# Patient Record
Sex: MALE | Race: WHITE | NOT HISPANIC OR LATINO | Employment: OTHER | URBAN - METROPOLITAN AREA
[De-identification: names, ages, dates, MRNs, and addresses within clinical notes are randomized per-mention and may not be internally consistent; named-entity substitution may affect disease eponyms.]

---

## 2017-01-03 ENCOUNTER — ALLSCRIPTS OFFICE VISIT (OUTPATIENT)
Dept: OTHER | Facility: OTHER | Age: 72
End: 2017-01-03

## 2017-01-03 ENCOUNTER — APPOINTMENT (OUTPATIENT)
Dept: LAB | Facility: CLINIC | Age: 72
End: 2017-01-03
Payer: MEDICARE

## 2017-01-03 DIAGNOSIS — G70.00 MYASTHENIA GRAVIS WITHOUT (ACUTE) EXACERBATION (HCC): ICD-10-CM

## 2017-01-03 LAB
ALBUMIN SERPL BCP-MCNC: 3.5 G/DL (ref 3.5–5)
ALP SERPL-CCNC: 47 U/L (ref 46–116)
ALT SERPL W P-5'-P-CCNC: 46 U/L (ref 12–78)
ANION GAP SERPL CALCULATED.3IONS-SCNC: 11 MMOL/L (ref 4–13)
AST SERPL W P-5'-P-CCNC: 34 U/L (ref 5–45)
BILIRUB SERPL-MCNC: 1.02 MG/DL (ref 0.2–1)
BILIRUB UR QL STRIP: ABNORMAL
BUN SERPL-MCNC: 15 MG/DL (ref 5–25)
CALCIUM SERPL-MCNC: 10 MG/DL (ref 8.3–10.1)
CHLORIDE SERPL-SCNC: 97 MMOL/L (ref 100–108)
CLARITY UR: ABNORMAL
CO2 SERPL-SCNC: 30 MMOL/L (ref 21–32)
COLOR UR: ABNORMAL
CREAT SERPL-MCNC: 1.41 MG/DL (ref 0.6–1.3)
ERYTHROCYTE [DISTWIDTH] IN BLOOD BY AUTOMATED COUNT: 12.8 % (ref 11.6–15.1)
GFR SERPL CREATININE-BSD FRML MDRD: 49.5 ML/MIN/1.73SQ M
GLUCOSE (HISTORICAL): NEGATIVE
GLUCOSE SERPL-MCNC: 194 MG/DL (ref 65–140)
HCT VFR BLD AUTO: 45.9 % (ref 36.5–49.3)
HGB BLD-MCNC: 16.2 G/DL (ref 12–17)
HGB UR QL STRIP.AUTO: ABNORMAL
KETONES UR STRIP-MCNC: 0.5 MG/DL
LEUKOCYTE ESTERASE UR QL STRIP: ABNORMAL
MCH RBC QN AUTO: 31.6 PG (ref 26.8–34.3)
MCHC RBC AUTO-ENTMCNC: 35.3 G/DL (ref 31.4–37.4)
MCV RBC AUTO: 90 FL (ref 82–98)
NITRITE UR QL STRIP: ABNORMAL
PH UR STRIP.AUTO: 5 [PH]
PLATELET # BLD AUTO: 301 THOUSANDS/UL (ref 149–390)
PMV BLD AUTO: 10.6 FL (ref 8.9–12.7)
POTASSIUM SERPL-SCNC: 3.3 MMOL/L (ref 3.5–5.3)
PROT SERPL-MCNC: 7.5 G/DL (ref 6.4–8.2)
PROT UR STRIP-MCNC: ABNORMAL MG/DL
RBC # BLD AUTO: 5.13 MILLION/UL (ref 3.88–5.62)
SODIUM SERPL-SCNC: 138 MMOL/L (ref 136–145)
SP GR UR STRIP.AUTO: 1.02
UROBILINOGEN UR QL STRIP.AUTO: ABNORMAL
WBC # BLD AUTO: 15.3 THOUSAND/UL (ref 4.31–10.16)

## 2017-01-03 PROCEDURE — 80053 COMPREHEN METABOLIC PANEL: CPT

## 2017-01-03 PROCEDURE — 36415 COLL VENOUS BLD VENIPUNCTURE: CPT

## 2017-01-03 PROCEDURE — 85027 COMPLETE CBC AUTOMATED: CPT

## 2017-01-04 ENCOUNTER — GENERIC CONVERSION - ENCOUNTER (OUTPATIENT)
Dept: OTHER | Facility: OTHER | Age: 72
End: 2017-01-04

## 2017-01-04 ENCOUNTER — LAB REQUISITION (OUTPATIENT)
Dept: LAB | Facility: HOSPITAL | Age: 72
End: 2017-01-04
Payer: MEDICARE

## 2017-01-04 DIAGNOSIS — R31.9 HEMATURIA: ICD-10-CM

## 2017-01-04 DIAGNOSIS — R33.9 RETENTION OF URINE: ICD-10-CM

## 2017-01-04 DIAGNOSIS — N39.0 URINARY TRACT INFECTION: ICD-10-CM

## 2017-01-04 DIAGNOSIS — R50.9 FEVER: ICD-10-CM

## 2017-01-04 DIAGNOSIS — R31.29 OTHER MICROSCOPIC HEMATURIA: ICD-10-CM

## 2017-01-04 DIAGNOSIS — N20.0 CALCULUS OF KIDNEY: ICD-10-CM

## 2017-01-04 DIAGNOSIS — R00.0 TACHYCARDIA: ICD-10-CM

## 2017-01-04 PROCEDURE — 87186 SC STD MICRODIL/AGAR DIL: CPT | Performed by: FAMILY MEDICINE

## 2017-01-04 PROCEDURE — 87086 URINE CULTURE/COLONY COUNT: CPT | Performed by: FAMILY MEDICINE

## 2017-01-04 PROCEDURE — 87077 CULTURE AEROBIC IDENTIFY: CPT | Performed by: FAMILY MEDICINE

## 2017-01-06 ENCOUNTER — TRANSCRIBE ORDERS (OUTPATIENT)
Dept: ADMINISTRATIVE | Facility: HOSPITAL | Age: 72
End: 2017-01-06

## 2017-01-06 ENCOUNTER — ALLSCRIPTS OFFICE VISIT (OUTPATIENT)
Dept: OTHER | Facility: OTHER | Age: 72
End: 2017-01-06

## 2017-01-06 ENCOUNTER — LAB REQUISITION (OUTPATIENT)
Dept: LAB | Facility: HOSPITAL | Age: 72
DRG: 683 | End: 2017-01-06
Payer: MEDICARE

## 2017-01-06 ENCOUNTER — APPOINTMENT (OUTPATIENT)
Dept: LAB | Facility: MEDICAL CENTER | Age: 72
DRG: 683 | End: 2017-01-06
Payer: MEDICARE

## 2017-01-06 DIAGNOSIS — N39.0 URINARY TRACT INFECTION: ICD-10-CM

## 2017-01-06 DIAGNOSIS — N20.0 CALCULUS OF KIDNEY: ICD-10-CM

## 2017-01-06 DIAGNOSIS — R17 JAUNDICE: ICD-10-CM

## 2017-01-06 LAB
ALBUMIN SERPL BCP-MCNC: 3.3 G/DL (ref 3.5–5)
ALP SERPL-CCNC: 44 U/L (ref 46–116)
ALT SERPL W P-5'-P-CCNC: 34 U/L (ref 12–78)
ANION GAP SERPL CALCULATED.3IONS-SCNC: 10 MMOL/L (ref 4–13)
AST SERPL W P-5'-P-CCNC: 18 U/L (ref 5–45)
BACTERIA UR CULT: NORMAL
BACTERIA UR QL AUTO: ABNORMAL /HPF
BILIRUB DIRECT SERPL-MCNC: 0.26 MG/DL (ref 0–0.2)
BILIRUB SERPL-MCNC: 0.89 MG/DL (ref 0.2–1)
BILIRUB UR QL STRIP: NEGATIVE
BUN SERPL-MCNC: 19 MG/DL (ref 5–25)
CALCIUM ALBUM COR SERPL-MCNC: 10.8 MG/DL (ref 8.3–10.1)
CALCIUM SERPL-MCNC: 10.2 MG/DL (ref 8.3–10.1)
CHLORIDE SERPL-SCNC: 93 MMOL/L (ref 100–108)
CLARITY UR: ABNORMAL
CLARITY UR: NORMAL
CO2 SERPL-SCNC: 32 MMOL/L (ref 21–32)
COLOR UR: YELLOW
COLOR UR: YELLOW
CREAT SERPL-MCNC: 1.31 MG/DL (ref 0.6–1.3)
GFR SERPL CREATININE-BSD FRML MDRD: 53.9 ML/MIN/1.73SQ M
GLUCOSE (HISTORICAL): NORMAL
GLUCOSE SERPL-MCNC: 199 MG/DL (ref 65–140)
GLUCOSE UR STRIP-MCNC: ABNORMAL MG/DL
HGB UR QL STRIP.AUTO: NEGATIVE
HGB UR QL STRIP.AUTO: NORMAL
KETONES UR STRIP-MCNC: NEGATIVE MG/DL
KETONES UR STRIP-MCNC: NORMAL MG/DL
LEUKOCYTE ESTERASE UR QL STRIP: ABNORMAL
LEUKOCYTE ESTERASE UR QL STRIP: NORMAL
NITRITE UR QL STRIP: NORMAL
NITRITE UR QL STRIP: POSITIVE
NON-SQ EPI CELLS URNS QL MICRO: ABNORMAL /HPF
PH UR STRIP.AUTO: 5 [PH]
PH UR STRIP.AUTO: 7 [PH] (ref 4.5–8)
POTASSIUM SERPL-SCNC: 3.1 MMOL/L (ref 3.5–5.3)
PROT SERPL-MCNC: 7.1 G/DL (ref 6.4–8.2)
PROT UR STRIP-MCNC: ABNORMAL MG/DL
PROT UR STRIP-MCNC: NORMAL MG/DL
RBC #/AREA URNS AUTO: ABNORMAL /HPF
SODIUM SERPL-SCNC: 135 MMOL/L (ref 136–145)
SP GR UR STRIP.AUTO: 1.02
SP GR UR STRIP.AUTO: 1.02 (ref 1–1.03)
UROBILINOGEN UR QL STRIP.AUTO: 2 E.U./DL
WBC #/AREA URNS AUTO: ABNORMAL /HPF

## 2017-01-06 PROCEDURE — 80053 COMPREHEN METABOLIC PANEL: CPT

## 2017-01-06 PROCEDURE — 81001 URINALYSIS AUTO W/SCOPE: CPT

## 2017-01-06 PROCEDURE — 87186 SC STD MICRODIL/AGAR DIL: CPT

## 2017-01-06 PROCEDURE — 82360 CALCULUS ASSAY QUANT: CPT | Performed by: UROLOGY

## 2017-01-06 PROCEDURE — 36415 COLL VENOUS BLD VENIPUNCTURE: CPT

## 2017-01-06 PROCEDURE — 82248 BILIRUBIN DIRECT: CPT

## 2017-01-06 PROCEDURE — 87086 URINE CULTURE/COLONY COUNT: CPT

## 2017-01-08 LAB — BACTERIA UR CULT: NORMAL

## 2017-01-09 ENCOUNTER — HOSPITAL ENCOUNTER (OUTPATIENT)
Dept: ULTRASOUND IMAGING | Facility: HOSPITAL | Age: 72
Discharge: HOME | DRG: 683 | End: 2017-01-09
Payer: MEDICARE

## 2017-01-09 DIAGNOSIS — N39.0 URINARY TRACT INFECTION: ICD-10-CM

## 2017-01-09 PROCEDURE — 76770 US EXAM ABDO BACK WALL COMP: CPT

## 2017-01-10 ENCOUNTER — HOSPITAL ENCOUNTER (INPATIENT)
Facility: HOSPITAL | Age: 72
LOS: 2 days | Discharge: HOME | DRG: 683 | End: 2017-01-12
Attending: EMERGENCY MEDICINE | Admitting: HOSPITALIST
Payer: MEDICARE

## 2017-01-10 ENCOUNTER — APPOINTMENT (EMERGENCY)
Dept: RADIOLOGY | Facility: HOSPITAL | Age: 72
DRG: 683 | End: 2017-01-10
Payer: MEDICARE

## 2017-01-10 DIAGNOSIS — G70.00 MYASTHENIA GRAVIS (HCC): ICD-10-CM

## 2017-01-10 DIAGNOSIS — N17.9 AKI (ACUTE KIDNEY INJURY) (HCC): Primary | ICD-10-CM

## 2017-01-10 DIAGNOSIS — N20.0 KIDNEY STONE: ICD-10-CM

## 2017-01-10 DIAGNOSIS — G70.00 MYASTHENIA (HCC): ICD-10-CM

## 2017-01-10 DIAGNOSIS — R73.9 HYPERGLYCEMIA: ICD-10-CM

## 2017-01-10 DIAGNOSIS — N39.0 UTI (URINARY TRACT INFECTION): ICD-10-CM

## 2017-01-10 LAB
ALBUMIN SERPL BCP-MCNC: 3.6 G/DL (ref 3.5–5)
ALP SERPL-CCNC: 51 U/L (ref 46–116)
ALT SERPL W P-5'-P-CCNC: 40 U/L (ref 12–78)
ANION GAP SERPL CALCULATED.3IONS-SCNC: 11 MMOL/L (ref 4–13)
APTT PPP: 25 SECONDS (ref 24–36)
AST SERPL W P-5'-P-CCNC: 33 U/L (ref 5–45)
ATRIAL RATE: 101 BPM
BACTERIA UR QL AUTO: ABNORMAL /HPF
BASOPHILS # BLD AUTO: 0.01 THOUSANDS/ΜL (ref 0–0.1)
BASOPHILS NFR BLD AUTO: 0 % (ref 0–1)
BILIRUB SERPL-MCNC: 0.93 MG/DL (ref 0.2–1)
BILIRUB UR QL STRIP: NEGATIVE
BUN SERPL-MCNC: 22 MG/DL (ref 5–25)
CALCIUM SERPL-MCNC: 9.7 MG/DL (ref 8.3–10.1)
CHLORIDE SERPL-SCNC: 94 MMOL/L (ref 100–108)
CLARITY UR: ABNORMAL
CO2 SERPL-SCNC: 30 MMOL/L (ref 21–32)
COLOR UR: ABNORMAL
CREAT SERPL-MCNC: 1.67 MG/DL (ref 0.6–1.3)
DEPRECATED D DIMER PPP: 282 NG/ML (FEU) (ref 0–424)
EOSINOPHIL # BLD AUTO: 0.01 THOUSAND/ΜL (ref 0–0.61)
EOSINOPHIL NFR BLD AUTO: 0 % (ref 0–6)
ERYTHROCYTE [DISTWIDTH] IN BLOOD BY AUTOMATED COUNT: 12.8 % (ref 11.6–15.1)
GFR SERPL CREATININE-BSD FRML MDRD: 40.8 ML/MIN/1.73SQ M
GLUCOSE SERPL-MCNC: 299 MG/DL (ref 65–140)
GLUCOSE UR STRIP-MCNC: ABNORMAL MG/DL
HCT VFR BLD AUTO: 42.6 % (ref 36.5–49.3)
HGB BLD-MCNC: 15.3 G/DL (ref 12–17)
HGB UR QL STRIP.AUTO: NEGATIVE
INR PPP: 1.04 (ref 0.86–1.16)
KETONES UR STRIP-MCNC: ABNORMAL MG/DL
LEUKOCYTE ESTERASE UR QL STRIP: ABNORMAL
LYMPHOCYTES # BLD AUTO: 1.01 THOUSANDS/ΜL (ref 0.6–4.47)
LYMPHOCYTES NFR BLD AUTO: 9 % (ref 14–44)
MCH RBC QN AUTO: 32 PG (ref 26.8–34.3)
MCHC RBC AUTO-ENTMCNC: 35.9 G/DL (ref 31.4–37.4)
MCV RBC AUTO: 89 FL (ref 82–98)
MONOCYTES # BLD AUTO: 0.21 THOUSAND/ΜL (ref 0.17–1.22)
MONOCYTES NFR BLD AUTO: 2 % (ref 4–12)
NEUTROPHILS # BLD AUTO: 10.16 THOUSANDS/ΜL (ref 1.85–7.62)
NEUTS SEG NFR BLD AUTO: 89 % (ref 43–75)
NITRITE UR QL STRIP: NEGATIVE
NON-SQ EPI CELLS URNS QL MICRO: ABNORMAL /HPF
NRBC BLD AUTO-RTO: 0 /100 WBCS
NT-PROBNP SERPL-MCNC: 225 PG/ML
P AXIS: 52 DEGREES
PH UR STRIP.AUTO: 6.5 [PH] (ref 4.5–8)
PLATELET # BLD AUTO: 261 THOUSANDS/UL (ref 149–390)
PMV BLD AUTO: 10.3 FL (ref 8.9–12.7)
POTASSIUM SERPL-SCNC: 3.4 MMOL/L (ref 3.5–5.3)
PR INTERVAL: 142 MS
PROT SERPL-MCNC: 7.5 G/DL (ref 6.4–8.2)
PROT UR STRIP-MCNC: NEGATIVE MG/DL
PROTHROMBIN TIME: 13.6 SECONDS (ref 12–14.3)
QRS AXIS: -19 DEGREES
QRSD INTERVAL: 114 MS
QT INTERVAL: 340 MS
QTC INTERVAL: 440 MS
RBC # BLD AUTO: 4.78 MILLION/UL (ref 3.88–5.62)
RBC #/AREA URNS AUTO: ABNORMAL /HPF
SODIUM SERPL-SCNC: 135 MMOL/L (ref 136–145)
SP GR UR STRIP.AUTO: 1.01 (ref 1–1.03)
SPECIMEN SOURCE: NORMAL
T WAVE AXIS: 26 DEGREES
TROPONIN I BLD-MCNC: 0 NG/ML (ref 0–0.08)
TSH SERPL DL<=0.05 MIU/L-ACNC: 2.16 UIU/ML (ref 0.36–3.74)
UROBILINOGEN UR QL STRIP.AUTO: 2 E.U./DL
VENTRICULAR RATE: 101 BPM
WBC # BLD AUTO: 11.4 THOUSAND/UL (ref 4.31–10.16)
WBC #/AREA URNS AUTO: ABNORMAL /HPF

## 2017-01-10 PROCEDURE — 80053 COMPREHEN METABOLIC PANEL: CPT | Performed by: EMERGENCY MEDICINE

## 2017-01-10 PROCEDURE — 36415 COLL VENOUS BLD VENIPUNCTURE: CPT | Performed by: EMERGENCY MEDICINE

## 2017-01-10 PROCEDURE — 84484 ASSAY OF TROPONIN QUANT: CPT

## 2017-01-10 PROCEDURE — 85025 COMPLETE CBC W/AUTO DIFF WBC: CPT | Performed by: EMERGENCY MEDICINE

## 2017-01-10 PROCEDURE — 83880 ASSAY OF NATRIURETIC PEPTIDE: CPT | Performed by: EMERGENCY MEDICINE

## 2017-01-10 PROCEDURE — 85379 FIBRIN DEGRADATION QUANT: CPT | Performed by: EMERGENCY MEDICINE

## 2017-01-10 PROCEDURE — 99285 EMERGENCY DEPT VISIT HI MDM: CPT

## 2017-01-10 PROCEDURE — 87086 URINE CULTURE/COLONY COUNT: CPT

## 2017-01-10 PROCEDURE — 85610 PROTHROMBIN TIME: CPT | Performed by: EMERGENCY MEDICINE

## 2017-01-10 PROCEDURE — 85730 THROMBOPLASTIN TIME PARTIAL: CPT | Performed by: EMERGENCY MEDICINE

## 2017-01-10 PROCEDURE — 71020 HB CHEST X-RAY 2VW FRONTAL&LATL: CPT

## 2017-01-10 PROCEDURE — 84443 ASSAY THYROID STIM HORMONE: CPT | Performed by: EMERGENCY MEDICINE

## 2017-01-10 PROCEDURE — 93005 ELECTROCARDIOGRAM TRACING: CPT | Performed by: EMERGENCY MEDICINE

## 2017-01-10 PROCEDURE — 81002 URINALYSIS NONAUTO W/O SCOPE: CPT | Performed by: EMERGENCY MEDICINE

## 2017-01-10 PROCEDURE — 81001 URINALYSIS AUTO W/SCOPE: CPT

## 2017-01-10 RX ORDER — NITROFURANTOIN MACROCRYSTALS 100 MG/1
100 CAPSULE ORAL EVERY 12 HOURS
Status: ON HOLD | COMMUNITY
End: 2017-01-12

## 2017-01-10 RX ORDER — POTASSIUM CHLORIDE 750 MG/1
10 TABLET, EXTENDED RELEASE ORAL DAILY
COMMUNITY
End: 2017-03-06

## 2017-01-10 RX ORDER — FAMOTIDINE 20 MG/1
20 TABLET, FILM COATED ORAL DAILY
Status: DISCONTINUED | OUTPATIENT
Start: 2017-01-11 | End: 2017-01-12 | Stop reason: HOSPADM

## 2017-01-10 RX ORDER — MELATONIN
2 DAILY
COMMUNITY
End: 2019-10-29

## 2017-01-10 RX ORDER — PREDNISONE 20 MG/1
20 TABLET ORAL DAILY
Status: DISCONTINUED | OUTPATIENT
Start: 2017-01-11 | End: 2017-01-10

## 2017-01-10 RX ORDER — HYDROCHLOROTHIAZIDE 25 MG/1
25 TABLET ORAL DAILY
COMMUNITY
End: 2018-06-20

## 2017-01-10 RX ORDER — ACETAMINOPHEN 325 MG/1
650 TABLET ORAL EVERY 6 HOURS PRN
Status: DISCONTINUED | OUTPATIENT
Start: 2017-01-10 | End: 2017-01-12 | Stop reason: HOSPADM

## 2017-01-10 RX ORDER — NITROFURANTOIN 25; 75 MG/1; MG/1
100 CAPSULE ORAL 2 TIMES DAILY WITH MEALS
Status: DISCONTINUED | OUTPATIENT
Start: 2017-01-10 | End: 2017-01-12 | Stop reason: HOSPADM

## 2017-01-10 RX ORDER — HEPARIN SODIUM 5000 [USP'U]/ML
5000 INJECTION, SOLUTION INTRAVENOUS; SUBCUTANEOUS EVERY 8 HOURS SCHEDULED
Status: DISCONTINUED | OUTPATIENT
Start: 2017-01-10 | End: 2017-01-12 | Stop reason: HOSPADM

## 2017-01-10 RX ORDER — SODIUM CHLORIDE 9 MG/ML
75 INJECTION, SOLUTION INTRAVENOUS CONTINUOUS
Status: CANCELLED | OUTPATIENT
Start: 2017-01-10

## 2017-01-10 RX ORDER — MECLIZINE HCL 12.5 MG/1
12.5 TABLET ORAL 3 TIMES DAILY PRN
Status: DISCONTINUED | OUTPATIENT
Start: 2017-01-10 | End: 2017-01-12 | Stop reason: HOSPADM

## 2017-01-10 RX ORDER — MELATONIN
2000 DAILY
Status: DISCONTINUED | OUTPATIENT
Start: 2017-01-11 | End: 2017-01-12 | Stop reason: HOSPADM

## 2017-01-10 RX ORDER — MECLIZINE HCL 12.5 MG/1
12.5 TABLET ORAL DAILY
COMMUNITY
End: 2018-11-27 | Stop reason: SDUPTHER

## 2017-01-10 RX ORDER — ONDANSETRON 2 MG/ML
4 INJECTION INTRAMUSCULAR; INTRAVENOUS EVERY 6 HOURS PRN
Status: DISCONTINUED | OUTPATIENT
Start: 2017-01-10 | End: 2017-01-12 | Stop reason: HOSPADM

## 2017-01-10 RX ORDER — SODIUM CHLORIDE AND POTASSIUM CHLORIDE .9; .15 G/100ML; G/100ML
75 SOLUTION INTRAVENOUS CONTINUOUS
Status: DISCONTINUED | OUTPATIENT
Start: 2017-01-10 | End: 2017-01-12 | Stop reason: HOSPADM

## 2017-01-10 RX ORDER — FAMOTIDINE 20 MG/1
20 TABLET, FILM COATED ORAL DAILY
COMMUNITY
End: 2018-04-05 | Stop reason: SDUPTHER

## 2017-01-10 RX ORDER — PREDNISONE 20 MG/1
30 TABLET ORAL DAILY
Status: DISCONTINUED | OUTPATIENT
Start: 2017-01-11 | End: 2017-01-12 | Stop reason: HOSPADM

## 2017-01-10 RX ORDER — HYDRALAZINE HYDROCHLORIDE 20 MG/ML
10 INJECTION INTRAMUSCULAR; INTRAVENOUS EVERY 6 HOURS PRN
Status: DISCONTINUED | OUTPATIENT
Start: 2017-01-10 | End: 2017-01-12 | Stop reason: HOSPADM

## 2017-01-10 RX ORDER — PREDNISONE 10 MG/1
20 TABLET ORAL DAILY
Status: ON HOLD | COMMUNITY
End: 2017-01-12

## 2017-01-10 RX ORDER — POTASSIUM CHLORIDE 750 MG/1
10 TABLET, EXTENDED RELEASE ORAL DAILY
Status: DISCONTINUED | OUTPATIENT
Start: 2017-01-11 | End: 2017-01-12 | Stop reason: HOSPADM

## 2017-01-10 RX ADMIN — SODIUM CHLORIDE 1000 ML: 0.9 INJECTION, SOLUTION INTRAVENOUS at 12:28

## 2017-01-10 RX ADMIN — NITROFURANTOIN MONOHYDRATE/MACROCRYSTALLINE 100 MG: 25; 75 CAPSULE ORAL at 18:44

## 2017-01-10 RX ADMIN — SODIUM CHLORIDE AND POTASSIUM CHLORIDE 75 ML/HR: .9; .15 SOLUTION INTRAVENOUS at 18:44

## 2017-01-10 RX ADMIN — HEPARIN SODIUM 5000 UNITS: 5000 INJECTION, SOLUTION INTRAVENOUS; SUBCUTANEOUS at 22:16

## 2017-01-11 PROBLEM — E87.6 HYPOKALEMIA: Status: ACTIVE | Noted: 2017-01-11

## 2017-01-11 LAB
ALBUMIN SERPL BCP-MCNC: 3 G/DL (ref 3.5–5)
ALP SERPL-CCNC: 45 U/L (ref 46–116)
ALT SERPL W P-5'-P-CCNC: 29 U/L (ref 12–78)
ANION GAP SERPL CALCULATED.3IONS-SCNC: 11 MMOL/L (ref 4–13)
AST SERPL W P-5'-P-CCNC: 23 U/L (ref 5–45)
BACTERIA UR CULT: NORMAL
BILIRUB SERPL-MCNC: 0.66 MG/DL (ref 0.2–1)
BUN SERPL-MCNC: 17 MG/DL (ref 5–25)
CALCIUM SERPL-MCNC: 9.1 MG/DL (ref 8.3–10.1)
CHLORIDE SERPL-SCNC: 102 MMOL/L (ref 100–108)
CO2 SERPL-SCNC: 28 MMOL/L (ref 21–32)
CREAT SERPL-MCNC: 1.24 MG/DL (ref 0.6–1.3)
ERYTHROCYTE [DISTWIDTH] IN BLOOD BY AUTOMATED COUNT: 12.9 % (ref 11.6–15.1)
GFR SERPL CREATININE-BSD FRML MDRD: 57.5 ML/MIN/1.73SQ M
GLUCOSE SERPL-MCNC: 138 MG/DL (ref 65–140)
HCT VFR BLD AUTO: 38.9 % (ref 36.5–49.3)
HGB BLD-MCNC: 13.7 G/DL (ref 12–17)
MCH RBC QN AUTO: 31.6 PG (ref 26.8–34.3)
MCHC RBC AUTO-ENTMCNC: 35.2 G/DL (ref 31.4–37.4)
MCV RBC AUTO: 90 FL (ref 82–98)
PLATELET # BLD AUTO: 224 THOUSANDS/UL (ref 149–390)
PMV BLD AUTO: 10.2 FL (ref 8.9–12.7)
POTASSIUM SERPL-SCNC: 3.3 MMOL/L (ref 3.5–5.3)
PROT SERPL-MCNC: 6.4 G/DL (ref 6.4–8.2)
RBC # BLD AUTO: 4.34 MILLION/UL (ref 3.88–5.62)
SODIUM SERPL-SCNC: 141 MMOL/L (ref 136–145)
WBC # BLD AUTO: 9.12 THOUSAND/UL (ref 4.31–10.16)

## 2017-01-11 PROCEDURE — G8978 MOBILITY CURRENT STATUS: HCPCS

## 2017-01-11 PROCEDURE — G8979 MOBILITY GOAL STATUS: HCPCS

## 2017-01-11 PROCEDURE — 97162 PT EVAL MOD COMPLEX 30 MIN: CPT

## 2017-01-11 PROCEDURE — 80053 COMPREHEN METABOLIC PANEL: CPT | Performed by: PHYSICIAN ASSISTANT

## 2017-01-11 PROCEDURE — 97110 THERAPEUTIC EXERCISES: CPT

## 2017-01-11 PROCEDURE — 85027 COMPLETE CBC AUTOMATED: CPT | Performed by: PHYSICIAN ASSISTANT

## 2017-01-11 RX ADMIN — PREDNISONE 30 MG: 20 TABLET ORAL at 09:57

## 2017-01-11 RX ADMIN — HEPARIN SODIUM 5000 UNITS: 5000 INJECTION, SOLUTION INTRAVENOUS; SUBCUTANEOUS at 21:38

## 2017-01-11 RX ADMIN — NITROFURANTOIN MONOHYDRATE/MACROCRYSTALLINE 100 MG: 25; 75 CAPSULE ORAL at 08:00

## 2017-01-11 RX ADMIN — FAMOTIDINE 20 MG: 20 TABLET ORAL at 09:56

## 2017-01-11 RX ADMIN — SODIUM CHLORIDE AND POTASSIUM CHLORIDE 75 ML/HR: .9; .15 SOLUTION INTRAVENOUS at 08:14

## 2017-01-11 RX ADMIN — VITAMIN D, TAB 1000IU (100/BT) 2000 UNITS: 25 TAB at 09:57

## 2017-01-11 RX ADMIN — NITROFURANTOIN MONOHYDRATE/MACROCRYSTALLINE 100 MG: 25; 75 CAPSULE ORAL at 17:12

## 2017-01-11 RX ADMIN — HEPARIN SODIUM 5000 UNITS: 5000 INJECTION, SOLUTION INTRAVENOUS; SUBCUTANEOUS at 13:54

## 2017-01-11 RX ADMIN — POTASSIUM CHLORIDE 10 MEQ: 750 TABLET, EXTENDED RELEASE ORAL at 09:57

## 2017-01-11 RX ADMIN — HEPARIN SODIUM 5000 UNITS: 5000 INJECTION, SOLUTION INTRAVENOUS; SUBCUTANEOUS at 05:13

## 2017-01-12 VITALS
WEIGHT: 173 LBS | DIASTOLIC BLOOD PRESSURE: 69 MMHG | HEART RATE: 75 BPM | OXYGEN SATURATION: 97 % | TEMPERATURE: 96.5 F | SYSTOLIC BLOOD PRESSURE: 113 MMHG | RESPIRATION RATE: 18 BRPM

## 2017-01-12 RX ORDER — PREDNISONE 10 MG/1
30 TABLET ORAL DAILY
Refills: 0
Start: 2017-01-12 | End: 2017-02-11

## 2017-01-12 RX ORDER — NITROFURANTOIN MACROCRYSTALS 100 MG/1
100 CAPSULE ORAL EVERY 12 HOURS
Qty: 4 CAPSULE | Refills: 0
Start: 2017-01-12 | End: 2017-01-14

## 2017-01-12 RX ADMIN — HEPARIN SODIUM 5000 UNITS: 5000 INJECTION, SOLUTION INTRAVENOUS; SUBCUTANEOUS at 05:11

## 2017-01-12 RX ADMIN — FAMOTIDINE 20 MG: 20 TABLET ORAL at 08:26

## 2017-01-12 RX ADMIN — POTASSIUM CHLORIDE 10 MEQ: 750 TABLET, EXTENDED RELEASE ORAL at 08:26

## 2017-01-12 RX ADMIN — VITAMIN D, TAB 1000IU (100/BT) 2000 UNITS: 25 TAB at 08:26

## 2017-01-12 RX ADMIN — NITROFURANTOIN MONOHYDRATE/MACROCRYSTALLINE 100 MG: 25; 75 CAPSULE ORAL at 08:27

## 2017-01-12 RX ADMIN — SODIUM CHLORIDE AND POTASSIUM CHLORIDE 75 ML/HR: .9; .15 SOLUTION INTRAVENOUS at 00:04

## 2017-01-12 RX ADMIN — PREDNISONE 30 MG: 20 TABLET ORAL at 08:26

## 2017-01-13 LAB
CA PHOS MFR STONE: 3 %
COLOR STONE: NORMAL
COM MFR STONE: 7 %
COMMENT-STONE3: NORMAL
COMPOSITION: NORMAL
LABORATORY COMMENT REPORT: NORMAL
NIDUS STONE QL: NORMAL
PHOTO: NORMAL
SIZE STONE: NORMAL MM
STONE ANALYSIS-IMP: NORMAL
URATE MFR STONE: 90 %
WT STONE: 28 MG

## 2017-01-17 DIAGNOSIS — R73.09 OTHER ABNORMAL GLUCOSE: ICD-10-CM

## 2017-01-17 DIAGNOSIS — E87.6 HYPOKALEMIA: ICD-10-CM

## 2017-01-17 DIAGNOSIS — N39.0 URINARY TRACT INFECTION: ICD-10-CM

## 2017-01-17 DIAGNOSIS — Z92.89 PERSONAL HISTORY OF OTHER MEDICAL TREATMENT: ICD-10-CM

## 2017-01-17 DIAGNOSIS — R29.898 OTHER SYMPTOMS AND SIGNS INVOLVING THE MUSCULOSKELETAL SYSTEM: ICD-10-CM

## 2017-01-17 DIAGNOSIS — E11.9 TYPE 2 DIABETES MELLITUS WITHOUT COMPLICATIONS (HCC): ICD-10-CM

## 2017-01-17 DIAGNOSIS — G70.00 MYASTHENIA GRAVIS WITHOUT (ACUTE) EXACERBATION (HCC): ICD-10-CM

## 2017-01-18 ENCOUNTER — APPOINTMENT (OUTPATIENT)
Dept: LAB | Facility: CLINIC | Age: 72
End: 2017-01-18
Payer: MEDICARE

## 2017-01-18 DIAGNOSIS — R73.09 OTHER ABNORMAL GLUCOSE: ICD-10-CM

## 2017-01-18 DIAGNOSIS — E87.6 HYPOKALEMIA: ICD-10-CM

## 2017-01-18 DIAGNOSIS — R29.898 OTHER SYMPTOMS AND SIGNS INVOLVING THE MUSCULOSKELETAL SYSTEM: ICD-10-CM

## 2017-01-18 DIAGNOSIS — G70.00 MYASTHENIA GRAVIS WITHOUT (ACUTE) EXACERBATION (HCC): ICD-10-CM

## 2017-01-18 DIAGNOSIS — N39.0 URINARY TRACT INFECTION: ICD-10-CM

## 2017-01-18 LAB
ALBUMIN SERPL BCP-MCNC: 3.5 G/DL (ref 3.5–5)
ALP SERPL-CCNC: 39 U/L (ref 46–116)
ALT SERPL W P-5'-P-CCNC: 49 U/L (ref 12–78)
ANION GAP SERPL CALCULATED.3IONS-SCNC: 9 MMOL/L (ref 4–13)
AST SERPL W P-5'-P-CCNC: 32 U/L (ref 5–45)
BASOPHILS # BLD AUTO: 0.03 THOUSANDS/ΜL (ref 0–0.1)
BASOPHILS NFR BLD AUTO: 0 % (ref 0–1)
BILIRUB SERPL-MCNC: 0.75 MG/DL (ref 0.2–1)
BUN SERPL-MCNC: 22 MG/DL (ref 5–25)
CALCIUM SERPL-MCNC: 10 MG/DL (ref 8.3–10.1)
CHLORIDE SERPL-SCNC: 97 MMOL/L (ref 100–108)
CO2 SERPL-SCNC: 30 MMOL/L (ref 21–32)
CREAT SERPL-MCNC: 1.34 MG/DL (ref 0.6–1.3)
EOSINOPHIL # BLD AUTO: 0.06 THOUSAND/ΜL (ref 0–0.61)
EOSINOPHIL NFR BLD AUTO: 1 % (ref 0–6)
ERYTHROCYTE [DISTWIDTH] IN BLOOD BY AUTOMATED COUNT: 13.4 % (ref 11.6–15.1)
EST. AVERAGE GLUCOSE BLD GHB EST-MCNC: 203 MG/DL
GFR SERPL CREATININE-BSD FRML MDRD: 52.5 ML/MIN/1.73SQ M
GLUCOSE SERPL-MCNC: 163 MG/DL (ref 65–140)
HBA1C MFR BLD: 8.7 % (ref 4.2–6.3)
HCT VFR BLD AUTO: 42 % (ref 36.5–49.3)
HGB BLD-MCNC: 14.4 G/DL (ref 12–17)
LYMPHOCYTES # BLD AUTO: 4.5 THOUSANDS/ΜL (ref 0.6–4.47)
LYMPHOCYTES NFR BLD AUTO: 36 % (ref 14–44)
MCH RBC QN AUTO: 31.3 PG (ref 26.8–34.3)
MCHC RBC AUTO-ENTMCNC: 34.3 G/DL (ref 31.4–37.4)
MCV RBC AUTO: 91 FL (ref 82–98)
MONOCYTES # BLD AUTO: 1.05 THOUSAND/ΜL (ref 0.17–1.22)
MONOCYTES NFR BLD AUTO: 8 % (ref 4–12)
NEUTROPHILS # BLD AUTO: 6.71 THOUSANDS/ΜL (ref 1.85–7.62)
NEUTS SEG NFR BLD AUTO: 55 % (ref 43–75)
NRBC BLD AUTO-RTO: 0 /100 WBCS
NT-PROBNP SERPL-MCNC: 272 PG/ML
PLATELET # BLD AUTO: 315 THOUSANDS/UL (ref 149–390)
PMV BLD AUTO: 10.1 FL (ref 8.9–12.7)
POTASSIUM SERPL-SCNC: 4 MMOL/L (ref 3.5–5.3)
PROT SERPL-MCNC: 7 G/DL (ref 6.4–8.2)
RBC # BLD AUTO: 4.6 MILLION/UL (ref 3.88–5.62)
SODIUM SERPL-SCNC: 136 MMOL/L (ref 136–145)
WBC # BLD AUTO: 12.46 THOUSAND/UL (ref 4.31–10.16)

## 2017-01-18 PROCEDURE — 85025 COMPLETE CBC W/AUTO DIFF WBC: CPT

## 2017-01-18 PROCEDURE — 36415 COLL VENOUS BLD VENIPUNCTURE: CPT

## 2017-01-18 PROCEDURE — 80053 COMPREHEN METABOLIC PANEL: CPT

## 2017-01-18 PROCEDURE — 83036 HEMOGLOBIN GLYCOSYLATED A1C: CPT

## 2017-01-18 PROCEDURE — 83880 ASSAY OF NATRIURETIC PEPTIDE: CPT

## 2017-01-20 ENCOUNTER — ALLSCRIPTS OFFICE VISIT (OUTPATIENT)
Dept: OTHER | Facility: OTHER | Age: 72
End: 2017-01-20

## 2017-01-31 ENCOUNTER — ALLSCRIPTS OFFICE VISIT (OUTPATIENT)
Dept: OTHER | Facility: OTHER | Age: 72
End: 2017-01-31

## 2017-02-03 ENCOUNTER — ALLSCRIPTS OFFICE VISIT (OUTPATIENT)
Dept: OTHER | Facility: OTHER | Age: 72
End: 2017-02-03

## 2017-02-03 ENCOUNTER — TRANSCRIBE ORDERS (OUTPATIENT)
Dept: ADMINISTRATIVE | Facility: HOSPITAL | Age: 72
End: 2017-02-03

## 2017-02-03 DIAGNOSIS — E11.9 TYPE 2 DIABETES MELLITUS WITHOUT COMPLICATIONS (HCC): ICD-10-CM

## 2017-02-03 DIAGNOSIS — E10.8 TYPE 1 DIABETES MELLITUS WITH COMPLICATION (HCC): ICD-10-CM

## 2017-02-03 DIAGNOSIS — R29.898 OTHER SYMPTOMS REFERABLE TO UPPER ARM JOINT: ICD-10-CM

## 2017-02-03 DIAGNOSIS — G70.00 MYASTHENIA GRAVIS WITHOUT EXACERBATION (HCC): ICD-10-CM

## 2017-02-03 DIAGNOSIS — M79.601 RIGHT UPPER LIMB PAIN: Primary | ICD-10-CM

## 2017-02-24 ENCOUNTER — GENERIC CONVERSION - ENCOUNTER (OUTPATIENT)
Dept: OTHER | Facility: OTHER | Age: 72
End: 2017-02-24

## 2017-02-24 ENCOUNTER — HOSPITAL ENCOUNTER (OUTPATIENT)
Dept: NEUROLOGY | Facility: CLINIC | Age: 72
Discharge: HOME | End: 2017-02-24
Payer: MEDICARE

## 2017-02-24 ENCOUNTER — APPOINTMENT (OUTPATIENT)
Dept: LAB | Facility: MEDICAL CENTER | Age: 72
End: 2017-02-24
Payer: MEDICARE

## 2017-02-24 DIAGNOSIS — N39.0 URINARY TRACT INFECTION: ICD-10-CM

## 2017-02-24 DIAGNOSIS — R82.90 ABNORMAL FINDING IN URINE: ICD-10-CM

## 2017-02-24 DIAGNOSIS — M79.601 RIGHT UPPER LIMB PAIN: ICD-10-CM

## 2017-02-24 DIAGNOSIS — G70.00 MYASTHENIA GRAVIS WITHOUT (ACUTE) EXACERBATION (HCC): ICD-10-CM

## 2017-02-24 DIAGNOSIS — E11.9 TYPE 2 DIABETES MELLITUS WITHOUT COMPLICATIONS (HCC): ICD-10-CM

## 2017-02-24 DIAGNOSIS — E87.1 HYPO-OSMOLALITY AND HYPONATREMIA: ICD-10-CM

## 2017-02-24 DIAGNOSIS — R31.9 HEMATURIA: ICD-10-CM

## 2017-02-24 DIAGNOSIS — G70.00 MYASTHENIA GRAVIS (HCC): ICD-10-CM

## 2017-02-24 DIAGNOSIS — N26.1 ATROPHY OF KIDNEY (TERMINAL): ICD-10-CM

## 2017-02-24 DIAGNOSIS — Z12.5 ENCOUNTER FOR SCREENING FOR MALIGNANT NEOPLASM OF PROSTATE: ICD-10-CM

## 2017-02-24 DIAGNOSIS — G62.9 NEUROPATHY: ICD-10-CM

## 2017-02-24 DIAGNOSIS — E83.52 HYPERCALCEMIA: ICD-10-CM

## 2017-02-24 LAB
BACTERIA UR QL AUTO: ABNORMAL /HPF
BILIRUB UR QL STRIP: NEGATIVE
CLARITY UR: CLEAR
COLOR UR: YELLOW
GLUCOSE UR STRIP-MCNC: ABNORMAL MG/DL
HGB UR QL STRIP.AUTO: NEGATIVE
KETONES UR STRIP-MCNC: NEGATIVE MG/DL
LEUKOCYTE ESTERASE UR QL STRIP: ABNORMAL
NITRITE UR QL STRIP: NEGATIVE
NON-SQ EPI CELLS URNS QL MICRO: ABNORMAL /HPF
PH UR STRIP.AUTO: 6 [PH] (ref 4.5–8)
PROT UR STRIP-MCNC: NEGATIVE MG/DL
PSA SERPL-MCNC: 0.4 NG/ML (ref 0–4)
RBC #/AREA URNS AUTO: ABNORMAL /HPF
SP GR UR STRIP.AUTO: 1.03 (ref 1–1.03)
UROBILINOGEN UR QL STRIP.AUTO: 1 E.U./DL
WBC #/AREA URNS AUTO: ABNORMAL /HPF

## 2017-02-24 PROCEDURE — 81001 URINALYSIS AUTO W/SCOPE: CPT

## 2017-02-24 PROCEDURE — 36415 COLL VENOUS BLD VENIPUNCTURE: CPT

## 2017-02-24 PROCEDURE — 95886 MUSC TEST DONE W/N TEST COMP: CPT

## 2017-02-24 PROCEDURE — 95910 NRV CNDJ TEST 7-8 STUDIES: CPT

## 2017-02-24 PROCEDURE — G0103 PSA SCREENING: HCPCS

## 2017-03-03 ENCOUNTER — GENERIC CONVERSION - ENCOUNTER (OUTPATIENT)
Dept: OTHER | Facility: OTHER | Age: 72
End: 2017-03-03

## 2017-03-03 ENCOUNTER — APPOINTMENT (OUTPATIENT)
Dept: LAB | Facility: CLINIC | Age: 72
DRG: 683 | End: 2017-03-03
Payer: MEDICARE

## 2017-03-03 ENCOUNTER — ALLSCRIPTS OFFICE VISIT (OUTPATIENT)
Dept: OTHER | Facility: OTHER | Age: 72
End: 2017-03-03

## 2017-03-03 ENCOUNTER — APPOINTMENT (OUTPATIENT)
Dept: LAB | Facility: HOSPITAL | Age: 72
DRG: 683 | End: 2017-03-03
Payer: MEDICARE

## 2017-03-03 DIAGNOSIS — E10.8 TYPE 1 DIABETES MELLITUS WITH COMPLICATION (HCC): ICD-10-CM

## 2017-03-03 DIAGNOSIS — R31.9 HEMATURIA: ICD-10-CM

## 2017-03-03 DIAGNOSIS — G70.00 MYASTHENIA GRAVIS WITHOUT EXACERBATION (HCC): ICD-10-CM

## 2017-03-03 DIAGNOSIS — R29.898 OTHER SYMPTOMS REFERABLE TO UPPER ARM JOINT: ICD-10-CM

## 2017-03-03 DIAGNOSIS — M79.601 RIGHT UPPER LIMB PAIN: ICD-10-CM

## 2017-03-03 LAB
ALBUMIN SERPL BCP-MCNC: 3.8 G/DL (ref 3.5–5)
ALP SERPL-CCNC: 51 U/L (ref 46–116)
ALT SERPL W P-5'-P-CCNC: 31 U/L (ref 12–78)
ANION GAP SERPL CALCULATED.3IONS-SCNC: 12 MMOL/L (ref 4–13)
AST SERPL W P-5'-P-CCNC: 21 U/L (ref 5–45)
BACTERIA UR QL AUTO: ABNORMAL /HPF
BILIRUB SERPL-MCNC: 1.06 MG/DL (ref 0.2–1)
BILIRUB UR QL STRIP: NEGATIVE
BUN SERPL-MCNC: 29 MG/DL (ref 5–25)
CALCIUM ALBUM COR SERPL-MCNC: 10.8 MG/DL (ref 8.3–10.1)
CALCIUM SERPL-MCNC: 10.6 MG/DL (ref 8.3–10.1)
CHLORIDE SERPL-SCNC: 86 MMOL/L (ref 100–108)
CLARITY UR: CLEAR
CO2 SERPL-SCNC: 32 MMOL/L (ref 21–32)
COLOR UR: YELLOW
CREAT SERPL-MCNC: 1.82 MG/DL (ref 0.6–1.3)
EST. AVERAGE GLUCOSE BLD GHB EST-MCNC: 269 MG/DL
GFR SERPL CREATININE-BSD FRML MDRD: 36.8 ML/MIN/1.73SQ M
GLUCOSE SERPL-MCNC: 497 MG/DL (ref 65–140)
GLUCOSE UR STRIP-MCNC: ABNORMAL MG/DL
HBA1C MFR BLD: 11 % (ref 4.2–6.3)
HGB UR QL STRIP.AUTO: NEGATIVE
HYALINE CASTS #/AREA URNS LPF: ABNORMAL /LPF
KETONES UR STRIP-MCNC: NEGATIVE MG/DL
LEUKOCYTE ESTERASE UR QL STRIP: ABNORMAL
NITRITE UR QL STRIP: NEGATIVE
NON-SQ EPI CELLS URNS QL MICRO: ABNORMAL /HPF
PH UR STRIP.AUTO: 6 [PH] (ref 4.5–8)
POTASSIUM SERPL-SCNC: 3.8 MMOL/L (ref 3.5–5.3)
PROT SERPL-MCNC: 8 G/DL (ref 6.4–8.2)
PROT UR STRIP-MCNC: NEGATIVE MG/DL
RBC #/AREA URNS AUTO: ABNORMAL /HPF
SODIUM SERPL-SCNC: 130 MMOL/L (ref 136–145)
SP GR UR STRIP.AUTO: 1.03 (ref 1–1.03)
UROBILINOGEN UR QL STRIP.AUTO: 1 E.U./DL
WBC #/AREA URNS AUTO: ABNORMAL /HPF

## 2017-03-03 PROCEDURE — 81001 URINALYSIS AUTO W/SCOPE: CPT

## 2017-03-03 PROCEDURE — 83036 HEMOGLOBIN GLYCOSYLATED A1C: CPT | Performed by: PSYCHIATRY & NEUROLOGY

## 2017-03-03 PROCEDURE — 80053 COMPREHEN METABOLIC PANEL: CPT | Performed by: PSYCHIATRY & NEUROLOGY

## 2017-03-06 ENCOUNTER — HOSPITAL ENCOUNTER (INPATIENT)
Facility: HOSPITAL | Age: 72
LOS: 3 days | Discharge: HOME | DRG: 683 | End: 2017-03-09
Attending: EMERGENCY MEDICINE | Admitting: INTERNAL MEDICINE
Payer: MEDICARE

## 2017-03-06 ENCOUNTER — TRANSCRIBE ORDERS (OUTPATIENT)
Dept: LAB | Facility: CLINIC | Age: 72
End: 2017-03-06

## 2017-03-06 ENCOUNTER — APPOINTMENT (OUTPATIENT)
Dept: LAB | Facility: CLINIC | Age: 72
DRG: 683 | End: 2017-03-06
Payer: MEDICARE

## 2017-03-06 DIAGNOSIS — N17.9 ACUTE KIDNEY INJURY (HCC): ICD-10-CM

## 2017-03-06 DIAGNOSIS — E87.1 HYPO-OSMOLALITY AND HYPONATREMIA: ICD-10-CM

## 2017-03-06 DIAGNOSIS — R73.9 HYPERGLYCEMIA: Primary | ICD-10-CM

## 2017-03-06 PROBLEM — K21.9 GASTROESOPHAGEAL REFLUX DISEASE WITHOUT ESOPHAGITIS: Status: ACTIVE | Noted: 2017-03-06

## 2017-03-06 LAB
ACETONE SERPL-MCNC: NEGATIVE MG/DL
ALBUMIN SERPL BCP-MCNC: 3.6 G/DL (ref 3.5–5)
ALBUMIN SERPL BCP-MCNC: 3.7 G/DL (ref 3.5–5)
ALP SERPL-CCNC: 50 U/L (ref 46–116)
ALP SERPL-CCNC: 54 U/L (ref 46–116)
ALT SERPL W P-5'-P-CCNC: 29 U/L (ref 12–78)
ALT SERPL W P-5'-P-CCNC: 31 U/L (ref 12–78)
ANION GAP SERPL CALCULATED.3IONS-SCNC: 12 MMOL/L (ref 4–13)
ANION GAP SERPL CALCULATED.3IONS-SCNC: 18 MMOL/L (ref 4–13)
AST SERPL W P-5'-P-CCNC: 17 U/L (ref 5–45)
AST SERPL W P-5'-P-CCNC: 23 U/L (ref 5–45)
BACTERIA UR QL AUTO: ABNORMAL /HPF
BASE EX.OXY STD BLDV CALC-SCNC: 74.5 % (ref 60–80)
BASE EXCESS BLDV CALC-SCNC: -0.3 MMOL/L
BASOPHILS # BLD AUTO: 0.02 THOUSANDS/ΜL (ref 0–0.1)
BASOPHILS # BLD AUTO: 0.03 THOUSANDS/ΜL (ref 0–0.1)
BASOPHILS NFR BLD AUTO: 0 % (ref 0–1)
BASOPHILS NFR BLD AUTO: 0 % (ref 0–1)
BILIRUB SERPL-MCNC: 0.69 MG/DL (ref 0.2–1)
BILIRUB SERPL-MCNC: 0.92 MG/DL (ref 0.2–1)
BILIRUB UR QL STRIP: NEGATIVE
BUN SERPL-MCNC: 29 MG/DL (ref 5–25)
BUN SERPL-MCNC: 29 MG/DL (ref 5–25)
CALCIUM ALBUM COR SERPL-MCNC: 10.7 MG/DL (ref 8.3–10.1)
CALCIUM SERPL-MCNC: 10.2 MG/DL (ref 8.3–10.1)
CALCIUM SERPL-MCNC: 10.4 MG/DL (ref 8.3–10.1)
CHLORIDE SERPL-SCNC: 85 MMOL/L (ref 100–108)
CHLORIDE SERPL-SCNC: 87 MMOL/L (ref 100–108)
CLARITY UR: CLEAR
CO2 SERPL-SCNC: 26 MMOL/L (ref 21–32)
CO2 SERPL-SCNC: 30 MMOL/L (ref 21–32)
COLOR UR: ABNORMAL
CREAT SERPL-MCNC: 1.95 MG/DL (ref 0.6–1.3)
CREAT SERPL-MCNC: 2.04 MG/DL (ref 0.6–1.3)
EOSINOPHIL # BLD AUTO: 0 THOUSAND/ΜL (ref 0–0.61)
EOSINOPHIL # BLD AUTO: 0.01 THOUSAND/ΜL (ref 0–0.61)
EOSINOPHIL NFR BLD AUTO: 0 % (ref 0–6)
EOSINOPHIL NFR BLD AUTO: 0 % (ref 0–6)
ERYTHROCYTE [DISTWIDTH] IN BLOOD BY AUTOMATED COUNT: 13.2 % (ref 11.6–15.1)
ERYTHROCYTE [DISTWIDTH] IN BLOOD BY AUTOMATED COUNT: 13.3 % (ref 11.6–15.1)
GFR SERPL CREATININE-BSD FRML MDRD: 32.3 ML/MIN/1.73SQ M
GFR SERPL CREATININE-BSD FRML MDRD: 34 ML/MIN/1.73SQ M
GLUCOSE SERPL-MCNC: 272 MG/DL (ref 65–140)
GLUCOSE SERPL-MCNC: 496 MG/DL (ref 65–140)
GLUCOSE SERPL-MCNC: 544 MG/DL (ref 65–140)
GLUCOSE UR STRIP-MCNC: ABNORMAL MG/DL
HCO3 BLDV-SCNC: 22.9 MMOL/L (ref 24–30)
HCT VFR BLD AUTO: 43.9 % (ref 36.5–49.3)
HCT VFR BLD AUTO: 44.2 % (ref 36.5–49.3)
HGB BLD-MCNC: 15.8 G/DL (ref 12–17)
HGB BLD-MCNC: 16.1 G/DL (ref 12–17)
HGB UR QL STRIP.AUTO: NEGATIVE
KETONES UR STRIP-MCNC: NEGATIVE MG/DL
LEUKOCYTE ESTERASE UR QL STRIP: ABNORMAL
LIPASE SERPL-CCNC: 320 U/L (ref 73–393)
LYMPHOCYTES # BLD AUTO: 1.46 THOUSANDS/ΜL (ref 0.6–4.47)
LYMPHOCYTES # BLD AUTO: 2 THOUSANDS/ΜL (ref 0.6–4.47)
LYMPHOCYTES NFR BLD AUTO: 10 % (ref 14–44)
LYMPHOCYTES NFR BLD AUTO: 15 % (ref 14–44)
MCH RBC QN AUTO: 31.5 PG (ref 26.8–34.3)
MCH RBC QN AUTO: 32.4 PG (ref 26.8–34.3)
MCHC RBC AUTO-ENTMCNC: 36 G/DL (ref 31.4–37.4)
MCHC RBC AUTO-ENTMCNC: 36.4 G/DL (ref 31.4–37.4)
MCV RBC AUTO: 88 FL (ref 82–98)
MCV RBC AUTO: 89 FL (ref 82–98)
MONOCYTES # BLD AUTO: 0.55 THOUSAND/ΜL (ref 0.17–1.22)
MONOCYTES # BLD AUTO: 0.9 THOUSAND/ΜL (ref 0.17–1.22)
MONOCYTES NFR BLD AUTO: 4 % (ref 4–12)
MONOCYTES NFR BLD AUTO: 7 % (ref 4–12)
NEUTROPHILS # BLD AUTO: 10.75 THOUSANDS/ΜL (ref 1.85–7.62)
NEUTROPHILS # BLD AUTO: 12.23 THOUSANDS/ΜL (ref 1.85–7.62)
NEUTS SEG NFR BLD AUTO: 78 % (ref 43–75)
NEUTS SEG NFR BLD AUTO: 86 % (ref 43–75)
NITRITE UR QL STRIP: NEGATIVE
NON-SQ EPI CELLS URNS QL MICRO: ABNORMAL /HPF
NRBC BLD AUTO-RTO: 0 /100 WBCS
NRBC BLD AUTO-RTO: 0 /100 WBCS
O2 CT BLDV-SCNC: 15.8 ML/DL
PCO2 BLDV: 33.7 MM HG (ref 42–50)
PH BLDV: 7.45 [PH] (ref 7.3–7.4)
PH UR STRIP.AUTO: 5 [PH] (ref 4.5–8)
PLATELET # BLD AUTO: 351 THOUSANDS/UL (ref 149–390)
PLATELET # BLD AUTO: 351 THOUSANDS/UL (ref 149–390)
PMV BLD AUTO: 11.1 FL (ref 8.9–12.7)
PMV BLD AUTO: 11.8 FL (ref 8.9–12.7)
PO2 BLDV: 39.4 MM HG (ref 35–45)
POTASSIUM SERPL-SCNC: 3.1 MMOL/L (ref 3.5–5.3)
POTASSIUM SERPL-SCNC: 3.3 MMOL/L (ref 3.5–5.3)
PROT SERPL-MCNC: 7.4 G/DL (ref 6.4–8.2)
PROT SERPL-MCNC: 7.9 G/DL (ref 6.4–8.2)
PROT UR STRIP-MCNC: NEGATIVE MG/DL
RBC # BLD AUTO: 4.97 MILLION/UL (ref 3.88–5.62)
RBC # BLD AUTO: 5.01 MILLION/UL (ref 3.88–5.62)
RBC #/AREA URNS AUTO: ABNORMAL /HPF
SODIUM SERPL-SCNC: 127 MMOL/L (ref 136–145)
SODIUM SERPL-SCNC: 131 MMOL/L (ref 136–145)
SP GR UR STRIP.AUTO: <=1.005 (ref 1–1.03)
TROPONIN I SERPL-MCNC: <0.02 NG/ML
UROBILINOGEN UR QL STRIP.AUTO: 0.2 E.U./DL
WBC # BLD AUTO: 13.67 THOUSAND/UL (ref 4.31–10.16)
WBC # BLD AUTO: 14.32 THOUSAND/UL (ref 4.31–10.16)
WBC #/AREA URNS AUTO: ABNORMAL /HPF

## 2017-03-06 PROCEDURE — 93005 ELECTROCARDIOGRAM TRACING: CPT | Performed by: EMERGENCY MEDICINE

## 2017-03-06 PROCEDURE — 82948 REAGENT STRIP/BLOOD GLUCOSE: CPT

## 2017-03-06 PROCEDURE — 93005 ELECTROCARDIOGRAM TRACING: CPT

## 2017-03-06 PROCEDURE — 85025 COMPLETE CBC W/AUTO DIFF WBC: CPT | Performed by: EMERGENCY MEDICINE

## 2017-03-06 PROCEDURE — 82009 KETONE BODYS QUAL: CPT | Performed by: EMERGENCY MEDICINE

## 2017-03-06 PROCEDURE — 82805 BLOOD GASES W/O2 SATURATION: CPT | Performed by: EMERGENCY MEDICINE

## 2017-03-06 PROCEDURE — 83690 ASSAY OF LIPASE: CPT | Performed by: EMERGENCY MEDICINE

## 2017-03-06 PROCEDURE — 96361 HYDRATE IV INFUSION ADD-ON: CPT

## 2017-03-06 PROCEDURE — 80053 COMPREHEN METABOLIC PANEL: CPT | Performed by: EMERGENCY MEDICINE

## 2017-03-06 PROCEDURE — 96360 HYDRATION IV INFUSION INIT: CPT

## 2017-03-06 PROCEDURE — 85025 COMPLETE CBC W/AUTO DIFF WBC: CPT

## 2017-03-06 PROCEDURE — 80053 COMPREHEN METABOLIC PANEL: CPT

## 2017-03-06 PROCEDURE — 99284 EMERGENCY DEPT VISIT MOD MDM: CPT

## 2017-03-06 PROCEDURE — 84484 ASSAY OF TROPONIN QUANT: CPT | Performed by: EMERGENCY MEDICINE

## 2017-03-06 PROCEDURE — 36415 COLL VENOUS BLD VENIPUNCTURE: CPT

## 2017-03-06 PROCEDURE — 81002 URINALYSIS NONAUTO W/O SCOPE: CPT | Performed by: EMERGENCY MEDICINE

## 2017-03-06 PROCEDURE — 81001 URINALYSIS AUTO W/SCOPE: CPT

## 2017-03-06 RX ORDER — ACETAMINOPHEN 325 MG/1
650 TABLET ORAL EVERY 6 HOURS PRN
Status: DISCONTINUED | OUTPATIENT
Start: 2017-03-06 | End: 2017-03-09 | Stop reason: HOSPADM

## 2017-03-06 RX ORDER — SODIUM CHLORIDE 9 MG/ML
75 INJECTION, SOLUTION INTRAVENOUS CONTINUOUS
Status: DISCONTINUED | OUTPATIENT
Start: 2017-03-06 | End: 2017-03-09 | Stop reason: HOSPADM

## 2017-03-06 RX ORDER — PREDNISONE 10 MG/1
10 TABLET ORAL DAILY
COMMUNITY
End: 2018-05-11 | Stop reason: SDUPTHER

## 2017-03-06 RX ADMIN — SODIUM CHLORIDE 1000 ML: 0.9 INJECTION, SOLUTION INTRAVENOUS at 18:44

## 2017-03-06 RX ADMIN — INSULIN HUMAN 5 UNITS: 100 INJECTION, SOLUTION PARENTERAL at 22:41

## 2017-03-06 RX ADMIN — SODIUM CHLORIDE 1000 ML: 0.9 INJECTION, SOLUTION INTRAVENOUS at 18:41

## 2017-03-07 ENCOUNTER — APPOINTMENT (INPATIENT)
Dept: PHYSICAL THERAPY | Facility: HOSPITAL | Age: 72
DRG: 683 | End: 2017-03-07
Payer: MEDICARE

## 2017-03-07 LAB
ALBUMIN SERPL BCP-MCNC: 2.6 G/DL (ref 3.5–5)
ALP SERPL-CCNC: 32 U/L (ref 46–116)
ALT SERPL W P-5'-P-CCNC: 20 U/L (ref 12–78)
ANION GAP SERPL CALCULATED.3IONS-SCNC: 10 MMOL/L (ref 4–13)
AST SERPL W P-5'-P-CCNC: 15 U/L (ref 5–45)
ATRIAL RATE: 110 BPM
BILIRUB SERPL-MCNC: 0.62 MG/DL (ref 0.2–1)
BUN SERPL-MCNC: 24 MG/DL (ref 5–25)
CALCIUM SERPL-MCNC: 8 MG/DL (ref 8.3–10.1)
CHLORIDE SERPL-SCNC: 101 MMOL/L (ref 100–108)
CO2 SERPL-SCNC: 29 MMOL/L (ref 21–32)
CREAT SERPL-MCNC: 1.36 MG/DL (ref 0.6–1.3)
EST. AVERAGE GLUCOSE BLD GHB EST-MCNC: 298 MG/DL
GFR SERPL CREATININE-BSD FRML MDRD: 51.5 ML/MIN/1.73SQ M
GLUCOSE SERPL-MCNC: 130 MG/DL (ref 65–140)
GLUCOSE SERPL-MCNC: 154 MG/DL (ref 65–140)
GLUCOSE SERPL-MCNC: 203 MG/DL (ref 65–140)
GLUCOSE SERPL-MCNC: 204 MG/DL (ref 65–140)
GLUCOSE SERPL-MCNC: 208 MG/DL (ref 65–140)
GLUCOSE SERPL-MCNC: 274 MG/DL (ref 65–140)
HBA1C MFR BLD: 12 % (ref 4.2–6.3)
P AXIS: 56 DEGREES
POTASSIUM SERPL-SCNC: 2.3 MMOL/L (ref 3.5–5.3)
POTASSIUM SERPL-SCNC: 3.1 MMOL/L (ref 3.5–5.3)
PR INTERVAL: 138 MS
PROT SERPL-MCNC: 5.5 G/DL (ref 6.4–8.2)
QRS AXIS: 48 DEGREES
QRSD INTERVAL: 126 MS
QT INTERVAL: 392 MS
QTC INTERVAL: 530 MS
SODIUM SERPL-SCNC: 140 MMOL/L (ref 136–145)
T WAVE AXIS: 43 DEGREES
TSH SERPL DL<=0.05 MIU/L-ACNC: 2.54 UIU/ML (ref 0.36–3.74)
VENTRICULAR RATE: 110 BPM

## 2017-03-07 PROCEDURE — 82948 REAGENT STRIP/BLOOD GLUCOSE: CPT

## 2017-03-07 PROCEDURE — 83036 HEMOGLOBIN GLYCOSYLATED A1C: CPT | Performed by: PHYSICIAN ASSISTANT

## 2017-03-07 PROCEDURE — 80053 COMPREHEN METABOLIC PANEL: CPT | Performed by: PHYSICIAN ASSISTANT

## 2017-03-07 PROCEDURE — G8979 MOBILITY GOAL STATUS: HCPCS | Performed by: PHYSICAL THERAPIST

## 2017-03-07 PROCEDURE — 97162 PT EVAL MOD COMPLEX 30 MIN: CPT | Performed by: PHYSICAL THERAPIST

## 2017-03-07 PROCEDURE — 84443 ASSAY THYROID STIM HORMONE: CPT | Performed by: PHYSICIAN ASSISTANT

## 2017-03-07 PROCEDURE — 84132 ASSAY OF SERUM POTASSIUM: CPT | Performed by: INTERNAL MEDICINE

## 2017-03-07 PROCEDURE — G8978 MOBILITY CURRENT STATUS: HCPCS | Performed by: PHYSICAL THERAPIST

## 2017-03-07 PROCEDURE — G8980 MOBILITY D/C STATUS: HCPCS | Performed by: PHYSICAL THERAPIST

## 2017-03-07 RX ORDER — INSULIN GLARGINE 100 [IU]/ML
10 INJECTION, SOLUTION SUBCUTANEOUS
Status: DISCONTINUED | OUTPATIENT
Start: 2017-03-07 | End: 2017-03-08

## 2017-03-07 RX ORDER — PREDNISONE 1 MG/1
10 TABLET ORAL EVERY OTHER DAY
Status: DISCONTINUED | OUTPATIENT
Start: 2017-03-09 | End: 2017-03-09 | Stop reason: HOSPADM

## 2017-03-07 RX ORDER — INSULIN GLARGINE 100 [IU]/ML
12 INJECTION, SOLUTION SUBCUTANEOUS
Status: DISCONTINUED | OUTPATIENT
Start: 2017-03-07 | End: 2017-03-07

## 2017-03-07 RX ORDER — PREDNISONE 10 MG/1
10 TABLET ORAL DAILY
Status: DISCONTINUED | OUTPATIENT
Start: 2017-03-07 | End: 2017-03-07

## 2017-03-07 RX ORDER — MECLIZINE HCL 12.5 MG/1
12.5 TABLET ORAL 3 TIMES DAILY PRN
Status: DISCONTINUED | OUTPATIENT
Start: 2017-03-07 | End: 2017-03-09 | Stop reason: HOSPADM

## 2017-03-07 RX ORDER — FAMOTIDINE 20 MG/1
20 TABLET, FILM COATED ORAL DAILY
Status: DISCONTINUED | OUTPATIENT
Start: 2017-03-07 | End: 2017-03-09 | Stop reason: HOSPADM

## 2017-03-07 RX ORDER — POTASSIUM CHLORIDE 20 MEQ/1
40 TABLET, EXTENDED RELEASE ORAL ONCE
Status: COMPLETED | OUTPATIENT
Start: 2017-03-07 | End: 2017-03-07

## 2017-03-07 RX ORDER — PREDNISONE 20 MG/1
20 TABLET ORAL EVERY OTHER DAY
Status: DISCONTINUED | OUTPATIENT
Start: 2017-03-08 | End: 2017-03-09 | Stop reason: HOSPADM

## 2017-03-07 RX ORDER — MELATONIN
2000 DAILY
Status: DISCONTINUED | OUTPATIENT
Start: 2017-03-07 | End: 2017-03-09 | Stop reason: HOSPADM

## 2017-03-07 RX ORDER — HEPARIN SODIUM 5000 [USP'U]/ML
5000 INJECTION, SOLUTION INTRAVENOUS; SUBCUTANEOUS EVERY 8 HOURS SCHEDULED
Status: DISCONTINUED | OUTPATIENT
Start: 2017-03-07 | End: 2017-03-09 | Stop reason: HOSPADM

## 2017-03-07 RX ADMIN — INSULIN GLARGINE 10 UNITS: 100 INJECTION, SOLUTION SUBCUTANEOUS at 22:49

## 2017-03-07 RX ADMIN — PREDNISONE 10 MG: 10 TABLET ORAL at 09:23

## 2017-03-07 RX ADMIN — SODIUM CHLORIDE 250 ML/HR: 0.9 INJECTION, SOLUTION INTRAVENOUS at 00:21

## 2017-03-07 RX ADMIN — INSULIN LISPRO 1 UNITS: 100 INJECTION, SOLUTION INTRAVENOUS; SUBCUTANEOUS at 09:22

## 2017-03-07 RX ADMIN — HEPARIN SODIUM 5000 UNITS: 5000 INJECTION, SOLUTION INTRAVENOUS; SUBCUTANEOUS at 14:46

## 2017-03-07 RX ADMIN — FAMOTIDINE 20 MG: 20 TABLET ORAL at 09:23

## 2017-03-07 RX ADMIN — SODIUM CHLORIDE 75 ML/HR: 0.9 INJECTION, SOLUTION INTRAVENOUS at 20:12

## 2017-03-07 RX ADMIN — HEPARIN SODIUM 5000 UNITS: 5000 INJECTION, SOLUTION INTRAVENOUS; SUBCUTANEOUS at 06:07

## 2017-03-07 RX ADMIN — HEPARIN SODIUM 5000 UNITS: 5000 INJECTION, SOLUTION INTRAVENOUS; SUBCUTANEOUS at 22:49

## 2017-03-07 RX ADMIN — POTASSIUM CHLORIDE 40 MEQ: 1500 TABLET, EXTENDED RELEASE ORAL at 12:27

## 2017-03-07 RX ADMIN — SODIUM CHLORIDE 250 ML/HR: 0.9 INJECTION, SOLUTION INTRAVENOUS at 07:41

## 2017-03-07 RX ADMIN — VITAMIN D, TAB 1000IU (100/BT) 2000 UNITS: 25 TAB at 09:23

## 2017-03-07 RX ADMIN — INSULIN LISPRO 2 UNITS: 100 INJECTION, SOLUTION INTRAVENOUS; SUBCUTANEOUS at 12:20

## 2017-03-07 RX ADMIN — INSULIN LISPRO 4 UNITS: 100 INJECTION, SOLUTION INTRAVENOUS; SUBCUTANEOUS at 17:35

## 2017-03-08 PROBLEM — E11.65 TYPE 2 DIABETES MELLITUS WITH HYPERGLYCEMIA (HCC): Status: ACTIVE | Noted: 2017-03-06

## 2017-03-08 LAB
ANION GAP SERPL CALCULATED.3IONS-SCNC: 9 MMOL/L (ref 4–13)
BUN SERPL-MCNC: 20 MG/DL (ref 5–25)
CALCIUM SERPL-MCNC: 8.3 MG/DL (ref 8.3–10.1)
CHLORIDE SERPL-SCNC: 105 MMOL/L (ref 100–108)
CO2 SERPL-SCNC: 26 MMOL/L (ref 21–32)
CREAT SERPL-MCNC: 1.16 MG/DL (ref 0.6–1.3)
ERYTHROCYTE [DISTWIDTH] IN BLOOD BY AUTOMATED COUNT: 13.5 % (ref 11.6–15.1)
GFR SERPL CREATININE-BSD FRML MDRD: >60 ML/MIN/1.73SQ M
GLUCOSE SERPL-MCNC: 139 MG/DL (ref 65–140)
GLUCOSE SERPL-MCNC: 152 MG/DL (ref 65–140)
GLUCOSE SERPL-MCNC: 200 MG/DL (ref 65–140)
GLUCOSE SERPL-MCNC: 215 MG/DL (ref 65–140)
GLUCOSE SERPL-MCNC: 238 MG/DL (ref 65–140)
HCT VFR BLD AUTO: 34.4 % (ref 36.5–49.3)
HGB BLD-MCNC: 12.2 G/DL (ref 12–17)
MCH RBC QN AUTO: 31.5 PG (ref 26.8–34.3)
MCHC RBC AUTO-ENTMCNC: 35.5 G/DL (ref 31.4–37.4)
MCV RBC AUTO: 89 FL (ref 82–98)
PLATELET # BLD AUTO: 235 THOUSANDS/UL (ref 149–390)
PMV BLD AUTO: 10.7 FL (ref 8.9–12.7)
POTASSIUM SERPL-SCNC: 2.8 MMOL/L (ref 3.5–5.3)
RBC # BLD AUTO: 3.87 MILLION/UL (ref 3.88–5.62)
SODIUM SERPL-SCNC: 140 MMOL/L (ref 136–145)
WBC # BLD AUTO: 7.43 THOUSAND/UL (ref 4.31–10.16)

## 2017-03-08 PROCEDURE — 85027 COMPLETE CBC AUTOMATED: CPT | Performed by: INTERNAL MEDICINE

## 2017-03-08 PROCEDURE — 80048 BASIC METABOLIC PNL TOTAL CA: CPT | Performed by: INTERNAL MEDICINE

## 2017-03-08 PROCEDURE — 82948 REAGENT STRIP/BLOOD GLUCOSE: CPT

## 2017-03-08 RX ORDER — POTASSIUM CHLORIDE 20 MEQ/1
40 TABLET, EXTENDED RELEASE ORAL ONCE
Status: COMPLETED | OUTPATIENT
Start: 2017-03-08 | End: 2017-03-08

## 2017-03-08 RX ORDER — POTASSIUM CHLORIDE 20 MEQ/1
20 TABLET, EXTENDED RELEASE ORAL ONCE
Status: COMPLETED | OUTPATIENT
Start: 2017-03-08 | End: 2017-03-08

## 2017-03-08 RX ORDER — HYDROCHLOROTHIAZIDE 25 MG/1
25 TABLET ORAL DAILY
Status: DISCONTINUED | OUTPATIENT
Start: 2017-03-08 | End: 2017-03-09 | Stop reason: HOSPADM

## 2017-03-08 RX ORDER — INSULIN GLARGINE 100 [IU]/ML
12 INJECTION, SOLUTION SUBCUTANEOUS
Status: DISCONTINUED | OUTPATIENT
Start: 2017-03-08 | End: 2017-03-09 | Stop reason: HOSPADM

## 2017-03-08 RX ORDER — POTASSIUM CHLORIDE 14.9 MG/ML
20 INJECTION INTRAVENOUS ONCE
Status: COMPLETED | OUTPATIENT
Start: 2017-03-08 | End: 2017-03-08

## 2017-03-08 RX ADMIN — HEPARIN SODIUM 5000 UNITS: 5000 INJECTION, SOLUTION INTRAVENOUS; SUBCUTANEOUS at 21:46

## 2017-03-08 RX ADMIN — INSULIN GLARGINE 12 UNITS: 100 INJECTION, SOLUTION SUBCUTANEOUS at 21:46

## 2017-03-08 RX ADMIN — POTASSIUM CHLORIDE 20 MEQ: 1500 TABLET, EXTENDED RELEASE ORAL at 19:28

## 2017-03-08 RX ADMIN — INSULIN LISPRO 1 UNITS: 100 INJECTION, SOLUTION INTRAVENOUS; SUBCUTANEOUS at 08:34

## 2017-03-08 RX ADMIN — HEPARIN SODIUM 5000 UNITS: 5000 INJECTION, SOLUTION INTRAVENOUS; SUBCUTANEOUS at 13:13

## 2017-03-08 RX ADMIN — FAMOTIDINE 20 MG: 20 TABLET ORAL at 08:35

## 2017-03-08 RX ADMIN — POTASSIUM CHLORIDE 20 MEQ: 200 INJECTION, SOLUTION INTRAVENOUS at 08:36

## 2017-03-08 RX ADMIN — INSULIN LISPRO 3 UNITS: 100 INJECTION, SOLUTION INTRAVENOUS; SUBCUTANEOUS at 17:00

## 2017-03-08 RX ADMIN — SODIUM CHLORIDE 75 ML/HR: 0.9 INJECTION, SOLUTION INTRAVENOUS at 08:35

## 2017-03-08 RX ADMIN — VITAMIN D, TAB 1000IU (100/BT) 2000 UNITS: 25 TAB at 08:34

## 2017-03-08 RX ADMIN — POTASSIUM CHLORIDE 40 MEQ: 1500 TABLET, EXTENDED RELEASE ORAL at 08:39

## 2017-03-08 RX ADMIN — INSULIN LISPRO 2 UNITS: 100 INJECTION, SOLUTION INTRAVENOUS; SUBCUTANEOUS at 11:12

## 2017-03-08 RX ADMIN — HEPARIN SODIUM 5000 UNITS: 5000 INJECTION, SOLUTION INTRAVENOUS; SUBCUTANEOUS at 05:10

## 2017-03-08 RX ADMIN — SODIUM CHLORIDE 75 ML/HR: 0.9 INJECTION, SOLUTION INTRAVENOUS at 22:42

## 2017-03-08 RX ADMIN — PREDNISONE 20 MG: 20 TABLET ORAL at 08:35

## 2017-03-08 RX ADMIN — HYDROCHLOROTHIAZIDE 25 MG: 25 TABLET ORAL at 11:13

## 2017-03-09 VITALS
SYSTOLIC BLOOD PRESSURE: 136 MMHG | HEART RATE: 82 BPM | HEIGHT: 70 IN | TEMPERATURE: 97.6 F | OXYGEN SATURATION: 98 % | RESPIRATION RATE: 18 BRPM | DIASTOLIC BLOOD PRESSURE: 63 MMHG

## 2017-03-09 PROBLEM — E87.6 HYPOKALEMIA: Status: RESOLVED | Noted: 2017-01-11 | Resolved: 2017-03-09

## 2017-03-09 PROBLEM — K21.9 GASTROESOPHAGEAL REFLUX DISEASE WITHOUT ESOPHAGITIS: Status: RESOLVED | Noted: 2017-03-06 | Resolved: 2017-03-09

## 2017-03-09 PROBLEM — N17.9 AKI (ACUTE KIDNEY INJURY) (HCC): Status: RESOLVED | Noted: 2017-01-10 | Resolved: 2017-03-09

## 2017-03-09 PROBLEM — E87.1 HYPONATREMIA: Status: RESOLVED | Noted: 2017-03-06 | Resolved: 2017-03-09

## 2017-03-09 LAB
ANION GAP SERPL CALCULATED.3IONS-SCNC: 9 MMOL/L (ref 4–13)
BUN SERPL-MCNC: 17 MG/DL (ref 5–25)
CALCIUM SERPL-MCNC: 8.3 MG/DL (ref 8.3–10.1)
CHLORIDE SERPL-SCNC: 107 MMOL/L (ref 100–108)
CO2 SERPL-SCNC: 25 MMOL/L (ref 21–32)
CREAT SERPL-MCNC: 1.1 MG/DL (ref 0.6–1.3)
GFR SERPL CREATININE-BSD FRML MDRD: >60 ML/MIN/1.73SQ M
GLUCOSE SERPL-MCNC: 131 MG/DL (ref 65–140)
GLUCOSE SERPL-MCNC: 143 MG/DL (ref 65–140)
GLUCOSE SERPL-MCNC: 165 MG/DL (ref 65–140)
POTASSIUM SERPL-SCNC: 3.2 MMOL/L (ref 3.5–5.3)
SODIUM SERPL-SCNC: 141 MMOL/L (ref 136–145)

## 2017-03-09 PROCEDURE — 80048 BASIC METABOLIC PNL TOTAL CA: CPT | Performed by: INTERNAL MEDICINE

## 2017-03-09 PROCEDURE — 82948 REAGENT STRIP/BLOOD GLUCOSE: CPT

## 2017-03-09 RX ORDER — POTASSIUM CHLORIDE 20 MEQ/1
40 TABLET, EXTENDED RELEASE ORAL ONCE
Status: COMPLETED | OUTPATIENT
Start: 2017-03-09 | End: 2017-03-09

## 2017-03-09 RX ORDER — INSULIN GLARGINE 100 [IU]/ML
12 INJECTION, SOLUTION SUBCUTANEOUS
Qty: 360 UNITS | Refills: 0 | Status: SHIPPED | OUTPATIENT
Start: 2017-03-09 | End: 2018-02-14 | Stop reason: ALTCHOICE

## 2017-03-09 RX ADMIN — FAMOTIDINE 20 MG: 20 TABLET ORAL at 09:09

## 2017-03-09 RX ADMIN — VITAMIN D, TAB 1000IU (100/BT) 2000 UNITS: 25 TAB at 09:09

## 2017-03-09 RX ADMIN — HEPARIN SODIUM 5000 UNITS: 5000 INJECTION, SOLUTION INTRAVENOUS; SUBCUTANEOUS at 05:08

## 2017-03-09 RX ADMIN — PREDNISONE 10 MG: 5 TABLET ORAL at 09:09

## 2017-03-09 RX ADMIN — HYDROCHLOROTHIAZIDE 25 MG: 25 TABLET ORAL at 09:09

## 2017-03-09 RX ADMIN — POTASSIUM CHLORIDE 40 MEQ: 1500 TABLET, EXTENDED RELEASE ORAL at 12:26

## 2017-03-16 ENCOUNTER — ALLSCRIPTS OFFICE VISIT (OUTPATIENT)
Dept: OTHER | Facility: OTHER | Age: 72
End: 2017-03-16

## 2017-03-17 ENCOUNTER — APPOINTMENT (OUTPATIENT)
Dept: LAB | Facility: CLINIC | Age: 72
End: 2017-03-17
Payer: MEDICARE

## 2017-03-17 DIAGNOSIS — G70.00 MYASTHENIA GRAVIS WITHOUT (ACUTE) EXACERBATION (HCC): ICD-10-CM

## 2017-03-17 DIAGNOSIS — E11.9 TYPE 2 DIABETES MELLITUS WITHOUT COMPLICATIONS (HCC): ICD-10-CM

## 2017-03-17 LAB
ALBUMIN SERPL BCP-MCNC: 3.6 G/DL (ref 3.5–5)
ALP SERPL-CCNC: 37 U/L (ref 46–116)
ALT SERPL W P-5'-P-CCNC: 34 U/L (ref 12–78)
ANION GAP SERPL CALCULATED.3IONS-SCNC: 10 MMOL/L (ref 4–13)
AST SERPL W P-5'-P-CCNC: 27 U/L (ref 5–45)
BACTERIA UR QL AUTO: ABNORMAL /HPF
BASOPHILS # BLD AUTO: 0.09 THOUSANDS/ΜL (ref 0–0.1)
BASOPHILS NFR BLD AUTO: 1 % (ref 0–1)
BILIRUB SERPL-MCNC: 0.64 MG/DL (ref 0.2–1)
BILIRUB UR QL STRIP: NEGATIVE
BUN SERPL-MCNC: 23 MG/DL (ref 5–25)
CALCIUM ALBUM COR SERPL-MCNC: 10.6 MG/DL (ref 8.3–10.1)
CALCIUM SERPL-MCNC: 10.3 MG/DL (ref 8.3–10.1)
CAOX CRY URNS QL MICRO: ABNORMAL /HPF
CHLORIDE SERPL-SCNC: 100 MMOL/L (ref 100–108)
CLARITY UR: ABNORMAL
CO2 SERPL-SCNC: 28 MMOL/L (ref 21–32)
COLOR UR: YELLOW
CREAT SERPL-MCNC: 1.31 MG/DL (ref 0.6–1.3)
CREAT UR-MCNC: 213 MG/DL
EOSINOPHIL # BLD AUTO: 0.07 THOUSAND/ΜL (ref 0–0.61)
EOSINOPHIL NFR BLD AUTO: 1 % (ref 0–6)
ERYTHROCYTE [DISTWIDTH] IN BLOOD BY AUTOMATED COUNT: 13.7 % (ref 11.6–15.1)
GFR SERPL CREATININE-BSD FRML MDRD: 53.8 ML/MIN/1.73SQ M
GLUCOSE P FAST SERPL-MCNC: 116 MG/DL (ref 65–99)
GLUCOSE UR STRIP-MCNC: NEGATIVE MG/DL
HCT VFR BLD AUTO: 43.8 % (ref 36.5–49.3)
HGB BLD-MCNC: 14.7 G/DL (ref 12–17)
HGB UR QL STRIP.AUTO: NEGATIVE
KETONES UR STRIP-MCNC: NEGATIVE MG/DL
LEUKOCYTE ESTERASE UR QL STRIP: ABNORMAL
LYMPHOCYTES # BLD AUTO: 4.14 THOUSANDS/ΜL (ref 0.6–4.47)
LYMPHOCYTES NFR BLD AUTO: 40 % (ref 14–44)
MCH RBC QN AUTO: 30.7 PG (ref 26.8–34.3)
MCHC RBC AUTO-ENTMCNC: 33.6 G/DL (ref 31.4–37.4)
MCV RBC AUTO: 91 FL (ref 82–98)
MICROALBUMIN UR-MCNC: 27.8 MG/L (ref 0–20)
MICROALBUMIN/CREAT 24H UR: 13 MG/G CREATININE (ref 0–30)
MONOCYTES # BLD AUTO: 1.03 THOUSAND/ΜL (ref 0.17–1.22)
MONOCYTES NFR BLD AUTO: 10 % (ref 4–12)
NEUTROPHILS # BLD AUTO: 4.92 THOUSANDS/ΜL (ref 1.85–7.62)
NEUTS SEG NFR BLD AUTO: 48 % (ref 43–75)
NITRITE UR QL STRIP: NEGATIVE
NON-SQ EPI CELLS URNS QL MICRO: ABNORMAL /HPF
NRBC BLD AUTO-RTO: 0 /100 WBCS
PH UR STRIP.AUTO: 5.5 [PH] (ref 4.5–8)
PLATELET # BLD AUTO: 388 THOUSANDS/UL (ref 149–390)
PMV BLD AUTO: 10.3 FL (ref 8.9–12.7)
POTASSIUM SERPL-SCNC: 3.7 MMOL/L (ref 3.5–5.3)
PROT SERPL-MCNC: 7.6 G/DL (ref 6.4–8.2)
PROT UR STRIP-MCNC: NEGATIVE MG/DL
RBC # BLD AUTO: 4.79 MILLION/UL (ref 3.88–5.62)
RBC #/AREA URNS AUTO: ABNORMAL /HPF
SODIUM SERPL-SCNC: 138 MMOL/L (ref 136–145)
SP GR UR STRIP.AUTO: 1.02 (ref 1–1.03)
UROBILINOGEN UR QL STRIP.AUTO: 1 E.U./DL
WBC # BLD AUTO: 10.31 THOUSAND/UL (ref 4.31–10.16)
WBC #/AREA URNS AUTO: ABNORMAL /HPF

## 2017-03-17 PROCEDURE — 36415 COLL VENOUS BLD VENIPUNCTURE: CPT

## 2017-03-17 PROCEDURE — 82570 ASSAY OF URINE CREATININE: CPT

## 2017-03-17 PROCEDURE — 86337 INSULIN ANTIBODIES: CPT

## 2017-03-17 PROCEDURE — 81001 URINALYSIS AUTO W/SCOPE: CPT

## 2017-03-17 PROCEDURE — 85025 COMPLETE CBC W/AUTO DIFF WBC: CPT

## 2017-03-17 PROCEDURE — 87086 URINE CULTURE/COLONY COUNT: CPT

## 2017-03-17 PROCEDURE — 82043 UR ALBUMIN QUANTITATIVE: CPT

## 2017-03-17 PROCEDURE — 80053 COMPREHEN METABOLIC PANEL: CPT

## 2017-03-18 LAB — BACTERIA UR CULT: NORMAL

## 2017-03-24 ENCOUNTER — GENERIC CONVERSION - ENCOUNTER (OUTPATIENT)
Dept: OTHER | Facility: OTHER | Age: 72
End: 2017-03-24

## 2017-03-24 ENCOUNTER — ALLSCRIPTS OFFICE VISIT (OUTPATIENT)
Dept: OTHER | Facility: OTHER | Age: 72
End: 2017-03-24

## 2017-03-24 LAB — INSULIN AB SER-ACNC: <5 UU/ML

## 2017-03-28 ENCOUNTER — GENERIC CONVERSION - ENCOUNTER (OUTPATIENT)
Dept: OTHER | Facility: OTHER | Age: 72
End: 2017-03-28

## 2017-03-29 ENCOUNTER — APPOINTMENT (OUTPATIENT)
Dept: LAB | Facility: MEDICAL CENTER | Age: 72
End: 2017-03-29
Payer: MEDICARE

## 2017-03-29 DIAGNOSIS — N18.30 CHRONIC KIDNEY DISEASE, STAGE III (MODERATE) (HCC): ICD-10-CM

## 2017-03-29 DIAGNOSIS — E83.52 HYPERCALCEMIA: ICD-10-CM

## 2017-03-29 LAB
25(OH)D3 SERPL-MCNC: 36.2 NG/ML (ref 30–100)
ALBUMIN SERPL BCP-MCNC: 3.8 G/DL (ref 3.5–5)
ALBUMIN SERPL BCP-MCNC: 3.8 G/DL (ref 3.5–5)
ANION GAP SERPL CALCULATED.3IONS-SCNC: 10 MMOL/L (ref 4–13)
BUN SERPL-MCNC: 24 MG/DL (ref 5–25)
CA-I BLD-SCNC: 1.3 MMOL/L (ref 1.12–1.32)
CALCIUM ALBUM COR SERPL-MCNC: 10.5 MG/DL (ref 8.3–10.1)
CALCIUM SERPL-MCNC: 10.3 MD/DL (ref 8.3–10.1)
CALCIUM SERPL-MCNC: 10.3 MG/DL (ref 8.3–10.1)
CHLORIDE SERPL-SCNC: 101 MMOL/L (ref 100–108)
CO2 SERPL-SCNC: 27 MMOL/L (ref 21–32)
CREAT SERPL-MCNC: 1.55 MG/DL (ref 0.6–1.3)
GFR SERPL CREATININE-BSD FRML MDRD: 44.3 ML/MIN/1.73SQ M
GLUCOSE P FAST SERPL-MCNC: 120 MG/DL (ref 65–99)
PHOSPHATE SERPL-MCNC: 2.9 MG/DL (ref 2.3–4.1)
POTASSIUM SERPL-SCNC: 3.6 MMOL/L (ref 3.5–5.3)
PTH-INTACT SERPL-MCNC: 15.7 PG/ML (ref 14–72)
SODIUM SERPL-SCNC: 138 MMOL/L (ref 136–145)

## 2017-03-29 PROCEDURE — 82040 ASSAY OF SERUM ALBUMIN: CPT

## 2017-03-29 PROCEDURE — 36415 COLL VENOUS BLD VENIPUNCTURE: CPT

## 2017-03-29 PROCEDURE — 82306 VITAMIN D 25 HYDROXY: CPT

## 2017-03-29 PROCEDURE — 83970 ASSAY OF PARATHORMONE: CPT

## 2017-03-29 PROCEDURE — 82310 ASSAY OF CALCIUM: CPT

## 2017-03-29 PROCEDURE — 84100 ASSAY OF PHOSPHORUS: CPT

## 2017-03-29 PROCEDURE — 82652 VIT D 1 25-DIHYDROXY: CPT

## 2017-03-29 PROCEDURE — 80048 BASIC METABOLIC PNL TOTAL CA: CPT

## 2017-03-29 PROCEDURE — 82330 ASSAY OF CALCIUM: CPT

## 2017-03-31 LAB — 1,25(OH)2D3 SERPL-MCNC: 27.6 PG/ML (ref 19.9–79.3)

## 2017-04-03 ENCOUNTER — GENERIC CONVERSION - ENCOUNTER (OUTPATIENT)
Dept: OTHER | Facility: OTHER | Age: 72
End: 2017-04-03

## 2017-04-03 DIAGNOSIS — N26.1 ATROPHY OF KIDNEY (TERMINAL): ICD-10-CM

## 2017-04-03 DIAGNOSIS — N18.30 CHRONIC KIDNEY DISEASE, STAGE III (MODERATE) (HCC): ICD-10-CM

## 2017-04-03 DIAGNOSIS — N17.9 ACUTE KIDNEY FAILURE (HCC): ICD-10-CM

## 2017-04-03 DIAGNOSIS — N20.0 CALCULUS OF KIDNEY: ICD-10-CM

## 2017-04-03 DIAGNOSIS — N39.0 URINARY TRACT INFECTION: ICD-10-CM

## 2017-04-05 ENCOUNTER — TRANSCRIBE ORDERS (OUTPATIENT)
Dept: LAB | Facility: CLINIC | Age: 72
End: 2017-04-05

## 2017-04-05 ENCOUNTER — APPOINTMENT (OUTPATIENT)
Dept: LAB | Facility: CLINIC | Age: 72
End: 2017-04-05
Payer: MEDICARE

## 2017-04-05 DIAGNOSIS — N18.30 CHRONIC KIDNEY DISEASE, STAGE III (MODERATE) (HCC): ICD-10-CM

## 2017-04-05 DIAGNOSIS — N26.1 ATROPHY OF KIDNEY (TERMINAL): ICD-10-CM

## 2017-04-05 DIAGNOSIS — N20.0 CALCULUS OF KIDNEY: ICD-10-CM

## 2017-04-05 DIAGNOSIS — N39.0 URINARY TRACT INFECTION: ICD-10-CM

## 2017-04-05 DIAGNOSIS — N17.9 ACUTE KIDNEY FAILURE (HCC): ICD-10-CM

## 2017-04-05 LAB
BACTERIA UR QL AUTO: ABNORMAL /HPF
BILIRUB UR QL STRIP: NEGATIVE
CLARITY UR: CLEAR
COLOR UR: YELLOW
GLUCOSE UR STRIP-MCNC: NEGATIVE MG/DL
HGB UR QL STRIP.AUTO: NEGATIVE
HYALINE CASTS #/AREA URNS LPF: ABNORMAL /LPF
KETONES UR STRIP-MCNC: NEGATIVE MG/DL
LEUKOCYTE ESTERASE UR QL STRIP: ABNORMAL
NITRITE UR QL STRIP: NEGATIVE
NON-SQ EPI CELLS URNS QL MICRO: ABNORMAL /HPF
PH UR STRIP.AUTO: 5 [PH] (ref 4.5–8)
PROT UR STRIP-MCNC: NEGATIVE MG/DL
RBC #/AREA URNS AUTO: ABNORMAL /HPF
SP GR UR STRIP.AUTO: 1.02 (ref 1–1.03)
UROBILINOGEN UR QL STRIP.AUTO: 0.2 E.U./DL
WBC #/AREA URNS AUTO: ABNORMAL /HPF

## 2017-04-05 PROCEDURE — 81001 URINALYSIS AUTO W/SCOPE: CPT

## 2017-04-17 ENCOUNTER — ALLSCRIPTS OFFICE VISIT (OUTPATIENT)
Dept: OTHER | Facility: OTHER | Age: 72
End: 2017-04-17

## 2017-04-26 ENCOUNTER — APPOINTMENT (OUTPATIENT)
Dept: LAB | Facility: CLINIC | Age: 72
End: 2017-04-26
Payer: MEDICARE

## 2017-04-26 DIAGNOSIS — N18.30 CHRONIC KIDNEY DISEASE, STAGE III (MODERATE) (HCC): ICD-10-CM

## 2017-04-26 LAB
BILIRUB UR QL STRIP: NEGATIVE
CLARITY UR: CLEAR
COLOR UR: YELLOW
GLUCOSE UR STRIP-MCNC: NEGATIVE MG/DL
HGB UR QL STRIP.AUTO: NEGATIVE
KETONES UR STRIP-MCNC: NEGATIVE MG/DL
LEUKOCYTE ESTERASE UR QL STRIP: NEGATIVE
NITRITE UR QL STRIP: NEGATIVE
PH UR STRIP.AUTO: 6 [PH] (ref 4.5–8)
PROT UR STRIP-MCNC: NEGATIVE MG/DL
SP GR UR STRIP.AUTO: 1.02 (ref 1–1.03)
UROBILINOGEN UR QL STRIP.AUTO: 1 E.U./DL

## 2017-04-26 PROCEDURE — 81003 URINALYSIS AUTO W/O SCOPE: CPT

## 2017-05-10 DIAGNOSIS — E11.9 TYPE 2 DIABETES MELLITUS WITHOUT COMPLICATIONS (HCC): ICD-10-CM

## 2017-06-02 ENCOUNTER — HOSPITAL ENCOUNTER (OUTPATIENT)
Dept: NON INVASIVE DIAGNOSTICS | Facility: CLINIC | Age: 72
Discharge: HOME | End: 2017-06-02
Payer: MEDICARE

## 2017-06-02 DIAGNOSIS — N26.1 ATROPHY OF KIDNEY (TERMINAL): ICD-10-CM

## 2017-06-02 PROCEDURE — 93975 VASCULAR STUDY: CPT

## 2017-06-06 ENCOUNTER — TRANSCRIBE ORDERS (OUTPATIENT)
Dept: LAB | Facility: CLINIC | Age: 72
End: 2017-06-06

## 2017-06-06 ENCOUNTER — APPOINTMENT (OUTPATIENT)
Dept: LAB | Facility: CLINIC | Age: 72
End: 2017-06-06
Payer: MEDICARE

## 2017-06-06 DIAGNOSIS — E11.9 TYPE 2 DIABETES MELLITUS WITHOUT COMPLICATIONS (HCC): ICD-10-CM

## 2017-06-06 LAB
ALBUMIN SERPL BCP-MCNC: 3.4 G/DL (ref 3.5–5)
ALP SERPL-CCNC: 35 U/L (ref 46–116)
ALT SERPL W P-5'-P-CCNC: 25 U/L (ref 12–78)
ANION GAP SERPL CALCULATED.3IONS-SCNC: 8 MMOL/L (ref 4–13)
AST SERPL W P-5'-P-CCNC: 20 U/L (ref 5–45)
BACTERIA UR QL AUTO: ABNORMAL /HPF
BASOPHILS # BLD AUTO: 0.13 THOUSANDS/ΜL (ref 0–0.1)
BASOPHILS NFR BLD AUTO: 2 % (ref 0–1)
BILIRUB SERPL-MCNC: 0.32 MG/DL (ref 0.2–1)
BILIRUB UR QL STRIP: NEGATIVE
BUN SERPL-MCNC: 24 MG/DL (ref 5–25)
CALCIUM SERPL-MCNC: 9.7 MG/DL (ref 8.3–10.1)
CHLORIDE SERPL-SCNC: 100 MMOL/L (ref 100–108)
CLARITY UR: ABNORMAL
CO2 SERPL-SCNC: 30 MMOL/L (ref 21–32)
COLOR UR: YELLOW
CREAT SERPL-MCNC: 1.33 MG/DL (ref 0.6–1.3)
EOSINOPHIL # BLD AUTO: 0.09 THOUSAND/ΜL (ref 0–0.61)
EOSINOPHIL NFR BLD AUTO: 1 % (ref 0–6)
ERYTHROCYTE [DISTWIDTH] IN BLOOD BY AUTOMATED COUNT: 12.4 % (ref 11.6–15.1)
EST. AVERAGE GLUCOSE BLD GHB EST-MCNC: 131 MG/DL
GFR SERPL CREATININE-BSD FRML MDRD: 52.9 ML/MIN/1.73SQ M
GLUCOSE SERPL-MCNC: 128 MG/DL (ref 65–140)
GLUCOSE UR STRIP-MCNC: NEGATIVE MG/DL
HBA1C MFR BLD: 6.2 % (ref 4.2–6.3)
HCT VFR BLD AUTO: 47 % (ref 36.5–49.3)
HGB BLD-MCNC: 15.8 G/DL (ref 12–17)
HGB UR QL STRIP.AUTO: NEGATIVE
HYALINE CASTS #/AREA URNS LPF: ABNORMAL /LPF
KETONES UR STRIP-MCNC: NEGATIVE MG/DL
LEUKOCYTE ESTERASE UR QL STRIP: ABNORMAL
LYMPHOCYTES # BLD AUTO: 2.33 THOUSANDS/ΜL (ref 0.6–4.47)
LYMPHOCYTES NFR BLD AUTO: 26 % (ref 14–44)
MCH RBC QN AUTO: 30.6 PG (ref 26.8–34.3)
MCHC RBC AUTO-ENTMCNC: 33.6 G/DL (ref 31.4–37.4)
MCV RBC AUTO: 91 FL (ref 82–98)
MONOCYTES # BLD AUTO: 0.91 THOUSAND/ΜL (ref 0.17–1.22)
MONOCYTES NFR BLD AUTO: 10 % (ref 4–12)
NEUTROPHILS # BLD AUTO: 5.37 THOUSANDS/ΜL (ref 1.85–7.62)
NEUTS SEG NFR BLD AUTO: 61 % (ref 43–75)
NITRITE UR QL STRIP: NEGATIVE
NON-SQ EPI CELLS URNS QL MICRO: ABNORMAL /HPF
NRBC BLD AUTO-RTO: 0 /100 WBCS
PH UR STRIP.AUTO: 6 [PH] (ref 4.5–8)
PLATELET # BLD AUTO: 323 THOUSANDS/UL (ref 149–390)
PMV BLD AUTO: 10.3 FL (ref 8.9–12.7)
POTASSIUM SERPL-SCNC: 3.6 MMOL/L (ref 3.5–5.3)
PROT SERPL-MCNC: 7.1 G/DL (ref 6.4–8.2)
PROT UR STRIP-MCNC: NEGATIVE MG/DL
RBC # BLD AUTO: 5.16 MILLION/UL (ref 3.88–5.62)
RBC #/AREA URNS AUTO: ABNORMAL /HPF
SODIUM SERPL-SCNC: 138 MMOL/L (ref 136–145)
SP GR UR STRIP.AUTO: 1.02 (ref 1–1.03)
UROBILINOGEN UR QL STRIP.AUTO: 0.2 E.U./DL
WBC # BLD AUTO: 8.87 THOUSAND/UL (ref 4.31–10.16)
WBC #/AREA URNS AUTO: ABNORMAL /HPF

## 2017-06-06 PROCEDURE — 81001 URINALYSIS AUTO W/SCOPE: CPT

## 2017-06-06 PROCEDURE — 87086 URINE CULTURE/COLONY COUNT: CPT

## 2017-06-06 PROCEDURE — 87077 CULTURE AEROBIC IDENTIFY: CPT

## 2017-06-06 PROCEDURE — 36415 COLL VENOUS BLD VENIPUNCTURE: CPT

## 2017-06-06 PROCEDURE — 80053 COMPREHEN METABOLIC PANEL: CPT

## 2017-06-06 PROCEDURE — 83036 HEMOGLOBIN GLYCOSYLATED A1C: CPT

## 2017-06-06 PROCEDURE — 87186 SC STD MICRODIL/AGAR DIL: CPT

## 2017-06-06 PROCEDURE — 85025 COMPLETE CBC W/AUTO DIFF WBC: CPT

## 2017-06-07 ENCOUNTER — APPOINTMENT (OUTPATIENT)
Dept: LAB | Facility: CLINIC | Age: 72
End: 2017-06-07
Payer: MEDICARE

## 2017-06-07 DIAGNOSIS — E11.9 TYPE 2 DIABETES MELLITUS WITHOUT COMPLICATIONS (HCC): ICD-10-CM

## 2017-06-07 LAB
CHOLEST SERPL-MCNC: 199 MG/DL (ref 50–200)
HDLC SERPL-MCNC: 48 MG/DL (ref 40–60)
LDLC SERPL CALC-MCNC: 118 MG/DL (ref 0–100)
TRIGL SERPL-MCNC: 163 MG/DL

## 2017-06-07 PROCEDURE — 80061 LIPID PANEL: CPT

## 2017-06-07 PROCEDURE — 36415 COLL VENOUS BLD VENIPUNCTURE: CPT

## 2017-06-08 LAB — BACTERIA UR CULT: NORMAL

## 2017-06-09 ENCOUNTER — ALLSCRIPTS OFFICE VISIT (OUTPATIENT)
Dept: OTHER | Facility: OTHER | Age: 72
End: 2017-06-09

## 2017-06-10 ENCOUNTER — HOSPITAL ENCOUNTER (OUTPATIENT)
Dept: RADIOLOGY | Facility: HOSPITAL | Age: 72
Discharge: HOME | End: 2017-06-10
Attending: UROLOGY
Payer: MEDICARE

## 2017-06-10 ENCOUNTER — TRANSCRIBE ORDERS (OUTPATIENT)
Dept: ADMINISTRATIVE | Facility: HOSPITAL | Age: 72
End: 2017-06-10

## 2017-06-10 DIAGNOSIS — N20.0 URIC ACID NEPHROLITHIASIS: Primary | ICD-10-CM

## 2017-06-10 DIAGNOSIS — N20.0 URIC ACID NEPHROLITHIASIS: ICD-10-CM

## 2017-06-10 PROCEDURE — 74000 HB X-RAY EXAM OF ABDOMEN (SINGLE ANTEROPOSTERIOR VIEW): CPT

## 2017-06-14 ENCOUNTER — GENERIC CONVERSION - ENCOUNTER (OUTPATIENT)
Dept: OTHER | Facility: OTHER | Age: 72
End: 2017-06-14

## 2017-06-16 ENCOUNTER — ALLSCRIPTS OFFICE VISIT (OUTPATIENT)
Dept: OTHER | Facility: OTHER | Age: 72
End: 2017-06-16

## 2017-06-19 DIAGNOSIS — N39.0 URINARY TRACT INFECTION: ICD-10-CM

## 2017-07-06 ENCOUNTER — TRANSCRIBE ORDERS (OUTPATIENT)
Dept: LAB | Facility: CLINIC | Age: 72
End: 2017-07-06

## 2017-07-06 ENCOUNTER — APPOINTMENT (OUTPATIENT)
Dept: LAB | Facility: CLINIC | Age: 72
End: 2017-07-06
Payer: MEDICARE

## 2017-07-06 DIAGNOSIS — N39.0 URINARY TRACT INFECTION: ICD-10-CM

## 2017-07-06 LAB
BACTERIA UR QL AUTO: ABNORMAL /HPF
BILIRUB UR QL STRIP: NEGATIVE
CLARITY UR: CLEAR
COLOR UR: YELLOW
GLUCOSE UR STRIP-MCNC: NEGATIVE MG/DL
HGB UR QL STRIP.AUTO: NEGATIVE
HYALINE CASTS #/AREA URNS LPF: ABNORMAL /LPF
KETONES UR STRIP-MCNC: NEGATIVE MG/DL
LEUKOCYTE ESTERASE UR QL STRIP: ABNORMAL
NITRITE UR QL STRIP: NEGATIVE
NON-SQ EPI CELLS URNS QL MICRO: ABNORMAL /HPF
PH UR STRIP.AUTO: 5.5 [PH] (ref 4.5–8)
PROT UR STRIP-MCNC: NEGATIVE MG/DL
RBC #/AREA URNS AUTO: ABNORMAL /HPF
SP GR UR STRIP.AUTO: 1.02 (ref 1–1.03)
UROBILINOGEN UR QL STRIP.AUTO: 1 E.U./DL
WBC #/AREA URNS AUTO: ABNORMAL /HPF

## 2017-07-06 PROCEDURE — 81001 URINALYSIS AUTO W/SCOPE: CPT

## 2017-07-06 PROCEDURE — 87086 URINE CULTURE/COLONY COUNT: CPT

## 2017-07-07 LAB — BACTERIA UR CULT: NORMAL

## 2017-08-14 DIAGNOSIS — E11.9 TYPE 2 DIABETES MELLITUS WITHOUT COMPLICATIONS (HCC): ICD-10-CM

## 2017-08-15 ENCOUNTER — TRANSCRIBE ORDERS (OUTPATIENT)
Dept: LAB | Facility: CLINIC | Age: 72
End: 2017-08-15

## 2017-08-15 ENCOUNTER — APPOINTMENT (OUTPATIENT)
Dept: LAB | Facility: CLINIC | Age: 72
End: 2017-08-15
Payer: MEDICARE

## 2017-08-15 DIAGNOSIS — E11.9 TYPE 2 DIABETES MELLITUS WITHOUT COMPLICATIONS (HCC): ICD-10-CM

## 2017-08-15 LAB
ANION GAP SERPL CALCULATED.3IONS-SCNC: 8 MMOL/L (ref 4–13)
BUN SERPL-MCNC: 31 MG/DL (ref 5–25)
CALCIUM SERPL-MCNC: 9.2 MG/DL (ref 8.3–10.1)
CHLORIDE SERPL-SCNC: 101 MMOL/L (ref 100–108)
CO2 SERPL-SCNC: 29 MMOL/L (ref 21–32)
CREAT SERPL-MCNC: 1.29 MG/DL (ref 0.6–1.3)
CREAT UR-MCNC: 107 MG/DL
GFR SERPL CREATININE-BSD FRML MDRD: 55 ML/MIN/1.73SQ M
GLUCOSE SERPL-MCNC: 119 MG/DL (ref 65–140)
MICROALBUMIN UR-MCNC: 5.9 MG/L (ref 0–20)
MICROALBUMIN/CREAT 24H UR: 6 MG/G CREATININE (ref 0–30)
POTASSIUM SERPL-SCNC: 3.7 MMOL/L (ref 3.5–5.3)
SODIUM SERPL-SCNC: 138 MMOL/L (ref 136–145)

## 2017-08-15 PROCEDURE — 82570 ASSAY OF URINE CREATININE: CPT

## 2017-08-15 PROCEDURE — 36415 COLL VENOUS BLD VENIPUNCTURE: CPT

## 2017-08-15 PROCEDURE — 80048 BASIC METABOLIC PNL TOTAL CA: CPT

## 2017-08-15 PROCEDURE — 82043 UR ALBUMIN QUANTITATIVE: CPT

## 2017-08-16 ENCOUNTER — ALLSCRIPTS OFFICE VISIT (OUTPATIENT)
Dept: OTHER | Facility: OTHER | Age: 72
End: 2017-08-16

## 2017-09-29 ENCOUNTER — APPOINTMENT (OUTPATIENT)
Dept: LAB | Facility: CLINIC | Age: 72
End: 2017-09-29
Payer: MEDICARE

## 2017-09-29 ENCOUNTER — TRANSCRIBE ORDERS (OUTPATIENT)
Dept: LAB | Facility: CLINIC | Age: 72
End: 2017-09-29

## 2017-09-29 DIAGNOSIS — E11.9 TYPE 2 DIABETES MELLITUS WITHOUT COMPLICATIONS (HCC): ICD-10-CM

## 2017-09-29 DIAGNOSIS — E13.8 DIABETES MELLITUS OF OTHER TYPE WITH COMPLICATION, UNSPECIFIED LONG TERM INSULIN USE STATUS: ICD-10-CM

## 2017-09-29 DIAGNOSIS — E13.8 DIABETES MELLITUS OF OTHER TYPE WITH COMPLICATION, UNSPECIFIED LONG TERM INSULIN USE STATUS: Primary | ICD-10-CM

## 2017-09-29 PROCEDURE — 36415 COLL VENOUS BLD VENIPUNCTURE: CPT

## 2017-09-29 PROCEDURE — 84681 ASSAY OF C-PEPTIDE: CPT

## 2017-09-29 PROCEDURE — 86341 ISLET CELL ANTIBODY: CPT

## 2017-09-29 PROCEDURE — 83519 RIA NONANTIBODY: CPT

## 2017-09-30 LAB — C PEPTIDE SERPL-MCNC: 7.1 NG/ML (ref 1.1–4.4)

## 2017-10-02 LAB — GAD65 AB SER-ACNC: <5 U/ML (ref 0–5)

## 2017-10-03 LAB — ISLET CELL512 AB SER-ACNC: <1 U/ML

## 2017-10-19 ENCOUNTER — ALLSCRIPTS OFFICE VISIT (OUTPATIENT)
Dept: OTHER | Facility: OTHER | Age: 72
End: 2017-10-19

## 2017-10-19 DIAGNOSIS — R14.0 ABDOMINAL DISTENSION (GASEOUS): ICD-10-CM

## 2017-10-19 DIAGNOSIS — E11.9 TYPE 2 DIABETES MELLITUS WITHOUT COMPLICATIONS (HCC): ICD-10-CM

## 2017-10-19 DIAGNOSIS — K57.90 DIVERTICULOSIS OF INTESTINE WITHOUT PERFORATION OR ABSCESS WITHOUT BLEEDING: ICD-10-CM

## 2017-10-19 DIAGNOSIS — R19.4 CHANGE IN BOWEL HABITS: ICD-10-CM

## 2017-10-19 DIAGNOSIS — K57.92 DIVERTICULITIS OF INTESTINE WITHOUT PERFORATION OR ABSCESS WITHOUT BLEEDING: ICD-10-CM

## 2017-10-19 DIAGNOSIS — G70.00 MYASTHENIA GRAVIS WITHOUT (ACUTE) EXACERBATION (HCC): ICD-10-CM

## 2017-10-19 DIAGNOSIS — R19.5 OTHER FECAL ABNORMALITIES: ICD-10-CM

## 2017-10-26 ENCOUNTER — GENERIC CONVERSION - ENCOUNTER (OUTPATIENT)
Dept: OTHER | Facility: OTHER | Age: 72
End: 2017-10-26

## 2017-10-27 ENCOUNTER — HOSPITAL ENCOUNTER (OUTPATIENT)
Dept: RADIOLOGY | Facility: HOSPITAL | Age: 72
Discharge: HOME | End: 2017-10-27
Payer: MEDICARE

## 2017-10-27 ENCOUNTER — APPOINTMENT (OUTPATIENT)
Dept: LAB | Facility: HOSPITAL | Age: 72
End: 2017-10-27
Payer: MEDICARE

## 2017-10-27 DIAGNOSIS — R19.5 OTHER FECAL ABNORMALITIES: ICD-10-CM

## 2017-10-27 DIAGNOSIS — R14.0 ABDOMINAL DISTENSION (GASEOUS): ICD-10-CM

## 2017-10-27 DIAGNOSIS — E11.9 TYPE 2 DIABETES MELLITUS WITHOUT COMPLICATIONS (HCC): ICD-10-CM

## 2017-10-27 DIAGNOSIS — K57.92 DIVERTICULITIS OF INTESTINE WITHOUT PERFORATION OR ABSCESS WITHOUT BLEEDING: ICD-10-CM

## 2017-10-27 DIAGNOSIS — R19.4 CHANGE IN BOWEL HABITS: ICD-10-CM

## 2017-10-27 DIAGNOSIS — G70.00 MYASTHENIA GRAVIS WITHOUT (ACUTE) EXACERBATION (HCC): ICD-10-CM

## 2017-10-27 DIAGNOSIS — K57.90 DIVERTICULOSIS OF INTESTINE WITHOUT PERFORATION OR ABSCESS WITHOUT BLEEDING: ICD-10-CM

## 2017-10-27 LAB
BASOPHILS # BLD AUTO: 0.11 THOUSANDS/ΜL (ref 0–0.1)
BASOPHILS NFR BLD AUTO: 1 % (ref 0–1)
EOSINOPHIL # BLD AUTO: 0.13 THOUSAND/ΜL (ref 0–0.61)
EOSINOPHIL NFR BLD AUTO: 1 % (ref 0–6)
ERYTHROCYTE [DISTWIDTH] IN BLOOD BY AUTOMATED COUNT: 13.6 % (ref 11.6–15.1)
HCT VFR BLD AUTO: 46.7 % (ref 36.5–49.3)
HGB BLD-MCNC: 16.3 G/DL (ref 12–17)
LYMPHOCYTES # BLD AUTO: 2.11 THOUSANDS/ΜL (ref 0.6–4.47)
LYMPHOCYTES NFR BLD AUTO: 18 % (ref 14–44)
MCH RBC QN AUTO: 31.8 PG (ref 26.8–34.3)
MCHC RBC AUTO-ENTMCNC: 34.9 G/DL (ref 31.4–37.4)
MCV RBC AUTO: 91 FL (ref 82–98)
MONOCYTES # BLD AUTO: 0.88 THOUSAND/ΜL (ref 0.17–1.22)
MONOCYTES NFR BLD AUTO: 8 % (ref 4–12)
NEUTROPHILS # BLD AUTO: 8.54 THOUSANDS/ΜL (ref 1.85–7.62)
NEUTS SEG NFR BLD AUTO: 72 % (ref 43–75)
NRBC BLD AUTO-RTO: 0 /100 WBCS
PLATELET # BLD AUTO: 331 THOUSANDS/UL (ref 149–390)
PMV BLD AUTO: 10.1 FL (ref 8.9–12.7)
RBC # BLD AUTO: 5.13 MILLION/UL (ref 3.88–5.62)
WBC # BLD AUTO: 11.77 THOUSAND/UL (ref 4.31–10.16)

## 2017-10-27 PROCEDURE — 85025 COMPLETE CBC W/AUTO DIFF WBC: CPT

## 2017-10-27 PROCEDURE — 74022 RADEX COMPL AQT ABD SERIES: CPT

## 2017-10-27 PROCEDURE — 36415 COLL VENOUS BLD VENIPUNCTURE: CPT

## 2017-11-07 ENCOUNTER — GENERIC CONVERSION - ENCOUNTER (OUTPATIENT)
Dept: OTHER | Facility: OTHER | Age: 72
End: 2017-11-07

## 2017-11-13 ENCOUNTER — GENERIC CONVERSION - ENCOUNTER (OUTPATIENT)
Dept: OTHER | Facility: OTHER | Age: 72
End: 2017-11-13

## 2017-11-28 ENCOUNTER — ALLSCRIPTS OFFICE VISIT (OUTPATIENT)
Dept: OTHER | Facility: OTHER | Age: 72
End: 2017-11-28

## 2017-11-28 ENCOUNTER — HOSPITAL ENCOUNTER (OUTPATIENT)
Facility: HOSPITAL | Age: 72
Setting detail: OUTPATIENT SURGERY
End: 2017-11-28
Attending: SURGERY | Admitting: SURGERY
Payer: MEDICARE

## 2017-11-30 NOTE — CONSULTS
Assessment    1  Change in bowel habits (787 99) (R19 4)   2  Myasthenia gravis (358 00) (G70 00)   3  Screening for colorectal cancer (V76 51) (Z12 11,Z12 12)    Discussion/Summary  Discussion Summary:   Rodolfo Cee is a 67year old male who presents today to discuss having a colonoscopy  is feeling better and is no longer constipated  He could develop diverticulitis again, but his symptoms do not indicate that  A fistula forming again in two years is highly unlikely  It is possible he was just constipated  Recommended that if that happens again he try milk of magnesia  He understands he should be eating a high fiber diet  He does need another colonoscopy anyway based on the results of his previous colonoscopy  He will schedule for a colonoscopy for January  He knows to contact our office if any concerns or problems arise  Goals and Barriers: The patient has the current Goals: Schedule colonoscopy  The patent has the current Barriers: Diabetes  Patient's Capacity to Self-Care: Patient is able to Self-Care  Patient Education: Educational resources provided:      Chief Complaint  Chief Complaint Free Text Note Form: Recent change in bowel movements  Established patient who is 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  History of Present Illness  HPI: Rodolfo Cee is a 67year old male who presents today to discuss having a colonoscopy  He has started having irregular bowel movements in the last few weeks  He is not sure if he has diverticulitis or another fistula  He was told by someone he has Crohnâs disease, which he does not  He was prescribed Flagyl  was diagnosed with diabetes in March  Review of Systems  Complete-Male:  Constitutional: No fever or chills, feels well, no tiredness, no recent weight gain or weight loss  Eyes: No complaints of eye pain, no red eyes, no discharge from eyes, no itchy eyes    ENT: no complaints of earache, no hearing loss, no nosebleeds, no nasal discharge, no sore throat, no hoarseness  Cardiovascular: No complaints of slow heart rate, no fast heart rate, no chest pain, no palpitations, no leg claudication, no lower extremity  Respiratory: No complaints of shortness of breath, no wheezing, no cough, no SOB on exertion, no orthopnea or PND  Gastrointestinal: nausea-- and-- constipation, but-- as noted in HPI  Genitourinary: No complaints of dysuria, no incontinence, no hesitancy, no nocturia, no genital lesion, no testicular pain-- and-- as noted in HPI  Musculoskeletal: No complaints of arthralgia, no myalgias, no joint swelling or stiffness, no limb pain or swelling  Integumentary: No complaints of skin rash or skin lesions, no itching, no skin wound, no dry skin  Neurological: No compliants of headache, no confusion, no convulsions, no numbness or tingling, no dizziness or fainting, no limb weakness, no difficulty walking  Psychiatric: Is not suicidal, no sleep disturbances, no anxiety or depression, no change in personality, no emotional problems  Endocrine: No complaints of proptosis, no hot flashes, no muscle weakness, no erectile dysfunction, no deepening of the voice, no feelings of weakness  Hematologic/Lymphatic: No complaints of swollen glands, no swollen glands in the neck, does not bleed easily, no easy bruising  ROS Reviewed:   ROS reviewed  Active Problems    1  Abdominal distension (787 3) (R14 0)   2  Abnormal urinalysis (791 9) (R82 90)   3  Actinic keratosis (702 0) (L57 0)   4  Acute kidney injury (584 9) (N17 9)   5  Anxiety as acute reaction to gross stress (308 0) (F41 1,F43 0)   6  Benign non-nodular prostatic hyperplasia with lower urinary tract symptoms (600 91) (N40 1)   7  Bilateral leg pain (729 5) (M79 604,M79 605)   8  Cervical radiculopathy (723 4) (M54 12)   9  Cervical spinal stenosis (723 0) (M48 02)   10  Change in bowel habits (787 99) (R19 4)   11   CKD (chronic kidney disease) stage 3, GFR 30-59 ml/min (585 3) (N18 3)   12  Complaints of leg weakness (729 89) (R29 898)   13  Constipation (564 00) (K59 00)   14  Diabetes mellitus type 2, insulin dependent (250 00,V58 67) (E11 9,Z79 4)   15  Diverticulitis (562 11) (K57 92)   16  Diverticulosis (562 10) (K57 90)   17  Elevated bilirubin (277 4) (R17)   18  Encounter for special screening examination for genitourinary disorder (V81 6) (Z13 89)   19  Generalized weakness (780 79) (R53 1)   20  Hematuria (599 70) (R31 9)   21  History of recent hospitalization (V13 9) (Z92 89)   22  Hypercalcemia (275 42) (E83 52)   23  Hyperlipidemia (272 4) (E78 5)   24  Hypertension (401 9) (I10)   25  Hyponatremia (276 1) (E87 1)   26  Kidney atrophy (587) (N26 1)   27  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)   28  Leukocytosis (288 60) (D72 829)   29  Loose stools (787 7) (R19 5)   30  Medicare annual wellness visit, subsequent (V70 0) (Z00 00)   31  Microscopic hematuria (599 72) (R31 29)   32  Myasthenia gravis (358 00) (G70 00)   33  Need for influenza vaccination (V04 81) (Z23)   34  Need for Tdap vaccination (V06 1) (Z23)   35  Need for zoster vaccine (V04 89) (Z23)   36  Nephrolithiasis (592 0) (N20 0)   37  Neuralgia (729 2) (M79 2)   38  Polyneuropathy (356 9) (G62 9)   39  RBBB (right bundle branch block) (426 4) (I45 10)   40  Recent major surgery (V12 29) (Z98 890)   41  Renal lithiasis (592 0) (N20 0)   42  Screening for colorectal cancer (V76 51) (Z12 11,Z12 12)   43  Sinus tachycardia (427 89) (R00 0)   44  Sleep apnea (780 57) (G47 30)   45  Special screening examination for neoplasm of prostate (V76 44) (Z12 5)   46  Urinary retention (788 20) (R33 9)   47  Vertigo (780 4) (R42)    Past Medical History    1  History of Acute upper respiratory infection (465 9) (J06 9)   2  History of Acute URI (465 9) (J06 9)   3  History of Ataxic gait (781 2) (R26 0)   4  History of Bilateral leg weakness (729 89) (R29 898)   5   History of Candida infection of genital region (112 2) (B37 49)   6  History of Colitis (558 9) (K52 9)   7  History of [place]-vesical fistula (596 1) (N32 1)   8  History of Cough (786 2) (R05)   9  History of Cough (786 2) (R05)   10  History of Dyspnea (786 09) (R06 00)   11  History of E  coli UTI (urinary tract infection) (599 0,041 49) (N39 0,B96 20)   12  History of E  coli UTI (urinary tract infection) (599 0,041 49) (N39 0,B96 20)   13  History of Flu-like symptoms (780 99) (R68 89)   14  History of acute sinusitis (V12 69) (Z87 09)   15  History of candidiasis of mouth (V12 09) (Z86 19)   16  History of dehydration (V12 29) (Z86 39)   17  History of fatigue (V13 89) (Z87 898)   18  History of fever (V13 89) (Z87 898)   19  History of hypokalemia (V12 29) (Z86 39)   20  History of polyneuropathy (V12 49) (Z86 69)   21  History of sinusitis (V12 69) (Z87 09)   22  History of transient cerebral ischemia (V12 54) (Z86 73)   23  History of urinary tract infection (V13 02) (Z87 440)   24  History of urinary tract infection (V13 02) (Z87 440)   25  History of vertigo (V12 49) (Z87 898)   26  History of Hospitalization within last 30 days (V15 89) (Z92 89)   27  History of Medicare annual wellness visit, initial (V70 0) (Z00 00)   28  History of Myasthenia gravis with acute exacerbation (358 01) (G70 01)   29  History of Nasal turbinate hypertrophy (478 0) (J34 3)   30  History of Neck pain (723 1) (M54 2)   31  History of Nephrolithiasis (V13 01)   32  History of Otitis media (382 9) (H66 90)   33  History of Postnasal drip (784 91) (R09 82)   34  History of Status post laparoscopic colectomy (V45 89) (Z90 49)   35  History of Unintentional weight loss (783 21) (R63 4)   36  History of Unstable gait (781 2) (R26 81)   37  History of Weakness of right lower extremity (969 89) (R29 898)  Active Problems And Past Medical History Reviewed: The active problems and past medical history were reviewed and updated today  Surgical History    1  History of Biopsy Parotid   2  History of Complete Colonoscopy   3  History of Laparoscopy Partial Colectomy Sigmoid   4  History of Lithotomy  Surgical History Reviewed: The surgical history was reviewed and updated today  Family History  Mother    1  Family history of Arthritis (V17 7)   2  Family history of Diabetes Mellitus (V18 0)   3  Family history of Hypertension (V17 49)   4  Family history of Osteoporosis (V17 81)  Father    5  Family history of depression (V17 0) (Z81 8)  Family History Reviewed: The family history was reviewed and updated today  Social History     · Marital History - Currently    · Never Drank Alcohol   · Never smoked cigarettes (V49 89) (Z78 9)   · No caffeine use   · Uses Safety Equipment - Seatbelts  Social History Reviewed: The social history was reviewed and updated today  The social history was reviewed and is unchanged  Current Meds   1  Accu-Chek Mone Plus In Vitro Strip; TEST 3 TIMES DAILY; Therapy: 69Hnd8387 to (Kerwin Ybarra)  Requested for: 40Ddz1054; Last Rx:19Apr2017 Ordered   2  Accu-Chek FastClix Lancets Miscellaneous; TEST twice a day as directed; Therapy: 92OFM9731 to (Last Rx:09Oct2017)  Requested for: 01XPF7425 Ordered   3  Accu-Chek Guide In Vitro Strip; TEST TWICE DAILY; Therapy: 76BVX0966 to ([facility]89OYY3244)  Requested for: 18NIK3740; Last Rx:09Oct2017 Ordered   4  Accu-Chek Guide w/Device Kit; USE AS DIRECTED; Therapy: 89XOR9139 to (Last Rx:09Oct2017)  Requested for: 85KSI7877 Ordered   5  Accu-Chek Softclix Lancets Miscellaneous; Test 3 times daily; Therapy: 96Qnc0415 to (Last Rx:19Apr2017)  Requested for: 85Heo3161 Ordered   6  BD Pen Needle Mini U/F 31G X 5 MM Miscellaneous; USE 4 TIMES DAILY; Therapy: 32MNF0779 to (Last Rx:88Aaw5103)  Requested for: 61Irl2054 Ordered   7  Famotidine 20 MG Oral Tablet; TAKE 1 TABLET DAILY AS DIRECTED;  Therapy: 35EPX2432 to (Brandon Castro)  Requested for: 88TDE7029; Last [facility]:08BVL8401 Ordered   8  HydroCHLOROthiazide 25 MG Oral Tablet; take one tablet by mouth daily as directed  Requested for: 14MXL3526; Last Rx:09Jun2017 Ordered   9  Lantus SoloStar 100 UNIT/ML Subcutaneous Solution Pen-injector; 12units SQ QHS; Therapy: 81RBP3547 to (Last Rx:09Jun2017)  Requested for: 84JDS3179 Ordered   10  NovoLOG FlexPen 100 UNIT/ML Subcutaneous Solution Pen-injector; INJECT  SUBCUTANEOUSLY 3 UNITS with  each meal;  Therapy: 83BQY7008 to (Last IU:23TFE2889)  Requested for: 35AMO1951 Ordered   11  Potassium Chloride Kendra ER 20 MEQ Oral Tablet Extended Release; Therapy: 84KXO4136 to (Evaluate:15Apr2017) Recorded   12  PredniSONE 10 MG Oral Tablet; TAKE 1 TABLET DAILY as of march 2017  3mth supply; Therapy: 01Apr2016 to (Rakesh Press)  Requested for: 19Oct2017; Last  Rx:19Oct2017 Ordered   13  RA Vitamin B-6  MG Oral Tablet Extended Release; TAKE 1 TABLET DAILY; Therapy: 01KGX6668 to Recorded   14  Vitamin D3 2000 UNIT Oral Capsule; take 1 capsule daily; Therapy: (Recorded:05Xwz1837) to Recorded  Medication List Reviewed: The medication list was reviewed and updated today  Allergies  1  Antihistamine Decongestant TABS   2  Antihistamine Decongestant TB12   3  Antihistamine Nasal Decongest TABS   4  Antihistamine TABS   5  Antihistamines, Chlorpheniramine-type   6  Antihistamines, Diphenhydramine-type   7  Antihistamines, Loratadine-type   8  Erythromycin Base TABS   9  Iodine Solution SOLN   10  Levaquin   11  Sulfa Drugs   12  Tetracyclines    Vitals  Vital Signs    Recorded: 28Nov2017 01:40PM   Temperature 98 2 F, Tympanic   Heart Rate 80, L Radial   Pulse Quality Normal, L Radial   Respiration Quality Normal   Respiration 16   Systolic 463, LUE, Sitting   Diastolic 78, LUE, Sitting   Height 5 ft 9 5 in   Weight 178 lb    BMI Calculated 25 91   BSA Calculated 1 98       Physical Exam   Constitutional  General appearance: No acute distress, well appearing and well nourished     Eyes Conjunctiva and lids: No swelling, erythema, or discharge  Pupils and irises: Equal, round and reactive to light  Sclera non-icteric  Ears, Nose, Mouth, and Throat  External inspection of ears and nose: Normal    Neck  Supple, symmetric, trachea midline, no masses  Pulmonary  Respiratory effort: No increased work of breathing or signs of respiratory distress  Auscultation of lungs: Clear to auscultation, equal breath sounds bilaterally, no wheezes, no rales, no rhonci  Cardiovascular  Auscultation of heart: Normal rate and rhythm, normal S1 and S2, without murmurs  Examination of extremities for edema and/or varicosities: Normal    Carotid pulses: Normal    Abdomen  Abdomen: Non-tender, no masses  Liver and spleen: No hepatomegaly or splenomegaly  Lymphatic  Palpation of lymph nodes in neck: No lymphadenopathy  Musculoskeletal  Digits and nails: Normal without clubbing or cyanosis  Extremities: No clubbing, no cyanosis, no edema  Skin  Skin and subcutaneous tissue: Normal without rashes or lesions  Neurologic  Sensation: Motor and sensory grossly intact  Psychiatric  Orientation to person, place and time: Normal    Mood and affect: Normal        Attending Note  Scribe Attestation:  Scribe Attestation Tom VIZCARRA am acting as a scribe in the presence of the attending physician while the attending physician examines the patient  Physician Attestation:  Ulysses Koch personally performed the services described in this documentation as scribed in my presence, and it is both accurate and complete        Future Appointments    Date/Time Provider Specialty Site   01/05/2018 12:30 PM Carlo Mello MD General Surgery [place] [address] OR   12/07/2018 09:45 AM Jono Chavarria MD Urology [address]       Signatures   Electronically signed by : Maxine Toledo MD; Nov 29 2017 11:03AM EST                       (Author)

## 2017-12-06 ENCOUNTER — GENERIC CONVERSION - ENCOUNTER (OUTPATIENT)
Dept: OTHER | Facility: OTHER | Age: 72
End: 2017-12-06

## 2017-12-06 ENCOUNTER — APPOINTMENT (OUTPATIENT)
Dept: LAB | Facility: CLINIC | Age: 72
End: 2017-12-06
Payer: MEDICARE

## 2017-12-06 ENCOUNTER — TRANSCRIBE ORDERS (OUTPATIENT)
Dept: LAB | Facility: CLINIC | Age: 72
End: 2017-12-06

## 2017-12-06 DIAGNOSIS — J01.90 ACUTE SINUSITIS: ICD-10-CM

## 2017-12-06 DIAGNOSIS — E11.9 TYPE 2 DIABETES MELLITUS WITHOUT COMPLICATIONS (HCC): ICD-10-CM

## 2017-12-06 LAB
ALBUMIN SERPL BCP-MCNC: 3.9 G/DL (ref 3.5–5)
ALP SERPL-CCNC: 40 U/L (ref 46–116)
ALT SERPL W P-5'-P-CCNC: 44 U/L (ref 12–78)
ANION GAP SERPL CALCULATED.3IONS-SCNC: 6 MMOL/L (ref 4–13)
AST SERPL W P-5'-P-CCNC: 30 U/L (ref 5–45)
BASOPHILS # BLD AUTO: 0.06 THOUSANDS/ΜL (ref 0–0.1)
BASOPHILS NFR BLD AUTO: 1 % (ref 0–1)
BILIRUB SERPL-MCNC: 0.85 MG/DL (ref 0.2–1)
BUN SERPL-MCNC: 18 MG/DL (ref 5–25)
CALCIUM SERPL-MCNC: 10 MG/DL (ref 8.3–10.1)
CHLORIDE SERPL-SCNC: 98 MMOL/L (ref 100–108)
CO2 SERPL-SCNC: 31 MMOL/L (ref 21–32)
CREAT SERPL-MCNC: 1.13 MG/DL (ref 0.6–1.3)
EOSINOPHIL # BLD AUTO: 0.05 THOUSAND/ΜL (ref 0–0.61)
EOSINOPHIL NFR BLD AUTO: 0 % (ref 0–6)
ERYTHROCYTE [DISTWIDTH] IN BLOOD BY AUTOMATED COUNT: 13.3 % (ref 11.6–15.1)
EST. AVERAGE GLUCOSE BLD GHB EST-MCNC: 126 MG/DL
GFR SERPL CREATININE-BSD FRML MDRD: 65 ML/MIN/1.73SQ M
GLUCOSE SERPL-MCNC: 125 MG/DL (ref 65–140)
HBA1C MFR BLD: 6 % (ref 4.2–6.3)
HCT VFR BLD AUTO: 48.2 % (ref 36.5–49.3)
HGB BLD-MCNC: 16.9 G/DL (ref 12–17)
LYMPHOCYTES # BLD AUTO: 1.25 THOUSANDS/ΜL (ref 0.6–4.47)
LYMPHOCYTES NFR BLD AUTO: 11 % (ref 14–44)
MCH RBC QN AUTO: 31.8 PG (ref 26.8–34.3)
MCHC RBC AUTO-ENTMCNC: 35.1 G/DL (ref 31.4–37.4)
MCV RBC AUTO: 91 FL (ref 82–98)
MONOCYTES # BLD AUTO: 0.51 THOUSAND/ΜL (ref 0.17–1.22)
MONOCYTES NFR BLD AUTO: 5 % (ref 4–12)
NEUTROPHILS # BLD AUTO: 9.23 THOUSANDS/ΜL (ref 1.85–7.62)
NEUTS SEG NFR BLD AUTO: 83 % (ref 43–75)
NRBC BLD AUTO-RTO: 0 /100 WBCS
PLATELET # BLD AUTO: 297 THOUSANDS/UL (ref 149–390)
PMV BLD AUTO: 10.4 FL (ref 8.9–12.7)
POTASSIUM SERPL-SCNC: 4.4 MMOL/L (ref 3.5–5.3)
PROT SERPL-MCNC: 8.2 G/DL (ref 6.4–8.2)
RBC # BLD AUTO: 5.31 MILLION/UL (ref 3.88–5.62)
SODIUM SERPL-SCNC: 135 MMOL/L (ref 136–145)
TSH SERPL DL<=0.05 MIU/L-ACNC: 2.08 UIU/ML (ref 0.36–3.74)
WBC # BLD AUTO: 11.14 THOUSAND/UL (ref 4.31–10.16)

## 2017-12-06 PROCEDURE — 84443 ASSAY THYROID STIM HORMONE: CPT

## 2017-12-06 PROCEDURE — 80053 COMPREHEN METABOLIC PANEL: CPT

## 2017-12-06 PROCEDURE — 83036 HEMOGLOBIN GLYCOSYLATED A1C: CPT

## 2017-12-06 PROCEDURE — 36415 COLL VENOUS BLD VENIPUNCTURE: CPT

## 2017-12-06 PROCEDURE — 85025 COMPLETE CBC W/AUTO DIFF WBC: CPT

## 2017-12-16 DIAGNOSIS — N39.0 URINARY TRACT INFECTION: ICD-10-CM

## 2017-12-16 DIAGNOSIS — N20.0 CALCULUS OF KIDNEY: ICD-10-CM

## 2017-12-28 ENCOUNTER — GENERIC CONVERSION - ENCOUNTER (OUTPATIENT)
Dept: OTHER | Facility: OTHER | Age: 72
End: 2017-12-28

## 2017-12-28 RX ORDER — MULTIVITAMIN WITH IRON
100 TABLET ORAL DAILY
COMMUNITY
End: 2020-11-04 | Stop reason: ALTCHOICE

## 2017-12-28 RX ORDER — POTASSIUM CHLORIDE 1.5 G/1.77G
20 POWDER, FOR SOLUTION ORAL 2 TIMES DAILY
COMMUNITY
End: 2018-02-14 | Stop reason: ALTCHOICE

## 2018-01-10 NOTE — RESULT NOTES
Message   labs reviwed ,  no change  in results      Verified Results  (1) COMPREHENSIVE METABOLIC PANEL [id number] 04:50VV Arcenio Justice     Test Name Result Flag Reference   GLUCOSE,RANDM 194 mg/dL H    If the patient is fasting, the ADA then defines impaired fasting glucose as > 100 mg/dL and diabetes as > or equal to 123 mg/dL  SODIUM 138 mmol/L  136-145   POTASSIUM 3 3 mmol/L L 3 5-5 3   CHLORIDE 97 mmol/L L 100-108   CARBON DIOXIDE 30 mmol/L  21-32   ANION GAP (CALC) 11 mmol/L  4-13   BLOOD UREA NITROGEN 15 mg/dL  5-25   CREATININE 1 41 mg/dL H 0 60-1 30   Standardized to IDMS reference method   CALCIUM 10 0 mg/dL  8 3-10 1   BILI, TOTAL 1 02 mg/dL H 0 20-1 00   ALK PHOSPHATAS 47 U/L     ALT (SGPT) 46 U/L  12-78   AST(SGOT) 34 U/L  5-45   ALBUMIN 3 5 g/dL  3 5-5 0   TOTAL PROTEIN 7 5 g/dL  6 4-8 2   eGFR Non-African American 49 5 ml/min/1 73sq [facility] Disease Education Program recommendations are as follows:  GFR calculation is accurate only with a steady state creatinine  Chronic Kidney disease less than 60 ml/min/1 73 sq  meters  Kidney failure less than 15 ml/min/1 73 sq  meters       (1) CBC/ PLT (NO DIFF) [id number] 09:59AM Arcenio Justice     Test Name Result Flag Reference   HEMATOCRIT 45 9 %  36 5-49 3   HEMOGLOBIN 16 2 g/dL  12 0-17 0   MCHC 35 3 g/dL  31 4-37 4   MCH 31 6 pg  26 8-34 3   MCV 90 fL  82-98   PLATELET COUNT 049 Thousands/uL  149-390   RBC COUNT 5 13 Million/uL  3 88-5 62   RDW 12 8 %  11 6-15 1   WBC COUNT 15 30 Thousand/uL H 4 31-10 16   MPV 10 6 fL  8 9-12 7

## 2018-01-11 NOTE — MISCELLANEOUS
Assessment   1  Diabetes mellitus type 2, insulin dependent (250 00,V58 67) (E11 9,Z79 4)  2  Myasthenia gravis (358 00) (G70 00)  3  Acute kidney injury (584 9) (N17 9)  4  History of recent hospitalization (V13 9) (Z92 89)  5  Sinus tachycardia (427 89) (R00 0)  6  Hypertension (401 9) (I10)    Plan   Diabetes mellitus type 2, insulin dependent, Myasthenia gravis    · (1) INSULIN ANTIBODIES; Status:Active; Requested for:2017;   Perform:[facility]; Due:2018; Ordered; For:Diabetes mellitus type 2,   insulin dependent, Myasthenia gravis; Ordered By:Mary Kay Nieto;  Diabetes mellitus, type 2    · Renew: Ginger Contour Next Test In [employer]; TEST TWICE DAILY  Rx By: Caleb Rose; Dispense: 50 Days ; #:1 X 100 Strip Box; Refill: 3;For: Diabetes   mellitus, type 2; DANIEL = N; Print Rx   · Glucose Monitoring Strips; Status:Complete;   Done: 74ZTU3817 01:37PM  Perform:Not Applicable; [id number];CNOXDAP; For:Diabetes mellitus, type 2; Ordered   By:Mary Kay Nieto;  Need for influenza vaccination    · Stop: Influenza  For: Need for influenza vaccination; Ordered By:Mary Kay Nieto; Effective   Date:2017; Last Updated By: Loki Vizcarra; 3/16/2017 2:54:22 PM    (1) COMPREHENSIVE METABOLIC PANEL; Status:Resulted - Requires Verification;   Done: 63IQT8947 12:00AM  OXJ:22UIR8365; Ordered; For:Diabetes mellitus type 2, insulin dependent, Myasthenia gravis; Ordered By:Mary Kay Nieto;   (1) CBC/PLT/DIFF; Status:Resulted - Requires Verification;   Done: 42QGI9935 12:00AM  Due:2018; Ordered; For:Diabetes mellitus type 2, insulin dependent, Myasthenia gravis; Ordered By:Mary Kay Nieto;   (1) MICROALBUMIN CREATININE RATIO, RANDOM URINE; Status:Resulted - Requires Verification;   Done: 78NYW1836 12:00AM  Due:2018; Ordered; For:Diabetes mellitus type 2, insulin dependent, Myasthenia gravis;  Ordered By:Mary Kay Nieto;   (1) URINALYSIS w URINE C/S REFLEX (will reflex a microscopy if leukocytes, occult blood, or nitrites are not within normal limits); Status:Resulted - Requires Verification;   Done: 45QAP2104 12:00AM  Due:16Mar2018; Ordered; For:Diabetes mellitus type 2, insulin dependent, Myasthenia gravis; Ordered By:Mary Kay Nieto;      Discussion/Summary  Discussion Summary:   Advised pt that, given his chronic autoimmune condition of MG, will test for possibility that he is actually type I diabetic/autoimmune diabetes, not type II, which could explain why his condition worsened so rapidly despite oral med  he will start testing fasting fingerstick bs's and continue one 2 hour pp test each day, 1  f/u in approx 3 weeks   Counseling Documentation With Imm: The patient was counseled regarding diagnostic results, instructions for management, risk factor reductions, patient and family education, impressions  Medication SE Review and Pt Understands Tx: Possible side effects of new medications were reviewed with the patient/guardian today  The treatment plan was reviewed with the patient/guardian  The patient/guardian understands and agrees with the treatment plan       1 Amended By: Magnolia Elaine; Mar 24 2017 1:12 PM EST    Chief Complaint  Chief Complaint Free Text Note Form: PT is here for a SARAVANAN  Pt would like to talk about diets  History of Present Illness  TCM Communication [facility]: The patient is being contacted for follow-up after hospitalization  He was hospitalized at [facility]  The date of admission: 03/06/2017, date of discharge: 03/09/2017  Diagnosis: diabetes Type II, Low potassium  He was discharged to home  Medications reviewed and updated today  He scheduled a follow up appointment  Follow-up appointments with other specialists: Nephrology appt  3/23     Symptoms: weakness, fatigue, shortness of breath and anorexia, but no fever, no dizziness, no headache, no cough, no chest pain, no back pain on left side, no back pain on right side, no arm pain left side, no arm pain on right side, no leg pain on left side, no leg pain on right side, no upper abdominal pain, no middle abdominal pain, no lower abdominal pain, no rash:, no nausea, no vomiting, no loose stools, no constipation and no pain with urinating  The patient is currently experiencing symptoms  Counseling was provided to the patient  Communication performed and completed by Any Pearce   HPI: feel 100% better except eyes are still a little blurry, hearing is back  is having difficulty pricking fingers, but does twice a day, highest was 189 or 191, range , but is testing about 2 hours after breakfast and 2 hours after supper  has been following diabetic diet, but feels hungry all the time  visiting nurse will be coming once a month  states his weight fluctuates during the day by about 4 pounds  made appt for podiatrist      Review of Systems  Complete-Male:   Constitutional: as noted in HPI, no fever, not feeling poorly, no chills and not feeling tired  Eyes: as noted in HPI, no eye pain, no dryness of the eyes, eyes not red, no purulent discharge from the eyes and no itching of the eyes  ENT: no complaints of earache, no hearing loss, no nosebleeds, no nasal discharge, no sore throat, no hoarseness  Cardiovascular: No complaints of slow heart rate, no fast heart rate, no chest pain, no palpitations, no leg claudication, no lower extremity  Respiratory: No complaints of shortness of breath, no wheezing, no cough, no SOB on exertion, no orthopnea or PND  Gastrointestinal: No complaints of abdominal pain, no constipation, no nausea or vomiting, no diarrhea or bloody stools  Genitourinary: No complaints of dysuria, no incontinence, no hesitancy, no nocturia, no genital lesion, no testicular pain  Musculoskeletal: No complaints of arthralgia, no myalgias, no joint swelling or stiffness, no limb pain or swelling     Integumentary: No complaints of skin rash or skin lesions, no itching, no skin wound, no dry skin  Neurological: admits still a little leg weakness and wobbliness when ambulating, was able to go back to work this morning for a couple of hours, but no headache, no numbness, no tingling, no confusion, no dizziness, no convulsions and no fainting  Psychiatric: Is not suicidal, no sleep disturbances, no anxiety or depression, no change in personality, no emotional problems  Endocrine: as noted in HPI, no proptosis, no muscle weakness, no deepening of the voice, no hot flashes and no feelings of weakness  Hematologic/Lymphatic: No complaints of swollen glands, no swollen glands in the neck, does not bleed easily, no easy bruising  Active Problems   1  Abnormal glucose measurement (790 29) (R73 09)  2  Abnormal urinalysis (791 9) (R82 90)  3  Actinic keratosis (702 0) (L57 0)  4  Acute kidney injury (584 9) (N17 9)  5  Anxiety as acute reaction to gross stress (308 0) (F41 1,F43 0)  6  Benign non-nodular prostatic hyperplasia with lower urinary tract symptoms (600 91)   (N40 1)  7  Bilateral leg pain (729 5) (M79 604,M79 605)  8  Cervical radiculopathy (723 4) (M54 12)  9  Cervical spinal stenosis (723 0) (M48 02)  10  Colitis (558 9) (K52 9)  11  Complaints of leg weakness (729 89) (R29 898)  12  Constipation (564 00) (K59 00)  13  Diabetes mellitus, type 2 (250 00) (E11 9)  14  Diverticulosis (562 10) (K57 90)  15  Elevated bilirubin (277 4) (R17)  16  Encounter for special screening examination for genitourinary disorder (V81 6) (Z13 89)  17  Fever (780 60) (R50 9)  18  Generalized weakness (780 79) (R53 1)  19  Hematuria (599 70) (R31 9)  20  History of recent hospitalization (V13 9) (Z92 89)  21  Hospitalization within last 30 days (V15 89) (Z92 89)  22  Hyperglycemia (790 29) (R73 9)  23  Hyperlipidemia (272 4) (E78 5)  24  Hypertension (401 9) (I10)  25  Hyponatremia (276 1) (E87 1)  26  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)  27   Medicare annual wellness visit, subsequent (V70 0) (Z00 00)  28  Microscopic hematuria (599 72) (R31 29)  29  Myasthenia gravis (358 00) (G70 00)  30  Myasthenia gravis (358 00) (G70 00)  31  Myasthenia gravis with acute exacerbation (358 01) (G70 01)  32  Nasal turbinate hypertrophy (478 0) (J34 3)  33  Need for influenza vaccination (V04 81) (Z23)  34  Need for Tdap vaccination (V06 1) (Z23)  35  Need for zoster vaccine (V04 89) (Z23)  36  Nephrolithiasis (592 0) (N20 0)  37  Neuralgia (729 2) (M79 2)  38  Polyneuropathy (356 9) (G62 9)  39  RBBB (right bundle branch block) (426 4) (I45 10)  40  Recent major surgery (V12 29) (Z98 890)  41  Renal lithiasis (592 0) (N20 0)  42  Sinus tachycardia (427 89) (R00 0)  43  Sleep apnea (780 57) (G47 30)  44  Special screening examination for neoplasm of prostate (V76 44) (Z12 5)  45  Tachycardia (785 0) (R00 0)  46  Unintentional weight loss (783 21) (R63 4)  47  Urinary retention (788 20) (R33 9)  48  UTI (urinary tract infection) (599 0) (N39 0)  49  Vertigo (780 4) (R42)    Past Medical History   1  History of Acute upper respiratory infection (465 9) (J06 9)  2  History of Acute URI (465 9) (J06 9)  3  History of Ataxic gait (781 2) (R26 0)  4  History of Bilateral leg weakness (729 89) (R29 898)  5  History of Candida infection of genital region (112 2) (B37 49)  6  History of [place]-vesical fistula (596 1) (N32 1)  7  History of Cough (786 2) (R05)  8  History of Cough (786 2) (R05)  9  History of Dyspnea (786 09) (R06 00)  10  History of E  coli UTI (urinary tract infection) (599 0,041 49) (N39 0,B96 20)  11  History of Flu-like symptoms (780 99) (R68 89)  12  History of acute sinusitis (V12 69) (Z87 09)  13  History of candidiasis of mouth (V12 09) (Z86 19)  14  History of dehydration (V12 29) (Z86 39)  15  History of fatigue (V13 89) (Z87 898)  16  History of hypokalemia (V12 29) (Z86 39)  17  History of polyneuropathy (V12 49) (Z86 69)  18   History of sinusitis (V12 69) (Z87 09)  19  History of transient cerebral ischemia (V12 54) (Z86 73)  20  History of vertigo (V12 49) (Z87 898)  21  History of Medicare annual wellness visit, initial (V70 0) (Z00 00)  22  History of Neck pain (723 1) (M54 2)  23  History of Nephrolithiasis (V13 01)  24  History of Otitis media (382 9) (H66 90)  25  History of Postnasal drip (784 91) (R09 82)  26  History of Status post laparoscopic colectomy (V45 89) (Z90 49)  27  History of Unstable gait (781 2) (R26 81)  28  History of UTI (lower urinary tract infection) (599 0) (N39 0)  29  History of Weakness of right lower extremity (729 89) (R29 898)    Surgical History   1  History of Biopsy Parotid  2  History of Complete Colonoscopy  3  History of Laparoscopy Partial Colectomy Sigmoid  4  History of Lithotomy  Surgical History Reviewed: The surgical history was reviewed and updated today  Family History  Mother   1  Family history of Arthritis (V17 7)  2  Family history of Diabetes Mellitus (V18 0)  3  Family history of Hypertension (V17 49)  4  Family history of Osteoporosis (V17 81)  Family History Reviewed: The family history was reviewed and updated today  Social History    · Marital History - Currently    · Never A Smoker   · Never Drank Alcohol   · No caffeine use   · Uses Safety Equipment - Seatbelts  Social History Reviewed: The social history was reviewed and updated today  The social history was reviewed and is unchanged  Current Meds  1  Clotrimazole 1 % External Cream; APPLY SPARINGLY TO AFFECTED AREA(S) 2 TO 3   TIMES DAILY; Therapy: 90JAF5236 to (Last Rx:03Mar2017)  Requested for: 76JET7110 Ordered  2  Famotidine 20 MG Oral Tablet; TAKE 1 TABLET DAILY AS DIRECTED; Therapy: 78XEG4745 to (Evaluate:29Apr2017)  Requested for: 46CGR9886; Last   Rx:01Nov2016 Ordered  3  HydroCHLOROthiazide 25 MG Oral Tablet; take one tablet by mouth daily as directed;    Therapy: (Recorded:16Mar2017) to  Requested for: 29ZCL6353 Recorded  4  Lantus SoloStar 100 UNIT/ML Subcutaneous Solution Pen-injector; 12units SQ QHS; Therapy: 98GYC4215 to Recorded  5  Meclizine HCl - 12 5 MG Oral Tablet; TAKE 1 TABLET 3 TIMES DAILY AS NEEDED; Therapy: 75BJL7938 to (Evaluate:30Lni8459)  Requested for: 20Jan2017; Last   Rx:20Jan2017 Ordered  6  MetFORMIN HCl - 1000 MG Oral Tablet; TAKE 1 TABLET TWICE DAILY; Therapy: 99NRQ6792 to (Evaluate:04Jun2017)  Requested for: 55RCL3238; Last   Rx:06Mar2017 Ordered  7  NovoLOG FlexPen 100 UNIT/ML Subcutaneous Solution Pen-injector; INJECT   SUBCUTANEOUSLY 3 UNITS with  each meal;   Therapy: 64DVA3816 to Recorded  8  Potassium Chloride Kendra ER 20 MEQ Oral Tablet Extended Release; Therapy: 27KBD7752 to (Evaluate:15Apr2017) Recorded  9  PredniSONE 10 MG Oral Tablet; TAKE 2 TABLET DAILY  3mth supply; Therapy: 01Apr2016 to (Chema Fraga)  Requested for: 44LDE6627; Last   Rx:18Zch6131 Ordered  10  Vitamin D3 2000 UNIT Oral Capsule; take 1 capsule daily; Therapy: (Recorded:86Lse1631) to Recorded  Medication List Reviewed: The medication list was reviewed and updated today  Allergies   1  Antihistamine Decongestant TABS  2  Antihistamine Decongestant TB12  3  Antihistamine Nasal Decongest TABS  4  Antihistamine TABS  5  Antihistamines, Chlorpheniramine-type  6  Antihistamines, Diphenhydramine-type  7  Antihistamines, Loratadine-type  8  Erythromycin Base TABS  9  Iodine Solution SOLN  10  Sulfa Drugs  11  Tetracyclines    Vitals  Signs   Recorded: 55MZD6733 01:13PM   Temperature: 97 9 F  Heart Rate: 104  Respiration: 17  Systolic: 674  Diastolic: 70  Height: 5 ft 9 5 in  Weight: 166 lb 2 oz  BMI Calculated: 24 18  BSA Calculated: 1 92  O2 Saturation: 97    Physical Exam    Constitutional   General appearance: No acute distress, well appearing and well nourished  Eyes   Conjunctiva and lids: No swelling, erythema, or discharge  Pupils and irises: Equal, round and reactive to light      Ears, Nose, Mouth, and Throat   Oropharynx: Normal with no erythema, edema, exudate or lesions  Pulmonary   Respiratory effort: No increased work of breathing or signs of respiratory distress  Auscultation of lungs: Clear to auscultation, equal breath sounds bilaterally, no wheezes, no rales, no rhonci  Cardiovascular   Auscultation of heart: Normal rate and rhythm, normal S1 and S2, without murmurs  Examination of extremities for edema and/or varicosities: Normal     Carotid pulses: Normal     Abdomen   Abdomen: Non-tender, no masses  Liver and spleen: No hepatomegaly or splenomegaly  Lymphatic   Palpation of lymph nodes in neck: No lymphadenopathy  Musculoskeletal   Gait and station: Abnormal   slightly unsteady gait when first gets up from chair/first couple of steps, then normal    Digits and nails: Normal without clubbing or cyanosis  Skin   Skin and subcutaneous tissue: Normal without rashes or lesions  Neurologic   Cranial nerves: Cranial nerves 2-12 intact  except chronic eylid and lower facial droops of MG unchanged  Reflexes: 2+ and symmetric      Psychiatric   Orientation to person, place and time: Normal     Mood and affect: Normal          Future Appointments    Date/Time Provider Specialty Site   06/16/2017 10:45 AM Cornell Mena MD Urology [facility]   03/24/2017 02:00 PM Kika Cardenas DO Nephrology [address]     Signatures   Electronically signed by : Army Lorrie RN; Mar  9 2017  4:12PM EST                       (Author)    Electronically signed by : Brionna Mohr DO; Mar 24 2017  1:11PM EST                       (Author)    Electronically signed by : Brionna Mohr DO; Mar 24 2017  1:12PM EST                       (Author)

## 2018-01-11 NOTE — MISCELLANEOUS
Message  Gin Rosado called that the preauthorization for the Nitrofurantoin Monohyd Macro 100mg  Medication was approved for 18 tablets  Gin Rosado called from [facility]/JAZZ - Reference # [id number]  Active Problems    1  Abnormal glucose measurement (790 29) (R73 09)   2  Abnormal urinalysis (791 9) (R82 90)   3  Actinic keratosis (702 0) (L57 0)   4  Acute kidney injury (584 9) (N17 9)   5  Anxiety as acute reaction to gross stress (308 0) (F41 1,F43 0)   6  Benign non-nodular prostatic hyperplasia with lower urinary tract symptoms (600 91)   (N40 1)   7  Bilateral leg pain (729 5) (M79 604,M79 605)   8  Cervical radiculopathy (723 4) (M54 12)   9  Cervical spinal stenosis (723 0) (M48 02)   10  CKD (chronic kidney disease) stage 3, GFR 30-59 ml/min (585 3) (N18 3)   11  Colitis (558 9) (K52 9)   12  Complaints of leg weakness (729 89) (R29 898)   13  Constipation (564 00) (K59 00)   14  Diabetes mellitus type 2, insulin dependent (250 00,V58 67) (E11 9,Z79 4)   15  Diabetes mellitus, type 2 (250 00) (E11 9)   16  Diverticulosis (562 10) (K57 90)   17  E  coli UTI (urinary tract infection) (599 0,041 49) (N39 0,B96 20)   18  Elevated bilirubin (277 4) (R17)   19  Encounter for special screening examination for genitourinary disorder (V81 6) (Z13 89)   20  Fever (780 60) (R50 9)   21  Generalized weakness (780 79) (R53 1)   22  Hematuria (599 70) (R31 9)   23  History of recent hospitalization (V13 9) (Z92 89)   24  Hospitalization within last 30 days (V15 89) (Z92 89)   25  Hypercalcemia (275 42) (E83 52)   26  Hyperglycemia (790 29) (R73 9)   27  Hyperlipidemia (272 4) (E78 5)   28  Hypertension (401 9) (I10)   29  Hyponatremia (276 1) (E87 1)   30  Kidney atrophy (587) (N26 1)   31  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)   32  Medicare annual wellness visit, subsequent (V70 0) (Z00 00)   33  Microscopic hematuria (599 72) (R31 29)   34  Myasthenia gravis (358 00) (G70 00)   35   Myasthenia gravis (358 00) (G70 00)   36  Myasthenia gravis with acute exacerbation (358 01) (G70 01)   37  Nasal turbinate hypertrophy (478 0) (J34 3)   38  Need for influenza vaccination (V04 81) (Z23)   39  Need for Tdap vaccination (V06 1) (Z23)   40  Need for zoster vaccine (V04 89) (Z23)   41  Nephrolithiasis (592 0) (N20 0)   42  Neuralgia (729 2) (M79 2)   43  Polyneuropathy (356 9) (G62 9)   44  RBBB (right bundle branch block) (426 4) (I45 10)   45  Recent major surgery (V12 29) (Z98 890)   46  Renal lithiasis (592 0) (N20 0)   47  Sinus tachycardia (427 89) (R00 0)   48  Sleep apnea (780 57) (G47 30)   49  Special screening examination for neoplasm of prostate (V76 44) (Z12 5)   50  Tachycardia (785 0) (R00 0)   51  Unintentional weight loss (783 21) (R63 4)   52  Urinary retention (788 20) (R33 9)   53  Vertigo (780 4) (R42)    Current Meds   1  Accu-Chek Mone Plus In Vitro Strip; TEST 3 TIMES DAILY; Therapy: 51Mcy4877 to ([facility])  Requested for: 20Apr2017; Last   Rx:19Apr2017 Ordered   2  Accu-Chek Softclix Lancets Miscellaneous; Test 3 times daily; Therapy: 66Hwv8238 to (Last Rx:19Apr2017)  Requested for: 57Pwn9105 Ordered   3  BD Pen Needle Mini U/F 31G X 5 MM Miscellaneous; USE 4 TIMES DAILY; Therapy: 29NTH5887 to (Last Rx:30Mar2017)  Requested for: 47XKW8525 Ordered   4  Clotrimazole 1 % External Cream; APPLY SPARINGLY TO AFFECTED AREA(S) 2 TO 3   TIMES DAILY; Therapy: 28HMG1705 to (Last Rx:03Mar2017)  Requested for: 26WEH2729 Ordered   5  Famotidine 20 MG Oral Tablet; TAKE 1 TABLET DAILY AS DIRECTED; Therapy: 64GAA2376 to (Evaluate:14Oct2017)  Requested for: 96Zvd6582; Last   Rx:44Tse3454 Ordered   6  HydroCHLOROthiazide 25 MG Oral Tablet; take one tablet by mouth daily as directed    Requested for: 48NSB0433; Last Rx:09Jun2017 Ordered   7  Lantus SoloStar 100 UNIT/ML Subcutaneous Solution Pen-injector; 12units SQ QHS; Therapy: 06WTQ1666 to (Last Rx:09Jun2017)  Requested for: 33TOE0814 Ordered   8  Meclizine HCl - 12 5 MG Oral Tablet; TAKE 1 TABLET 3 TIMES DAILY AS NEEDED; Therapy: 45CEK9079 to (Evaluate:30Nwy6738)  Requested for: 81Fuo9607; Last   Rx:21Hqi6115 Ordered   9  Nitrofurantoin Monohyd Macro 100 MG Oral Capsule; TAKE 1 CAPSULE Every twelve   hours; Therapy: 34GFM2323 to (Evaluate:29Jun2017)  Requested for: 92GVX1590; Last   Rx:09Jun2017 Ordered   10  NovoLOG FlexPen 100 UNIT/ML Subcutaneous Solution Pen-injector; INJECT    SUBCUTANEOUSLY 3 UNITS with  each meal;    Therapy: 68BKG5312 to (Last AP:89XGR6306)  Requested for: 69SPX5807 Ordered   11  Potassium Chloride Kendra ER 20 MEQ Oral Tablet Extended Release; Therapy: 76QSH7494 to (Evaluate:15Apr2017) Recorded   12  PredniSONE 10 MG Oral Tablet; TAKE 1 TABLET DAILY as of march 2017  3mth supply; Therapy: 01Apr2016 to (Renew:88Wif6446)  Requested for: 85PRD4177 Recorded   13  Vitamin D3 2000 UNIT Oral Capsule; take 1 capsule daily; Therapy: (Recorded:99Ksf0900) to Recorded    Allergies    1  Antihistamine Decongestant TABS   2  Antihistamine Decongestant TB12   3  Antihistamine Nasal Decongest TABS   4  Antihistamine TABS   5  Antihistamines, Chlorpheniramine-type   6  Antihistamines, Diphenhydramine-type   7  Antihistamines, Loratadine-type   8  Erythromycin Base TABS   9  Iodine Solution SOLN   10  Sulfa Drugs   11   Tetracyclines    Signatures   Electronically signed by : Jackie Monzon RN; Jun 14 2017  1:51PM EST                       (Author)

## 2018-01-11 NOTE — RESULT NOTES
Message   glucsoe hgh , wbc high on long term steriods      Verified Results  (1) COMPREHENSIVE METABOLIC PANEL 23AUF6348 95:00GQ Danyelle Fagan Order Number: [id number]     Test Name Result Flag Reference   GLUCOSE,RANDM 157 mg/dL H    If the patient is fasting, the ADA then defines impaired fasting glucose as > 100 mg/dL and diabetes as > or equal to 123 mg/dL  SODIUM 136 mmol/L  136-145   POTASSIUM 4 0 mmol/L  3 5-5 3   CHLORIDE 99 mmol/L L 100-108   CARBON DIOXIDE 31 mmol/L  21-32   ANION GAP (CALC) 6 mmol/L  4-13   BLOOD UREA NITROGEN 19 mg/dL  5-25   CREATININE 1 09 mg/dL  0 60-1 30   Standardized to IDMS reference method   CALCIUM 9 7 mg/dL  8 3-10 1   BILI, TOTAL 0 66 mg/dL  0 20-1 00   ALK PHOSPHATAS 42 U/L L    ALT (SGPT) 55 U/L  12-78   AST(SGOT) 38 U/L  5-45   ALBUMIN 3 5 g/dL  3 5-5 0   TOTAL PROTEIN 7 3 g/dL  6 4-8 2   eGFR Non-African American      >60 0 ml/min/1 73sq m   [facility] Disease Education Program recommendations are as follows:  GFR calculation is accurate only with a steady state creatinine  Chronic Kidney disease less than 60 ml/min/1 73 sq  meters  Kidney failure less than 15 ml/min/1 73 sq  meters       (1) CBC/PLT/DIFF 97Czt7199 07:18AM Danyelle Fagan Order Number: [id number]     Test Name Result Flag Reference   WBC COUNT 11 20 Thousand/uL H 4 31-10 16   RBC COUNT 4 83 Million/uL  3 88-5 62   HEMOGLOBIN 15 2 g/dL  12 0-17 0   HEMATOCRIT 44 2 %  36 5-49 3   MCV 92 fL  82-98   MCH 31 5 pg  26 8-34 3   MCHC 34 4 g/dL  31 4-37 4   RDW 13 5 %  11 6-15 1   MPV 10 4 fL  8 9-12 7   PLATELET COUNT 791 Thousands/uL  149-390   nRBC AUTOMATED 0 /100 WBCs     NEUTROPHILS RELATIVE PERCENT 55 %  43-75   LYMPHOCYTES RELATIVE PERCENT 33 %  14-44   MONOCYTES RELATIVE PERCENT 10 %  4-12   EOSINOPHILS RELATIVE PERCENT 1 %  0-6   BASOPHILS RELATIVE PERCENT 1 %  0-1   NEUTROPHILS ABSOLUTE COUNT 6 22 Thousands/?L  1 85-7 62   LYMPHOCYTES ABSOLUTE COUNT 3 67 Thousands/?L  0 60-4 47   MONOCYTES ABSOLUTE COUNT 1 10 Thousand/?L  0 17-1 22   EOSINOPHILS ABSOLUTE COUNT 0 08 Thousand/?L  0 00-0 61   BASOPHILS ABSOLUTE COUNT 0 07 Thousands/?L  0 00-0 10   - Patient Instructions: This bloodwork is non-fasting  Please drink two glasses of water morning of bloodwork  - Patient Instructions: This bloodwork is non-fasting  Please drink two glasses of water morning of bloodwork

## 2018-01-12 VITALS
DIASTOLIC BLOOD PRESSURE: 76 MMHG | TEMPERATURE: 98 F | OXYGEN SATURATION: 98 % | HEIGHT: 70 IN | BODY MASS INDEX: 24.77 KG/M2 | HEART RATE: 108 BPM | SYSTOLIC BLOOD PRESSURE: 100 MMHG | WEIGHT: 173 LBS

## 2018-01-12 VITALS
WEIGHT: 178 LBS | DIASTOLIC BLOOD PRESSURE: 78 MMHG | HEART RATE: 80 BPM | SYSTOLIC BLOOD PRESSURE: 134 MMHG | RESPIRATION RATE: 16 BRPM | TEMPERATURE: 98.2 F | BODY MASS INDEX: 25.48 KG/M2 | HEIGHT: 70 IN

## 2018-01-12 VITALS
SYSTOLIC BLOOD PRESSURE: 140 MMHG | HEART RATE: 60 BPM | DIASTOLIC BLOOD PRESSURE: 80 MMHG | HEIGHT: 70 IN | BODY MASS INDEX: 24.98 KG/M2 | WEIGHT: 174.5 LBS

## 2018-01-12 NOTE — RESULT NOTES
Verified Results  (1) PROTEIN ELECTRO, SERUM [id number] 09:28AM Lauren Milks     Test Name Result Flag Reference   A/G RATIO 1 31  1 10-1 80   Albumin 56 8 %  52 0-65 0   Albumin Conc  4 09 g/dl  3 50-5 00   Alpha 1 Conc  0 30 g/dL  0 10-0 40   ALPHA 1 4 1 %  2 5-5 0   Alpha 2 Conc  1 17 g/dL  0 40-1 20   ALPHA 2 16 2 % H 7 0-13 0   Beta 1 Conc  0 48 g/dL  0 40-0 80   BETA-1 6 6 %  5 0-13 0   Beta 2 Conc 0 43 g/dL  0 20-0 50   BETA-2 6 0 %  2 0-8 0   Gamma Conc 0 74 g/dL  0 50-1 60   GAMMA GLOBULIN 10 3 % L 12 0-22 0   Interpretation      The serum total protein, albumin and electrophoresis are within normal limits  No monoclonal bands noted   Reviewed by: Kathrine Bernard MD ([id number]) **Electronic Signature**   TOTAL PROTEIN 7 2 g/dL  6 4-8 2

## 2018-01-12 NOTE — MISCELLANEOUS
Assessment   1  History of recent hospitalization (V13 9) (Z92 89)  2  Hospitalization within last 30 days (V15 89) (Z92 89)  3  Diabetes mellitus, type 2 (250 00) (E11 9)  4  Myasthenia gravis (358 00) (G70 00)  5  History of E  coli UTI (urinary tract infection) (599 0,041 49) (N39 0,B96 20)  6  History of hypokalemia (V12 29) (Z86 39)    Plan  Diabetes mellitus, type 2, PMH: E  coli UTI (urinary tract infection), History of recent  hospitalization, Hospitalization within last 30 days, Myasthenia gravis    · (1) BASIC METABOLIC PROFILE; Status:Active; Requested EDC:[id number];   Perform:[facility]; HUMBLE:[id number]; Ordered; For:Diabetes mellitus, type 2,   PMH: E  coli UTI (urinary tract infection), History of recent   hospitalization, Hospitalization within last 30 days, Myasthenia gravis; Ordered   By:Mary Kay Nieto;  PMH: History of vertigo    · Renew: Meclizine HCl - 12 5 MG Oral Tablet; TAKE 1 TABLET 3 TIMES DAILY AS NEEDED  Rx By: Domo Woods; Dispense: 15 Days ; #:45 Tablet; Refill: 1;For: PMH: History of   vertigo; DANIEL = N; Verified Transmission to [address] [place] [address]; Last   Updated By: System, [employer]Script[employer]; 1/20/2017 12:39:28 PM    Discussion/Summary  Discussion Summary:   F/u with specialists as scheduled, begin metformin thx, f/u 1  here in approx 1 month   Counseling Documentation With Imm: The patient was counseled regarding instructions for management, impressions  1 Amended By: Domo Woods; Feb 05 2017 9:39 AM EST    Chief Complaint  Chief Complaint Free Text Note Form: Pt presents today for a SARAVANAN  History of Present Illness  TCM Communication [facility]: The patient is being contacted for follow-up after hospitalization  He was hospitalized at [facility]  The dates of hospitalization: 01/10/2017, date of discharge: 01/12/2017  He was discharged to home  Medications reviewed and updated today     Follow-up appointments with other specialists:   [facility] Physical therapy Jan 16, 2017  Symptoms: fatigue, but no fever, no weakness, no dizziness, no headache, no cough, no shortness of breath, no chest pain, no back pain on left side, no back pain on right side, no arm pain left side, no arm pain on right side, no leg pain on left side, no leg pain on right side, no upper abdominal pain, no middle abdominal pain, no lower abdominal pain, no rash:, no anorexia, no nausea, no vomiting, no loose stools, no constipation and no pain with urinating  experiencing bilateral leg weakness  Stop and start urination  Counseling was provided to the patient  Communication performed and completed by Marquis Dillan Crow   HPI: was in hospital 1/10- 1/12  was told by neuro in hospital that no signs of him being in crisis, so was advised not to do the IG, was advised plasmapharesis instead  prednisone was increased to 3 per day  pt states, "met with PT at [facility] outpatient and muscle strength in legs was good considering the MG and it's the balance that is way off," has been wearing running shoes since out of hospital and feels can walk much better  seeing dr Ml Acuña again 2/3, urinating excessively still, but new diabetes and has not yet started metformin  finished macrobid this morning  potassium back in normal range  worked this morning for one hour, had been unable to work with the prolonged UTI/illness      Review of Systems  Complete-Male:   Constitutional: feeling tired, but no fever, not feeling poorly, no recent weight gain and no chills  ENT: no complaints of earache, no hearing loss, no nosebleeds, no nasal discharge, no sore throat, no hoarseness  Cardiovascular: No complaints of slow heart rate, no fast heart rate, no chest pain, no palpitations, no leg claudication, no lower extremity  Respiratory: No complaints of shortness of breath, no wheezing, no cough, no SOB on exertion, no orthopnea or PND     Gastrointestinal: No complaints of abdominal pain, no constipation, no nausea or vomiting, no diarrhea or bloody stools  Genitourinary: as noted in HPI, no dysuria and no incontinence  Musculoskeletal: no limb pain and no limb swelling  Integumentary: No complaints of skin rash or skin lesions, no itching, no skin wound, no dry skin  Neurological: no headache, no numbness, no confusion, no dizziness and no fainting  Psychiatric: Is not suicidal, no sleep disturbances, no anxiety or depression, no change in personality, no emotional problems  Endocrine: No complaints of proptosis, no hot flashes, no muscle weakness, no erectile dysfunction, no deepening of the voice, no feelings of weakness  Hematologic/Lymphatic: No complaints of swollen glands, no swollen glands in the neck, does not bleed easily, no easy bruising  Active Problems    1  Abnormal glucose measurement (790 29) (R73 09)  2  Actinic keratosis (702 0) (L57 0)  3  Anxiety as acute reaction to gross stress (308 0) (F41 1,F43 0)  4  Benign non-nodular prostatic hyperplasia with lower urinary tract symptoms (600 91)   (N40 1)  5  Bilateral leg pain (729 5) (M79 604,M79 605)  6  Cervical radiculopathy (723 4) (M54 12)  7  Cervical spinal stenosis (723 0) (M48 02)  8  Colitis (558 9) (K52 9)  9  Complaints of leg weakness (729 89) (R29 898)  10  Constipation (564 00) (K59 00)  11  Dehydration (276 51) (E86 0)  12  Diverticulosis (562 10) (K57 90)  13  Dyspnea (786 09) (R06 00)  14  Elevated bilirubin (277 4) (R17)  15  Encounter for special screening examination for genitourinary disorder (V81 6) (Z13 89)  16  Fever (780 60) (R50 9)  17  Flu-like symptoms (780 99) (R68 89)  18  Hematuria (599 70) (R31 9)  19  Hyperglycemia (790 29) (R73 9)  20  Hyperlipidemia (272 4) (E78 5)  21  Hypertension (401 9) (I10)  22  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)  23  Medicare annual wellness visit, subsequent (V70 0) (Z00 00)  24  Microscopic hematuria (599 72) (R33 26)  25   Myasthenia gravis (019 00) (G70 00)  26  Myasthenia gravis with acute exacerbation (358 01) (G70 01)  27  Nasal turbinate hypertrophy (478 0) (J34 3)  28  Need for influenza vaccination (V04 81) (Z23)  29  Need for Tdap vaccination (V06 1) (Z23)  30  Need for zoster vaccine (V04 89) (Z23)  31  Nephrolithiasis (592 0) (N20 0)  32  Neuralgia (729 2) (M79 2)  33  Polyneuropathy (356 9) (G62 9)  34  RBBB (right bundle branch block) (426 4) (I45 10)  35  Recent major surgery (V12 29) (Z98 890)  36  Renal lithiasis (592 0) (N20 0)  37  Sinus tachycardia (427 89) (R00 0)  38  Sleep apnea (780 57) (G47 30)  39  Special screening examination for neoplasm of prostate (V76 44) (Z12 5)  40  Tachycardia (785 0) (R00 0)  41  TIA (transient ischemic attack) (435 9) (G45 9)  42  Unintentional weight loss (783 21) (R63 4)  43  Urinary retention (788 20) (R33 9)  44  UTI (urinary tract infection) (599 0) (N39 0)    Myasthenia gravis (358 00) (G70 00)       Cough (786 2) (R05)       Vertigo (780 4) (R42)          Past Medical History   1  History of Acute upper respiratory infection (465 9) (J06 9)  2  History of Ataxic gait (781 2) (R26 0)  3  History of Bilateral leg weakness (729 89) (R29 898)  4  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) (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 1)  5  History of E  coli UTI (urinary tract infection) (599 0,041 49) (N39 0,B96 20)  6  History of acute sinusitis (V12 69) (Z87 09)  7  History of candidiasis of mouth (V12 09) (Z86 19)  8  History of fatigue (V13 89) (Z87 898)  9  History of hypokalemia (V12 29) (Z86 39)  10  History of polyneuropathy (V12 49) (Z86 69)  11  History of sinusitis (V12 69) (Z87 09)  12  History of vertigo (V12 49) (Z87 898)  13  History of Medicare annual wellness visit, initial (V70 0) (Z00 00)  14  History of Neck pain (723 1) (M54 2)  15  History of Nephrolithiasis (V13 01)  16  History of Otitis media (382 9) (H66 90)  17  History of Postnasal drip (784 91) (R09 82)  18   History of Status post laparoscopic colectomy (V45 89) (Z90 49)  19  History of Unstable gait (781 2) (R26 81)  20  History of UTI (lower urinary tract infection) (599 0) (N39 0)  21  History of Weakness of right lower extremity (729 89) (R29 898)    Surgical History   1  History of Biopsy Parotid  2  History of Complete Colonoscopy  3  History of Laparoscopy Partial Colectomy Sigmoid  4  History of Lithotomy  Surgical History Reviewed: The surgical history was reviewed and updated today  Family History  Mother   1  Family history of Arthritis (V17 7)  2  Family history of Diabetes Mellitus (V18 0)  3  Family history of Hypertension (V17 49)  4  Family history of Osteoporosis (V17 81)  Family History Reviewed: The family history was reviewed and updated today  Social History    · Marital History - Currently    · Never A Smoker   · Never Drank Alcohol   · No caffeine use   · Uses Safety Equipment - Seatbelts  Social History Reviewed: The social history was reviewed and updated today  The social history was reviewed and is unchanged  Current Meds  1  Famotidine 20 MG Oral Tablet; TAKE 1 TABLET DAILY AS DIRECTED; Therapy: 33WUD5908 to (Evaluate:29Apr2017)  Requested for: 48JDU9469; Last   Rx:01Nov2016 Ordered  2  HydroCHLOROthiazide 25 MG Oral Tablet; take one tablet by mouth daily as directed    Requested for: 30DNR2525; Last Rx:01Nov2016 Ordered  3  Klor-Con 10 10 MEQ Oral Tablet Extended Release; Take 1 daily; Therapy: 81KEO6880 to (Last Rx:05Jan2017) Ordered  4  Meclizine HCl - 12 5 MG Oral Tablet; TAKE 1 TABLET 3 TIMES DAILY AS NEEDED; Therapy: 17YLN7880 to (J Carlos Navarro)  Requested for: 05FXU5200; Last   Rx:05Oct2016 Ordered  5  PredniSONE 10 MG Oral Tablet; TAKE 2 TABLET DAILY  3mth supply; Therapy: 01Apr2016 to (Florina Mcgovern)  Requested for: 60NNZ7263; Last   Rx:28Akv7910 Ordered  6  Vitamin D3 2000 UNIT Oral Capsule; take 1 capsule daily; Therapy: (Recorded:17Plf9829) to Recorded  Medication List Reviewed:    The medication list was reviewed and updated today  Allergies   1  Antihistamine Decongestant TABS  2  Antihistamine Decongestant TB12  3  Antihistamine Nasal Decongest TABS  4  Antihistamine TABS  5  Antihistamines, Chlorpheniramine-type  6  Antihistamines, Diphenhydramine-type  7  Antihistamines, Loratadine-type  8  Erythromycin Base TABS  9  Iodine Solution SOLN  10  Sulfa Drugs  11  Tetracyclines    Vitals  Signs   Recorded: 20Jan2017 11:40AM   Temperature: 98 F  Heart Rate: 653  Systolic: 756  Diastolic: 76  Height: 5 ft 9 5 in  Weight: 173 lb   BMI Calculated: 25 18  BSA Calculated: 1 95  O2 Saturation: 98    Physical Exam    Constitutional   General appearance: Abnormal   well developed, comfortable, well nourished, rested, not exhausted, well hydrated and appearance reflects stated age  Eyes   Conjunctiva and lids: No swelling, erythema, or discharge  Pupils and irises: Equal, round and reactive to light  Ears, Nose, Mouth, and Throat   Oropharynx: Normal with no erythema, edema, exudate or lesions  Pulmonary   Respiratory effort: No increased work of breathing or signs of respiratory distress  Auscultation of lungs: Clear to auscultation, equal breath sounds bilaterally, no wheezes, no rales, no rhonci  Cardiovascular   Auscultation of heart: Normal rate and rhythm, normal S1 and S2, without murmurs  Examination of extremities for edema and/or varicosities: Normal     Abdomen   Abdomen: Non-tender, no masses  Liver and spleen: No hepatomegaly or splenomegaly  Lymphatic   Palpation of lymph nodes in neck: No lymphadenopathy  Musculoskeletal   Gait and station: Abnormal   improved to being closer to baseline chronic leg weakness  Digits and nails: Normal without clubbing or cyanosis  Skin   Skin and subcutaneous tissue: Normal without rashes or lesions      Psychiatric   Orientation to person, place and time: Normal     Mood and affect: Normal          Results/Data  US KIDNEY AND BLADDER [id number] 10:08AM Nitin Finley Order Number: [id number]    - Patient Instructions: To schedule this appointment, please contact Central Scheduling at [phone]  Test Name Result Flag Reference   US KIDNEY AND BLADDER (Report)     RENAL ULTRASOUND     INDICATION: UTI  COMPARISON: None  TECHNIQUE:  Ultrasound of the retroperitoneum was performed with a curvilinear transducer utilizing volumetric sweeps and still imaging techniques  FINDINGS:     KIDNEYS:   Symmetric and normal size  Right kidney: 7 6 x 6 0 cm  Normal echogenicity and contour  No suspicious masses detected  13 mm right midpole renal cyst is noted  No hydronephrosis  No shadowing calculi  No perinephric fluid collections  Left kidney: 11 1 x 5 6 cm  Normal echogenicity and contour  No suspicious masses detected  13 mm left upper pole renal cyst is noted  No hydronephrosis  No shadowing calculi  No perinephric fluid collections  URETERS:   Nonvisualized  BLADDER:    Normally distended  No focal thickening or mass lesions  Bilateral ureteral jets detected  IMPRESSION:     Bilateral renal cysts, otherwise normal study  Workstation performed: [id number]     Signed by: Charu Toledo MD   1/9/17     (1) 100 [address] 99VQI4063 07:18AM1 Nitin Finley Order Number: [id number]     Test Name Result Flag Reference   GLUCOSE,RANDM1 163 mg/dL1 H1    If the patient is fasting, the ADA then defines impaired fasting glucose as > 100 mg/dL and diabetes as > or equal to 123 mg/dL     SODIUM1 136 mmol/L1  136-1451   POTASSIUM1 4 0 mmol/L1  3 5-5 31   CHLORIDE1 97 mmol/L1 L1 100-1081   CARBON DIOXIDE1 30 mmol/L1     ANION GAP (CALC)1 9 mmol/L1  4-131   BLOOD UREA NITROGEN1 22 mg/dL1  5-251   CREATININE1 1 34 mg/dL1 H1 0 60-1 301   Standardized to IDMS reference method   CALCIUM1 10 0 mg/dL1  8 3-10 11   BILI, TOTAL1 0 75 mg/dL1  0 20-1 001   ALK PHOSPHATAS1 39 U/L1 L1    ALT (SGPT)1 49 U/L1     AST(SGOT)1 32 U/L1  5-451   ALBUMIN1 3 5 g/dL1  3 5-5 01   TOTAL PROTEIN1 7 0 g/dL1  6 4-8 21   eGFR Non-African American1 52 5 ml/min/1 73sq m1     National Kidney Disease Education Program recommendations are as follows:  GFR calculation is accurate only with a steady state creatinine  Chronic Kidney disease less than 60 ml/min/1 73 sq  meters  Kidney failure less than 15 ml/min/1 73 sq  meters  (1) CBC/PLT/DIFF1 80LXU7022 07:18AM1 [place] Luana Order Number: [id number]     Test Name Result Flag Reference   WBC COUNT1 12 46 Thousand/uL1 H1 4 31-10 161   RBC COUNT1 4 60 Million/uL1  3 88-5 621   HEMOGLOBIN1 14 4 g/dL1  12 0-17 01   HEMATOCRIT1 42 0 %1  36 5-49 31   MCV1 91 fL1     MCH1 31 3 pg1  26 8-34 31   MCHC1 34 3 g/dL1  31 4-37 41   RDW1 13 4 %1  11 6-15 11   MPV1 10 1 fL1  8 9-12 71   PLATELET COUNT1 723 Thousands/uL1  149-3901   nRBC AUTOMATED1 0 /100 WBCs1     NEUTROPHILS RELATIVE PERCENT1 55 %1     LYMPHOCYTES RELATIVE PERCENT1 36 %1     MONOCYTES RELATIVE PERCENT1 8 %1  4-121   EOSINOPHILS RELATIVE PERCENT1 1 %1  0-61   BASOPHILS RELATIVE PERCENT1 0 %1  0-11   NEUTROPHILS ABSOLUTE COUNT1 6 71 Thousands/?L1  1 85-7 621   LYMPHOCYTES ABSOLUTE COUNT1 4 50 Thousands/?L1 H1 0 60-4  [address] 1 05 Thousand/?L1  0 17-1  [address] 0 06 Thousand/?L1  0 00-0  [address] 0 03 Thousands/?L1  0 00-0 101   - Patient Instructions: This bloodwork is non-fasting  Please drink two glasses of water morning of bloodwork  - Patient Instructions: This bloodwork is non-fasting  Please drink two glasses of water morning of bloodwork  (1) HEMOGLOBIN A1C1 50LGS8117 07:18AM1 [place] Luana Order Number: [id number]     Test Name Result Flag Reference   HEMOGLOBIN A1C1 8 7 %1 H1 4 2-6 31   EST  AVG   GLUCOSE1 203 mg/dl1       (1) NT- BNP [facility] BRAIN NATRIURETIC PEPTIDE)1 [id number] 07:18AM1 [employer] Order Number: [id number]     Test Name Result Flag Reference   NT-PRO MXA7 458 pg/mL1 H1 <1251     (1) BILIRUBIN, DIRECT1 [id number] 53:77RI7 [employer] Order Number: [id number]     Test Name Result Flag Reference   BILI, DIRECT1 0 26 mg/dL1 H1 0 00-0 201     (1) COMPREHENSIVE METABOLIC PANEL1 55DFK1970 09:39AM1 [employer] Order Number: [id number]     Test Name Result Flag Reference   GLUCOSE,RANDM1 199 mg/dL1 H1    If the patient is fasting, the ADA then defines impaired fasting glucose as > 100 mg/dL and diabetes as > or equal to 123 mg/dL  SODIUM1 135 mmol/L1 L1 136-1451   POTASSIUM1 3 1 mmol/L1 L1 3 5-5 31   CHLORIDE1 93 mmol/L1 L1 100-1081   CARBON DIOXIDE1 32 mmol/L1     ANION GAP (CALC)1 10 mmol/L1  4-131   BLOOD UREA NITROGEN1 19 mg/dL1  5-251   CREATININE1 1 31 mg/dL1 H1 0 60-1 301   Standardized to IDMS reference method   CALCIUM1 10 2 mg/dL1 H1 8 3-10 11   BILI, TOTAL1 0 89 mg/dL1  0 20-1 001   ALK PHOSPHATAS1 44 U/L1 L1    ALT (SGPT)1 34 U/L1     AST(SGOT)1 18 U/L1  5-451   ALBUMIN1 3 3 g/dL1 L1 3 5-5 01   TOTAL PROTEIN1 7 1 g/dL1  6 4-8 21   eGFR Non-African American1 53 9 ml/min/1 73sq m1     National Kidney Disease Education Program recommendations are as follows:  GFR calculation is accurate only with a steady state creatinine  Chronic Kidney disease less than 60 ml/min/1 73 sq  meters  Kidney failure less than 15 ml/min/1 73 sq  meters  CORRECTED CALCIUM1 10 8 mg/dL1 H1 8 3-10 11          1  Amended By: Robyn Paige;  Feb 05 2017 9:47 AM EST Future Appointments    Date/Time Provider Specialty Site   04/06/2017 10:00 AM Jil Quinn DO Neurology [facility]156   06/16/2017 10:45 AM Lula Smart MD Urology [address]   Electronically signed by : Kaleigh Mabel Genao; Feb 5 2017  9:38AM EST                       (Author)    Electronically signed by : Drew Palacios DO; Feb 5 2017  9:39AM EST                       (Author)    Electronically signed by : Drew Palacios DO; Feb 5 2017  9:47AM EST                       (Author)

## 2018-01-12 NOTE — PROGRESS NOTES
Assessment    1  Diabetes mellitus type 2, insulin dependent (250 00,V58 67) (E11 9,Z79 4)   2  Hypertension (401 9) (I10)   3  Myasthenia gravis (358 00) (G70 00)    Plan   Diabetes mellitus type 2, insulin dependent    · (1) CBC/PLT/DIFF; Status:Active; Requested NDM:99SMM1323;    · (1) COMPREHENSIVE METABOLIC PANEL; Status:Active; Requested IRJ:[id number];    · (1) HEMOGLOBIN A1C; Status:Active; Requested AOP:30YNM9457;    · (1) LIPID PANEL, FASTING; Status:Active; Requested for:19Oct2017;    · (1) TSH WITH FT4 REFLEX; Status:Active; Requested ELIDA:[id number];   Myasthenia gravis    · PredniSONE 10 MG Oral Tablet; TAKE 1 TABLET DAILY as of march 2017  3mth  supply    Diabetes mellitus type 2, insulin dependent (250 00) (E11 9)          Discussion/Summary  Impression: Subsequent Annual Wellness Visit  Cardiovascular screening and counseling: Dx - V81 2 Screen for CV Disorder  Diabetes screening and counseling: Dx - V77 1 Screen for DM  Colorectal cancer screening and counseling: Dx - V76 51 Screen for CRC  Prostate cancer screening and counseling: Dx - V76 44 Screen PSA  Abdominal aortic aneurysm screening and counseling: Dx - V81 2 Screen for CV Disorder  Glaucoma screening and counseling: Dx - V80 1 Screen for Glaucoma  HIV screening and counseling: screening not indicated  Chief Complaint  Patient presents for Medicare Wellness exam       History of Present Illness  HPI: here for medicare wellness visit, and pt has questions each time he is here about the MG diagnosis, asks if the MG is actually what he has  states he's doing well with the diabetes, sticking to diet, checking his sugars regularly, no lows, last HbA1c was 6 2% in june, c-peptide slightly elevated, but remainder of type 1/insulin AB testing was normal   Welcome to [employer] and Wellness Visits: The patient is being seen for the subsequent annual wellness visit     Medicare Screening and Risk Factors   Hospitalizations: he has been previously hospitalizied and he has been hospitalized 1 times  Medicare Screening Tests Risk Questions   Abdominal aortic aneurysm risk assessment: tobacco use and over 72years of age  Osteoporosis risk assessment:  and over 48years of age  Drug and Alcohol Use: The patient has never smoked cigarettes  The patient reports never drinking alcohol  He has never used illicit drugs  Diet and Physical Activity: Current diet includes low carbohydrate food choices, low salt food choices, frequent junk food, 2 servings of vegetables per day and 2 servings of meat per day  He exercises daily  Exercise: walking 12 hours per week  Mood Disorder and Cognitive Impairment Screening: PHQ-9 Depression Scale   Over the past 2 weeks, how often have you been bothered by the following problems? 1 ) Little interest or pleasure in doing things? Not at all    2 ) Feeling down, depressed or hopeless? Not at all    3 ) Trouble falling asleep or sleeping too much? Not at all    4 ) Feeling tired or having little energy? Not at all    5 ) Poor appetite or overeating? Not at all    6 ) Feeling bad about yourself, or that you are a failure, or have let yourself or your family down? Not at all    7 ) Trouble concentrating on things, such as reading a newspaper or watching television? Not at all    8 ) Moving or speaking so slowly that other people could have noticed, or the opposite, moving or speaking faster than usual? Not at all  TOTAL SCORE: 0    How difficult have these problems made it for you to do your work, take care of things at home, or get along with people? Not at all  Functional Ability/Level of Safety: Hearing is normal bilaterally, normal in the right ear and normal in the left ear  He denies hearing difficulties  He does not use a hearing aid   The patient is currently able to drive with limitations, but able to do activities of daily living without limitations and able to participate in social activities without limitations  Activities of daily living details: does not need help using the phone, no transportation help needed, does not need help shopping, no meal preparation help needed, does not need help doing housework, does not need help doing laundry, does not need help managing medications and does not need help managing money   Patient does not drive at night  Fall risk factors: The patient fell 10 times in the past 12 months  Injury History: no alcohol use, mobility impairment, no postural hypotension, no visual impairment, no urinary incontinence, up and go test was unsteady or greater than thirty seconds and previous fall  Home safety risk factors:  no grab bars in the bathroom, but no unfamiliar surroundings, no loose rugs, no poor household lighting, no uneven floors, no household clutter and handrails on the stairs  Advance Directives: Advance directives: living will, durable power of  for health care directives and advance directives  Co-Managers and Medical Equipment/Suppliers: See Patient Care Team      Patient Care Team    Care Team Member Role Specialty Office Number   Jarret Lima MD  Urology [phone]   Scar Luz MD  Internal Medicine [phone]   Tiarra Salgado MD  Otolaryngology [phone]   Etta Harrington MD  Ophthalmology [phone]   Carmen Flowers DO  Neurology [phone]   [facility] [phone]     Review of Systems    Constitutional: negative  Eyes: negative  ENT: negative  Cardiovascular: negative  Respiratory: negative  Gastrointestinal: as noted in HPI  Genitourinary: negative  Musculoskeletal: negative  Neurological: as noted in HPI, no headache, no confusion, no dizziness, no fainting and no tingling  Psychiatric: negative  Endocrine: negative  Hematologic and Lymphatic: negative  Active Problems     1  Abnormal urinalysis (791 9) (R82 90)   2   Actinic keratosis (702 0) (L57 0)   3  Acute kidney injury (584 9) (N17 9)   4  Anxiety as acute reaction to gross stress (308 0) (F41 1,F43 0)   5  Benign non-nodular prostatic hyperplasia with lower urinary tract symptoms (600 91)   (N40 1)   6  Bilateral leg pain (729 5) (M79 604,M79 605)   7  Cervical radiculopathy (723 4) (M54 12)   8  Cervical spinal stenosis (723 0) (M48 02)   9  CKD (chronic kidney disease) stage 3, GFR 30-59 ml/min (585 3) (N18 3)   10  Colitis (558 9) (K52 9)   11  Complaints of leg weakness (729 89) (R29 898)   12  Constipation (564 00) (K59 00)   13  Elevated bilirubin (277 4) (R17)   14  Encounter for special screening examination for genitourinary disorder (V81 6) (Z13 89)   15  Generalized weakness (780 79) (R53 1)   16  Hematuria (599 70) (R31 9)   17  History of recent hospitalization (V13 9) (Z92 89)   18  Hypercalcemia (275 42) (E83 52)   19  Hyperlipidemia (272 4) (E78 5)   20  Hypertension (401 9) (I10)   21  Hyponatremia (276 1) (E87 1)   22  Kidney atrophy (587) (N26 1)   23  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)   24  Medicare annual wellness visit, subsequent (V70 0) (Z00 00)   25  Microscopic hematuria (599 72) (R31 29)   26  Myasthenia gravis (358 00) (G70 00)   27  Need for influenza vaccination (V04 81) (Z23)   28  Need for Tdap vaccination (V06 1) (Z23)   29  Need for zoster vaccine (V04 89) (Z23)   30  Nephrolithiasis (592 0) (N20 0)   31  Neuralgia (729 2) (M79 2)   32  Polyneuropathy (356 9) (G62 9)   33  RBBB (right bundle branch block) (426 4) (I45 10)   34  Recent major surgery (V12 29) (Z98 890)   35  Renal lithiasis (592 0) (N20 0)   36  Sinus tachycardia (427 89) (R00 0)   37  Sleep apnea (780 57) (G47 30)   38  Special screening examination for neoplasm of prostate (V76 44) (Z12 5)   39  Urinary retention (788 20) (R33 9)   40   Vertigo (780 4) (R42)    Diverticulosis (562 10) (K57 90)       Diabetes mellitus type 2, insulin dependent (250 00) (E11 9)          Past Medical History · History of Acute upper respiratory infection (465 9) (J06 9)   · History of Acute URI (465 9) (J06 9)   · History of Ataxic gait (781 2) (R26 0)   · History of Bilateral leg weakness (729 89) (R29 898)   · History of Candida infection of genital region (112 2) (B37 49)   · History of Cough (786 2) (R05)   · History of Cough (786 2) (R05)   · History of Dyspnea (786 09) (R06 00)   · History of E  coli UTI (urinary tract infection) (599 0,041 49) (N39 0,B96 20)   · History of E  coli UTI (urinary tract infection) (599 0,041 49) (N39 0,B96 20)   · History of Flu-like symptoms (780 99) (R68 89)   · History of acute sinusitis (V12 69) (Z87 09)   · History of candidiasis of mouth (V12 09) (Z86 19)   · History of dehydration (V12 29) (Z86 39)   · History of fatigue (V13 89) (X36 845)   · History of fever (V13 89) (G23 215)   · History of hypokalemia (V12 29) (Z86 39)   · History of polyneuropathy (V12 49) (Z86 69)   · History of sinusitis (V12 69) (Z87 09)   · History of transient cerebral ischemia (V12 54) (Z86 73)   · History of urinary tract infection (V13 02) (Z87 440)   · History of urinary tract infection (V13 02) (Z87 440)   · History of vertigo (V12 49) (O34 858)   · History of Hospitalization within last 30 days (V15 89) (Z92 89)   · History of Medicare annual wellness visit, initial (V70 0) (Z00 00)   · History of Myasthenia gravis with acute exacerbation (358 01) (G70 01)   · History of Nasal turbinate hypertrophy (478 0) (J34 3)   · History of Neck pain (723 1) (M54 2)   · History of Nephrolithiasis (V13 01)   · History of Otitis media (382 9) (H66 90)   · History of Postnasal drip (784 91) (R09 82)   · History of Unintentional weight loss (783 21) (R63 4)   · History of Unstable gait (781 2) (R26 81)   · History of Weakness of right lower extremity (729 89) (R29 898)    The active problems and past medical history were reviewed and updated today     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) History of Status post laparoscopic colectomy (V45 89) (Z98 89)             Surgical History    · History of Biopsy Parotid   · History of Complete Colonoscopy   · History of Laparoscopy Partial Colectomy Sigmoid   · History of Lithotomy    The surgical history was reviewed and updated today  Family History  Mother    · Family history of Arthritis (V17 7)   · Family history of Diabetes Mellitus (V18 0)   · Family history of Hypertension (V17 49)   · Family history of Osteoporosis (V17 81)  Father    · Family history of depression (V17 0) (Z81 8)    The family history was reviewed and updated today  Social History    · Marital History - Currently    · Never Drank Alcohol   · Never smoked cigarettes (V49 89) (Z78 9)   · No caffeine use   · Uses Safety Equipment - Seatbelts  The social history was reviewed and updated today  The social history was reviewed and is unchanged  Current Meds   1  Accu-Chek Mone Plus In Vitro Strip; TEST 3 TIMES DAILY; Therapy: 46Ntc8704 to (Roxann England)  Requested for: 51Xpl6898; Last   Rx:19Apr2017 Ordered   2  Accu-Chek FastClix Lancets Miscellaneous; TEST twice a day as directed; Therapy: 61ZNE6224 to (Last Rx:09Oct2017)  Requested for: 67UQZ7381 Ordered   3  Accu-Chek Guide In Vitro Strip; TEST TWICE DAILY; Therapy: 20XLP7052 to (BRRIMATX:01SAJ3037)  Requested for: 70SXB3262; Last   Rx:09Oct2017 Ordered   4  Accu-Chek Guide w/Device Kit; USE AS DIRECTED; Therapy: 40VPF4188 to (Last Rx:09Oct2017)  Requested for: 38JMO4171 Ordered   5  Accu-Chek Softclix Lancets Miscellaneous; Test 3 times daily; Therapy: 43Vee2754 to (Last Rx:19Apr2017)  Requested for: 69Soi9307 Ordered   6  BD Pen Needle Mini U/F 31G X 5 MM Miscellaneous; USE 4 TIMES DAILY; Therapy: 65TGW7774 to (Last Rx:61Ksl3067)  Requested for: 07Yyp8605 Ordered   7   HydroCHLOROthiazide 25 MG Oral Tablet; take one tablet by mouth daily as directed    Requested for: 95UXT7053; Last SW:75NDC8907 Ordered   8  Lantus SoloStar 100 UNIT/ML Subcutaneous Solution Pen-injector; 12units SQ QHS; Therapy: 25ADM1090 to (Last Rx:09Jun2017)  Requested for: 19XRB6525 Ordered   9  NovoLOG FlexPen 100 UNIT/ML Subcutaneous Solution Pen-injector; INJECT   SUBCUTANEOUSLY 3 UNITS with  each meal;   Therapy: 13PCS5157 to (Last XL:23QRV3664)  Requested for: 65YZR3973 Ordered   10  Potassium Chloride Kendra ER 20 MEQ Oral Tablet Extended Release; Therapy: 16SYW8036 to (Evaluate:15Apr2017) Recorded   11  PredniSONE 10 MG Oral Tablet; TAKE 1 TABLET DAILY as of march 2017  3mth supply; Therapy: 11Mic6736 to (Dominga Siemens)  Requested for: 63VTB2805; Last    Rx:10Oct2017 Ordered   12  RA Vitamin B-6  MG Oral Tablet Extended Release; TAKE 1 TABLET DAILY; Therapy: 86MUP9735 to Recorded   13  Vitamin D3 2000 UNIT Oral Capsule; take 1 capsule daily; Therapy: (Recorded:99Zoa7556) to Recorded    The medication list was reviewed and updated today  Allergies    1  Antihistamine Decongestant TABS   2  Antihistamine Decongestant TB12   3  Antihistamine Nasal Decongest TABS   4  Antihistamine TABS   5  Antihistamines, Chlorpheniramine-type   6  Antihistamines, Diphenhydramine-type   7  Antihistamines, Loratadine-type   8  Erythromycin Base TABS   9  Iodine Solution SOLN   10  Levaquin   11  Sulfa Drugs   12  Tetracyclines    Immunizations   1    Influenza  Permanently Deferred: Medical Deferral, [id number]    Tdap  Permanently Deferred: Medical Deferral, [id number]    Zoster  Permanently Deferred: Medical Deferral, [id number]     Vitals  Signs    Temperature: 98 4 F  Heart Rate: 94  Systolic: 525  Diastolic: 82  Height: 5 ft 9 5 in  Weight: 179 lb   BMI Calculated: 26 05  BSA Calculated: 1 98  O2 Saturation: 97    Physical Exam    Constitutional   General appearance: Abnormal   appears healthy, comfortable, overweight, rested, not exhausted, well hydrated and appears younger than stated age     Eyes Conjunctiva and lids: No erythema, swelling or discharge  Pupils and irises: Equal, round, reactive to light  Ears, Nose, Mouth, and Throat   Oropharynx: Normal with no erythema, edema, exudate or lesions  Neck   Neck: Supple, symmetric, trachea midline, no masses  Thyroid: Normal, no thyromegaly  Pulmonary   Respiratory effort: No increased work of breathing or signs of respiratory distress  Percussion of chest: Normal     Auscultation of lungs: Clear to auscultation  Cardiovascular   Auscultation of heart: Normal rate and rhythm, normal S1 and S2, no murmurs  Carotid pulses: 2+ bilaterally  Abdominal aorta: Normal     Femoral pulses: 2+ bilaterally  Pedal pulses: 2+ bilaterally  Peripheral vascular exam: Normal     Examination of extremities for edema and/or varicosities: Normal     Abdomen   Abdomen: Non-tender, no masses  Liver and spleen: No hepatomegaly or splenomegaly  Lymphatic   Palpation of lymph nodes in neck: No lymphadenopathy  Palpation of lymph nodes in other areas: No lymphadenopathy  Musculoskeletal   Inspection/palpation of digits and nails: Normal without clubbing or cyanosis  Skin   Skin and subcutaneous tissue: Normal without rashes or lesions  Palpation of skin and subcutaneous tissue: Normal turgor  Neurologic   Cranial nerves: Cranial nerves 2-12 intact  Reflexes: 2+ and symmetric  Sensation: No sensory loss  Psychiatric   Judgment and insight: Normal     Orientation to person, place and time: Normal     Recent and remote memory: Intact  Mood and affect: Normal        Results/Data  (1) C-PEPTIDE 73Khx7379 09:52AM Nitin Finley Order Number: [id number]     Test Name Result Flag Reference   C PEPTIDE 7 1 ng/mL H 1 1 - 4 4   C-Peptide reference interval is for fasting patients      Performed at:  [address] [place] [address]  [id number]  : Brenda Hamilton MD, Phone:  [phone] (1) GLUTAMIC ACID DECARBOXYLASE 49Gtb4393 09:52AM Juline Gloss     Test Name Result Flag Reference   GLUTAMIC ACID DECARBOXYLASE <5 0 U/mL  0 0 - 5 0   Performed at:  [address]  [id number]  : Samantha Lisa MD, Phone:  [phone]     (1) IA2 AUTOANTIBODIES 60HXN6212 09:52AM Juline Gloss     Test Name Result Flag Reference   IA2 AUTOANTIBODIES <1 0 U/mL     Reference Range:  <1 0        Negative  > or = 1 0  Positive  Performed at:   [facility]  [address] [place] [address]  [de-identified]  : Kiera Rebolledo MD, Phone:  [phone]     Future Appointments    Date/Time Provider Specialty Site   12/15/2017 11:30 AM Kalyani Friedman MD Urology [address]     Signatures   Electronically signed by : René Meyers DO; Oct 29 2017 10:13PM EST                       (Author)

## 2018-01-13 VITALS
SYSTOLIC BLOOD PRESSURE: 94 MMHG | WEIGHT: 167.5 LBS | HEART RATE: 117 BPM | OXYGEN SATURATION: 98 % | RESPIRATION RATE: 12 BRPM | BODY MASS INDEX: 23.98 KG/M2 | DIASTOLIC BLOOD PRESSURE: 60 MMHG | HEIGHT: 70 IN

## 2018-01-13 VITALS
DIASTOLIC BLOOD PRESSURE: 60 MMHG | BODY MASS INDEX: 24.34 KG/M2 | SYSTOLIC BLOOD PRESSURE: 110 MMHG | HEIGHT: 70 IN | RESPIRATION RATE: 17 BRPM | TEMPERATURE: 99.3 F | WEIGHT: 170 LBS | OXYGEN SATURATION: 96 % | HEART RATE: 120 BPM

## 2018-01-13 VITALS
DIASTOLIC BLOOD PRESSURE: 76 MMHG | HEIGHT: 70 IN | SYSTOLIC BLOOD PRESSURE: 124 MMHG | WEIGHT: 177.38 LBS | BODY MASS INDEX: 25.39 KG/M2 | HEART RATE: 78 BPM

## 2018-01-13 VITALS
OXYGEN SATURATION: 99 % | HEART RATE: 77 BPM | HEIGHT: 70 IN | BODY MASS INDEX: 25.93 KG/M2 | WEIGHT: 181.13 LBS | TEMPERATURE: 97.5 F | DIASTOLIC BLOOD PRESSURE: 78 MMHG | SYSTOLIC BLOOD PRESSURE: 134 MMHG

## 2018-01-13 NOTE — MISCELLANEOUS
Message  pt came into waiting room and told MA that he was seen by urologist and neurologist earlier this morning, and they found some kind of evidence of abnormal glucose reading and that he should contact his family doctor  I reviewed neuro and urol notes from today and don't see any urine glucose result or blood glucose result   he did have a little increase in HBA1c from november to january, so I rx'd repeat RIW1W and comp metabolic profile per order      Signatures   Electronically signed by : Clayton Cooley DO; Mar  3 2017 11:05AM EST                       (Author)

## 2018-01-14 VITALS
HEIGHT: 70 IN | OXYGEN SATURATION: 97 % | TEMPERATURE: 98.4 F | SYSTOLIC BLOOD PRESSURE: 138 MMHG | HEART RATE: 94 BPM | WEIGHT: 179 LBS | BODY MASS INDEX: 25.62 KG/M2 | DIASTOLIC BLOOD PRESSURE: 82 MMHG

## 2018-01-14 VITALS
WEIGHT: 166.13 LBS | HEART RATE: 104 BPM | HEIGHT: 70 IN | DIASTOLIC BLOOD PRESSURE: 70 MMHG | BODY MASS INDEX: 23.78 KG/M2 | RESPIRATION RATE: 17 BRPM | SYSTOLIC BLOOD PRESSURE: 130 MMHG | OXYGEN SATURATION: 97 % | TEMPERATURE: 97.9 F

## 2018-01-14 VITALS
OXYGEN SATURATION: 98 % | SYSTOLIC BLOOD PRESSURE: 122 MMHG | TEMPERATURE: 97.9 F | WEIGHT: 175.5 LBS | BODY MASS INDEX: 25.13 KG/M2 | HEIGHT: 70 IN | HEART RATE: 83 BPM | DIASTOLIC BLOOD PRESSURE: 68 MMHG

## 2018-01-14 VITALS
HEIGHT: 70 IN | SYSTOLIC BLOOD PRESSURE: 100 MMHG | WEIGHT: 174.5 LBS | TEMPERATURE: 99 F | RESPIRATION RATE: 16 BRPM | DIASTOLIC BLOOD PRESSURE: 60 MMHG | HEART RATE: 135 BPM | BODY MASS INDEX: 24.98 KG/M2 | OXYGEN SATURATION: 98 %

## 2018-01-14 VITALS
OXYGEN SATURATION: 93 % | HEIGHT: 70 IN | WEIGHT: 174.13 LBS | SYSTOLIC BLOOD PRESSURE: 138 MMHG | BODY MASS INDEX: 24.93 KG/M2 | HEART RATE: 112 BPM | DIASTOLIC BLOOD PRESSURE: 64 MMHG | TEMPERATURE: 98.9 F

## 2018-01-16 NOTE — RESULT NOTES
Verified Results  (1) TSH WITH FT4 REFLEX 29Mar2016 11:43AM [facility] Order Number: [id number]    Patients undergoing fluorescein dye angiography may retain small amounts of fluorescein in the body for 48-72 hours post procedure  Samples containing fluorescein can produce falsely depressed TSH values  If the patient had this procedure,a specimen should be resubmitted post fluorescein clearance       Test Name Result Flag Reference   TSH 1 720 uIU/mL  0 358-3 740

## 2018-01-17 NOTE — RESULT NOTES
Message   pt jsut seen today , cbc with hgh wbc c/ recent infeciton and use f steriods, na los, pot low  , sugar high , ? cmponet of deyhration  with recent uti , add  pot supplement  and increase in hydration       woudl suggest he repeat the lab again in  3 weeks      Verified Results  (1) CBC/PLT/DIFF 97QVM2428 09:46AM Rhoderick Bamberger Order Number: [id number]     Test Name Result Flag Reference   WBC COUNT 18 24 Thousand/uL H 4 31-10 16   RBC COUNT 5 29 Million/uL  3 88-5 62   HEMOGLOBIN 17 0 g/dL  12 0-17 0   HEMATOCRIT 46 9 %  36 5-49 3   MCV 89 fL  82-98   MCH 32 1 pg  26 8-34 3   MCHC 36 2 g/dL  31 4-37 4   RDW 12 6 %  11 6-15 1   MPV 10 3 fL  8 9-12 7   PLATELET COUNT 648 Thousands/uL H 149-390   nRBC AUTOMATED 0 /100 WBCs     NEUTROPHILS RELATIVE PERCENT 68 %  43-75   LYMPHOCYTES RELATIVE PERCENT 23 %  14-44   MONOCYTES RELATIVE PERCENT 8 %  4-12   EOSINOPHILS RELATIVE PERCENT 0 %  0-6   BASOPHILS RELATIVE PERCENT 1 %  0-1   NEUTROPHILS ABSOLUTE COUNT 12 20 Thousands/?L H 1 85-7 62   LYMPHOCYTES ABSOLUTE COUNT 4 15 Thousands/?L  0 60-4 47   MONOCYTES ABSOLUTE COUNT 1 51 Thousand/?L H 0 17-1 22   EOSINOPHILS ABSOLUTE COUNT 0 08 Thousand/?L  0 00-0 61   BASOPHILS ABSOLUTE COUNT 0 09 Thousands/?L  0 00-0 10   - Patient Instructions: This bloodwork is non-fasting  Please drink two glasses of water morning of bloodwork  - Patient Instructions: This bloodwork is non-fasting  Please drink two glasses of water morning of bloodwork  (1) COMPREHENSIVE METABOLIC PANEL 65SJD0825 99:45NE Rhoderick Bamberger Order Number: [id number]     Test Name Result Flag Reference   GLUCOSE,RANDM 192 mg/dL H    If the patient is fasting, the ADA then defines impaired fasting glucose as > 100 mg/dL and diabetes as > or equal to 123 mg/dL     SODIUM 131 mmol/L L 136-145   POTASSIUM 3 4 mmol/L L 3 5-5 3   CHLORIDE 88 mmol/L L 100-108   CARBON DIOXIDE 34 mmol/L H 21-32   ANION GAP (CALC) 9 mmol/L 4-13   BLOOD UREA NITROGEN 23 mg/dL  5-25   CREATININE 1 63 mg/dL H 0 60-1 30   Standardized to IDMS reference method   CALCIUM 10 0 mg/dL  8 3-10 1   BILI, TOTAL 0 94 mg/dL  0 20-1 00   ALK PHOSPHATAS 68 U/L     ALT (SGPT) 49 U/L  12-78   AST(SGOT) 41 U/L  5-45   ALBUMIN 3 9 g/dL  3 5-5 0   TOTAL PROTEIN 8 2 g/dL  6 4-8 2   eGFR Non-African American 41 9 ml/min/1 73sq [facility] [employer] Disease Education Program recommendations are as follows:  GFR calculation is accurate only with a steady state creatinine  Chronic Kidney disease less than 60 ml/min/1 73 sq  meters  Kidney failure less than 15 ml/min/1 73 sq  meters

## 2018-01-22 VITALS — HEIGHT: 70 IN | WEIGHT: 171.13 LBS | BODY MASS INDEX: 24.5 KG/M2

## 2018-01-22 VITALS
SYSTOLIC BLOOD PRESSURE: 102 MMHG | HEART RATE: 112 BPM | DIASTOLIC BLOOD PRESSURE: 60 MMHG | BODY MASS INDEX: 23.41 KG/M2 | WEIGHT: 163.5 LBS | HEIGHT: 70 IN

## 2018-01-22 VITALS
TEMPERATURE: 98.5 F | WEIGHT: 180.13 LBS | OXYGEN SATURATION: 98 % | DIASTOLIC BLOOD PRESSURE: 78 MMHG | SYSTOLIC BLOOD PRESSURE: 142 MMHG | BODY MASS INDEX: 25.79 KG/M2 | HEIGHT: 70 IN | HEART RATE: 118 BPM

## 2018-01-22 VITALS
RESPIRATION RATE: 17 BRPM | WEIGHT: 179.13 LBS | BODY MASS INDEX: 25.64 KG/M2 | TEMPERATURE: 98.7 F | HEIGHT: 70 IN | HEART RATE: 95 BPM | SYSTOLIC BLOOD PRESSURE: 120 MMHG | DIASTOLIC BLOOD PRESSURE: 78 MMHG | OXYGEN SATURATION: 98 %

## 2018-01-22 VITALS — SYSTOLIC BLOOD PRESSURE: 140 MMHG | DIASTOLIC BLOOD PRESSURE: 82 MMHG | HEART RATE: 112 BPM

## 2018-01-23 NOTE — PROGRESS NOTES
Assessment    1  UTI (urinary tract infection) (599 0) (N39 0)   2  Urinary retention (788 20) (R33 9)   3  Candida infection of genital region (112 2) (B37 49)    Plan  Candida infection of genital region    · Clotrimazole 1 % External Cream; APPLY SPARINGLY TO AFFECTED AREA(S) 2 TO  3 TIMES DAILY   Rx By: Alessandra Rosales; Dispense: 0 Days ; #:1 GM; Refill: 0; For: Candida infection of genital region; DANIEL = N; Verified Transmission to [address] [place] [address]; Last Updated By: System, [employer]; 3/3/2017 11:19:45 AM  Diabetes mellitus, type 2    · (1) COMPREHENSIVE METABOLIC PANEL; Status:Active; Requested FAP:[id number];    Perform:[address] [place]; UGQ:[id number]; Ordered; For:Diabetes mellitus, type 2; Ordered By:Mary Kay Nieto;   · (1) HEMOGLOBIN A1C; Status:Active; Requested JQH:[id number];    Perform:[address] [place]; LIK:[id number]; Ordered; For:Diabetes mellitus, type 2; Ordered By:Mary Kay Nieto;  Hematuria    · (1) URINALYSIS WITH MICROSCOPIC; Status:Active - Retrospective By Protocol  Authorization; Requested JORGE:[id number];    Perform:[address]; CQZ:[id number]; Last Updated By:Jaycee Cobb; 3/3/2017 11:10:13 AM;Ordered;  For:Hematuria; Ordered By:Lisa Alfonso;    Discussion/Summary  Discussion Summary:   Urinary frequency and urgency, glucosuria, candida infection of the glans penis, routine prostate cancer screening, history of colovesicular fistula s/p sigmoid resection, Myasthenia Gravis     US of kidneys and bladder and PVR of 2 8 mL are within normal limits  Current PSA is 0 4 and PEPITO reveals no concerns  Discussed that because the patients age we can discontinue prostate cancer screening all together  Will send clotrimazole 1% to the patients pharmacy for his candida infection  Recommend evaluation by his PCP for his glucosuria  Discussed that glucose in the urine is the most likely cause of his urinary frequency and urgency   Will follow up in 3 months to determine if diabetic treatment improves his urinary symptoms  Can call in the meantime with any questions or concerns  Patient understands and agrees to this treatment plan  Chief Complaint  Chief Complaint Free Text Note Form: Patient presents for f/u UTI's, cap screen  PSA 2/24/17 *0 4      History of Present Illness  HPI: Otf Boone is a 67year old male here for follow up evaluation of urinary frequency and urgency  States that he has no other lower urinary tract symptoms and denies dysuria, hematuria, and hesitancy  Has a good stream and feels like he empties to completion most of the time  PVR obtained in offive was 2 80mL  Urinalysis prior to visit reveals a large amount of glucose  He states that he was recently started on metformin but stopped taking this due to diarrhea  Also has complaints of "white discharge" around the head of his penis  Review of Systems  Complete-Male Urology:   Constitutional: No fever or chills, feels well, no tiredness, no recent weight gain or weight loss  Respiratory: No complaints of shortness of breath, no wheezing, no cough, no SOB on exertion, no orthopnea or PND  Cardiovascular: No complaints of slow heart rate, no fast heart rate, no chest pain, no palpitations, no leg claudication, no lower extremity  Gastrointestinal: No complaints of abdominal pain, no constipation, no nausea or vomiting, no diarrhea or bloody stools  Genitourinary: feelings of urinary urgency and stream quality good, but no dysuria, no hematuria, no empty sensation and no incontinence    The patient presents with complaints of occasional episodes of urinary hesitancy  The patient presents with complaints of 5 episodes of nocturia  Musculoskeletal: No complaints of arthralgia, no myalgias, no joint swelling or stiffness, no limb pain or swelling  Integumentary: No complaints of skin rash or skin lesions, no itching, no skin wound, no dry skin     Hematologic/Lymphatic: No complaints of swollen glands, no swollen glands in the neck, does not bleed easily, no easy bruising  Neurological: No compliants of headache, no confusion, no convulsions, no numbness or tingling, no dizziness or fainting, no limb weakness, no difficulty walking  ROS Reviewed:   ROS reviewed  Active Problems    1  Abnormal glucose measurement (790 29) (R73 09)   2  Abnormal urinalysis (791 9) (R82 90)   3  Actinic keratosis (702 0) (L57 0)   4  Acute URI (465 9) (J06 9)   5  Anxiety as acute reaction to gross stress (308 0) (F41 1,F43 0)   6  Benign non-nodular prostatic hyperplasia with lower urinary tract symptoms (600 91)   (N40 1)   7  Bilateral leg pain (729 5) (M79 604,M79 605)   8  Cervical radiculopathy (723 4) (M54 12)   9  Cervical spinal stenosis (723 0) (M48 02)   10  Colitis (558 9) (K52 9)   11  Complaints of leg weakness (729 89) (R29 898)   12  Constipation (564 00) (K59 00)   13  Cough (786 2) (R05)   14  Dehydration (276 51) (E86 0)   15  Diabetes mellitus, type 2 (250 00) (E11 9)   16  Diverticulosis (562 10) (K57 90)   17  Dyspnea (786 09) (R06 00)   18  Elevated bilirubin (277 4) (R17)   19  Encounter for special screening examination for genitourinary disorder (V81 6) (Z13 89)   20  Fever (780 60) (R50 9)   21  Flu-like symptoms (780 99) (R68 89)   22  Generalized weakness (780 79) (R53 1)   23  Hematuria (599 70) (R31 9)   24  History of recent hospitalization (V13 9) (Z92 89)   25  Hospitalization within last 30 days (V15 89) (Z92 89)   26  Hyperglycemia (790 29) (R73 9)   27  Hyperlipidemia (272 4) (E78 5)   28  Hypertension (401 9) (I10)   29  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)   30  Medicare annual wellness visit, subsequent (V70 0) (Z00 00)   31  Microscopic hematuria (599 72) (R31 29)   32  Myasthenia gravis (358 00) (G70 00)   33  Myasthenia gravis (358 00) (G70 00)   34  Myasthenia gravis with acute exacerbation (358 01) (G70 01)   35   Nasal turbinate hypertrophy (478 0) (J34 3)   36  Need for influenza vaccination (V04 81) (Z23)   37  Need for Tdap vaccination (V06 1) (Z23)   38  Need for zoster vaccine (V04 89) (Z23)   39  Nephrolithiasis (592 0) (N20 0)   40  Neuralgia (729 2) (M79 2)   41  Polyneuropathy (356 9) (G62 9)   42  RBBB (right bundle branch block) (426 4) (I45 10)   43  Recent major surgery (V12 29) (Z98 890)   44  Renal lithiasis (592 0) (N20 0)   45  Sinus tachycardia (427 89) (R00 0)   46  Sleep apnea (780 57) (G47 30)   47  Special screening examination for neoplasm of prostate (V76 44) (Z12 5)   48  Tachycardia (785 0) (R00 0)   49  TIA (transient ischemic attack) (435 9) (G45 9)   50  Unintentional weight loss (783 21) (R63 4)   51  Urinary retention (788 20) (R33 9)   52  UTI (urinary tract infection) (599 0) (N39 0)   53  Vertigo (780 4) (R42)    Past Medical History    1  History of Acute upper respiratory infection (465 9) (J06 9)   2  History of Ataxic gait (781 2) (R26 0)   3  History of Bilateral leg weakness (729 89) (R29 898)   4  History of [place]-vesical fistula (596 1) (N32 1)   5  History of Cough (786 2) (R05)   6  History of E  coli UTI (urinary tract infection) (599 0,041 49) (N39 0,B96 20)   7  History of acute sinusitis (V12 69) (Z87 09)   8  History of candidiasis of mouth (V12 09) (Z86 19)   9  History of fatigue (V13 89) (Z87 898)   10  History of hypokalemia (V12 29) (Z86 39)   11  History of polyneuropathy (V12 49) (Z86 69)   12  History of sinusitis (V12 69) (Z87 09)   13  History of vertigo (V12 49) (Z87 898)   14  History of Medicare annual wellness visit, initial (V70 0) (Z00 00)   15  History of Neck pain (723 1) (M54 2)   16  History of Nephrolithiasis (V13 01)   17  History of Otitis media (382 9) (H66 90)   18  History of Postnasal drip (784 91) (R09 82)   19  History of Status post laparoscopic colectomy (V45 89) (Z90 49)   20  History of Unstable gait (781 2) (R26 81)   21   History of UTI (lower urinary tract infection) (599 0) (N39 0)   22  History of Weakness of right lower extremity (729 89) (R29 898)  Active Problems And Past Medical History Reviewed: The active problems and past medical history were reviewed and updated today  Surgical History    1  History of Biopsy Parotid   2  History of Complete Colonoscopy   3  History of Laparoscopy Partial Colectomy Sigmoid   4  History of Lithotomy  Surgical History Reviewed: The surgical history was reviewed and updated today  Family History  Mother    1  Family history of Arthritis (V17 7)   2  Family history of Diabetes Mellitus (V18 0)   3  Family history of Hypertension (V17 49)   4  Family history of Osteoporosis (V17 81)  Family History Reviewed: The family history was reviewed and updated today  Social History    · Marital History - Currently    · Never A Smoker   · Never Drank Alcohol   · No caffeine use   · Uses Safety Equipment - Seatbelts  Social History Reviewed: The social history was reviewed and updated today  Current Meds   1  Famotidine 20 MG Oral Tablet; TAKE 1 TABLET DAILY AS DIRECTED; Therapy: 82IKL5674 to (Evaluate:29Apr2017)  Requested for: 62XFX9364; Last   Rx:01Nov2016 Ordered   2  HydroCHLOROthiazide 25 MG Oral Tablet; take one tablet by mouth daily as directed    Requested for: 50CXA7081; Last Rx:31Jan2017 Ordered   3  Meclizine HCl - 12 5 MG Oral Tablet; TAKE 1 TABLET 3 TIMES DAILY AS NEEDED; Therapy: 82ZYS1420 to (Evaluate:56Pei3714)  Requested for: 20Jan2017; Last   Rx:20Jan2017 Ordered   4  MetFORMIN HCl - 500 MG Oral Tablet; TAKE half tablet po twice daily with meal;   Therapy: 53KTS4676 to (Last Rx:18Jan2017)  Requested for: 07BTD3105 Ordered   5  PredniSONE 10 MG Oral Tablet; TAKE 2 TABLET DAILY  3mth supply; Therapy: 01Apr2016 to (Nila Comp)  Requested for: 24MMB4981; Last   Rx:45Hgs3379 Ordered   6  Vitamin D3 2000 UNIT Oral Capsule; take 1 capsule daily;    Therapy: (Recorded:70Nju4284) to Recorded  Medication List Reviewed: The medication list was reviewed and updated today  Allergies    1  Antihistamine Decongestant TABS   2  Antihistamine Decongestant TB12   3  Antihistamine Nasal Decongest TABS   4  Antihistamine TABS   5  Antihistamines, Chlorpheniramine-type   6  Antihistamines, Diphenhydramine-type   7  Antihistamines, Loratadine-type   8  Erythromycin Base TABS   9  Iodine Solution SOLN   10  Sulfa Drugs   11  Tetracyclines    Vitals  Vital Signs    Recorded: 65PUH9585 08:54AM   Heart Rate 842   Systolic 763   Diastolic 60   Height 5 ft 9 5 in   Weight 163 lb 8 0 oz   BMI Calculated 23 8   BSA Calculated 1 9     Physical Exam    Constitutional Seated comfortably, in no acute distress, appears his stated age  Ears, Nose, Mouth, and Throat   Hearing: Normal     Pulmonary   Respiratory effort: No increased work of breathing or signs of respiratory distress  Abdomen   Abdomen: Non-tender, no masses  Genitourinary   Urethral meatus: Normal, no lesions  Penis: Abnormal   Erythema and white discharge around corona  Smooth, nontender, 30 g prostate  Musculoskeletal   Gait and station: Abnormal   Walks with walker due to MG     Psychiatric   Mood and affect: Normal        Results/Data  AUA Symptom Score 16QHP2384 09:03AM User, Ahs     Test Name Result Flag Reference   AUA Symptom Score (for prostate disease) 23     Incomplete emptying: Less than 1 time in 5 (1)  Frequency: Almost always (5)  Intermittency: Almost always (5)  Urgency: Less than 1 time in 5 (1)  Weak-stream: Less than 1 time in 5 (1)  Straining: Almost always (5)  Nocturia: Almost always (5)   AUA Symptom Score (for prostate disease) - Score Category Severe     AUA Symptom Score (for prostate disease) - Quality of Life Due to Urinary Symptoms Terrible       (1) PSA (SCREEN) (Dx V76 44 Screen for Prostate Cancer) 88FCD6621 09:36AM Minerva Tolentinos Order Number: [id number]     Test Name Result Flag Reference   PROSTATE SPECIFIC ANTIGEN 0 4 ng/mL  0 0-4 0   American Urological Association Guidelines define biochemical recurrence of prostate cancer as a detectable or rising PSA value post-radical prostatectomy that is greater than or equal to 0 2 ng/mL with a second confirmatory level of greater than or equal to 0 2 ng/mL  (1) URINALYSIS (will reflex a microscopy if leukocytes, occult blood, protein or nitrites are not within normal limits) 03Tvj4727 09:36AM Rose Mary Woodard    Order Number: [id number]     Test Name Result Flag Reference   COLOR Yellow     CLARITY Clear     SPECIFIC GRAVITY UA 1 028  1 003-1 030   PH UA 6 0  4 5-8 0   LEUKOCYTE ESTERASE UA Large A Negative   NITRITE UA Negative  Negative   PROTEIN UA Negative mg/dl  Negative   GLUCOSE UA >=1000 (1%) mg/dl A Negative   KETONES UA Negative mg/dl  Negative   UROBILINOGEN UA 1 0 E U /dl  0 2, 1 0 E U /dl   BILIRUBIN UA Negative  Negative   BLOOD UA Negative  Negative   BACTERIA Occasional /hpf  None Seen, Occasional   EPITHELIAL CELLS None Seen /hpf  None Seen, Occasional   RBC UA None Seen /hpf  None Seen   WBC UA 30-50 /hpf A None Seen     US KIDNEY AND BLADDER 40IQA0158 10:08AM Kira James Order Number: [id number]    - Patient Instructions: To schedule this appointment, please contact Central Scheduling at [phone]  Test Name Result Flag Reference   US KIDNEY AND BLADDER (Report)     RENAL ULTRASOUND     INDICATION: UTI  COMPARISON: None  TECHNIQUE:  Ultrasound of the retroperitoneum was performed with a curvilinear transducer utilizing volumetric sweeps and still imaging techniques  FINDINGS:     KIDNEYS:   Symmetric and normal size  Right kidney: 7 6 x 6 0 cm  Normal echogenicity and contour  No suspicious masses detected  13 mm right midpole renal cyst is noted  No hydronephrosis  No shadowing calculi  No perinephric fluid collections  Left kidney: 11 1 x 5 6 cm     Normal echogenicity and contour  No suspicious masses detected  13 mm left upper pole renal cyst is noted  No hydronephrosis  No shadowing calculi  No perinephric fluid collections  URETERS:   Nonvisualized  BLADDER:    Normally distended  No focal thickening or mass lesions  Bilateral ureteral jets detected  IMPRESSION:     Bilateral renal cysts, otherwise normal study  Workstation performed: [id number]     Signed by: Cirilo Klein MD   1/9/17     Procedure    Procedure:   Equipment And Procedure: The patient voided  U/S Findings: 2 80ml        Future Appointments    Date/Time Provider Specialty Site   03/09/2017 09:45 AM Yasmin Gaona DO Family Medicine [facility]   04/06/2017 10:00 AM Akanksha Adame DO Neurology [place] [address]   06/16/2017 10:45 AM Kamari Davis MD Urology [address]   Electronically signed by : Estelita Mon, ; Mar  3 2017 11:22AM EST                       (Author)    Electronically signed by : Estelita Mon, ; Mar  3 2017 11:24AM EST                       (Author)    Electronically signed by : Rodolfo Carey MD; Mar  6 2017  9:43PM EST

## 2018-01-24 VITALS
OXYGEN SATURATION: 96 % | RESPIRATION RATE: 16 BRPM | HEART RATE: 108 BPM | WEIGHT: 175.5 LBS | DIASTOLIC BLOOD PRESSURE: 78 MMHG | HEIGHT: 70 IN | TEMPERATURE: 98.4 F | SYSTOLIC BLOOD PRESSURE: 130 MMHG | BODY MASS INDEX: 25.13 KG/M2

## 2018-01-24 VITALS
DIASTOLIC BLOOD PRESSURE: 80 MMHG | BODY MASS INDEX: 25.64 KG/M2 | RESPIRATION RATE: 17 BRPM | TEMPERATURE: 97.8 F | OXYGEN SATURATION: 97 % | HEIGHT: 70 IN | SYSTOLIC BLOOD PRESSURE: 130 MMHG | WEIGHT: 179.13 LBS | HEART RATE: 81 BPM

## 2018-02-01 DIAGNOSIS — R35.0 FREQUENT URINATION: Primary | ICD-10-CM

## 2018-02-01 DIAGNOSIS — Z79.52 ON PREDNISONE THERAPY: ICD-10-CM

## 2018-02-01 DIAGNOSIS — G70.00 MYASTHENIA GRAVIS (HCC): ICD-10-CM

## 2018-02-02 ENCOUNTER — TRANSCRIBE ORDERS (OUTPATIENT)
Dept: LAB | Facility: CLINIC | Age: 73
End: 2018-02-02

## 2018-02-02 ENCOUNTER — LAB (OUTPATIENT)
Dept: LAB | Facility: CLINIC | Age: 73
End: 2018-02-02
Payer: MEDICARE

## 2018-02-02 DIAGNOSIS — R35.0 FREQUENT URINATION: ICD-10-CM

## 2018-02-02 DIAGNOSIS — N30.00 ACUTE CYSTITIS WITHOUT HEMATURIA: Primary | ICD-10-CM

## 2018-02-02 DIAGNOSIS — Z79.52 ON PREDNISONE THERAPY: ICD-10-CM

## 2018-02-02 DIAGNOSIS — G70.00 MYASTHENIA GRAVIS (HCC): ICD-10-CM

## 2018-02-02 LAB
AMORPH URATE CRY URNS QL MICRO: ABNORMAL /HPF
BACTERIA UR QL AUTO: ABNORMAL /HPF
BILIRUB UR QL STRIP: NEGATIVE
CLARITY UR: CLEAR
COLOR UR: YELLOW
GLUCOSE UR STRIP-MCNC: NEGATIVE MG/DL
HGB UR QL STRIP.AUTO: NEGATIVE
KETONES UR STRIP-MCNC: NEGATIVE MG/DL
LEUKOCYTE ESTERASE UR QL STRIP: ABNORMAL
NITRITE UR QL STRIP: NEGATIVE
NON-SQ EPI CELLS URNS QL MICRO: ABNORMAL /HPF
PH UR STRIP.AUTO: 6.5 [PH] (ref 4.5–8)
PROT UR STRIP-MCNC: NEGATIVE MG/DL
RBC #/AREA URNS AUTO: ABNORMAL /HPF
SP GR UR STRIP.AUTO: 1.02 (ref 1–1.03)
UROBILINOGEN UR QL STRIP.AUTO: 1 E.U./DL
WBC #/AREA URNS AUTO: ABNORMAL /HPF

## 2018-02-02 PROCEDURE — 81001 URINALYSIS AUTO W/SCOPE: CPT

## 2018-02-02 PROCEDURE — 87086 URINE CULTURE/COLONY COUNT: CPT

## 2018-02-02 PROCEDURE — 87186 SC STD MICRODIL/AGAR DIL: CPT

## 2018-02-02 PROCEDURE — 87077 CULTURE AEROBIC IDENTIFY: CPT

## 2018-02-03 DIAGNOSIS — R82.90 ABNORMAL URINALYSIS: Primary | ICD-10-CM

## 2018-02-03 RX ORDER — NITROFURANTOIN 25; 75 MG/1; MG/1
100 CAPSULE ORAL 2 TIMES DAILY
Qty: 14 CAPSULE | Refills: 0 | Status: SHIPPED | OUTPATIENT
Start: 2018-02-03 | End: 2018-02-14 | Stop reason: ALTCHOICE

## 2018-02-04 LAB — BACTERIA UR CULT: ABNORMAL

## 2018-02-05 ENCOUNTER — TELEPHONE (OUTPATIENT)
Dept: UROLOGY | Facility: CLINIC | Age: 73
End: 2018-02-05

## 2018-02-05 DIAGNOSIS — N30.00 ACUTE CYSTITIS WITHOUT HEMATURIA: Primary | ICD-10-CM

## 2018-02-05 NOTE — TELEPHONE ENCOUNTER
Received call from Dr Ricardo Cm regarding patient's UTI  She stated that the only medication affective for his UTI would be IV abx and wanted to know if Dr Delma Valdez would be able to get him started on this  I returned call and had spoken with Karyna Oslon from [facility] and instructed her that if patient is symptomatic, the quickest way would be to send patient to the Er  There they can decide if they want to put in a pic line or admit patient for IV abx    She stated that she would pass the message onto Dr Ricardo Cm

## 2018-02-06 ENCOUNTER — APPOINTMENT (OUTPATIENT)
Dept: LAB | Facility: CLINIC | Age: 73
End: 2018-02-06
Payer: MEDICARE

## 2018-02-06 LAB
BACTERIA UR QL AUTO: ABNORMAL /HPF
BILIRUB UR QL STRIP: NEGATIVE
CLARITY UR: CLEAR
COLOR UR: YELLOW
GLUCOSE UR STRIP-MCNC: NEGATIVE MG/DL
HGB UR QL STRIP.AUTO: NEGATIVE
KETONES UR STRIP-MCNC: ABNORMAL MG/DL
LEUKOCYTE ESTERASE UR QL STRIP: ABNORMAL
NITRITE UR QL STRIP: NEGATIVE
NON-SQ EPI CELLS URNS QL MICRO: ABNORMAL /HPF
PH UR STRIP.AUTO: 6 [PH] (ref 4.5–8)
PROT UR STRIP-MCNC: NEGATIVE MG/DL
RBC #/AREA URNS AUTO: ABNORMAL /HPF
SP GR UR STRIP.AUTO: 1.02 (ref 1–1.03)
UROBILINOGEN UR QL STRIP.AUTO: 1 E.U./DL
WBC #/AREA URNS AUTO: ABNORMAL /HPF

## 2018-02-06 PROCEDURE — 81001 URINALYSIS AUTO W/SCOPE: CPT

## 2018-02-07 NOTE — PROGRESS NOTES
Called patient  He is aware of preliminary results  I told him that he would get a call from our office when the culture results come in

## 2018-02-08 ENCOUNTER — APPOINTMENT (OUTPATIENT)
Dept: LAB | Facility: CLINIC | Age: 73
End: 2018-02-08
Payer: MEDICARE

## 2018-02-08 ENCOUNTER — OFFICE VISIT (OUTPATIENT)
Dept: FAMILY MEDICINE CLINIC | Facility: CLINIC | Age: 73
End: 2018-02-08
Payer: MEDICARE

## 2018-02-08 VITALS
HEART RATE: 106 BPM | TEMPERATURE: 100.2 F | HEIGHT: 68 IN | RESPIRATION RATE: 17 BRPM | SYSTOLIC BLOOD PRESSURE: 110 MMHG | WEIGHT: 178 LBS | BODY MASS INDEX: 26.98 KG/M2 | DIASTOLIC BLOOD PRESSURE: 82 MMHG | OXYGEN SATURATION: 96 %

## 2018-02-08 DIAGNOSIS — J10.1 INFLUENZA A: ICD-10-CM

## 2018-02-08 DIAGNOSIS — N30.00 ACUTE CYSTITIS WITHOUT HEMATURIA: ICD-10-CM

## 2018-02-08 DIAGNOSIS — N30.00 ACUTE CYSTITIS WITHOUT HEMATURIA: Primary | ICD-10-CM

## 2018-02-08 DIAGNOSIS — R68.89 FLU-LIKE SYMPTOMS: Primary | ICD-10-CM

## 2018-02-08 LAB
SL AMB POCT RAPID FLU A: POSITIVE
SL AMB POCT RAPID FLU B: NEGATIVE

## 2018-02-08 PROCEDURE — 87077 CULTURE AEROBIC IDENTIFY: CPT

## 2018-02-08 PROCEDURE — 87086 URINE CULTURE/COLONY COUNT: CPT

## 2018-02-08 PROCEDURE — 99213 OFFICE O/P EST LOW 20 MIN: CPT | Performed by: PHYSICIAN ASSISTANT

## 2018-02-08 PROCEDURE — 87186 SC STD MICRODIL/AGAR DIL: CPT

## 2018-02-08 PROCEDURE — 87804 INFLUENZA ASSAY W/OPTIC: CPT | Performed by: PHYSICIAN ASSISTANT

## 2018-02-08 RX ORDER — LANCETS
EACH MISCELLANEOUS 2 TIMES DAILY
COMMUNITY
Start: 2017-10-09 | End: 2018-02-14 | Stop reason: CLARIF

## 2018-02-08 RX ORDER — OSELTAMIVIR PHOSPHATE 75 MG/1
75 CAPSULE ORAL EVERY 12 HOURS SCHEDULED
Qty: 10 CAPSULE | Refills: 0 | Status: SHIPPED | OUTPATIENT
Start: 2018-02-08 | End: 2018-02-14 | Stop reason: ALTCHOICE

## 2018-02-08 RX ORDER — BENZONATATE 100 MG/1
100 CAPSULE ORAL 3 TIMES DAILY PRN
Qty: 20 CAPSULE | Refills: 0 | Status: SHIPPED | OUTPATIENT
Start: 2018-02-08 | End: 2018-02-14 | Stop reason: ALTCHOICE

## 2018-02-08 NOTE — PROGRESS NOTES
Assessment/Plan:     Diagnoses and all orders for this visit:    Flu-like symptoms  -     POCT rapid flu A and B - positive for influenza A    Influenza A  -     oseltamivir (TAMIFLU) 75 mg capsule; Take 1 capsule (75 mg total) by mouth every 12 (twelve) hours for 5 days  - supportive care discussed; hydration, rest, prn tylenol/motrin, tessalon pearls for cough   - if get lethargic, resp distress then go to ER       Repeat urine culture ordered by Dr Emily Valentine to assess sensitivities of UTI and appropriate abx choice in setting of reoccurence and several abx allergies; Dr Melony [facility] made aware and suggests possibly sending to ER for IV abx pending culture results       Subjective:      Patient ID: Julissa Chris is a 68 y o  male  Patient is a 67 yo male with PMH below who presents for sick visit  He started with symptoms yesterday morning experiencing fever, chills, weakness, fatigue, nasal congestion, headaches, ear pressure, dry cough, scratchy throat  He has been in/out of office and labs recently for UTI and possibly had exposure  We are waiting on preliminary result for UTI culture  The following portions of the patient's history were reviewed and updated as appropriate: allergies, current medications, past family history, past medical history, past social history, past surgical history and problem list     Review of Systems   Constitutional: Positive for activity change, appetite change, chills, fatigue and fever  Negative for diaphoresis and unexpected weight change  HENT: Positive for congestion, ear pain (pressure), postnasal drip, rhinorrhea, sneezing and sore throat  Negative for dental problem, drooling, ear discharge, facial swelling, sinus pain, sinus pressure, trouble swallowing and voice change  Eyes: Positive for redness  Negative for photophobia, pain, discharge and itching  Respiratory: Positive for cough   Negative for choking, chest tightness, shortness of breath, wheezing and stridor  Cardiovascular: Negative for chest pain, palpitations and leg swelling  Gastrointestinal: Negative for abdominal distention, abdominal pain, constipation, diarrhea, nausea and vomiting  Genitourinary: Positive for dysuria and frequency  Negative for difficulty urinating, flank pain and hematuria  Musculoskeletal: Positive for myalgias  Negative for arthralgias, gait problem and joint swelling  Skin: Positive for pallor  Negative for color change and rash  Neurological: Positive for weakness and headaches  Negative for dizziness, syncope and numbness  Hematological: Negative for adenopathy  Does not bruise/bleed easily  Psychiatric/Behavioral: Negative for behavioral problems, confusion, decreased concentration and sleep disturbance  Objective:     Physical Exam   Constitutional: He is oriented to person, place, and time  Vital signs are normal  He appears well-developed and well-nourished  He has a sickly appearance  No distress  HENT:   Head: Normocephalic and atraumatic  Right Ear: Tympanic membrane, external ear and ear canal normal  No drainage  Left Ear: Tympanic membrane, external ear and ear canal normal  No drainage  Nose: Mucosal edema and rhinorrhea present  No sinus tenderness  Mouth/Throat: Oropharynx is clear and moist    Eyes: Lids are normal  Pupils are equal, round, and reactive to light  Right conjunctiva is injected  Left conjunctiva is injected  Neck: Trachea normal and normal range of motion  Neck supple  Cardiovascular: Normal rate, regular rhythm, S1 normal, S2 normal and intact distal pulses  Exam reveals no gallop  No murmur heard  Pulmonary/Chest: Breath sounds normal  No accessory muscle usage or stridor  No tachypnea  No respiratory distress  He has no wheezes  He has no rhonchi  He has no rales  Abdominal: Soft  Normal appearance and bowel sounds are normal  He exhibits distension  There is no hepatosplenomegaly   There is no tenderness  Musculoskeletal: Normal range of motion  He exhibits no edema or deformity  Lymphadenopathy:     He has no cervical adenopathy  Neurological: He is alert and oriented to person, place, and time  He has normal reflexes  No cranial nerve deficit or sensory deficit  Skin: Skin is warm and dry  No rash noted  No cyanosis  There is pallor  Nails show no clubbing  Psychiatric: He has a normal mood and affect   His behavior is normal  Cognition and memory are normal

## 2018-02-08 NOTE — PATIENT INSTRUCTIONS
Influenza   WHAT YOU NEED TO KNOW:   Influenza (the flu) is an infection caused by the influenza virus  The flu is easily spread when an infected person coughs, sneezes, or has close contact with others  You may be able to spread the flu to others for 1 week or longer after signs or symptoms appear  DISCHARGE INSTRUCTIONS:   Call 911 for any of the following:   · You have trouble breathing, and your lips look purple or blue  · You have a seizure  Return to the emergency department if:   · You are dizzy, or you are urinating less or not at all  · You have a headache with a stiff neck, and you feel tired or confused  · You have new pain or pressure in your chest     · Your symptoms, such as shortness of breath, vomiting, or diarrhea, get worse  · Your symptoms, such as fever and coughing, seem to get better, but then get worse  Contact your healthcare provider if:   · You have new muscle pain or weakness  · You have questions or concerns about your condition or care  Medicines: You may need any of the following:  · Acetaminophen  decreases pain and fever  It is available without a doctor's order  Ask how much to take and how often to take it  Follow directions  Acetaminophen can cause liver damage if not taken correctly  · NSAIDs , such as ibuprofen, help decrease swelling, pain, and fever  This medicine is available with or without a doctor's order  NSAIDs can cause stomach bleeding or kidney problems in certain people  If you take blood thinner medicine, always ask your healthcare provider if NSAIDs are safe for you  Always read the medicine label and follow directions  · Antivirals  help fight a viral infection  · Take your medicine as directed  Contact your healthcare provider if you think your medicine is not helping or if you have side effects  Tell him or her if you are allergic to any medicine  Keep a list of the medicines, vitamins, and herbs you take   Include the amounts, and when and why you take them  Bring the list or the pill bottles to follow-up visits  Carry your medicine list with you in case of an emergency  Rest  as much as you can to help you recover  Drink liquids as directed  to help prevent dehydration  Ask how much liquid to drink each day and which liquids are best for you  Prevent the spread of influenza:   · Wash your hands often  Use soap and water  Wash your hands after you use the bathroom, change a child's diapers, or sneeze  Wash your hands before you prepare or eat food  Use gel hand cleanser when soap and water are not available  Do not touch your eyes, nose, or mouth unless you have washed your hands first            · Cover your mouth when you sneeze or cough  Cough into a tissue or the bend of your arm  · Clean shared items with a germ-killing   Clean table surfaces, doorknobs, and light switches  Do not share towels, silverware, and dishes with people who are sick  Wash bed sheets, towels, silverware, and dishes with soap and water  · Wear a mask  over your mouth and nose if you are sick or are near anyone who is sick  · Stay away from others  if you are sick  · Influenza vaccine  helps prevent influenza (flu)  Everyone older than 6 months should get a yearly influenza vaccine  Get the vaccine as soon as it is available, usually in September or October each year  Follow up with your healthcare provider as directed:  Write down your questions so you remember to ask them during your visits  © 2017 [facility] [employer] Information is for End User's use only and may not be sold, redistributed or otherwise used for commercial purposes  All illustrations and images included in CareNotes® are the copyrighted property of [employer]  or Emmett Gutierrez  The above information is an  only  It is not intended as medical advice for individual conditions or treatments   Talk to your doctor, nurse or pharmacist before following any medical regimen to see if it is safe and effective for you

## 2018-02-10 LAB — BACTERIA UR CULT: ABNORMAL

## 2018-02-12 ENCOUNTER — OFFICE VISIT (OUTPATIENT)
Dept: FAMILY MEDICINE CLINIC | Facility: CLINIC | Age: 73
End: 2018-02-12
Payer: MEDICARE

## 2018-02-12 VITALS
BODY MASS INDEX: 26.92 KG/M2 | HEART RATE: 95 BPM | HEIGHT: 68 IN | SYSTOLIC BLOOD PRESSURE: 122 MMHG | DIASTOLIC BLOOD PRESSURE: 80 MMHG | OXYGEN SATURATION: 98 % | TEMPERATURE: 97.6 F | WEIGHT: 177.6 LBS

## 2018-02-12 DIAGNOSIS — N30.80 CYSTITIS DUE TO PSEUDOMONAS: ICD-10-CM

## 2018-02-12 DIAGNOSIS — B96.5 CYSTITIS DUE TO PSEUDOMONAS: ICD-10-CM

## 2018-02-12 DIAGNOSIS — Z12.11 SCREENING FOR COLON CANCER: Primary | ICD-10-CM

## 2018-02-12 PROCEDURE — 99214 OFFICE O/P EST MOD 30 MIN: CPT | Performed by: FAMILY MEDICINE

## 2018-02-12 RX ORDER — CIPROFLOXACIN 500 MG/1
500 TABLET, FILM COATED ORAL EVERY 12 HOURS SCHEDULED
Qty: 14 TABLET | Refills: 0 | Status: SHIPPED | OUTPATIENT
Start: 2018-02-12 | End: 2018-02-12 | Stop reason: SDUPTHER

## 2018-02-12 RX ORDER — CIPROFLOXACIN 500 MG/1
500 TABLET, FILM COATED ORAL EVERY 12 HOURS SCHEDULED
Qty: 14 TABLET | Refills: 0 | Status: SHIPPED | OUTPATIENT
Start: 2018-02-12 | End: 2018-02-14 | Stop reason: ALTCHOICE

## 2018-02-12 NOTE — PROGRESS NOTES
Assessment/Plan:    No problem-specific Assessment & Plan notes found for this encounter  Diagnoses and all orders for this visit:    Screening for colon cancer  -     Ambulatory referral to Gastroenterology; Future  -     Ambulatory referral to Gastroenterology; Future    Other orders  -     insulin glargine (LANTUS SOLOSTAR) injection pen 100 units/mL; Inject 12 Units under the skin daily at bedtime  -     insulin aspart (NovoLOG) 100 Units/mL SOPN; Inject 3 Units under the skin 3 (three) times a day before meals  -     Insulin Pen Needle (B-D UF III MINI PEN NEEDLES) 31G X 5 MM MISC; by Does not apply route          Subjective:   Chief Complaint   Patient presents with    Follow-up     review labs - discuss antibiotic therapy        Patient ID: Gagan Cisse is a 68 y o  male  Per c/c reviewed by me  Pt had telephone c/o urinary frequency sx 1-2 weeks ago, ordered urine culture and was rx'd nitrofurantoin empirically  Culture revealed growth of pseudomon auruginos resistant to nitrofurantoin and with sensitivities mainly to IV abx, also sensitive to oral flouroquinolone, though pt had levaquin listed among his numerous med/abx allergies  Pt was contacted and advised likely will need to go to ER for IV abx, but he reported that his urinary symptoms had improved and that he otherwise felt in his usual health  He was therefore instructed to repeat the urine culture and to schedule appt here  His repeat urine culture result came back over the weekend per chart- still some growth pseudomonas aurug  In the meantime, pt had developed acute resp sx, seen here in office and rapid flu test was positive, he was rx'd tamiflu  Today states his acute flu sx are much better, but urine frequency sx returned slightly   He also reports that he doesn't recall what his allergy to levaquin was, and review of his records, both [facility] and [employer] today during visit, showed no levaquin rx ever given, and levaquin was not on his allergy list as of June 2017, but was present by his visit here august 2017  I asked pt if the levaquin might have potential to cause problems with his Mg, and this is why listed on his allergies, and he stated that he wasn't sure, but that his pharmacist had recommended that cipro should be safe to take  The urine culture does show sensitivity to cipro        The following portions of the patient's history were reviewed and updated as appropriate: allergies, current medications, past family history, past medical history, past social history, past surgical history and problem list     Review of Systems   Constitutional: Negative for chills, diaphoresis, fatigue, fever and unexpected weight change  Respiratory: Negative  Cardiovascular: Negative  Gastrointestinal: Negative  Genitourinary: Positive for frequency  Negative for decreased urine volume, dysuria, flank pain, hematuria and urgency  Musculoskeletal: Negative  Neurological: Negative  Hematological: Negative  Objective:    Vitals:    02/12/18 1428   BP: 122/80   Pulse: 95   Temp: 97 6 °F (36 4 °C)   SpO2: 98%        Physical Exam   Constitutional: He appears well-developed and well-nourished  He is cooperative  Non-toxic appearance  He does not have a sickly appearance  He does not appear ill  No distress  HENT:   Mouth/Throat: Uvula is midline, oropharynx is clear and moist and mucous membranes are normal    Eyes: Conjunctivae and EOM are normal  Pupils are equal, round, and reactive to light  Cardiovascular: Normal rate, regular rhythm, normal heart sounds and normal pulses  Pulmonary/Chest: Effort normal and breath sounds normal    Abdominal: Normal appearance and bowel sounds are normal  There is no hepatosplenomegaly  There is no tenderness  There is no rebound and no CVA tenderness  Neurological: He is alert  Skin: Skin is warm and dry  No rash noted  He is not diaphoretic  No pallor     Psychiatric: He has a normal mood and affect   His speech is normal and behavior is normal

## 2018-02-12 NOTE — PATIENT INSTRUCTIONS
Pt does not recall having reaction to levaquin, review of [employer] shows no levaquin rx ever on record, June 2017 visit with dr Lee Buckner, was not on allergy list, then august 2017 visit with me listed as allergy- pt does not remember having reaction to med  Will give cipro rx as pseudomon is susceptible, otherwise pt would need IV abx   F/U approx 1 week

## 2018-02-14 ENCOUNTER — APPOINTMENT (INPATIENT)
Dept: RADIOLOGY | Facility: HOSPITAL | Age: 73
DRG: 683 | End: 2018-02-14
Payer: MEDICARE

## 2018-02-14 ENCOUNTER — DOCUMENTATION (OUTPATIENT)
Dept: NEUROLOGY | Facility: CLINIC | Age: 73
End: 2018-02-14

## 2018-02-14 ENCOUNTER — HOSPITAL ENCOUNTER (INPATIENT)
Facility: HOSPITAL | Age: 73
LOS: 3 days | Discharge: HOME HEALTH | DRG: 683 | End: 2018-02-17
Attending: EMERGENCY MEDICINE | Admitting: INTERNAL MEDICINE
Payer: MEDICARE

## 2018-02-14 DIAGNOSIS — N39.0 UTI (URINARY TRACT INFECTION): Primary | ICD-10-CM

## 2018-02-14 DIAGNOSIS — Z79.4 TYPE 2 DIABETES MELLITUS WITH HYPERGLYCEMIA, WITH LONG-TERM CURRENT USE OF INSULIN (HCC): ICD-10-CM

## 2018-02-14 DIAGNOSIS — E11.65 TYPE 2 DIABETES MELLITUS WITH HYPERGLYCEMIA, WITH LONG-TERM CURRENT USE OF INSULIN (HCC): ICD-10-CM

## 2018-02-14 DIAGNOSIS — B96.5 PSEUDOMONAS URINARY TRACT INFECTION: ICD-10-CM

## 2018-02-14 DIAGNOSIS — G70.00 MYASTHENIA GRAVIS (HCC): ICD-10-CM

## 2018-02-14 DIAGNOSIS — N28.9 ACUTE RENAL INSUFFICIENCY: ICD-10-CM

## 2018-02-14 DIAGNOSIS — N39.0 PSEUDOMONAS URINARY TRACT INFECTION: ICD-10-CM

## 2018-02-14 PROBLEM — E83.52 HYPERCALCEMIA: Status: ACTIVE | Noted: 2018-02-14

## 2018-02-14 PROBLEM — R42 DIZZINESS: Status: ACTIVE | Noted: 2018-02-14

## 2018-02-14 PROBLEM — N18.30 CKD (CHRONIC KIDNEY DISEASE), STAGE III (HCC): Status: ACTIVE | Noted: 2018-02-14

## 2018-02-14 LAB
ANION GAP SERPL CALCULATED.3IONS-SCNC: 7 MMOL/L (ref 4–13)
BACTERIA UR QL AUTO: ABNORMAL /HPF
BASOPHILS # BLD AUTO: 0.05 THOUSANDS/ΜL (ref 0–0.1)
BASOPHILS NFR BLD AUTO: 1 % (ref 0–1)
BILIRUB UR QL STRIP: NEGATIVE
BUN SERPL-MCNC: 25 MG/DL (ref 5–25)
CALCIUM SERPL-MCNC: 10.2 MG/DL (ref 8.3–10.1)
CHLORIDE SERPL-SCNC: 99 MMOL/L (ref 100–108)
CLARITY UR: CLEAR
CO2 SERPL-SCNC: 33 MMOL/L (ref 21–32)
COLOR UR: YELLOW
CREAT SERPL-MCNC: 1.66 MG/DL (ref 0.6–1.3)
EOSINOPHIL # BLD AUTO: 0.04 THOUSAND/ΜL (ref 0–0.61)
EOSINOPHIL NFR BLD AUTO: 0 % (ref 0–6)
ERYTHROCYTE [DISTWIDTH] IN BLOOD BY AUTOMATED COUNT: 13.2 % (ref 11.6–15.1)
GFR SERPL CREATININE-BSD FRML MDRD: 40 ML/MIN/1.73SQ M
GLUCOSE SERPL-MCNC: 109 MG/DL (ref 65–140)
GLUCOSE SERPL-MCNC: 139 MG/DL (ref 65–140)
GLUCOSE SERPL-MCNC: 171 MG/DL (ref 65–140)
GLUCOSE SERPL-MCNC: 97 MG/DL (ref 65–140)
GLUCOSE UR STRIP-MCNC: NEGATIVE MG/DL
HCT VFR BLD AUTO: 46.4 % (ref 36.5–49.3)
HGB BLD-MCNC: 16.5 G/DL (ref 12–17)
HGB UR QL STRIP.AUTO: NEGATIVE
KETONES UR STRIP-MCNC: NEGATIVE MG/DL
LEUKOCYTE ESTERASE UR QL STRIP: ABNORMAL
LYMPHOCYTES # BLD AUTO: 1.8 THOUSANDS/ΜL (ref 0.6–4.47)
LYMPHOCYTES NFR BLD AUTO: 17 % (ref 14–44)
MCH RBC QN AUTO: 32.3 PG (ref 26.8–34.3)
MCHC RBC AUTO-ENTMCNC: 35.6 G/DL (ref 31.4–37.4)
MCV RBC AUTO: 91 FL (ref 82–98)
MONOCYTES # BLD AUTO: 0.66 THOUSAND/ΜL (ref 0.17–1.22)
MONOCYTES NFR BLD AUTO: 6 % (ref 4–12)
NEUTROPHILS # BLD AUTO: 8.06 THOUSANDS/ΜL (ref 1.85–7.62)
NEUTS SEG NFR BLD AUTO: 76 % (ref 43–75)
NITRITE UR QL STRIP: NEGATIVE
NON-SQ EPI CELLS URNS QL MICRO: ABNORMAL /HPF
NRBC BLD AUTO-RTO: 0 /100 WBCS
PH UR STRIP.AUTO: 5 [PH] (ref 4.5–8)
PLATELET # BLD AUTO: 279 THOUSANDS/UL (ref 149–390)
PMV BLD AUTO: 10.3 FL (ref 8.9–12.7)
POTASSIUM SERPL-SCNC: 3.6 MMOL/L (ref 3.5–5.3)
PROT UR STRIP-MCNC: NEGATIVE MG/DL
RBC # BLD AUTO: 5.11 MILLION/UL (ref 3.88–5.62)
RBC #/AREA URNS AUTO: ABNORMAL /HPF
SODIUM SERPL-SCNC: 139 MMOL/L (ref 136–145)
SP GR UR STRIP.AUTO: 1.02 (ref 1–1.03)
UROBILINOGEN UR QL STRIP.AUTO: 1 E.U./DL
WBC # BLD AUTO: 10.61 THOUSAND/UL (ref 4.31–10.16)
WBC #/AREA URNS AUTO: ABNORMAL /HPF

## 2018-02-14 PROCEDURE — 81002 URINALYSIS NONAUTO W/O SCOPE: CPT | Performed by: PODIATRIST

## 2018-02-14 PROCEDURE — 99223 1ST HOSP IP/OBS HIGH 75: CPT | Performed by: INTERNAL MEDICINE

## 2018-02-14 PROCEDURE — 99284 EMERGENCY DEPT VISIT MOD MDM: CPT

## 2018-02-14 PROCEDURE — 85025 COMPLETE CBC W/AUTO DIFF WBC: CPT | Performed by: PODIATRIST

## 2018-02-14 PROCEDURE — 74018 RADEX ABDOMEN 1 VIEW: CPT

## 2018-02-14 PROCEDURE — 96365 THER/PROPH/DIAG IV INF INIT: CPT

## 2018-02-14 PROCEDURE — 82948 REAGENT STRIP/BLOOD GLUCOSE: CPT

## 2018-02-14 PROCEDURE — 99223 1ST HOSP IP/OBS HIGH 75: CPT | Performed by: PHYSICIAN ASSISTANT

## 2018-02-14 PROCEDURE — 80048 BASIC METABOLIC PNL TOTAL CA: CPT | Performed by: PODIATRIST

## 2018-02-14 PROCEDURE — 81001 URINALYSIS AUTO W/SCOPE: CPT

## 2018-02-14 PROCEDURE — 87086 URINE CULTURE/COLONY COUNT: CPT

## 2018-02-14 PROCEDURE — 87077 CULTURE AEROBIC IDENTIFY: CPT

## 2018-02-14 PROCEDURE — 36415 COLL VENOUS BLD VENIPUNCTURE: CPT | Performed by: PODIATRIST

## 2018-02-14 PROCEDURE — 87186 SC STD MICRODIL/AGAR DIL: CPT

## 2018-02-14 RX ORDER — HEPARIN SODIUM 5000 [USP'U]/ML
5000 INJECTION, SOLUTION INTRAVENOUS; SUBCUTANEOUS EVERY 8 HOURS SCHEDULED
Status: DISCONTINUED | OUTPATIENT
Start: 2018-02-14 | End: 2018-02-17 | Stop reason: HOSPADM

## 2018-02-14 RX ORDER — ACETAMINOPHEN 325 MG/1
650 TABLET ORAL EVERY 6 HOURS PRN
Status: DISCONTINUED | OUTPATIENT
Start: 2018-02-14 | End: 2018-02-17 | Stop reason: HOSPADM

## 2018-02-14 RX ORDER — FAMOTIDINE 20 MG/1
20 TABLET, FILM COATED ORAL DAILY
Status: DISCONTINUED | OUTPATIENT
Start: 2018-02-14 | End: 2018-02-17 | Stop reason: HOSPADM

## 2018-02-14 RX ORDER — MECLIZINE HCL 12.5 MG/1
12.5 TABLET ORAL DAILY
Status: DISCONTINUED | OUTPATIENT
Start: 2018-02-14 | End: 2018-02-17 | Stop reason: HOSPADM

## 2018-02-14 RX ORDER — PREDNISONE 1 MG/1
10 TABLET ORAL DAILY
Status: DISCONTINUED | OUTPATIENT
Start: 2018-02-14 | End: 2018-02-17 | Stop reason: HOSPADM

## 2018-02-14 RX ORDER — ONDANSETRON 2 MG/ML
4 INJECTION INTRAMUSCULAR; INTRAVENOUS EVERY 6 HOURS PRN
Status: DISCONTINUED | OUTPATIENT
Start: 2018-02-14 | End: 2018-02-17 | Stop reason: HOSPADM

## 2018-02-14 RX ORDER — INSULIN GLARGINE 100 [IU]/ML
12 INJECTION, SOLUTION SUBCUTANEOUS
Status: DISCONTINUED | OUTPATIENT
Start: 2018-02-14 | End: 2018-02-17 | Stop reason: HOSPADM

## 2018-02-14 RX ORDER — PYRIDOXINE HCL (VITAMIN B6) 50 MG
100 TABLET ORAL DAILY
Status: DISCONTINUED | OUTPATIENT
Start: 2018-02-14 | End: 2018-02-17 | Stop reason: HOSPADM

## 2018-02-14 RX ORDER — SODIUM CHLORIDE 9 MG/ML
50 INJECTION, SOLUTION INTRAVENOUS CONTINUOUS
Status: DISCONTINUED | OUTPATIENT
Start: 2018-02-14 | End: 2018-02-17 | Stop reason: HOSPADM

## 2018-02-14 RX ORDER — DOCUSATE SODIUM 100 MG/1
100 CAPSULE, LIQUID FILLED ORAL 2 TIMES DAILY
Status: DISCONTINUED | OUTPATIENT
Start: 2018-02-14 | End: 2018-02-17 | Stop reason: HOSPADM

## 2018-02-14 RX ORDER — MELATONIN
1000 DAILY
Status: DISCONTINUED | OUTPATIENT
Start: 2018-02-14 | End: 2018-02-17 | Stop reason: HOSPADM

## 2018-02-14 RX ADMIN — SODIUM CHLORIDE 75 ML/HR: 0.9 INJECTION, SOLUTION INTRAVENOUS at 11:35

## 2018-02-14 RX ADMIN — HEPARIN SODIUM 5000 UNITS: 5000 INJECTION, SOLUTION INTRAVENOUS; SUBCUTANEOUS at 14:30

## 2018-02-14 RX ADMIN — CEFEPIME HYDROCHLORIDE 1000 MG: 1 INJECTION, SOLUTION INTRAVENOUS at 22:29

## 2018-02-14 RX ADMIN — CEFEPIME HYDROCHLORIDE 2000 MG: 2 INJECTION, POWDER, FOR SOLUTION INTRAVENOUS at 09:11

## 2018-02-14 RX ADMIN — INSULIN GLARGINE 12 UNITS: 100 INJECTION, SOLUTION SUBCUTANEOUS at 22:27

## 2018-02-14 RX ADMIN — SODIUM CHLORIDE 1000 ML: 0.9 INJECTION, SOLUTION INTRAVENOUS at 09:08

## 2018-02-14 RX ADMIN — HEPARIN SODIUM 5000 UNITS: 5000 INJECTION, SOLUTION INTRAVENOUS; SUBCUTANEOUS at 22:28

## 2018-02-14 NOTE — ASSESSMENT & PLAN NOTE
· Baseline creatinine 1 1-1 2, currently 1 66  · S/p IVF in ER  · Hold HCTZ  · Give IVF hydration   Avoid nephrotoxins

## 2018-02-14 NOTE — ASSESSMENT & PLAN NOTE
· Last saw neurology 2/2017  ER discussed with neuro this AM who stated that FQ can exacerbate MG  · Continue prednisone 10 mg daily  · Outpatient follow up needed with neurology

## 2018-02-14 NOTE — ED ATTENDING ATTESTATION
Abebe Akins MD, saw and evaluated the patient  I have discussed the patient with the resident/non-physician practitioner and agree with the resident's/non-physician practitioner's findings, Plan of Care, and MDM as documented in the resident's/non-physician practitioner's note, except where noted  All available labs and Radiology studies were reviewed  At this point I agree with the current assessment done in the Emergency Department  I have conducted an independent evaluation of this patient a history and physical is as follows:      Critical Care Time  CritCare Time    Procedures     Pt  Comes to the ER for a positive urine culture results, pseudomonas not suscepticle to nitrofurantin which he has been taking  He has a hx of Myasthenia gravis and has a lot of allergies- he is not able to take fluoroquinolones  +dysuria for the past 2 weeks, no fevers, no n/v or abd  Pain now    Pt  Recently had the flu and finished tamiflu - feels better for flu-like symptoms      Well-appearing, no distress, RRR, CTA b/l, abd  -nontender, ext  - no edema, neuro no focal deficits

## 2018-02-14 NOTE — ED PROVIDER NOTES
History  Chief Complaint   Patient presents with    Abnormal Lab     Reports positive urine culture from PCP last week; reports nausea, dizziness, and weakness  Pt presents for evaluation after having a positive urine culture result with symptoms  He has MG and has a relative contraindication to taking fluoroquinolones which are the only oral antibiotics that his UTI is sensitive to  He was prescribed Cipro, but did not take this medication due to the side effects it can cause with MG  He continues to report the feelings of burning with urination and increased urinary frequency  Also states that he felt light headed today  Of note, he was recently diagnosed with the flu and these symptoms have improved  States the light headed ness made him a little nauseous but denies vomiting  Prior to Admission Medications   Prescriptions Last Dose Informant Patient Reported? Taking?    ACCU-CHEK FASTCLIX LANCETS MISC   Yes No   Sig: by Does not apply route 2 (two) times a day   Cholecalciferol (VITAMIN D3) 2000 UNITS TABS 2/14/2018 at Unknown time  Yes Yes   Sig: Take 1 tablet by mouth daily   Insulin Pen Needle (B-D UF III MINI PEN NEEDLES) 31G X 5 MM MISC   Yes No   Sig: by Does not apply route   Potassium 99 MG TABS 2/14/2018 at Unknown time  Yes Yes   Sig: Take 2 tablets by mouth daily   Pyridoxine HCl (VITAMIN B-6) 500 MG tablet 2/14/2018 at Unknown time  Yes Yes   Sig: Take 100 mg by mouth daily     famotidine (PEPCID) 20 mg tablet 2/14/2018 at Unknown time  Yes Yes   Sig: Take 20 mg by mouth daily   glucose blood (ACCU-CHEK JESICA PLUS) test strip   Yes No   Sig: by In Vitro route 3 (three) times a day   hydrochlorothiazide (HYDRODIURIL) 25 mg tablet 2/14/2018 at Unknown time  Yes Yes   Sig: Take 25 mg by mouth daily     insulin aspart (NovoLOG) 100 units/mL injection 2/14/2018 at Unknown time  No Yes   Sig: Inject 3 Units under the skin 3 (three) times a day before meals for 30 days   insulin glargine (LANTUS SOLOSTAR) injection pen 100 units/mL 2/13/2018 at Unknown time  Yes Yes   Sig: Inject 12 Units under the skin daily at bedtime   meclizine (ANTIVERT) 12 5 MG tablet 2/14/2018 at Unknown time  Yes Yes   Sig: Take 12 5 mg by mouth 3 (three) times a day as needed for dizziness   predniSONE 10 mg tablet 2/14/2018 at Unknown time  Yes Yes   Sig: Take 10 mg by mouth daily        Facility-Administered Medications: None       Past Medical History:   Diagnosis Date    Anxiety     Cervical spinal stenosis     [facility]     type 2    Hx of chronic ulcerative colitis     Hx of diverticulitis of colon     Hyperlipidemia     Hypertension     Kidney stone     [facility] gravis [facility]     Neuropathy     RBBB     Renal disorder     Sleep apnea     UTI (urinary tract infection)        Past Surgical History:   Procedure Laterality Date    COLECTOMY      partial sigmoid    COLECTOMY LAPAROSCOPIC      partial sigmoid colectomy    COLON SURGERY      lap  partial colectomy sigmoid    COLONOSCOPY      LITHOTRIPSY      OTHER SURGICAL HISTORY      parotid bx    SALIVARY GLAND SURGERY      biopsy       Family History   Problem Relation Age of Onset    Arthritis Mother     Diabetes Mother     Hypertension Mother     Osteoporosis Mother     Depression Father     Cancer Neg Hx     Heart disease Neg Hx      I have reviewed and agree with the history as documented  Social History   Substance Use Topics    Smoking status: Never Smoker    Smokeless tobacco: Never Used    Alcohol use No        Review of Systems   Constitutional: Negative for chills  HENT: Negative for congestion  Respiratory: Negative for apnea  Cardiovascular: Negative for leg swelling  Gastrointestinal: Negative for abdominal distention  Genitourinary: Positive for flank pain and frequency  Musculoskeletal: Negative for back pain  Neurological: Positive for light-headedness  Negative for dizziness  Psychiatric/Behavioral: Negative for agitation  All other systems reviewed and are negative  Physical Exam  ED Triage Vitals [02/14/18 0814]   Temperature Pulse Respirations Blood Pressure SpO2   97 6 °F (36 4 °C) 98 18 149/76 98 %      Temp Source Heart Rate Source Patient Position - Orthostatic VS BP Location FiO2 (%)   Oral -- -- -- --      Pain Score       No Pain           Orthostatic Vital Signs  Vitals:    02/14/18 0814   BP: 149/76   Pulse: 98       Physical Exam   Constitutional: He is oriented to person, place, and time  He appears well-developed and well-nourished  HENT:   Head: Normocephalic and atraumatic  Eyes: Pupils are equal, round, and reactive to light  Neck: Normal range of motion  Cardiovascular: Normal rate and regular rhythm  Pulmonary/Chest: Effort normal and breath sounds normal    Abdominal: Soft  Bowel sounds are normal    Musculoskeletal: Normal range of motion  He exhibits no edema or tenderness  Neurological: He is alert and oriented to person, place, and time  Skin: Skin is warm and dry  Psychiatric: He has a normal mood and affect  His behavior is normal    Nursing note and vitals reviewed        ED Medications  Medications   sodium chloride 0 9 % bolus 1,000 mL (not administered)   cefepime (MAXIPIME) 2 g/50 mL dextrose IVPB (not administered)       Diagnostic Studies  Results Reviewed     Procedure Component Value Units Date/Time    POCT urinalysis dipstick [[id number]]  (Abnormal) Resulted:  02/14/18 0859    Lab Status:  Final result Specimen:  Urine Updated:  02/14/18 0859    ED Urine Macroscopic [[id number]]  (Abnormal) Collected:  02/14/18 0858    Lab Status:  Final result Specimen:  Urine Updated:  02/14/18 0858     Color, UA Yellow     Clarity, UA Clear     pH, UA 5 0     Leukocytes, UA Small (A)     Nitrite, UA Negative     Protein, UA Negative mg/dl      Glucose, UA Negative mg/dl      Ketones, UA Negative mg/dl      Urobilinogen, UA 1 0 E U /dl Bilirubin, UA Negative     Blood, UA Negative     Specific Gravity, UA 1 020    Narrative:       CLINITEK RESULT    Urine Microscopic [[id number]] Collected:  02/14/18 0858    Lab Status:  No result Specimen:  Urine from Urine, Clean Catch     CBC and differential [[id number]]     Lab Status:  No result Specimen:  Blood     Basic metabolic panel [[id number]]     Lab Status:  No result Specimen:  Blood                  No orders to display         Procedures  Procedures      Phone Consults  ED Phone Contact    ED Course  ED Course                                MDM  Number of Diagnoses or Management Options  Diagnosis management comments: 1  UTI  - Cx + for pseudomonas  Will obtain basic labs, start patient on cefepime for now  -Case discussed with Dr Robin Luciano who stated that if patient was unable to tolerate oral fluoroquinolones, he would have to be admitted for administration for IV anti-pseudomonal abx  - Case reviewed with patient's neurologist, Dr Nathanael Miranda  Pt's last appointment was over a year ago with her and was supposed to follow-up within 2 months  She was unable to offer recommendations regarding the severity of interaction fluoroquinolones with his MG due to the lack of recent evaluation    - Will admit to [facility] for IV abx and PICC line placement  2  Acute renal insufficiency  - will give 1 L of fluids       Amount and/or Complexity of Data Reviewed  Clinical lab tests: ordered and reviewed  Discuss the patient with other providers: yes      CritCare Time    Disposition  Final diagnoses:   None     ED Disposition     None      Follow-up Information    None       Patient's Medications   Discharge Prescriptions    No medications on file     No discharge procedures on file  ED Provider  Attending physically available and evaluated Constantin Haddad  CARMITA managed the patient along with the ED Attending      Electronically Signed by         Nay Valero  02/14/18 2814

## 2018-02-14 NOTE — H&P
H&P- Romeo Humphreys 1945, 68 y o  male MRN: [id number]  Unit/Bed#: ED 29 Encounter: [id number]  Primary Care Provider: Ángel Jiménez DO   Date and time admitted to hospital: 2/14/2018  8:13 AM    * Pseudomonas urinary tract infection   Assessment & Plan    · Urine cultures x2 showing Pseudomonas however initially was noted to grow [phone] colony-forming units on repeat [phone] colony-forming units  · UA itself showing leukocytes but negative for nitrites  10-20 WBC on initial urinalysis on 2/2/18 and 2-4 on 2/6/18  · Patient was given 1 dose of cefepime 2 gram in the ER  · Having urinary frequency and urgency x 2 weeks but no dysuria, change in urine color or odor  · Fever at home 100 2 but at same time as influenza diagnosis  · Cefepime 1 g 12 hrs at this time  Monitor symptoms with use of IV abx  · Cannot take FQ given MG, no oral agents available for treatment of pseudomonas  CKD (chronic kidney disease), stage III   Assessment & Plan    · Followed with renal x 1 as outpatient  · Creatinine at baseline 1 1-1 2  · Currently 1 66  MARSHALL (acute kidney injury) ([facility] [address] )   Assessment & Plan    · Baseline creatinine 1 1-1 2, currently 1 66  · S/p IVF in ER  · Hold HCTZ  · Give IVF hydration  Avoid nephrotoxins        Myasthenia gravis [facility])   Assessment & Plan    · Last saw neurology 2/2017  ER discussed with neuro this AM who stated that FQ can exacerbate MG  · Continue prednisone 10 mg daily  · Outpatient follow up needed with neurology  Type 2 diabetes mellitus with hyperglycemia (HCC)   Assessment & Plan    · A1C 6 0, was previously as high as 12 0  · Continue lantus qhs, add SSI coverage  Will hold scheduled humalog TID (3 units) at this time  · accuchecks ACHS        Nephrolithiasis   Assessment & Plan    · Follows with Dr Rosie Dunn   · Known 5 mm left renal calculus 6 months ago    · Will repeat KUB to ensure symptoms of urinary frequency not related to stone Dizziness   Assessment & Plan    · Suspect related to dehydration  · Check orthostatics  Continue IVF  Hypercalcemia   Assessment & Plan    · Mild at 10 2  · IVF hdyration, repeat labs in AM            VTE Prophylaxis: Heparin  / sequential compression device   Code Status: FULL CODE  DISCUSSED WITH PATIENT AT BEDSIDE  POLST: There is no POLST form on file for this patient (pre-hospital)  Discussion with family: PATIENT    Anticipated Length of Stay:  Patient will be admitted on an Inpatient basis with an anticipated length of stay of  > 2 midnights  Justification for Hospital Stay: need for IV antibiotics given pseudomonal UTI with history of MG with MARSHALL    Total Time for Visit, including Counseling / Coordination of Care: 1 hour  Greater than 50% of this total time spent on direct patient counseling and coordination of care  Chief Complaint:   UTI    History of Present Illness:    Anil Morejon is a 68 y o  male who presents with urinary tract infection  The patient has past medical history of nephrolithiasis, history of colovesical fistula status post sigmoid resection, history of urinary tract infection, myasthenia gravis, diabetes type 2, CKD3, hypertension  Patient was last seen by Neurology over 1 year ago in February of 2017  He was diagnosed with myasthenia gravis in 2009  Patient began having increased urinary frequency and was noted to have a UA with culture performed on 2/1/2018  The patient was empirically prescribed Macrobid  Urine culture was noted to return positive for Pseudomonas  The patient was noted to have an allergy listed to Levaquin and therefore family practice physician contacted the patient however he at that point he noted that his urinary symptoms were improving and he felt back to his usual health  The patient had repeat urinalysis performed which once again showed Pseudomonas    Patient was noted to be seen at primary care physician on 2/8/2018 secondary to flu-like symptoms  He was noted to return positive for influenza a and was prescribed Tamiflu 75 mg p o  b i d  he then returned to the office on 2/12/2018 and urinary frequency was noted to return  He reported that he was unsure what his allergy to Levaquin was and this was not listed as an active allergy in June of 2018 however appeared to be present in August of 2017  Patient was unsure of his allergy however had stated that he could tolerate Cipro  He was prescribed Cipro 500 mg q 12 hours however he was then informed that he could end up intubated secondary to his MG if he were to take this and therefore came to the emergency department for further evaluation  In the ER the patient was noted to have acute kidney injury with a creatinine of 1 66 with a baseline of 1 1-1 2  Patient reports he had fever and chills at home but attributed this to influenza  He is urinating every 20 minutes initially, now still very often but it did improve somewhat over the past 4 days  No hematuria, dysuria  Reports frequency and urgency  Did not take any of the cipro, did complete the macrobid treatment  No flank pain  Felt dizzy today when he was walking into the hospital  No nausea, vomiting, diarrhea  Drank some gatorade today but not drinking as much recently  Review of Systems:    Review of Systems   Constitutional: Positive for chills ( 2 weeks ago, now resolved) and fever (100 2 when had influenza)  Cardiovascular: Negative for chest pain, palpitations and leg swelling  Gastrointestinal: Negative for nausea and vomiting  Endocrine: Positive for polyuria  Genitourinary: Positive for frequency and urgency  Negative for decreased urine volume, difficulty urinating, dysuria and hematuria  Musculoskeletal: Negative for joint swelling, myalgias and neck pain  Skin: Negative for pallor and rash  Neurological: Positive for dizziness (this AM) and weakness (generalized)     All other systems reviewed and are negative  Past Medical and Surgical History:     Past Medical History:   Diagnosis Date    Anxiety     Cervical spinal stenosis     Diabetes mellitus ([facility] [address] )     type 2    Hx of chronic ulcerative colitis     Hx of diverticulitis of colon     Hyperlipidemia     Hypertension     Kidney stone     Myasthenia gravis ([facility] [address] )     Neuropathy     RBBB     Renal disorder     Sleep apnea     UTI (urinary tract infection)        Past Surgical History:   Procedure Laterality Date    COLECTOMY      partial sigmoid    COLECTOMY LAPAROSCOPIC      partial sigmoid colectomy    COLON SURGERY      lap  partial colectomy sigmoid    COLONOSCOPY      LITHOTRIPSY      OTHER SURGICAL HISTORY      parotid bx    SALIVARY GLAND SURGERY      biopsy       Meds/Allergies:    Prior to Admission medications    Medication Sig Start Date End Date Taking?  Authorizing Provider   Cholecalciferol (VITAMIN D3) 2000 UNITS TABS Take 1 tablet by mouth daily   Yes Historical Provider, MD   famotidine (PEPCID) 20 mg tablet Take 20 mg by mouth daily   Yes Historical Provider, MD   hydrochlorothiazide (HYDRODIURIL) 25 mg tablet Take 25 mg by mouth daily     Yes Historical Provider, MD   insulin aspart (NovoLOG) 100 units/mL injection Inject 3 Units under the skin 3 (three) times a day before meals for 30 days 3/9/17 2/14/18 Yes Pat Martinez MD   insulin glargine (LANTUS SOLOSTAR) injection pen 100 units/mL Inject 12 Units under the skin daily at bedtime   Yes Historical Provider, MD   meclizine (ANTIVERT) 12 5 MG tablet Take 12 5 mg by mouth 3 (three) times a day as needed for dizziness   Yes Historical Provider, MD   Potassium 99 MG TABS Take 2 tablets by mouth daily   Yes Historical Provider, MD   predniSONE 10 mg tablet Take 10 mg by mouth daily     Yes Historical Provider, MD   Pyridoxine HCl (VITAMIN B-6) 500 MG tablet Take 100 mg by mouth daily     Yes Historical Provider, MD   potassium chloride (KLOR-CON) 20 mEq packet Take 20 mEq by mouth 2 (two) times a day  2/14/18 Yes Historical Provider, MD   ACCU-CHEK FASTCLIX LANCETS [address] [facility] by Does not apply route 2 (two) times a day 10/9/17   Historical Provider, MD   glucose blood (ACCU-CHEK JESICA PLUS) test strip by In Vitro route 3 (three) times a day 4/19/17   Historical Provider, MD   Insulin Pen Needle (B-D UF III MINI PEN NEEDLES) 31G X 5 MM MISC by Does not apply route    Historical Provider, MD   benzonatate (TESSALON PERLES) 100 mg capsule Take 1 capsule (100 mg total) by mouth 3 (three) times a day as needed for cough 2/8/18 2/14/18  Kenny Del Real PA-C   ciprofloxacin (CIPRO) 500 mg tablet Take 1 tablet (500 mg total) by mouth every 12 (twelve) hours for 7 days 2/12/18 2/14/18  Merline Rainwater,    insulin aspart (NovoLOG) 100 Units/mL SOPN Inject 3 Units under the skin 3 (three) times a day before meals  2/14/18  Historical Provider, MD   insulin glargine (LANTUS) 100 units/mL subcutaneous injection Inject 12 Units under the skin daily at bedtime for 30 days 3/9/17 2/14/18  Kasia Bruno MD   nitrofurantoin (MACROBID) 100 mg capsule Take 1 capsule (100 mg total) by mouth 2 (two) times a day 2/3/18 2/14/18  Merline [facility] Central CampusDO   oseltamivir (TAMIFLU) 75 mg capsule Take 1 capsule (75 mg total) by mouth every 12 (twelve) hours for 5 days 2/8/18 2/14/18  Kenny Del Real PA-C     I have reviewed home medications with patient personally  Allergies:    Allergies   Allergen Reactions    Antihistamines, Chlorpheniramine-Type      Difficulty urinating    Benzodiazepines     Chlorpheniramine     Diphenhydramine     Erythromycin     Iodine     Levaquin [Levofloxacin]     Loratadine     Other      Muscle relaxers d/t myasthenia gravis-they close my airway    Sulfa Antibiotics     Tetracyclines & Related        Social History:     Marital Status: /Civil Union   Occupation: [address] [place] [address]  Patient Pre-hospital Living Situation: AT HOME  Patient Pre-hospital Level of Mobility: NO RESTRICTIONS  Patient Pre-hospital Diet Restrictions: DM2  Substance Use History:   History   Alcohol Use No     History   Smoking Status    Never Smoker   Smokeless Tobacco    Never Used     History   Drug Use No       Family History:    Family History   Problem Relation Age of Onset    Arthritis Mother     Diabetes Mother     Hypertension Mother    [employer]osis Mother     Depression Father     Cancer Neg Hx     Heart disease Neg Hx        Physical Exam:     Vitals:   Blood Pressure: 134/75 (02/14/18 0935)  Pulse: 80 (02/14/18 0935)  Temperature: 97 8 °F (36 6 °C) (02/14/18 0935)  Temp Source: Oral (02/14/18 0935)  Respirations: 20 (02/14/18 0935)  Weight - Scale: 81 2 kg (179 lb) (02/14/18 0935)  SpO2: 95 % (02/14/18 0935)    Physical Exam   Constitutional: He is oriented to person, place, and time  No distress  HENT:   Head: Normocephalic and atraumatic  Eyes: Conjunctivae are normal    Neck: No JVD present  Cardiovascular: Normal rate, regular rhythm, normal heart sounds and intact distal pulses  No murmur heard  Pulmonary/Chest: Effort normal and breath sounds normal  No respiratory distress  He has no wheezes  He has no rales  Abdominal: Soft  Bowel sounds are normal  He exhibits no distension  There is no tenderness  There is no rebound and no guarding  Musculoskeletal: He exhibits no edema  Neurological: He is alert and oriented to person, place, and time  AAO x 3  Follows commands  Speech clear without dysarthria  Moving bilateral upper and lower extremities equally  5/5 muscle strength  Face symmetric  Skin: Skin is warm and dry  No rash noted  He is not diaphoretic  No erythema  Psychiatric: He has a normal mood and affect  His behavior is normal    Nursing note and vitals reviewed  Additional Data:     Lab Results: I have personally reviewed pertinent reports          Results from last 7 days  Lab Units 02/14/18 0905   WBC Thousand/uL 10 61*   HEMOGLOBIN g/dL 16 5   HEMATOCRIT % 46 4   PLATELETS Thousands/uL 279   NEUTROS PCT % 76*   LYMPHS PCT % 17   MONOS PCT % 6   EOS PCT % 0       Results from last 7 days  Lab Units 02/14/18  0905   SODIUM mmol/L 139   POTASSIUM mmol/L 3 6   CHLORIDE mmol/L 99*   CO2 mmol/L 33*   BUN mg/dL 25   CREATININE mg/dL 1 66*   CALCIUM mg/dL 10 2*   GLUCOSE RANDOM mg/dL 139           Imaging: no imaging currently      EKG, Pathology, and Other Studies Reviewed on Admission:   · Prior records    Allscripts / Epic Records Reviewed: Yes     ** Please Note: This note has been constructed using a voice recognition system   **

## 2018-02-14 NOTE — PROGRESS NOTES
Spoke to Elena Dowell resident     Pt has a history of MG and was last seen in feb 2017 , no f/u appointments scheduled, no phone calls     Today he is dx with pseudomonas uti and the question is for the use of antibiotics   the only antibiotic approved  are relatively contraindicated in MG    I informed  The staff that since  I have not seen  him in over a year ,or heard from  him in that long ,  it is very difficult  to determine the effect on the pt     It is up to Pt , and the staff in the ER to gauge the effect        He needs to schedule a f/u

## 2018-02-14 NOTE — ASSESSMENT & PLAN NOTE
· Urine cultures x2 showing Pseudomonas however initially was noted to grow [phone] colony-forming units on repeat [phone] colony-forming units  · UA itself showing leukocytes but negative for nitrites  10-20 WBC on initial urinalysis on 2/2/18 and 2-4 on 2/6/18  · Patient was given 1 dose of cefepime 2 gram in the ER  · Having urinary frequency and urgency x 2 weeks but no dysuria, change in urine color or odor  · Fever at home 100 2 but at same time as influenza diagnosis  · Cefepime 1 g 12 hrs at this time  Monitor symptoms with use of IV abx  · Cannot take FQ given MG, no oral agents available for treatment of pseudomonas

## 2018-02-14 NOTE — CONSULTS
Consultation - Infectious Disease   Mari Baxter 68 y o  male MRN: [id number]  Unit/Bed#: ED 29 Encounter: [id number]      IMPRESSION & RECOMMENDATIONS:   1  Pseudomonas urinary tract infection-based upon the positive urine cultures in symptomatology  Limited antibiotic options due to the patient's inability to take quinolones in the setting of myasthenia gravis  Therefore, there are no good oral options   -agree with cefepime 1 g IV q 12 hours  -monitor symptomatology  -plan 7 days total of treatment  -monitor creatinine adjust the antibiotic dosing as needed  -no ID contraindication to placing a PICC line if needed    2  Acute kidney injury-the setting of known chronic kidney disease  Possibly pre renal issues the patient recently had influenza infection  No other clear source appreciated  -monitor the GFR  -dose adjust the antibiotics as needed  -no additional ID workup for now    3  Myasthenia gravis-clinically stable  Unfortunately fluoroquinolones can exacerbate myasthenia and therefore would be contraindicated in this patient   -outpatient Neurology follow-up  -continue prednisone    4  Diabetes mellitus-type 2 with hyperglycemia    HISTORY OF PRESENT ILLNESS:  Reason for Consult:  Pseudomonas UTI  HPI: Mari Baxter is a 68y o  year old male with a history of nephrolithiasis in a history of a colovesicular fistula status post sigmoid resection the past or mass to assist with management of his Pseudomonas UTI  Patient has also history of myasthenia gravis and diabetes mellitus  Patient began having some increased urinary frequency a few weeks ago with some intermittent burning  He was treated with a course of Macrobid and had some modest improvement noted  However he had a repeat urine culture that came back positive for Pseudomonas aeruginosa    His symptoms seem better and therefore he was just monitored initially as an outpatient as he cannot take wounds due to the risk of exacerbation of his myasthenia gravis  The patient developed flu-like symptoms last week and was diagnosed with influenza  He was treated with a 5 day course of Tamiflu and completed the course yesterday  He once again began developing increased urinary frequency intermittent burning in therefore he had a repeat urine culture that once again grew Pseudomonas aeruginosa sensitive to everything tested  The patient has not had any fever chills or sweats, denies any nausea vomiting or diarrhea, denies any cough or shortness of breath, denies any chest pain or abdominal pain, denies any new rashes skin lesions, denies any new joint or muscle pains  REVIEW OF SYSTEMS:  A complete 12 point system-based review of systems is otherwise negative      PAST MEDICAL HISTORY:  Past Medical History:   Diagnosis Date    Anxiety     Cervical spinal stenosis     Diabetes mellitus (HCC)     type 2    Hx of chronic ulcerative colitis     Hx of diverticulitis of colon     Hyperlipidemia     Hypertension     Kidney stone     Myasthenia gravis ([address] )     Neuropathy     RBBB     Renal disorder     Sleep apnea     UTI (urinary tract infection)      Past Surgical History:   Procedure Laterality Date    COLECTOMY      partial sigmoid    COLECTOMY LAPAROSCOPIC      partial sigmoid colectomy    COLON SURGERY      lap  partial colectomy sigmoid    COLONOSCOPY      LITHOTRIPSY      OTHER SURGICAL HISTORY      parotid bx    SALIVARY GLAND SURGERY      biopsy       FAMILY HISTORY:  Non-contributory    SOCIAL HISTORY:  Social History   History   Alcohol Use No     History   Drug Use No     History   Smoking Status    Never Smoker   Smokeless Tobacco    Never Used       ALLERGIES:  Allergies   Allergen Reactions    Antihistamines, Chlorpheniramine-Type      Difficulty urinating    Benzodiazepines     Chlorpheniramine     Diphenhydramine     Erythromycin     Iodine     Levaquin [Levofloxacin]     Loratadine     Other      Muscle relaxers d/t myasthenia gravis-they close my airway    Sulfa Antibiotics     Tetracyclines & Related        MEDICATIONS:  All current active medications have been reviewed  Antibiotics:  Cefepime 1    PHYSICAL EXAM:  HR:  [80-98] 85  Resp:  [16-20] 16  BP: (117-149)/(73-76) 117/73  SpO2:  [95 %-98 %] 96 %  Temp (24hrs), Av 7 °F (36 5 °C), Min:97 6 °F (36 4 °C), Max:97 8 °F (36 6 °C)  Current: Temperature: 97 8 °F (36 6 °C)    Intake/Output Summary (Last 24 hours) at 18 1506  Last data filed at 18 1125   Gross per 24 hour   Intake             1050 ml   Output                0 ml   Net             1050 ml       General Appearance:  Appearing well, nontoxic, and in no distress   Head:  Normocephalic, without obvious abnormality, atraumatic   Eyes:  Conjunctiva pink and sclera anicteric, both eyes   Nose: Nares normal, mucosa normal, no drainage   Throat: Oropharynx moist without lesions   Neck: Supple, symmetrical, no adenopathy, no tenderness/mass/nodules   Back:   Symmetric, no curvature, ROM normal, no CVA tenderness   Lungs:   Clear to auscultation bilaterally, respirations unlabored   Chest Wall:  No tenderness or deformity   Heart:  RRR; no murmur, rub or gallop   Abdomen:   Soft, non-tender, non-distended, positive bowel sounds    Extremities: No cyanosis, clubbing or edema   Skin: No rashes or lesions  No draining wounds noted  Lymph nodes: Cervical, supraclavicular nodes normal   Neurologic: Alert and oriented times 3, extremity strength 5/5 and symmetric       LABS, IMAGING, & OTHER STUDIES:  Lab Results:  I have personally reviewed pertinent labs      Results from last 7 days  Lab Units 18  0905   WBC Thousand/uL 10 61*   HEMOGLOBIN g/dL 16 5   PLATELETS Thousands/uL 279       Results from last 7 days  Lab Units 18  0905   SODIUM mmol/L 139   POTASSIUM mmol/L 3 6   CHLORIDE mmol/L 99*   CO2 mmol/L 33*   ANION GAP mmol/L 7   BUN mg/dL 25   CREATININE mg/dL 1 66*   EGFR ml/min/1 73sq m 40   GLUCOSE RANDOM mg/dL 139   CALCIUM mg/dL 10 2*       Results from last 7 days  Lab Units 02/08/18  1057   URINE CULTURE  20,000-29,000 cfu/ml Pseudomonas aeruginosa*       Imaging Studies:     KUB pending

## 2018-02-14 NOTE — ASSESSMENT & PLAN NOTE
· A1C 6 0, was previously as high as 12 0  · Continue lantus qhs, add SSI coverage  Will hold scheduled humalog TID (3 units) at this time    · accuchecks ACHS

## 2018-02-14 NOTE — ASSESSMENT & PLAN NOTE
· Follows with Dr Rosana Palafox   · Known 5 mm left renal calculus 6 months ago    · Will repeat KUB to ensure symptoms of urinary frequency not related to stone

## 2018-02-15 LAB
ALBUMIN SERPL BCP-MCNC: 2.9 G/DL (ref 3.5–5)
ALP SERPL-CCNC: 27 U/L (ref 46–116)
ALT SERPL W P-5'-P-CCNC: 43 U/L (ref 12–78)
ANION GAP SERPL CALCULATED.3IONS-SCNC: 6 MMOL/L (ref 4–13)
AST SERPL W P-5'-P-CCNC: 28 U/L (ref 5–45)
BILIRUB SERPL-MCNC: 0.51 MG/DL (ref 0.2–1)
BUN SERPL-MCNC: 23 MG/DL (ref 5–25)
CALCIUM SERPL-MCNC: 9.1 MG/DL (ref 8.3–10.1)
CHLORIDE SERPL-SCNC: 105 MMOL/L (ref 100–108)
CO2 SERPL-SCNC: 30 MMOL/L (ref 21–32)
CREAT SERPL-MCNC: 1.23 MG/DL (ref 0.6–1.3)
ERYTHROCYTE [DISTWIDTH] IN BLOOD BY AUTOMATED COUNT: 13.4 % (ref 11.6–15.1)
GFR SERPL CREATININE-BSD FRML MDRD: 58 ML/MIN/1.73SQ M
GLUCOSE SERPL-MCNC: 113 MG/DL (ref 65–140)
GLUCOSE SERPL-MCNC: 113 MG/DL (ref 65–140)
GLUCOSE SERPL-MCNC: 119 MG/DL (ref 65–140)
GLUCOSE SERPL-MCNC: 120 MG/DL (ref 65–140)
GLUCOSE SERPL-MCNC: 85 MG/DL (ref 65–140)
HCT VFR BLD AUTO: 39.3 % (ref 36.5–49.3)
HGB BLD-MCNC: 13.6 G/DL (ref 12–17)
MCH RBC QN AUTO: 31.8 PG (ref 26.8–34.3)
MCHC RBC AUTO-ENTMCNC: 34.6 G/DL (ref 31.4–37.4)
MCV RBC AUTO: 92 FL (ref 82–98)
PLATELET # BLD AUTO: 234 THOUSANDS/UL (ref 149–390)
PMV BLD AUTO: 10.1 FL (ref 8.9–12.7)
POTASSIUM SERPL-SCNC: 3.2 MMOL/L (ref 3.5–5.3)
PROT SERPL-MCNC: 6 G/DL (ref 6.4–8.2)
RBC # BLD AUTO: 4.28 MILLION/UL (ref 3.88–5.62)
SODIUM SERPL-SCNC: 141 MMOL/L (ref 136–145)
WBC # BLD AUTO: 8.5 THOUSAND/UL (ref 4.31–10.16)

## 2018-02-15 PROCEDURE — 85027 COMPLETE CBC AUTOMATED: CPT | Performed by: PHYSICIAN ASSISTANT

## 2018-02-15 PROCEDURE — 99232 SBSQ HOSP IP/OBS MODERATE 35: CPT | Performed by: INTERNAL MEDICINE

## 2018-02-15 PROCEDURE — 80053 COMPREHEN METABOLIC PANEL: CPT | Performed by: PHYSICIAN ASSISTANT

## 2018-02-15 PROCEDURE — 82948 REAGENT STRIP/BLOOD GLUCOSE: CPT

## 2018-02-15 PROCEDURE — 99233 SBSQ HOSP IP/OBS HIGH 50: CPT | Performed by: INTERNAL MEDICINE

## 2018-02-15 RX ORDER — POTASSIUM CHLORIDE 14.9 MG/ML
20 INJECTION INTRAVENOUS ONCE
Status: COMPLETED | OUTPATIENT
Start: 2018-02-15 | End: 2018-02-15

## 2018-02-15 RX ADMIN — CEFEPIME HYDROCHLORIDE 1000 MG: 1 INJECTION, SOLUTION INTRAVENOUS at 22:16

## 2018-02-15 RX ADMIN — HEPARIN SODIUM 5000 UNITS: 5000 INJECTION, SOLUTION INTRAVENOUS; SUBCUTANEOUS at 05:41

## 2018-02-15 RX ADMIN — PREDNISONE 10 MG: 5 TABLET ORAL at 09:05

## 2018-02-15 RX ADMIN — HEPARIN SODIUM 5000 UNITS: 5000 INJECTION, SOLUTION INTRAVENOUS; SUBCUTANEOUS at 13:56

## 2018-02-15 RX ADMIN — MECLIZINE HCL 12.5 MG: 12.5 TABLET ORAL at 09:05

## 2018-02-15 RX ADMIN — VITAMIN D, TAB 1000IU (100/BT) 1000 UNITS: 25 TAB at 09:05

## 2018-02-15 RX ADMIN — DOCUSATE SODIUM 100 MG: 100 CAPSULE, LIQUID FILLED ORAL at 09:06

## 2018-02-15 RX ADMIN — HEPARIN SODIUM 5000 UNITS: 5000 INJECTION, SOLUTION INTRAVENOUS; SUBCUTANEOUS at 22:16

## 2018-02-15 RX ADMIN — SODIUM CHLORIDE 50 ML/HR: 0.9 INJECTION, SOLUTION INTRAVENOUS at 17:34

## 2018-02-15 RX ADMIN — FAMOTIDINE 20 MG: 20 TABLET, FILM COATED ORAL at 09:05

## 2018-02-15 RX ADMIN — Medication 100 MG: at 09:05

## 2018-02-15 RX ADMIN — SODIUM CHLORIDE 75 ML/HR: 0.9 INJECTION, SOLUTION INTRAVENOUS at 01:50

## 2018-02-15 RX ADMIN — POTASSIUM CHLORIDE 20 MEQ: 200 INJECTION, SOLUTION INTRAVENOUS at 12:30

## 2018-02-15 RX ADMIN — CEFEPIME HYDROCHLORIDE 1000 MG: 1 INJECTION, SOLUTION INTRAVENOUS at 10:47

## 2018-02-15 RX ADMIN — INSULIN GLARGINE 12 UNITS: 100 INJECTION, SOLUTION SUBCUTANEOUS at 22:16

## 2018-02-15 NOTE — PROGRESS NOTES
[address] Internal Medicine Progress Note  Patient: Hanna Wood 68 y o  male   MRN: [id number]  PCP: Erika Wan DO  Unit/Bed#: E4 -01 Encounter: [id number]  Date Of Visit: 02/15/18    Assessment:    Principal Problem:    Pseudomonas urinary tract infection  Active Problems:    Myasthenia gravis ([facility] [address] )    Nephrolithiasis    MARSHALL (acute kidney injury) ([facility] [address] )    Type 2 diabetes mellitus with hyperglycemia ([facility] [address] )    CKD (chronic kidney disease), stage III    Hypercalcemia    Dizziness      Plan:    · Pseudomonas urinary tract infection with a history of nephrolithiasis with stone status unchanged on KUB patient with myasthenia gravis and not a candidate for quinolone therapy and cannot take sulfa thusly will need to complete a course of cefepime seen by ID will need to confer with case management if has to complete full course of therapy here can be done at home  · Acute kidney injury in setting of chronic kidney disease stage 3 some resolution with IV fluids overnight with creatinine evolving from 1 66 to 1 23  · Type 2 diabetes mellitus recently placed on insulin maintenance with improvement in A1c  · Recent influenza a treated with full course of Tamiflu  · Hypercalcemia has improved with hydration  · Dizziness with chronic benign positional vertigo takes meclizine  · Myasthenia gravis on chronic steroid management with prednisone 10 mg daily has not had a recrudescence in over a year      VTE Pharmacologic Prophylaxis:   Pharmacologic: Heparin  Mechanical VTE Prophylaxis in Place: Yes    Discussions with Specialists or Other Care Team Provider:  No    Time Spent for Care: 30 minutes  More than 50% of total time spent on counseling and coordination of care as described above  Subjective:   Denies any symptoms of urgency dysuria abdominal pain nausea and GI upset    Denies any  Respiratory distress or shortness of breath   Objective:     Vitals:   Temp (24hrs), Av 8 °F (36 6 °C), Min:97 5 °F (36 4 °C), Max:98 °F (36 7 °C)    HR:  [77-90] 84  Resp:  [16-20] 17  BP: (104-134)/(59-76) 131/75  SpO2:  [95 %-98 %] 98 %  Body mass index is 25 88 kg/m²  Input and Output Summary (last 24 hours): Intake/Output Summary (Last 24 hours) at 02/15/18 0913  Last data filed at 02/14/18 1801   Gross per 24 hour   Intake             1530 ml   Output                0 ml   Net             1530 ml       Physical Exam:     Physical Exam:   General appearance: alert, appears stated age and cooperative  Head: Normocephalic, without obvious abnormality, atraumatic  Lungs: clear to auscultation bilaterally  Heart: regular rate and rhythm  Abdomen: soft, non-tender; bowel sounds normal; no masses,  no organomegaly  Back: negative  Extremities: extremities normal, atraumatic, no cyanosis or edema  Neurologic: Grossly normal      Additional Data:     Labs:      Results from last 7 days  Lab Units 02/15/18  0617 02/14/18  0905   WBC Thousand/uL 8 50 10 61*   HEMOGLOBIN g/dL 13 6 16 5   HEMATOCRIT % 39 3 46 4   PLATELETS Thousands/uL 234 279   NEUTROS PCT %  --  76*   LYMPHS PCT %  --  17   MONOS PCT %  --  6   EOS PCT %  --  0       Results from last 7 days  Lab Units 02/15/18  0617   SODIUM mmol/L 141   POTASSIUM mmol/L 3 2*   CHLORIDE mmol/L 105   CO2 mmol/L 30   BUN mg/dL 23   CREATININE mg/dL 1 23   CALCIUM mg/dL 9 1   TOTAL PROTEIN g/dL 6 0*   BILIRUBIN TOTAL mg/dL 0 51   ALK PHOS U/L 27*   ALT U/L 43   AST U/L 28   GLUCOSE RANDOM mg/dL 85           * I Have Reviewed All Lab Data Listed Above  * Additional Pertinent Lab Tests Reviewed: All Labs For Current Hospital Admission Reviewed    Imaging:  Xr Abdomen 1 View Kub    Result Date: 2/14/2018  Narrative: ABDOMEN INDICATION: Reevaluate prior left lower pole stone  COMPARISON: Abdomen plain films from 10/27/2017  VIEWS: AP supine IMAGES:  3 FINDINGS: There is an unchanged approximately 5 mm left kidney lower pole stone  No ureteral calculi identified   Nonobstructive bowel gas pattern  Osseous structures appear intact  Impression: There is an unchanged approximately 5 mm left kidney lower pole stone  Workstation performed: [id number]     Imaging Reports Reviewed Today Include:  Reviewed KUB  Imaging Personally Reviewed by Myself Includes:  No  Procedure: Xr Abdomen 1 View Kub    Result Date: 2/14/2018  Narrative: ABDOMEN INDICATION: Reevaluate prior left lower pole stone  COMPARISON: Abdomen plain films from 10/27/2017  VIEWS: AP supine IMAGES:  3 FINDINGS: There is an unchanged approximately 5 mm left kidney lower pole stone  No ureteral calculi identified  Nonobstructive bowel gas pattern  Osseous structures appear intact  Impression: There is an unchanged approximately 5 mm left kidney lower pole stone   Workstation performed: [id number]        Recent Cultures (last 7 days):       Results from last 7 days  Lab Units 02/08/18  1057   URINE CULTURE  20,000-29,000 cfu/ml Pseudomonas aeruginosa*       Last 24 Hours Medication List:     Current Facility-Administered Medications:  acetaminophen 650 mg Oral Q6H PRN GUANAKITO Yuan-JERRY    cefepime 1,000 mg Intravenous Q12H Tamie Briggs PA-C Last Rate: 1,000 mg (02/14/18 2229)   cholecalciferol 1,000 Units Oral Daily GUANAKITO Yuan-C    docusate sodium 100 mg Oral BID GUANAKITO Yuan-C    famotidine 20 mg Oral Daily GUANAKITO Yuan-C    heparin (porcine) 5,000 Units Subcutaneous Q8H [facility] GUANAKITO Yuan-JERRY    insulin glargine 12 Units Subcutaneous HS Tamie Briggs, PA-C    insulin lispro 1-5 Units Subcutaneous TID AC GUANAKITO Yuan-C    insulin lispro 1-5 Units Subcutaneous HS Tamie Diarosa, PA-C    insulin lispro 1-5 Units Subcutaneous TID AC GUANAKITO Yuan-C    insulin lispro 1-5 Units Subcutaneous HS Tamie Briggs, PA-C    meclizine 12 5 mg Oral Daily Tamie Diarosa, PA-C    ondansetron 4 mg Intravenous Q6H PRN Tamie Briggs, PA-C    potassium chloride 20 mEq Intravenous Once Julia Ramos MD    predniSONE 10 mg Oral Daily Tamie Briggs PA-C    pyridoxine 100 mg Oral Daily Tamie Briggs PA-C    sodium chloride 75 mL/hr Intravenous Continuous Tamie Briggs PA-C Last Rate: 75 mL/hr (02/15/18 0150)        Today, Patient Was Seen By: Angelic Barragan MD    ** Please Note: Dragon 360 Dictation voice to text software may have been used in the creation of this document   **

## 2018-02-15 NOTE — CASE MANAGEMENT
Initial Clinical Review    Admission: Date/Time/Statement: 2/14/18 @ 0941     Orders Placed This Encounter   Procedures    Inpatient Admission (expected length of stay for this patient is greater than two midnights)     Standing Status:   Standing     Number of Occurrences:   1     Order Specific Question:   Admitting Physician     Answer:   Wesley Calabrese [[id number]]     Order Specific Question:   Level of Care     Answer:   Med Surg [[id number]]     Order Specific Question:   Estimated length of stay     Answer:   More than 2 Midnights     Order Specific Question:   Certification     Answer:   I certify that inpatient services are medically necessary for this patient for a duration of greater than two midnights  See H&P and MD Progress Notes for additional information about the patient's course of treatment  ED: Date/Time/Mode of Arrival:   ED Arrival Information     Expected Arrival Acuity Means of Arrival Escorted By Service Admission Type    - 2/14/2018 08:08 Urgent Wheelchair Family Member General Medicine Urgent    Arrival Complaint    [address] lab results        Chief Complaint:   Chief Complaint   Patient presents with    Abnormal Lab     Reports positive urine culture from PCP last week; reports nausea, dizziness, and weakness  History of Illness:  Pt presents for evaluation after having a positive urine culture result with symptoms  He has MG and has a relative contraindication to taking fluoroquinolones which are the only oral antibiotics that his UTI is sensitive to  He was prescribed Cipro, but did not take this medication due to the side effects it can cause with MG  He continues to report the feelings of burning with urination and increased urinary frequency  Also states that he felt light headed today  Of note, he was recently diagnosed with the flu and these symptoms have improved  States the light headed ness made him a little nauseous but denies vomiting         ED Vital Signs:   ED Triage Vitals   Temperature Pulse Respirations Blood Pressure SpO2   02/14/18 0814 02/14/18 0814 02/14/18 0814 02/14/18 0814 02/14/18 0814   97 6 °F (36 4 °C) 98 18 149/76 98 %      Temp Source Heart Rate Source Patient Position - Orthostatic VS BP Location FiO2 (%)   02/14/18 0814 02/14/18 1354 02/14/18 0935 02/14/18 0935 --   Oral Monitor Lying Right arm       Pain Score       02/14/18 0814       No Pain        Wt Readings from Last 1 Encounters:   02/15/18 79 5 kg (175 lb 4 3 oz)       Abnormal Labs/Diagnostic Test Results:  Cl 99,   co2  33,   Cr 1 66,   Ca 10 2,   Wbc's 10 61,   anc 8 06  Urine:  Small leukocytes  Urine Cx  pending  KUB: There is an unchanged approximately 5 mm left kidney lower pole stone      ED Treatment:   Medication Administration from 02/14/2018 0808 to 02/14/2018 1628       Date/Time Order Dose Route Action Action by Comments     02/14/2018 1125 sodium chloride 0 9 % bolus 1,000 mL 0 mL Intravenous Stopped Greer Hendricks RN      02/14/2018 0908 sodium chloride 0 9 % bolus 1,000 mL 1,000 mL Intravenous New Bag Harman Kitchen RN      02/14/2018 0949 cefepime (MAXIPIME) 2 g/50 mL dextrose IVPB 0 mg Intravenous Stopped Harman Kitchen RN      02/14/2018 0911 cefepime (MAXIPIME) 2 g/50 mL dextrose IVPB 2,000 mg Intravenous New Bag Harman Kitchen RN      02/14/2018 1145 cholecalciferol (VITAMIN D3) tablet 1,000 Units 1,000 Units Oral Not Given Greer Hendricks RN taken pta     02/14/2018 1145 famotidine (PEPCID) tablet 20 mg 20 mg Oral Not Given Greer Hendricks RN taken pta     02/14/2018 1145 meclizine (ANTIVERT) tablet 12 5 mg 12 5 mg Oral Not Given Greer Hendricks RN taken pta     02/14/2018 1145 predniSONE tablet 10 mg 10 mg Oral Not Given Greer Hendricks RN taken pta     02/14/2018 1145 pyridoxine (VITAMIN B6) tablet 100 mg 100 mg Oral Not Given Greer Hendricks RN taken pta     02/14/2018 1135 sodium chloride 0 9 % infusion 75 mL/hr Intravenous [address] Greer Hendricks RN      02/14/2018 1145 docusate sodium (COLACE) capsule 100 mg 100 mg Oral Not Given Mitlai Gardiner RN      02/14/2018 1430 heparin (porcine) subcutaneous injection 5,000 Units 5,000 Units Subcutaneous Given Mitali Gardiner RN      02/14/2018 1505 insulin lispro (HumaLOG) 100 units/mL subcutaneous injection 1-5 Units 0 Units Subcutaneous Hold Mitali Gardiner RN hold till 1600 accucheck          Past Medical/Surgical History: Active Ambulatory Problems     Diagnosis Date Noted    Pseudomonas urinary tract infection     [facility]asthenia gravis ([facility] [address] )     Nephrolithiasis     [facility] 01/10/2017    Type 2 diabetes mellitus with hyperglycemia ([facility] ) 03/06/2017     Resolved Ambulatory Problems     Diagnosis Date Noted    Hypokalemia 01/11/2017    Hyponatremia 03/06/2017    Gastroesophageal reflux disease without esophagitis 03/06/2017     Past Medical History:   Diagnosis Date    Anxiety     Cervical spinal stenosis     Diabetes mellitus (HCC)     Hx of chronic ulcerative colitis     Hx of diverticulitis of colon     Hyperlipidemia     Hypertension     Kidney stone     [facility]     Neuropathy     RBBB     Renal disorder     Sleep apnea     UTI (urinary tract infection)        Admitting Diagnosis: UTI (urinary tract infection) [N39 0]  Abnormal blood chemistry [R79 9]  Myasthenia gravis (HCC) [G70 00]  Acute renal insufficiency [N28 9]  Pseudomonas urinary tract infection [N39 0, B96 5]  Type 2 diabetes mellitus with hyperglycemia, with long-term current use of insulin ([facility]) [E11 65, Z79 4]    Age/Sex: 68 y o  male    Assessment/Plan:   Pseudomonas urinary tract infection   Assessment & Plan     · Urine cultures x2 showing Pseudomonas however initially was noted to grow [id number] colony-forming units on repeat 20-45752 colony-forming units  · UA itself showing leukocytes but negative for nitrites   10-20 WBC on initial urinalysis on 2/2/18 and 2-4 on 2/6/18  · Patient was given 1 dose of cefepime 2 gram in the ER  · Having urinary frequency and urgency x 2 weeks but no dysuria, change in urine color or odor  · Fever at home 100 2 but at same time as influenza diagnosis  · Cefepime 1 g 12 hrs at this time  Monitor symptoms with use of IV abx  · Cannot take FQ given MG, no oral agents available for treatment of pseudomonas            CKD (chronic kidney disease), stage III   Assessment & Plan     · Followed with renal x 1 as outpatient  · Creatinine at baseline 1 1-1 2  · Currently 1 66           MARSHALL (acute kidney injury) ([facility] [address] )   Assessment & Plan     · Baseline creatinine 1 1-1 2, currently 1 66  · S/p IVF in ER  · Hold HCTZ  · Give IVF hydration  Avoid nephrotoxins          Myasthenia gravis [facility])   Assessment & Plan     · Last saw neurology 2/2017  ER discussed with neuro this AM who stated that FQ can exacerbate MG  · Continue prednisone 10 mg daily  · Outpatient follow up needed with neurology            Type 2 diabetes mellitus with hyperglycemia (HCC)   Assessment & Plan     · A1C 6 0, was previously as high as 12 0  · Continue lantus qhs, add SSI coverage  Will hold scheduled humalog TID (3 units) at this time  · accuchecks ACHS          Nephrolithiasis   Assessment & Plan     · Follows with Dr Amado Espinoza   · Known 5 mm left renal calculus 6 months ago  · Will repeat KUB to ensure symptoms of urinary frequency not related to stone           Dizziness   Assessment & Plan     · Suspect related to dehydration  · Check orthostatics  Continue IVF           Hypercalcemia   Assessment & Plan     · Mild at 10 2  · IVF hdyration, repeat labs in AM             VTE Prophylaxis: Heparin  / sequential compression device   Code Status: FULL CODE  DISCUSSED WITH PATIENT AT BEDSIDE  POLST: There is no POLST form on file for this patient (pre-hospital)  Discussion with family: PATIENT  Anticipated Length of Stay:  Patient will be admitted on an Inpatient basis with an anticipated length of stay of  > 2 midnights  Justification for Hospital Stay: need for IV antibiotics given pseudomonal UTI with history of MG with MARSHALL      Admission Orders:  IP  Consult ID  SCD's    Scheduled Meds:   Current Facility-Administered Medications:  acetaminophen 650 mg Oral Q6H PRN GUANAKITO Yuan-JERRY    cefepime 1,000 mg Intravenous Q12H Tamie Briggs, PA-C Last Rate: 1,000 mg (02/15/18 1047)   cholecalciferol 1,000 Units Oral Daily Tamie Diasio, PA-C    docusate sodium 100 mg Oral BID Tamie Diasio, PA-C    famotidine 20 mg Oral Daily Tamie Diasio, PA-C    heparin (porcine) 5,000 Units Subcutaneous Q8H [address] Tamie Diasio, PA-C    insulin glargine 12 Units Subcutaneous HS Tamie Diasio, PA-C    insulin lispro 1-5 Units Subcutaneous TID AC Tamie Diasio, PA-C    insulin lispro 1-5 Units Subcutaneous HS Tamie Diasio, PA-C    insulin lispro 1-5 Units Subcutaneous TID AC Tamie Diasio, PA-C    insulin lispro 1-5 Units Subcutaneous HS Tamie Diasio, PA-C    meclizine 12 5 mg Oral Daily Tamie Diasio, PA-C    ondansetron 4 mg Intravenous Q6H PRN Tamie Briggs, PA-C    potassium chloride 20 mEq Intravenous Once Fany Aguirre MD    predniSONE 10 mg Oral Daily Tamie Diasio, PA-C    pyridoxine 100 mg Oral Daily Tamie Diacheno, PA-C    sodium chloride 50 mL/hr Intravenous Continuous Fany Aguirre MD Last Rate: 50 mL/hr (02/15/18 1047)     Continuous Infusions:   sodium chloride 50 mL/hr Last Rate: 50 mL/hr (02/15/18 1047)     PRN Meds:   acetaminophen    ondansetron      Per Id: IMPRESSION & RECOMMENDATIONS:   1  Pseudomonas urinary tract infection-based upon the positive urine cultures in symptomatology  Limited antibiotic options due to the patient's inability to take quinolones in the setting of myasthenia gravis    Therefore, there are no good oral options   -agree with cefepime 1 g IV q 12 hours  -monitor symptomatology  -plan 7 days total of treatment  -monitor creatinine adjust the antibiotic dosing as needed  -no ID contraindication to placing a PICC line if needed     2  Acute kidney injury-the setting of known chronic kidney disease  Possibly pre renal issues the patient recently had influenza infection  No other clear source appreciated  -monitor the GFR  -dose adjust the antibiotics as needed  -no additional ID workup for now     3  Myasthenia gravis-clinically stable  Unfortunately fluoroquinolones can exacerbate myasthenia and therefore would be contraindicated in this patient   -outpatient Neurology follow-up  -continue prednisone     4  Diabetes mellitus-type 2 with hyperglycemia    Thank you,  [address] in the [facility] by Emmett Gutierrez for 2017  Network Utilization Review Department  Phone: [phone]; Fax [phone]  ATTENTION: The Network Utilization Review Department is now centralized for our 7 Facilities  Make a note that we have a new phone and fax numbers for our Department  Please call with any questions or concerns to [phone] and carefully follow the prompts so that you are directed to the right person  All voicemails are confidential  Fax any determinations, approvals, denials, and requests for initial or continue stay review clinical to [phone]  Due to HIGH CALL volume, it would be easier if you could please send faxed requests to expedite your requests and in part, help us provide discharge notifications faster

## 2018-02-15 NOTE — PLAN OF CARE
Problem: Potential for Falls  Goal: Patient will remain free of falls  INTERVENTIONS:  - Assess patient frequently for physical needs  -  Identify cognitive and physical deficits and behaviors that affect risk of falls    -  [place] fall precautions as indicated by assessment   - Educate patient/family on patient safety including physical limitations  - Instruct patient to call for assistance with activity based on assessment  - Modify environment to reduce risk of injury  - Consider OT/PT consult to assist with strengthening/mobility   Outcome: Progressing      Problem: DISCHARGE PLANNING - CARE MANAGEMENT  Goal: Discharge to post-acute care or home with appropriate resources  INTERVENTIONS:  - Conduct assessment to determine patient/family and health care team treatment goals, and need for post-acute services based on payer coverage, community resources, and patient preferences, and barriers to discharge  - Address psychosocial, clinical, and financial barriers to discharge as identified in assessment in conjunction with the patient/family and health care team  - Arrange appropriate level of post-acute services according to patients   needs and preference and payer coverage in collaboration with the physician and health care team  - Communicate with and update the patient/family, physician, and health care team regarding progress on the discharge plan  - Arrange appropriate transportation to post-acute venues   Outcome: Progressing      Problem: INFECTION - ADULT  Goal: Absence or prevention of progression during hospitalization  INTERVENTIONS:  - Assess and monitor for signs and symptoms of infection  - Monitor lab/diagnostic results  - Monitor all insertion sites, i e  indwelling lines, tubes, and drains  - Monitor endotracheal (as able) and nasal secretions for changes in amount and color  - [place] appropriate cooling/warming therapies per order  - Administer medications as ordered  - Instruct and encourage patient and family to use good hand hygiene technique  - Identify and instruct in appropriate isolation precautions for identified infection/condition   Outcome: Progressing      Problem: DISCHARGE PLANNING  Goal: Discharge to home or other facility with appropriate resources  INTERVENTIONS:  - Identify barriers to discharge w/patient and caregiver  - Arrange for needed discharge resources and transportation as appropriate  - Identify discharge learning needs (meds, wound care, etc )  - Arrange for interpretive services to assist at discharge as needed  - Refer to Case Management Department for coordinating discharge planning if the patient needs post-hospital services based on physician/advanced practitioner order or complex needs related to functional status, cognitive ability, or social support system   Outcome: Progressing      Problem: Knowledge Deficit  Goal: Patient/family/caregiver demonstrates understanding of disease process, treatment plan, medications, and discharge instructions  Complete learning assessment and assess knowledge base    Interventions:  - Provide teaching at level of understanding  - Provide teaching via preferred learning methods   Outcome: Progressing      Problem: METABOLIC, FLUID AND ELECTROLYTES - ADULT  Goal: Glucose maintained within target range  INTERVENTIONS:  - Monitor Blood Glucose as ordered  - Assess for signs and symptoms of hyperglycemia and hypoglycemia  - Administer ordered medications to maintain glucose within target range  - Assess nutritional intake and initiate nutrition service referral as needed   Outcome: Progressing    Goal: Electrolytes maintained within normal limits  INTERVENTIONS:  - Monitor labs and assess patient for signs and symptoms of electrolyte imbalances  - Administer electrolyte replacement as ordered  - Monitor response to electrolyte replacements, including repeat lab results as appropriate  - Instruct patient on fluid and nutrition as appropriate  Outcome: Progressing    Goal: Fluid balance maintained  INTERVENTIONS:  - Monitor labs and assess for signs and symptoms of volume excess or deficit  - Monitor I/O and WT  - Instruct patient on fluid and nutrition as appropriate  Outcome: Progressing      Problem: GENITOURINARY - ADULT  Goal: Maintains or returns to baseline urinary function  INTERVENTIONS:  - Assess urinary function  - Encourage oral fluids to ensure adequate hydration  - Administer IV fluids as ordered to ensure adequate hydration  - Administer ordered medications as needed  - Offer frequent toileting  - Follow urinary retention protocol if ordered  Outcome: Progressing    Goal: Absence of urinary retention  INTERVENTIONS:  - Assess patients ability to void and empty bladder  - Monitor I/O  - Bladder scan as needed  - Discuss with physician/AP medications to alleviate retention as needed  - Discuss catheterization for long term situations as appropriate  Outcome: Progressing    Goal: Urinary catheter remains patent  INTERVENTIONS:  - Assess patency of urinary catheter  - If patient has a chronic alvarez, consider changing catheter if non-functioning  - Follow guidelines for intermittent irrigation of non-functioning urinary catheter  Outcome: Progressing      Problem: SKIN/TISSUE INTEGRITY - ADULT  Goal: Skin integrity remains intact  INTERVENTIONS  - Identify patients at risk for skin breakdown  - Assess and monitor skin integrity  - Assess and monitor nutrition and hydration status  - Monitor labs (i e  albumin)  - Assess for incontinence   - Turn and reposition patient  - Assist with mobility/ambulation  - Relieve pressure over bony prominences  - Avoid friction and shearing  - Provide appropriate hygiene as needed including keeping skin clean and dry  - Evaluate need for skin moisturizer/barrier cream  - Collaborate with interdisciplinary team (i e  Nutrition, Rehabilitation, etc )   - Patient/family teaching  Outcome: Progressing    Goal: Incision(s), wounds(s) or drain site(s) healing without S/S of infection  INTERVENTIONS  - Assess and document risk factors for skin impairment   - Assess and document dressing, incision, wound bed, drain sites and surrounding tissue  - Initiate Nutrition services consult and/or wound management as needed  Outcome: Progressing    Goal: Oral mucous membranes remain intact  INTERVENTIONS  - Assess oral mucosa and hygiene practices  - Implement preventative oral hygiene regimen  - Implement oral medicated treatments as ordered  - Initiate Nutrition services referral as needed  Outcome: Progressing

## 2018-02-15 NOTE — PROGRESS NOTES
Progress Note - Infectious Disease   Kelly Yanes 68 y o  male MRN: [id number]  Unit/Bed#: E4 -01 Encounter: [id number]      Impression/Plan:  1  Pseudomonas urinary tract infection-based upon the positive urine cultures in symptomatology  Limited antibiotic options due to the patient's inability to take quinolones in the setting of myasthenia gravis  Therefore, there are no good oral options  The symptoms seemed to have improved with the cefepime  -continue cefepime for now with the current dose through 18  -monitor symptomatology  -monitor creatinine adjust the antibiotic dosing as needed  -no ID contraindication to placing a PICC line if needed     2  Acute kidney injury-the setting of known chronic kidney disease  Possibly pre renal issues the patient recently had influenza infection  No other clear source appreciated  The renal function has improved with hydration  -recheck BMP tomorrow  -dose adjust the antibiotics as needed  -no additional ID workup for now     3  Myasthenia gravis-clinically stable  Unfortunately fluoroquinolones can exacerbate myasthenia and therefore would be contraindicated in this patient   -outpatient Neurology follow-up  -continue prednisone     4  Diabetes mellitus-type 2 with hyperglycemia    5  Nephrolithiasis-nonobstructing stone   -urology follow-up  -no urgent intervention needed    Discussed with the primary service and patient in detail    Antibiotics:  Cefepime 2    Subjective:  Patient has no fever, chills, sweats; no nausea, vomiting, diarrhea; no cough, shortness of breath; no pain  No new symptoms  He has dysuria is resolving, and urinary frequency is improved      Objective:  Vitals:  HR:  [77-90] 84  Resp:  [16-17] 17  BP: (104-131)/(59-76) 131/75  SpO2:  [96 %-98 %] 98 %  Temp (24hrs), Av 8 °F (36 6 °C), Min:97 5 °F (36 4 °C), Max:98 °F (36 7 °C)  Current: Temperature: 97 5 °F (36 4 °C)    Physical Exam:   General Appearance:  Alert, interactive, nontoxic, no acute distress  Throat: Oropharynx moist without lesions  Lungs:   Clear to auscultation bilaterally; no wheezes, rhonchi or rales; respirations unlabored   Heart:  RRR; no murmur, rub or gallop   Abdomen:   Soft, non-tender, non-distended, positive bowel sounds  Extremities: No clubbing, cyanosis or edema   Skin: No new rashes or lesions  No draining wounds noted         Labs, Imaging, & Other studies:   All pertinent labs and imaging studies were personally reviewed    Results from last 7 days  Lab Units 02/15/18  0617 02/14/18  0905   WBC Thousand/uL 8 50 10 61*   HEMOGLOBIN g/dL 13 6 16 5   PLATELETS Thousands/uL 234 279       Results from last 7 days  Lab Units 02/15/18  0617 02/14/18  0905   SODIUM mmol/L 141 139   POTASSIUM mmol/L 3 2* 3 6   CHLORIDE mmol/L 105 99*   CO2 mmol/L 30 33*   ANION GAP mmol/L 6 7   BUN mg/dL 23 25   CREATININE mg/dL 1 23 1 66*   EGFR ml/min/1 73sq m 58 40   GLUCOSE RANDOM mg/dL 85 139   CALCIUM mg/dL 9 1 10 2*   AST U/L 28  --    ALT U/L 43  --    ALK PHOS U/L 27*  --    TOTAL PROTEIN g/dL 6 0*  --    BILIRUBIN TOTAL mg/dL 0 51  --        Results from last 7 days  Lab Units 02/08/18  1057   URINE CULTURE  20,000-29,000 cfu/ml Pseudomonas aeruginosa*       KUB-unchanged 5 mm left kidney lower pole stone     Reviewed by me in PACS

## 2018-02-15 NOTE — PHYSICIAN ADVISOR
Current patient class: Inpatient  The patient is currently on Hospital Day: 2      The patient was admitted to the hospital at 60 [id number] [id number] on 2/14/18 for the following diagnosis:  UTI (urinary tract infection) [N39 0]  Abnormal blood chemistry [R79 9]  Myasthenia gravis ([address] ) [G70 00]  Acute renal insufficiency [N28 9]  Pseudomonas urinary tract infection [N39 0, B96 5]  Type 2 diabetes mellitus with hyperglycemia, with long-term current use of insulin ([address] ) [E11 65, Z79 4]       There is documentation in the medical record of an expected length of stay of at least 2 midnights  The patient is therefore expected to satisfy the 2 midnight benchmark and given the 2 midnight presumption is appropriate for INPATIENT ADMISSION  Given this expectation of a satisfying stay, CMS instructs us that the patient is most often appropriate for inpatient admission under part A provided medical necessity is documented in the chart  After review of the relevant documentation, labs, vital signs and test results, the patient is appropriate for INPATIENT ADMISSION  Admission to the hospital as an inpatient is a complex decision making process which requires the practitioner to consider the patients presenting complaint, history and physical examination and all relevant testing  With this in mind, in this case, the patient was deemed appropriate for INPATIENT ADMISSION  After review of the documentation and testing available at the time of the admission I concur with this clinical determination of medical necessity  Rationale is as follows: The patient is a 55-year-old male who presented to the hospital on February 14, 2018  He has been having urinary frequency and was evaluated as an outpatient prior to admission  Urine culture was notable for Pseudomonas  The patient also is recovering from influenza  The patient requires treatment for urinary tract infection, this was symptomatic   "Limited antibiotic options due to the patient's inability to take quinolones in the setting of myasthenia gravis"  He will continue intravenous cefepime through February 20, 2018  He may finish the course through a PICC if appropriate, however he is expected to remain in the hospital for at least two midnights  The patient also had acute kidney injury likely secondary to recent infection      The patients vitals on arrival were ED Triage Vitals   Temperature Pulse Respirations Blood Pressure SpO2   02/14/18 0814 02/14/18 0814 02/14/18 0814 02/14/18 0814 02/14/18 0814   97 6 °F (36 4 °C) 98 18 149/76 98 %      Temp Source Heart Rate Source Patient Position - Orthostatic VS BP Location FiO2 (%)   02/14/18 0814 02/14/18 1354 02/14/18 0935 02/14/18 0935 --   Oral Monitor Lying Right arm       Pain Score       02/14/18 0814       No Pain           Past Medical History:   Diagnosis Date    Anxiety     Cervical spinal stenosis     Diabetes mellitus (HCC)     type 2    Hx of chronic ulcerative colitis     Hx of diverticulitis of colon     Hyperlipidemia     Hypertension     Kidney stone     Myasthenia gravis ([address] )     Neuropathy     RBBB     Renal disorder     Sleep apnea     UTI (urinary tract infection)      Past Surgical History:   Procedure Laterality Date    COLECTOMY      partial sigmoid    COLECTOMY LAPAROSCOPIC      partial sigmoid colectomy    COLON SURGERY      lap  partial colectomy sigmoid    COLONOSCOPY      LITHOTRIPSY      OTHER SURGICAL HISTORY      parotid bx    SALIVARY GLAND SURGERY      biopsy           Consults have been placed to:   IP CONSULT TO INFECTIOUS DISEASES  IP CONSULT TO CASE MANAGEMENT    Vitals:    02/14/18 2327 02/15/18 0600 02/15/18 0741 02/15/18 1100   BP: 104/59  131/75 111/63   BP Location: Right arm  Right arm Right arm   Pulse: 77  84 80   Resp: 16  17    Temp: 98 °F (36 7 °C)  97 5 °F (36 4 °C)    TempSrc: Tympanic  Tympanic    SpO2: 98%  98%    Weight:  79 5 kg (175 lb 4 3 oz)     Height: Most recent labs:    Recent Labs      02/15/18   0617   WBC  8 50   HGB  13 6   HCT  39 3   PLT  234   K  3 2*   NA  141   CALCIUM  9 1   BUN  23   CREATININE  1 23   AST  28   ALT  43   ALKPHOS  27*   BILITOT  0 51       Scheduled Meds:  Current Facility-Administered Medications:  acetaminophen 650 mg Oral Q6H PRN Tamie Briggs PA-C    cefepime 1,000 mg Intravenous Q12H Tamie Briggs PA-C Last Rate: Stopped (02/15/18 1200)   cholecalciferol 1,000 Units Oral Daily Tamie Briggs PA-C    docusate sodium 100 mg Oral BID Tamie Briggs PA-C    famotidine 20 mg Oral Daily Tamie Briggs PA-C    heparin (porcine) 5,000 Units Subcutaneous Q8H [facility] Tamie Briggs PA-C    insulin glargine 12 Units Subcutaneous HS GUANAKITO Yuan-JERRY    insulin lispro 1-5 Units Subcutaneous TID AC GUANAKITO Yuan-JERRY    insulin lispro 1-5 Units Subcutaneous HS Tamie Briggs, PA-JERRY    insulin lispro 1-5 Units Subcutaneous TID AC GUANAKITO Yuan-JERRY    insulin lispro 1-5 Units Subcutaneous HS Tamie Briggs PA-C    meclizine 12 5 mg Oral Daily Tamie Briggs PA-C    ondansetron 4 mg Intravenous Q6H PRN Tamie Briggs PA-C    potassium chloride 20 mEq Intravenous Once Dereje Lewis MD Last Rate: 20 mEq (02/15/18 1230)   predniSONE 10 mg Oral Daily Tamie Briggs PA-C    pyridoxine 100 mg Oral Daily Tamie Briggs PA-C    sodium chloride 50 mL/hr Intravenous Continuous Dereje Lewis MD Last Rate: 50 mL/hr (02/15/18 1047)     Continuous Infusions:  sodium chloride 50 mL/hr Last Rate: 50 mL/hr (02/15/18 1047)     PRN Meds:   acetaminophen    ondansetron    Surgical procedures (if appropriate):

## 2018-02-16 LAB
ANION GAP SERPL CALCULATED.3IONS-SCNC: 8 MMOL/L (ref 4–13)
BASOPHILS # BLD AUTO: 0.04 THOUSANDS/ΜL (ref 0–0.1)
BASOPHILS NFR BLD AUTO: 1 % (ref 0–1)
BUN SERPL-MCNC: 21 MG/DL (ref 5–25)
CALCIUM SERPL-MCNC: 9 MG/DL (ref 8.3–10.1)
CHLORIDE SERPL-SCNC: 105 MMOL/L (ref 100–108)
CO2 SERPL-SCNC: 28 MMOL/L (ref 21–32)
CREAT SERPL-MCNC: 1.14 MG/DL (ref 0.6–1.3)
EOSINOPHIL # BLD AUTO: 0.1 THOUSAND/ΜL (ref 0–0.61)
EOSINOPHIL NFR BLD AUTO: 1 % (ref 0–6)
ERYTHROCYTE [DISTWIDTH] IN BLOOD BY AUTOMATED COUNT: 13.2 % (ref 11.6–15.1)
GFR SERPL CREATININE-BSD FRML MDRD: 63 ML/MIN/1.73SQ M
GLUCOSE SERPL-MCNC: 111 MG/DL (ref 65–140)
GLUCOSE SERPL-MCNC: 156 MG/DL (ref 65–140)
GLUCOSE SERPL-MCNC: 76 MG/DL (ref 65–140)
GLUCOSE SERPL-MCNC: 84 MG/DL (ref 65–140)
HCT VFR BLD AUTO: 39.4 % (ref 36.5–49.3)
HGB BLD-MCNC: 13.6 G/DL (ref 12–17)
LYMPHOCYTES # BLD AUTO: 3.17 THOUSANDS/ΜL (ref 0.6–4.47)
LYMPHOCYTES NFR BLD AUTO: 36 % (ref 14–44)
MCH RBC QN AUTO: 31.5 PG (ref 26.8–34.3)
MCHC RBC AUTO-ENTMCNC: 34.5 G/DL (ref 31.4–37.4)
MCV RBC AUTO: 91 FL (ref 82–98)
MONOCYTES # BLD AUTO: 0.65 THOUSAND/ΜL (ref 0.17–1.22)
MONOCYTES NFR BLD AUTO: 8 % (ref 4–12)
NEUTROPHILS # BLD AUTO: 4.75 THOUSANDS/ΜL (ref 1.85–7.62)
NEUTS SEG NFR BLD AUTO: 54 % (ref 43–75)
NRBC BLD AUTO-RTO: 0 /100 WBCS
PLATELET # BLD AUTO: 263 THOUSANDS/UL (ref 149–390)
PMV BLD AUTO: 10.3 FL (ref 8.9–12.7)
POTASSIUM SERPL-SCNC: 3.9 MMOL/L (ref 3.5–5.3)
RBC # BLD AUTO: 4.32 MILLION/UL (ref 3.88–5.62)
SODIUM SERPL-SCNC: 141 MMOL/L (ref 136–145)
WBC # BLD AUTO: 8.71 THOUSAND/UL (ref 4.31–10.16)

## 2018-02-16 PROCEDURE — 85025 COMPLETE CBC W/AUTO DIFF WBC: CPT | Performed by: INTERNAL MEDICINE

## 2018-02-16 PROCEDURE — 99232 SBSQ HOSP IP/OBS MODERATE 35: CPT | Performed by: INTERNAL MEDICINE

## 2018-02-16 PROCEDURE — 36569 INSJ PICC 5 YR+ W/O IMAGING: CPT

## 2018-02-16 PROCEDURE — 82948 REAGENT STRIP/BLOOD GLUCOSE: CPT

## 2018-02-16 PROCEDURE — 80048 BASIC METABOLIC PNL TOTAL CA: CPT | Performed by: INTERNAL MEDICINE

## 2018-02-16 PROCEDURE — C1751 CATH, INF, PER/CENT/MIDLINE: HCPCS

## 2018-02-16 PROCEDURE — 02HV33Z INSERTION OF INFUSION DEVICE INTO SUPERIOR VENA CAVA, PERCUTANEOUS APPROACH: ICD-10-PCS | Performed by: INTERNAL MEDICINE

## 2018-02-16 RX ADMIN — CEFEPIME HYDROCHLORIDE 1000 MG: 1 INJECTION, SOLUTION INTRAVENOUS at 10:40

## 2018-02-16 RX ADMIN — HEPARIN SODIUM 5000 UNITS: 5000 INJECTION, SOLUTION INTRAVENOUS; SUBCUTANEOUS at 21:32

## 2018-02-16 RX ADMIN — VITAMIN D, TAB 1000IU (100/BT) 1000 UNITS: 25 TAB at 08:30

## 2018-02-16 RX ADMIN — HEPARIN SODIUM 5000 UNITS: 5000 INJECTION, SOLUTION INTRAVENOUS; SUBCUTANEOUS at 14:34

## 2018-02-16 RX ADMIN — INSULIN GLARGINE 12 UNITS: 100 INJECTION, SOLUTION SUBCUTANEOUS at 21:31

## 2018-02-16 RX ADMIN — Medication 100 MG: at 08:30

## 2018-02-16 RX ADMIN — SODIUM CHLORIDE 50 ML/HR: 0.9 INJECTION, SOLUTION INTRAVENOUS at 18:01

## 2018-02-16 RX ADMIN — PREDNISONE 10 MG: 5 TABLET ORAL at 08:30

## 2018-02-16 RX ADMIN — FAMOTIDINE 20 MG: 20 TABLET, FILM COATED ORAL at 08:30

## 2018-02-16 RX ADMIN — HEPARIN SODIUM 5000 UNITS: 5000 INJECTION, SOLUTION INTRAVENOUS; SUBCUTANEOUS at 05:15

## 2018-02-16 RX ADMIN — MECLIZINE HCL 12.5 MG: 12.5 TABLET ORAL at 08:30

## 2018-02-16 NOTE — DISCHARGE INSTR - OTHER ORDERS
[facility] is the company that supplies the IV antibiotics and PICC supplies  There number is [phone]  [facility] will come to give your IV antibiotics Saturday at 8pm and the subsequent days

## 2018-02-16 NOTE — PLAN OF CARE
DISCHARGE PLANNING     Discharge to home or other facility with appropriate resources Progressing        DISCHARGE PLANNING - CARE MANAGEMENT     Discharge to post-acute care or home with appropriate resources Progressing        GENITOURINARY - ADULT     Maintains or returns to baseline urinary function Progressing     Absence of urinary retention Progressing     Urinary catheter remains patent Progressing        INFECTION - ADULT     Absence or prevention of progression during hospitalization Progressing        Knowledge Deficit     Patient/family/caregiver demonstrates understanding of disease process, treatment plan, medications, and discharge instructions Progressing        METABOLIC, FLUID AND ELECTROLYTES - ADULT     Glucose maintained within target range Progressing     Electrolytes maintained within normal limits Progressing     Fluid balance maintained Progressing        Potential for Falls     Patient will remain free of falls Progressing        SKIN/TISSUE INTEGRITY - ADULT     Skin integrity remains intact Progressing     Incision(s), wounds(s) or drain site(s) healing without S/S of infection Progressing     Oral mucous membranes remain intact Progressing

## 2018-02-16 NOTE — PROGRESS NOTES
Progress Note - Infectious Disease   Larry Lovell 68 y o  male MRN: [id number]  Unit/Bed#: E4 -01 Encounter: [id number]      Impression/Plan:  1   Pseudomonas urinary tract infection-based upon the positive urine cultures in symptomatology   Limited antibiotic options due to the patient's inability to take quinolones in the setting of myasthenia gravis   Therefore, there are no good oral options  The symptoms seemed to have improved with the cefepime  -continue cefepime for now with the current dose through 18  -monitor symptomatology  -monitor creatinine adjust the antibiotic dosing as needed  -no ID contraindication to placing a PICC line if needed     2   Acute kidney injury-the setting of known chronic kidney disease  Possibly pre renal issues the patient recently had influenza infection   No other clear source appreciated  The renal function has improved with hydration  -recheck BMP tomorrow  -dose adjust the antibiotics as needed  -no additional ID workup for now     3   Myasthenia gravis-clinically stable   Unfortunately fluoroquinolones can exacerbate myasthenia and therefore would be contraindicated in this patient   -outpatient Neurology follow-up  -continue prednisone     4   Diabetes mellitus-type 2 with hyperglycemia     5  Nephrolithiasis-nonobstructing stone   -urology follow-up  -no urgent intervention needed     Discussed with the primary service and patient in detail    Antibiotics:  Cefepime 3    Subjective:  Patient has no fever, chills, sweats; no nausea, vomiting, diarrhea; no cough, shortness of breath; no pain  No new symptoms  He is feeling well overall  No more urinary symptoms      Objective:  Vitals:  HR:  [78-94] 94  Resp:  [17-18] 18  BP: (111-124)/(58-68) 124/66  SpO2:  [93 %-98 %] 98 %  Temp (24hrs), Av 3 °F (36 3 °C), Min:96 7 °F (35 9 °C), Max:98 1 °F (36 7 °C)  Current: Temperature: (!) 97 1 °F (36 2 °C)    Physical Exam:   General Appearance:  Alert, interactive, nontoxic, no acute distress  Throat: Oropharynx moist without lesions  Lungs:   Clear to auscultation bilaterally; no wheezes, rhonchi or rales; respirations unlabored   Heart:  RRR; no murmur, rub or gallop   Abdomen:   Soft, non-tender, non-distended, positive bowel sounds  Extremities: No clubbing, cyanosis or edema   Skin: No new rashes or lesions  No draining wounds noted  Labs, Imaging, & Other studies:   All pertinent labs and imaging studies were personally reviewed    Results from last 7 days  Lab Units 02/16/18  0515 02/15/18  0617 02/14/18  0905   WBC Thousand/uL 8 71 8 50 10 61*   HEMOGLOBIN g/dL 13 6 13 6 16 5   PLATELETS Thousands/uL 263 234 279       Results from last 7 days  Lab Units 02/16/18  0515 02/15/18  0617 02/14/18  0905   SODIUM mmol/L 141 141 139   POTASSIUM mmol/L 3 9 3 2* 3 6   CHLORIDE mmol/L 105 105 99*   CO2 mmol/L 28 30 33*   ANION GAP mmol/L 8 6 7   BUN mg/dL 21 23 25   CREATININE mg/dL 1 14 1 23 1 66*   EGFR ml/min/1 73sq m 63 58 40   GLUCOSE RANDOM mg/dL 84 85 139   CALCIUM mg/dL 9 0 9 1 10 2*   AST U/L  --  28  --    ALT U/L  --  43  --    ALK PHOS U/L  --  27*  --    TOTAL PROTEIN g/dL  --  6 0*  --    BILIRUBIN TOTAL mg/dL  --  0 51  --        Results from last 7 days  Lab Units 02/14/18  0858   URINE CULTURE  Culture results to follow    70,000-79,000 cfu/ml Pseudomonas aeruginosa*

## 2018-02-16 NOTE — PROGRESS NOTES
[facility] Internal Medicine Progress Note  Patient: Anil Morejon 68 y o  male   MRN: [id number]  PCP: Estela Byrd DO  Unit/Bed#: E4 -01 Encounter: [id number]  Date Of Visit: 02/16/18    Assessment:    Principal Problem:    Pseudomonas urinary tract infection  Active Problems:    Myasthenia gravis ([facility] )    Nephrolithiasis    MARSHALL (acute kidney injury) ([facility] )    Type 2 diabetes mellitus with hyperglycemia ([facility] )    CKD (chronic kidney disease), stage III    Hypercalcemia    Dizziness      Plan:    · Pseudomonas urinary tract infection with a history of nephrolithiasis with stone status unchanged on KUB patient with myasthenia gravis and not a candidate for quinolone therapy and cannot take sulfa thusly will need to complete a course of cefepime seen by ID will need to confer with case management if has to complete full course of therapy here can be done at home will insert PICC line today in preparation  · Acute kidney injury in setting of chronic kidney disease stage 3 some resolution with IV fluids overnight with creatinine evolving from 1 66 to 1 23 and now 1 14  · Type 2 diabetes mellitus recently placed on insulin maintenance with improvement in A1c  · Recent influenza a treated with full course of Tamiflu  · Hypercalcemia has improved with hydration  · Dizziness with chronic benign positional vertigo takes meclizine  · Myasthenia gravis on chronic steroid management with prednisone 10 mg daily has not had a recrudescence in over a year      VTE Pharmacologic Prophylaxis:   Pharmacologic: Heparin  Mechanical VTE Prophylaxis in Place: Yes    Discussions with Specialists or Other Care Team Provider:  No    Time Spent for Care: 30 minutes  More than 50% of total time spent on counseling and coordination of care as described above  Subjective:   Denies any symptoms of urgency dysuria abdominal pain nausea and GI upset    Denies any  Respiratory distress or shortness of breath   Objective: Vitals:   Temp (24hrs), Av 3 °F (36 3 °C), Min:96 7 °F (35 9 °C), Max:98 1 °F (36 7 °C)    HR:  [78-94] 94  Resp:  [17-18] 18  BP: (115-124)/(58-68) 124/66  SpO2:  [93 %-98 %] 98 %  Body mass index is 26 6 kg/m²  Input and Output Summary (last 24 hours): Intake/Output Summary (Last 24 hours) at 18 1148  Last data filed at 18 0900   Gross per 24 hour   Intake              600 ml   Output              730 ml   Net             -130 ml       Physical Exam:     Physical Exam:   General appearance: alert, appears stated age and cooperative  Head: Normocephalic, without obvious abnormality, atraumatic  Lungs: clear to auscultation bilaterally  Heart: regular rate and rhythm  Abdomen: soft, non-tender; bowel sounds normal; no masses,  no organomegaly  Back: negative  Extremities: extremities normal, atraumatic, no cyanosis or edema  Neurologic: Grossly normal      Additional Data:     Labs:      Results from last 7 days  Lab Units 18  0515   WBC Thousand/uL 8 71   HEMOGLOBIN g/dL 13 6   HEMATOCRIT % 39 4   PLATELETS Thousands/uL 263   NEUTROS PCT % 54   LYMPHS PCT % 36   MONOS PCT % 8   EOS PCT % 1       Results from last 7 days  Lab Units 18  0515 02/15/18  0617   SODIUM mmol/L 141 141   POTASSIUM mmol/L 3 9 3 2*   CHLORIDE mmol/L 105 105   CO2 mmol/L 28 30   BUN mg/dL 21 23   CREATININE mg/dL 1 14 1 23   CALCIUM mg/dL 9 0 9 1   TOTAL PROTEIN g/dL  --  6 0*   BILIRUBIN TOTAL mg/dL  --  0 51   ALK PHOS U/L  --  27*   ALT U/L  --  43   AST U/L  --  28   GLUCOSE RANDOM mg/dL 84 85           * I Have Reviewed All Lab Data Listed Above  * Additional Pertinent Lab Tests Reviewed: All Labs For Current Hospital Admission Reviewed    Imaging:  Xr Abdomen 1 View Kub    Result Date: 2018  Narrative: ABDOMEN INDICATION: Reevaluate prior left lower pole stone  COMPARISON: Abdomen plain films from 10/27/2017   VIEWS: AP supine IMAGES:  3 FINDINGS: There is an unchanged approximately 5 mm left kidney lower pole stone  No ureteral calculi identified  Nonobstructive bowel gas pattern  Osseous structures appear intact  Impression: There is an unchanged approximately 5 mm left kidney lower pole stone  Workstation performed: [id number]     Imaging Reports Reviewed Today Include:  Reviewed KUB  Imaging Personally Reviewed by Myself Includes:  No  Procedure: Xr Abdomen 1 View Kub    Result Date: 2/14/2018  Narrative: ABDOMEN INDICATION: Reevaluate prior left lower pole stone  COMPARISON: Abdomen plain films from 10/27/2017  VIEWS: AP supine IMAGES:  3 FINDINGS: There is an unchanged approximately 5 mm left kidney lower pole stone  No ureteral calculi identified  Nonobstructive bowel gas pattern  Osseous structures appear intact  Impression: There is an unchanged approximately 5 mm left kidney lower pole stone  Workstation performed: [id number]        Recent Cultures (last 7 days):       Results from last 7 days  Lab Units 02/14/18  0858   URINE CULTURE  Culture results to follow    70,000-79,000 cfu/ml Pseudomonas aeruginosa*       Last 24 Hours Medication List:     Current Facility-Administered Medications:  acetaminophen 650 mg Oral Q6H PRN Tamie Briggs PA-C    cefepime 1,000 mg Intravenous Q12H Tamie Briggs PA-C Last Rate: 1,000 mg (02/16/18 1040)   cholecalciferol 1,000 Units Oral Daily GUANAKITO Yuan-JERRY    docusate sodium 100 mg Oral BID GUANAKITO Yuan-JERRY    famotidine 20 mg Oral Daily GUANAKITO Yuan-JERRY    heparin (porcine) 5,000 Units Subcutaneous Q8H [facility] GUANAKITO Yuan-JERRY    insulin glargine 12 Units Subcutaneous HS GUANAKITO Yuan-C    insulin lispro 1-5 Units Subcutaneous TID AC GUANAKITO Yuan-C    insulin lispro 1-5 Units Subcutaneous HS GUANAKITO Yuan-C    insulin lispro 1-5 Units Subcutaneous TID AC GUANAKITO Yuan-C    insulin lispro 1-5 Units Subcutaneous HS GUANAKITO Yuan-C    meclizine 12 5 mg Oral Daily GUANAKITO Yuan-JERRY    ondansetron 4 mg Intravenous Q6H PRN [address] Briggs    predniSONE 10 mg Oral Daily Tamie Briggs PA-C    pyridoxine 100 mg Oral Daily Tamie Briggs PA-C    sodium chloride 50 mL/hr Intravenous Continuous Betty Herndon MD Last Rate: 50 mL/hr (02/15/18 9577)        Today, Patient Was Seen By: Betty Herndon MD    ** Please Note: Dragon 360 Dictation voice to text software may have been used in the creation of this document   **

## 2018-02-16 NOTE — PLAN OF CARE
Problem: DISCHARGE PLANNING - CARE MANAGEMENT  Goal: Discharge to post-acute care or home with appropriate resources  INTERVENTIONS:  - Conduct assessment to determine patient/family and health care team treatment goals, and need for post-acute services based on payer coverage, community resources, and patient preferences, and barriers to discharge  - Address psychosocial, clinical, and financial barriers to discharge as identified in assessment in conjunction with the patient/family and health care team  - Arrange appropriate level of post-acute services according to patient's   needs and preference and payer coverage in collaboration with the physician and health care team  - Communicate with and update the patient/family, physician, and health care team regarding progress on the discharge plan  - Arrange appropriate transportation to post-acute venues   Outcome: Adequate for Discharge  CM set patient up to be discharged 2/17/18 with PICC for IV ABX for 4 more days  [facility] will deliver meds and supplies and [facility] will come to patient's house for 8pm IV ABX

## 2018-02-16 NOTE — PROGRESS NOTES
Resuming care from 7-3 shift w/ no deviation from previous assessment  A&Ox4, able to identify needs  Comfortably resting in bed  Denies any pain s/p prn pain mgmt  or SOB  Will continue to monitor, call bell within reach

## 2018-02-16 NOTE — PROCEDURES
Insert PICC line  Date/Time: 2/16/2018 1:37 PM  Performed by: Boris Godfrey by: Claire Cooper     Patient location:  Bedside  Other Assisting Provider: No    Consent:     Consent obtained:  Written (Kushal Deal obtained consent on chart )    Consent given by:  Patient    Procedural risks discussed: by MD Miranda protocol:     Procedure explained and questions answered to patient or proxy's satisfaction: yes      Relevant documents present and verified: yes      Test results available and properly labeled: yes      Imaging studies available: yes      Required blood products, implants, devices, and special equipment available: yes      Site/side marked: yes      Immediately prior to procedure, a time out was called: yes      Patient identity confirmed:  Verbally with patient and arm band  Pre-procedure details:     Hand hygiene: Hand hygiene performed prior to insertion      Sterile barrier technique: All elements of maximal sterile technique followed      Skin preparation:  ChloraPrep    Skin preparation agent: Skin preparation agent completely dried prior to procedure    Indications:     PICC line indications: long term antibiotics    Anesthesia (see MAR for exact dosages):      Anesthesia method:  Local infiltration    Local anesthetic:  Lidocaine 1% w/o epi (1 5 ml)  Procedure details:     Location:  Basilic    Vessel type: vein      Laterality:  Right    Approach: percutaneous technique used      Patient position:  Flat    Procedural supplies:  Double lumen    Catheter size:  5 Fr    Landmarks identified: yes      Ultrasound guidance: yes      Sterile ultrasound techniques: Sterile gel and sterile probe covers were used      Number of attempts:  1    Successful placement: yes      Vessel of catheter tip end:  Sherlock 3CG confirmed    Total catheter length (cm):  40    Catheter out on skin (cm):  3    Max flow rate:  999ml/hr     Arm circumference:  27  Post-procedure details: Post-procedure:  Securement device placed and dressing applied    Assessment:  Blood return through all ports    Post-procedure complications: none      Patient tolerance of procedure:   Tolerated well, no immediate complications  Comments:      LOT # [id number] exp 3-31-19

## 2018-02-16 NOTE — CASE MANAGEMENT
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 in [place]  Patient's PCP is Dr Michelle Ramon and has also seen Dr Soheila Carson in the past   Patient has appointed his wife as POA  Patient will be discharged tomorrow 2/17/18 with a PICC line and needs 4 more days of IV Abx  CM set up [facility] to provide meds and supplies which will be delivered to his home tomorrow prior to 8pm  CM spoke with Rayna from [facility], patient will have a copay for home IV Abx of  $16 77 and supply charge of $100  Pateint aware and in agreement  Patient agreeable to [facility] for administration of IV ABX and PICC care  Patient will receive tomorrows dose at [address] [place] [address] aware of plan  CM spoke with Dr Kate Herrera to obtain script for IV abx and to adjust times of antibioic while in hospital to accommodate home infusion times  CM will continue to follow for any further anticipated d/c needs

## 2018-02-17 VITALS
OXYGEN SATURATION: 98 % | RESPIRATION RATE: 18 BRPM | WEIGHT: 182.76 LBS | HEART RATE: 75 BPM | TEMPERATURE: 96.3 F | SYSTOLIC BLOOD PRESSURE: 143 MMHG | HEIGHT: 69 IN | BODY MASS INDEX: 27.07 KG/M2 | DIASTOLIC BLOOD PRESSURE: 80 MMHG

## 2018-02-17 LAB
ANION GAP SERPL CALCULATED.3IONS-SCNC: 6 MMOL/L (ref 4–13)
BACTERIA UR CULT: ABNORMAL
BACTERIA UR CULT: ABNORMAL
BUN SERPL-MCNC: 20 MG/DL (ref 5–25)
CALCIUM SERPL-MCNC: 8.6 MG/DL (ref 8.3–10.1)
CHLORIDE SERPL-SCNC: 109 MMOL/L (ref 100–108)
CO2 SERPL-SCNC: 29 MMOL/L (ref 21–32)
CREAT SERPL-MCNC: 1.06 MG/DL (ref 0.6–1.3)
GFR SERPL CREATININE-BSD FRML MDRD: 69 ML/MIN/1.73SQ M
GLUCOSE SERPL-MCNC: 130 MG/DL (ref 65–140)
GLUCOSE SERPL-MCNC: 64 MG/DL (ref 65–140)
GLUCOSE SERPL-MCNC: 68 MG/DL (ref 65–140)
GLUCOSE SERPL-MCNC: 75 MG/DL (ref 65–140)
POTASSIUM SERPL-SCNC: 3.6 MMOL/L (ref 3.5–5.3)
SODIUM SERPL-SCNC: 144 MMOL/L (ref 136–145)

## 2018-02-17 PROCEDURE — 99239 HOSP IP/OBS DSCHRG MGMT >30: CPT | Performed by: INTERNAL MEDICINE

## 2018-02-17 PROCEDURE — 82948 REAGENT STRIP/BLOOD GLUCOSE: CPT

## 2018-02-17 PROCEDURE — 80048 BASIC METABOLIC PNL TOTAL CA: CPT | Performed by: INTERNAL MEDICINE

## 2018-02-17 RX ADMIN — DOCUSATE SODIUM 100 MG: 100 CAPSULE, LIQUID FILLED ORAL at 08:53

## 2018-02-17 RX ADMIN — VITAMIN D, TAB 1000IU (100/BT) 1000 UNITS: 25 TAB at 08:53

## 2018-02-17 RX ADMIN — PREDNISONE 10 MG: 5 TABLET ORAL at 08:54

## 2018-02-17 RX ADMIN — FAMOTIDINE 20 MG: 20 TABLET, FILM COATED ORAL at 08:53

## 2018-02-17 RX ADMIN — MECLIZINE HCL 12.5 MG: 12.5 TABLET ORAL at 08:53

## 2018-02-17 RX ADMIN — HEPARIN SODIUM 5000 UNITS: 5000 INJECTION, SOLUTION INTRAVENOUS; SUBCUTANEOUS at 05:58

## 2018-02-17 RX ADMIN — Medication 100 MG: at 08:53

## 2018-02-17 RX ADMIN — CEFEPIME HYDROCHLORIDE 1000 MG: 1 INJECTION, SOLUTION INTRAVENOUS at 08:56

## 2018-02-17 NOTE — PLAN OF CARE
Problem: Potential for Falls  Goal: Patient will remain free of falls  INTERVENTIONS:  - Assess patient frequently for physical needs  -  Identify cognitive and physical deficits and behaviors that affect risk of falls    -  [place] fall precautions as indicated by assessment   - Educate patient/family on patient safety including physical limitations  - Instruct patient to call for assistance with activity based on assessment  - Modify environment to reduce risk of injury  - Consider OT/PT consult to assist with strengthening/mobility   Outcome: Progressing      Problem: DISCHARGE PLANNING - CARE MANAGEMENT  Goal: Discharge to post-acute care or home with appropriate resources  INTERVENTIONS:  - Conduct assessment to determine patient/family and health care team treatment goals, and need for post-acute services based on payer coverage, community resources, and patient preferences, and barriers to discharge  - Address psychosocial, clinical, and financial barriers to discharge as identified in assessment in conjunction with the patient/family and health care team  - Arrange appropriate level of post-acute services according to patient's   needs and preference and payer coverage in collaboration with the physician and health care team  - Communicate with and update the patient/family, physician, and health care team regarding progress on the discharge plan  - Arrange appropriate transportation to post-acute venues    Outcome: Progressing      Problem: INFECTION - ADULT  Goal: Absence or prevention of progression during hospitalization  INTERVENTIONS:  - Assess and monitor for signs and symptoms of infection  - Monitor lab/diagnostic results  - Monitor all insertion sites, i e  indwelling lines, tubes, and drains  - Monitor endotracheal (as able) and nasal secretions for changes in amount and color  - [place] appropriate cooling/warming therapies per order  - Administer medications as ordered  - Instruct and encourage patient and family to use good hand hygiene technique  - Identify and instruct in appropriate isolation precautions for identified infection/condition   Outcome: Progressing      Problem: DISCHARGE PLANNING  Goal: Discharge to home or other facility with appropriate resources  INTERVENTIONS:  - Identify barriers to discharge w/patient and caregiver  - Arrange for needed discharge resources and transportation as appropriate  - Identify discharge learning needs (meds, wound care, etc )  - Arrange for interpretive services to assist at discharge as needed  - Refer to Case Management Department for coordinating discharge planning if the patient needs post-hospital services based on physician/advanced practitioner order or complex needs related to functional status, cognitive ability, or social support system   Outcome: Progressing      Problem: Knowledge Deficit  Goal: Patient/family/caregiver demonstrates understanding of disease process, treatment plan, medications, and discharge instructions  Complete learning assessment and assess knowledge base    Interventions:  - Provide teaching at level of understanding  - Provide teaching via preferred learning methods   Outcome: Progressing      Problem: METABOLIC, FLUID AND ELECTROLYTES - ADULT  Goal: Glucose maintained within target range  INTERVENTIONS:  - Monitor Blood Glucose as ordered  - Assess for signs and symptoms of hyperglycemia and hypoglycemia  - Administer ordered medications to maintain glucose within target range  - Assess nutritional intake and initiate nutrition service referral as needed   Outcome: Progressing    Goal: Electrolytes maintained within normal limits  INTERVENTIONS:  - Monitor labs and assess patient for signs and symptoms of electrolyte imbalances  - Administer electrolyte replacement as ordered  - Monitor response to electrolyte replacements, including repeat lab results as appropriate  - Instruct patient on fluid and nutrition as appropriate   Outcome: Progressing    Goal: Fluid balance maintained  INTERVENTIONS:  - Monitor labs and assess for signs and symptoms of volume excess or deficit  - Monitor I/O and WT  - Instruct patient on fluid and nutrition as appropriate   Outcome: Progressing      Problem: GENITOURINARY - ADULT  Goal: Maintains or returns to baseline urinary function  INTERVENTIONS:  - Assess urinary function  - Encourage oral fluids to ensure adequate hydration  - Administer IV fluids as ordered to ensure adequate hydration  - Administer ordered medications as needed  - Offer frequent toileting  - Follow urinary retention protocol if ordered   Outcome: Progressing    Goal: Absence of urinary retention  INTERVENTIONS:  - Assess patients ability to void and empty bladder  - Monitor I/O  - Bladder scan as needed  - Discuss with physician/AP medications to alleviate retention as needed  - Discuss catheterization for long term situations as appropriate   Outcome: Progressing    Goal: Urinary catheter remains patent  INTERVENTIONS:  - Assess patency of urinary catheter  - If patient has a chronic alvarez, consider changing catheter if non-functioning  - Follow guidelines for intermittent irrigation of non-functioning urinary catheter   Outcome: Progressing      Problem: SKIN/TISSUE INTEGRITY - ADULT  Goal: Skin integrity remains intact  INTERVENTIONS  - Identify patients at risk for skin breakdown  - Assess and monitor skin integrity  - Assess and monitor nutrition and hydration status  - Monitor labs (i e  albumin)  - Assess for incontinence   - Turn and reposition patient  - Assist with mobility/ambulation  - Relieve pressure over bony prominences  - Avoid friction and shearing  - Provide appropriate hygiene as needed including keeping skin clean and dry  - Evaluate need for skin moisturizer/barrier cream  - Collaborate with interdisciplinary team (i e  Nutrition, Rehabilitation, etc )   - Patient/family teaching   Outcome: Progressing    Goal: Incision(s), wounds(s) or drain site(s) healing without S/S of infection  INTERVENTIONS  - Assess and document risk factors for skin impairment   - Assess and document dressing, incision, wound bed, drain sites and surrounding tissue  - Initiate Nutrition services consult and/or wound management as needed   Outcome: Progressing    Goal: Oral mucous membranes remain intact  INTERVENTIONS  - Assess oral mucosa and hygiene practices  - Implement preventative oral hygiene regimen  - Implement oral medicated treatments as ordered  - Initiate Nutrition services referral as needed   Outcome: Progressing

## 2018-02-17 NOTE — DISCHARGE SUMMARY
Discharge Summary - [facility] Internal Medicine    Patient Information: Katty Leger 68 y o  male MRN: [id number]  Unit/Bed#: E4 -01 Encounter: [id number]    Discharging Physician / Practitioner: Fartun Durand MD  PCP: Doris Cardoso DO  Admission Date: 2/14/2018  Discharge Date: 02/17/18    Reason for Admission:  Pseudomonas urinary tract infection    Discharge Diagnoses:  Pseudomonas urinary tract infection    Principal Problem:    Pseudomonas urinary tract infection  Active Problems:    Myasthenia gravis ([facility] [address] )    Nephrolithiasis    MARSHALL (acute kidney injury) ([facility] [address] )    Type 2 diabetes mellitus with hyperglycemia [facility]    CKD (chronic kidney disease), stage III    Hypercalcemia    Dizziness  Resolved Problems:    * No resolved hospital problems  *      Consultations During Hospital Stay:  · Infectious disease    Procedures Performed:     Xr Abdomen 1 View Kub    Result Date: 2/14/2018  Narrative: ABDOMEN INDICATION: Reevaluate prior left lower pole stone  COMPARISON: Abdomen plain films from 10/27/2017  VIEWS: AP supine IMAGES:  3 FINDINGS: There is an unchanged approximately 5 mm left kidney lower pole stone  No ureteral calculi identified  Nonobstructive bowel gas pattern  Osseous structures appear intact  Impression: There is an unchanged approximately 5 mm left kidney lower pole stone  Workstation performed: [id number]         Significant Findings:     · Patient was brought in after being diagnosed with infected urine growing Pseudomonas and noting his inability to take oral quinolones because of his underlying myasthenia gravis the patient was brought in for cefepime which he received for  48 hours  He is now discharged on I to complete a course of cefepime 1 g q 12 hours for next 4 days he has had a PICC line inserted  VNA will monitor and treat    · Presentation also noted acute kidney injury that resolved with IV hydration in the setting of chronic kidney disease with creatinine time of discharge 1 06 entrance creatinine 1 66  · He has chronic nephrolithiasis with a nonobstructive stone [facility] urology will continue to follow as outpatient  · Myasthenia gravis will continue Neurology follow-up he has not had a recrudescence in over year    Incidental Findings:   · Borderline leukocytosis on presentation now resolved  · Patient admitted seen and treated for recent influenza A outbreak but no signs of residual illness at time of this presentation      Test Results Pending at Discharge (will require follow up): · None     Outpatient Tests Requested:  · None  ·     Complications:  None    Hospital Course:     Jerrell Bumpers is a 68 y o  male patient who originally presented to the hospital on 2/14/2018 due to Pseudomonas UTI  Please see above significant findings for hospital course and treatment plan    Condition at Discharge: good     Discharge Day Visit / Exam:     * Please refer to separate progress for these details *    Discharge instructions/Information to patient and family:   See after visit summary for information provided to patient and family  Provisions for Follow-Up Care:  See after visit summary for information related to follow-up care and any pertinent home health orders  Disposition:     Home with VNA Services (Reminder: Complete face to face encounter)    For Discharges to [facility] SNF:   · Not Applicable to this Patient - Not Applicable to this Patient      Discharge Statement:  I spent 56 minutes discharging the patient  This time was spent on the day of discharge  I had direct contact with the patient on the day of discharge  Greater than 50% of the total time was spent examining patient, answering all patient questions, arranging and discussing plan of care with patient as well as directly providing post-discharge instructions  Additional time then spent on discharge activities      Discharge Medications:  See after visit summary for reconciled discharge medications provided to patient and family  ** Please Note: Dragon 360 Dictation voice to text software may have been used in the creation of this document   **

## 2018-02-18 ENCOUNTER — TELEPHONE (OUTPATIENT)
Dept: FAMILY MEDICINE CLINIC | Facility: CLINIC | Age: 73
End: 2018-02-18

## 2018-02-18 DIAGNOSIS — B96.5 PSEUDOMONAS URINARY TRACT INFECTION: Primary | ICD-10-CM

## 2018-02-18 DIAGNOSIS — Z79.4 CONTROLLED TYPE 2 DIABETES MELLITUS WITH HYPOGLYCEMIA, WITH LONG-TERM CURRENT USE OF INSULIN (HCC): ICD-10-CM

## 2018-02-18 DIAGNOSIS — N18.30 CKD (CHRONIC KIDNEY DISEASE), STAGE III (HCC): ICD-10-CM

## 2018-02-18 DIAGNOSIS — E11.649 CONTROLLED TYPE 2 DIABETES MELLITUS WITH HYPOGLYCEMIA, WITH LONG-TERM CURRENT USE OF INSULIN (HCC): ICD-10-CM

## 2018-02-18 DIAGNOSIS — Z79.2 RECEIVING INTRAVENOUS ANTIBIOTIC TREATMENT AT HOME: ICD-10-CM

## 2018-02-18 DIAGNOSIS — N39.0 PSEUDOMONAS URINARY TRACT INFECTION: Primary | ICD-10-CM

## 2018-02-18 DIAGNOSIS — E11.65 TYPE 2 DIABETES MELLITUS WITH HYPERGLYCEMIA, WITH LONG-TERM CURRENT USE OF INSULIN (HCC): ICD-10-CM

## 2018-02-18 DIAGNOSIS — G70.00 MYASTHENIA GRAVIS (HCC): ICD-10-CM

## 2018-02-18 DIAGNOSIS — Z79.4 TYPE 2 DIABETES MELLITUS WITH HYPERGLYCEMIA, WITH LONG-TERM CURRENT USE OF INSULIN (HCC): ICD-10-CM

## 2018-02-18 NOTE — TELEPHONE ENCOUNTER
Received call from answering service that visiting nurse at home of pt and bp elevated- call put through to nurse, Stanley Villagomez, who reported that pt just home from hospital Friday with IV line for continued IV abx for his UTI , and bp was 150/90 yesterday and about the same today  Pt has never had high bp, he feels well, and reports that his bp was normal in hospital; also was normal here on 2/12 before admission  He has DM II requiring insulin, and also c/o  has been having low blood sugars- was 52 this morning and 78 yesterday morning; states his regular insulin was held in hospital because sugars were running low there as well  He treated both lows with juice and has been holding the Humalog; he currently tests bid  Pt denied that he is feeling anxious at all  Nurse reported that she would be back tonight for abx dose as well as tomorrow, and I advised monitoring of bp's  Pt asked if he needed to obtain the urinalysis that was orderd before admission, and I advised to wait on that test, given that he has had only 4-5 days of IV abx  He will be scheduled for his hospital f/u in office this week   Nurse and pt understand instructions

## 2018-02-19 ENCOUNTER — LAB (OUTPATIENT)
Dept: LAB | Facility: CLINIC | Age: 73
End: 2018-02-19
Payer: MEDICARE

## 2018-02-19 ENCOUNTER — TRANSITIONAL CARE MANAGEMENT (OUTPATIENT)
Dept: FAMILY MEDICINE CLINIC | Facility: CLINIC | Age: 73
End: 2018-02-19

## 2018-02-19 DIAGNOSIS — B96.5 CYSTITIS DUE TO PSEUDOMONAS: ICD-10-CM

## 2018-02-19 DIAGNOSIS — B37.2 INTERTRIGINOUS CANDIDIASIS: Primary | ICD-10-CM

## 2018-02-19 DIAGNOSIS — N30.80 CYSTITIS DUE TO PSEUDOMONAS: ICD-10-CM

## 2018-02-19 PROCEDURE — 87086 URINE CULTURE/COLONY COUNT: CPT

## 2018-02-19 RX ORDER — CLOTRIMAZOLE 1 %
CREAM (GRAM) TOPICAL 2 TIMES DAILY
Qty: 30 G | Refills: 1 | Status: SHIPPED | OUTPATIENT
Start: 2018-02-19 | End: 2018-11-03 | Stop reason: ALTCHOICE

## 2018-02-19 RX ORDER — CLOTRIMAZOLE 1 %
CREAM (GRAM) TOPICAL 2 TIMES DAILY
Qty: 30 G | Refills: 1 | Status: SHIPPED | OUTPATIENT
Start: 2018-02-19 | End: 2018-02-19 | Stop reason: SDUPTHER

## 2018-02-19 RX ORDER — CEFEPIME HYDROCHLORIDE 2 G/1
INJECTION, POWDER, FOR SOLUTION INTRAVENOUS
COMMUNITY
Start: 2018-02-16 | End: 2018-04-11 | Stop reason: ALTCHOICE

## 2018-02-20 LAB — BACTERIA UR CULT: NORMAL

## 2018-02-23 ENCOUNTER — OFFICE VISIT (OUTPATIENT)
Dept: FAMILY MEDICINE CLINIC | Facility: CLINIC | Age: 73
End: 2018-02-23
Payer: MEDICARE

## 2018-02-23 ENCOUNTER — APPOINTMENT (OUTPATIENT)
Dept: LAB | Facility: CLINIC | Age: 73
End: 2018-02-23
Payer: MEDICARE

## 2018-02-23 VITALS
HEART RATE: 78 BPM | SYSTOLIC BLOOD PRESSURE: 146 MMHG | TEMPERATURE: 97.6 F | OXYGEN SATURATION: 99 % | WEIGHT: 184.6 LBS | BODY MASS INDEX: 27.34 KG/M2 | DIASTOLIC BLOOD PRESSURE: 80 MMHG | HEIGHT: 69 IN

## 2018-02-23 DIAGNOSIS — G70.00 MYASTHENIA GRAVIS (HCC): ICD-10-CM

## 2018-02-23 DIAGNOSIS — R14.0 ABDOMINAL DISTENTION, NON-GASEOUS: ICD-10-CM

## 2018-02-23 DIAGNOSIS — R63.5 WEIGHT GAIN, ABNORMAL: ICD-10-CM

## 2018-02-23 DIAGNOSIS — N20.0 NEPHROLITHIASIS: ICD-10-CM

## 2018-02-23 DIAGNOSIS — N18.30 CKD (CHRONIC KIDNEY DISEASE), STAGE III (HCC): ICD-10-CM

## 2018-02-23 DIAGNOSIS — N39.0 PSEUDOMONAS URINARY TRACT INFECTION: Primary | ICD-10-CM

## 2018-02-23 DIAGNOSIS — E11.649 CONTROLLED TYPE 2 DIABETES MELLITUS WITH HYPOGLYCEMIA, WITH LONG-TERM CURRENT USE OF INSULIN (HCC): ICD-10-CM

## 2018-02-23 DIAGNOSIS — E83.52 HYPERCALCEMIA: ICD-10-CM

## 2018-02-23 DIAGNOSIS — N17.9 AKI (ACUTE KIDNEY INJURY) (HCC): ICD-10-CM

## 2018-02-23 DIAGNOSIS — I10 ESSENTIAL HYPERTENSION: ICD-10-CM

## 2018-02-23 DIAGNOSIS — B96.5 PSEUDOMONAS URINARY TRACT INFECTION: Primary | ICD-10-CM

## 2018-02-23 DIAGNOSIS — Z79.4 CONTROLLED TYPE 2 DIABETES MELLITUS WITH HYPOGLYCEMIA, WITH LONG-TERM CURRENT USE OF INSULIN (HCC): ICD-10-CM

## 2018-02-23 DIAGNOSIS — E11.649 HYPOGLYCEMIA ASSOCIATED WITH TYPE 2 DIABETES MELLITUS (HCC): ICD-10-CM

## 2018-02-23 DIAGNOSIS — B37.9 ANTIBIOTIC-INDUCED YEAST INFECTION: ICD-10-CM

## 2018-02-23 DIAGNOSIS — T36.95XA ANTIBIOTIC-INDUCED YEAST INFECTION: ICD-10-CM

## 2018-02-23 PROBLEM — R42 DIZZINESS: Status: RESOLVED | Noted: 2018-02-14 | Resolved: 2018-02-23

## 2018-02-23 PROBLEM — Z79.2 RECEIVING INTRAVENOUS ANTIBIOTIC TREATMENT AT HOME: Status: RESOLVED | Noted: 2018-02-18 | Resolved: 2018-02-23

## 2018-02-23 LAB
ALBUMIN SERPL BCP-MCNC: 3.7 G/DL (ref 3.5–5)
ALP SERPL-CCNC: 34 U/L (ref 46–116)
ALT SERPL W P-5'-P-CCNC: 49 U/L (ref 12–78)
ANION GAP SERPL CALCULATED.3IONS-SCNC: 6 MMOL/L (ref 4–13)
AST SERPL W P-5'-P-CCNC: 33 U/L (ref 5–45)
BASOPHILS # BLD AUTO: 0.03 THOUSANDS/ΜL (ref 0–0.1)
BASOPHILS NFR BLD AUTO: 0 % (ref 0–1)
BILIRUB SERPL-MCNC: 0.49 MG/DL (ref 0.2–1)
BUN SERPL-MCNC: 21 MG/DL (ref 5–25)
CALCIUM SERPL-MCNC: 9.4 MG/DL (ref 8.3–10.1)
CHLORIDE SERPL-SCNC: 106 MMOL/L (ref 100–108)
CO2 SERPL-SCNC: 28 MMOL/L (ref 21–32)
CREAT SERPL-MCNC: 1.31 MG/DL (ref 0.6–1.3)
EOSINOPHIL # BLD AUTO: 0.03 THOUSAND/ΜL (ref 0–0.61)
EOSINOPHIL NFR BLD AUTO: 0 % (ref 0–6)
ERYTHROCYTE [DISTWIDTH] IN BLOOD BY AUTOMATED COUNT: 13.6 % (ref 11.6–15.1)
EST. AVERAGE GLUCOSE BLD GHB EST-MCNC: 128 MG/DL
GFR SERPL CREATININE-BSD FRML MDRD: 54 ML/MIN/1.73SQ M
GLUCOSE SERPL-MCNC: 116 MG/DL (ref 65–140)
HBA1C MFR BLD: 6.1 % (ref 4.2–6.3)
HCT VFR BLD AUTO: 44 % (ref 36.5–49.3)
HGB BLD-MCNC: 15.2 G/DL (ref 12–17)
LYMPHOCYTES # BLD AUTO: 1.13 THOUSANDS/ΜL (ref 0.6–4.47)
LYMPHOCYTES NFR BLD AUTO: 12 % (ref 14–44)
MCH RBC QN AUTO: 32 PG (ref 26.8–34.3)
MCHC RBC AUTO-ENTMCNC: 34.5 G/DL (ref 31.4–37.4)
MCV RBC AUTO: 93 FL (ref 82–98)
MONOCYTES # BLD AUTO: 0.5 THOUSAND/ΜL (ref 0.17–1.22)
MONOCYTES NFR BLD AUTO: 5 % (ref 4–12)
NEUTROPHILS # BLD AUTO: 8.01 THOUSANDS/ΜL (ref 1.85–7.62)
NEUTS SEG NFR BLD AUTO: 83 % (ref 43–75)
NRBC BLD AUTO-RTO: 0 /100 WBCS
PLATELET # BLD AUTO: 310 THOUSANDS/UL (ref 149–390)
PMV BLD AUTO: 10.1 FL (ref 8.9–12.7)
POTASSIUM SERPL-SCNC: 4.8 MMOL/L (ref 3.5–5.3)
PROT SERPL-MCNC: 7.4 G/DL (ref 6.4–8.2)
RBC # BLD AUTO: 4.75 MILLION/UL (ref 3.88–5.62)
SODIUM SERPL-SCNC: 140 MMOL/L (ref 136–145)
WBC # BLD AUTO: 9.79 THOUSAND/UL (ref 4.31–10.16)

## 2018-02-23 PROCEDURE — 83036 HEMOGLOBIN GLYCOSYLATED A1C: CPT

## 2018-02-23 PROCEDURE — 36415 COLL VENOUS BLD VENIPUNCTURE: CPT

## 2018-02-23 PROCEDURE — 85025 COMPLETE CBC W/AUTO DIFF WBC: CPT

## 2018-02-23 PROCEDURE — 99496 TRANSJ CARE MGMT HIGH F2F 7D: CPT | Performed by: FAMILY MEDICINE

## 2018-02-23 PROCEDURE — 80053 COMPREHEN METABOLIC PANEL: CPT

## 2018-02-23 RX ORDER — LANCETS
EACH MISCELLANEOUS
Refills: 0 | COMMUNITY
Start: 2018-02-19 | End: 2019-06-04 | Stop reason: SDUPTHER

## 2018-02-23 RX ORDER — BLOOD SUGAR DIAGNOSTIC
STRIP MISCELLANEOUS
Refills: 0 | COMMUNITY
Start: 2018-02-19 | End: 2018-05-29 | Stop reason: SDUPTHER

## 2018-02-23 RX ORDER — FLUCONAZOLE 150 MG/1
150 TABLET ORAL ONCE
Qty: 1 TABLET | Refills: 0 | Status: SHIPPED | OUTPATIENT
Start: 2018-02-23 | End: 2018-02-23

## 2018-02-23 NOTE — PROGRESS NOTES
Transition of Care  Follow-up After Hospitalization    Kelly Yanes 68 y o  male   Date:  2/25/2018    Date and time hospital follow up call was made:  2/19/2018  9:42 AM  Hospital care reviewed:  Records reviewed  Patient was hopsitalized at:  [facility]  Date of admission:  2/14/18  Date of discharge:  2/17/18  Diagnosis:  UTI  Were the patients medicaitons reviewed and updated:  Yes  Current symptoms:  Swelling (Comment: FREQUENT URINATION AND ELEVATED BP)  Swelling location:  ANKLES  Post hospital issues:  None  Should patient be enrolled in anticoag monitoring?:  No  Scheduled for follow up?:  Yes  I have advised the patient to call PCP with any new or worsening symptoms (please type in name along with any credentials):  Lamonte Prader, MA  Living Arrangements:  Spouse or Significiant other, Family members  Support System:  Home care staff  The type of support provided:  Other (comment)  Are you recieving home care services:  Yes  Types of home care services:  Nurse visit  Counseling:  Patient       Hospital records were reviewed  Medications upon discharge reviewed/updated  Discharge Disposition:    Follow up visits with other specialists:       Assessment and Plan:    Emily Nolasco was seen today for transition of care management  Diagnoses and all orders for this visit:    Pseudomonas urinary tract infection    MARSHALL (acute kidney injury) ([facility] [address] )  -     CBC and differential; Future    CKD (chronic kidney disease), stage III  -     US abdomen complete; Future  -     Comprehensive metabolic panel; Future    Nephrolithiasis  -     US abdomen complete; Future  -     Comprehensive metabolic panel; Future    Hypercalcemia  -     Comprehensive metabolic panel; Future  -     CBC and differential; Future    Myasthenia gravis (HCC)  -     Comprehensive metabolic panel;  Future  -     CBC and differential; Future    Controlled type 2 diabetes mellitus with hypoglycemia, with long-term current use of insulin [facility])  -     Comprehensive metabolic panel; Future  -     CBC and differential; Future  -     Hemoglobin A1c; Future    Abdominal distention, non-gaseous  -     US abdomen complete; Future  -     Comprehensive metabolic panel; Future    Antibiotic-induced yeast infection  -     fluconazole (DIFLUCAN) 150 mg tablet; Take 1 tablet (150 mg total) by mouth once for 1 dose    Hypoglycemia associated with type 2 diabetes mellitus ([address] )    Weight gain, abnormal    Essential hypertension            HPI:  Wednesday (2days ago) nurse had called that heart rate was up-130, PICC line had been removed, and pt states had no problem with the PICC line, but noticed yesterday that right arm below PICC line site has area of swelling or lump, no pain or tenderness, and moves arm without problem, no bruising  Pt reports that the day that he had tachycardia, his daughter, who is drug-addict per pt, was at his home  Still feeling tired  Has not yet returned to work  Has gained significant weight in hospital, and has been noticing right  leg swelling- reconciling med list, he was not given any HCTZ in hospital due to MARSHALL, and has not been on any since d/c  bp was also elevated over the weekend per visiting nurse- 160 range  Pt has been having episodes of hypoglycemia- in hospital in  50's few timesper pt, also once or twice at home - takes juice to treat low  States had yeast infection from abx - head of penis- was given topical cream in hosp, but still feels a little irritated and slightly red, is taking acidophilus tabs  Repeat urine culture was negative, pt states has not yet scheduled f/u appt with his urologist to discuss removal of chronic stone              ROS: Review of Systems   Constitutional: Positive for fatigue and unexpected weight change  Negative for activity change, appetite change, chills, diaphoresis and fever  HENT: Negative  Eyes: Negative  Respiratory: Negative  Cardiovascular: Positive for leg swelling  Negative for chest pain and palpitations  Gastrointestinal: Negative  Endocrine: Negative for polydipsia, polyphagia and polyuria  Genitourinary: Negative for decreased urine volume, difficulty urinating, discharge, dysuria, enuresis, flank pain, frequency, genital sores, hematuria, penile swelling and urgency  Musculoskeletal: Negative for back pain, gait problem and joint swelling  Skin: Negative for color change, pallor and rash  Neurological: Positive for light-headedness  Negative for dizziness, tremors, seizures, syncope, speech difficulty, weakness, numbness and headaches  States has not seen neurologist for just over a year   Hematological: Negative  Psychiatric/Behavioral: Negative          Past Medical History:   Diagnosis Date    Anxiety     Cervical spinal stenosis     Diabetes mellitus (HCC)     type 2    Hx of chronic ulcerative colitis     Hx of diverticulitis of colon     Hyperlipidemia     Hypertension     Kidney stone     Myasthenia gravis ([facility] [address] )     Neuropathy     RBBB     Renal disorder     Sleep apnea     UTI (urinary tract infection)        Past Surgical History:   Procedure Laterality Date    COLECTOMY      partial sigmoid    COLECTOMY LAPAROSCOPIC      partial sigmoid colectomy    COLON SURGERY      lap  partial colectomy sigmoid    COLONOSCOPY      LITHOTRIPSY      OTHER SURGICAL HISTORY      parotid bx    SALIVARY GLAND SURGERY      biopsy       Social History     Social History    Marital status: /Civil Union     Spouse name: N/A    Number of children: N/A    Years of education: N/A     Occupational History    cleans doctors houses      Social History Main Topics    Smoking status: Never Smoker    Smokeless tobacco: Never Used    Alcohol use No    Drug use: No    Sexual activity: Not Asked     Other Topics Concern    None     Social History Narrative    No caffeine use    Uses seat belts       Family History   Problem Relation Age of Onset    Arthritis Mother     Diabetes Mother     Hypertension Mother     Osteoporosis Mother     Depression Father     Cancer Neg Hx     Heart disease Neg Hx        Allergies   Allergen Reactions    Levaquin [Levofloxacin]      Due to MG dx    Quinolones      Due to MG dx    Antihistamines, Chlorpheniramine-Type      Difficulty urinating    Benzodiazepines     Chlorpheniramine     Diphenhydramine     Erythromycin     Iodine     Loratadine     Other      Muscle relaxers d/t myasthenia gravis-they close my airway    Sulfa Antibiotics     Tetracyclines & Related          Current Outpatient Prescriptions:     ACCU-CHEK FASTCLIX LANCETS MISC, TEST twice a day, Disp: , Rfl: 0    ACCU-CHEK GUIDE test strip, TEST twice a day, Disp: , Rfl: 0    cholecalciferol (VITAMIN D3) 1,000 units tablet, Take 1 tablet by mouth daily, Disp: , Rfl:     clotrimazole (LOTRIMIN) 1 % cream, Apply topically 2 (two) times a day, Disp: 30 g, Rfl: 1    famotidine (PEPCID) 20 mg tablet, Take 20 mg by mouth daily, Disp: , Rfl:     insulin aspart (NovoLOG) 100 units/mL injection, Inject 3 Units under the skin 3 (three) times a day before meals for 30 days, Disp: 270 Units, Rfl: 0    insulin glargine (LANTUS SOLOSTAR) injection pen 100 units/mL, Inject 12 Units under the skin daily at bedtime, Disp: , Rfl:     meclizine (ANTIVERT) 12 5 MG tablet, Take 12 5 mg by mouth daily  , Disp: , Rfl:     Potassium 99 MG TABS, Take 2 tablets by mouth daily, Disp: , Rfl:     predniSONE 10 mg tablet, Take 10 mg by mouth daily  , Disp: , Rfl:     pyridoxine (VITAMIN B6) 100 mg tablet, Take 100 mg by mouth daily  , Disp: , Rfl:     cefepime (MAXIPIME) 2 g injection, , Disp: , Rfl:     hydrochlorothiazide (HYDRODIURIL) 25 mg tablet, Take 25 mg by mouth daily  , Disp: , Rfl:       Physical Exam:  /80 (BP Location: Left arm, Patient Position: Sitting, Cuff Size: Standard)   Pulse 78   Temp 97 6 °F (36 4 °C) (Tympanic)   Ht 5' 9" (1 753 m)   Wt 83 7 kg (184 lb 9 6 oz)   SpO2 99%   BMI 27 26 kg/m²     Physical Exam   Constitutional: He is oriented to person, place, and time  He appears well-developed and well-nourished  He is cooperative  Non-toxic appearance  He does not appear ill  No distress  HENT:   Head: Normocephalic and atraumatic  Mouth/Throat: Uvula is midline, oropharynx is clear and moist and mucous membranes are normal    Eyes: Conjunctivae are normal  Pupils are equal, round, and reactive to light  Neck: Neck supple  No JVD present  No thyroid mass and no thyromegaly present  Cardiovascular: Normal rate, regular rhythm, normal heart sounds and normal pulses  Pulmonary/Chest: Effort normal and breath sounds normal    Abdominal: Soft  He exhibits distension  He exhibits no fluid wave, no abdominal bruit, no ascites and no mass  Bowel sounds are decreased  There is no hepatosplenomegaly  There is no tenderness  There is no CVA tenderness  Musculoskeletal:        Arms:  Lymphadenopathy:     He has no cervical adenopathy  Neurological: He is alert and oriented to person, place, and time  He has normal strength  Skin: Skin is warm and dry  He is not diaphoretic  Psychiatric: He has a normal mood and affect  His behavior is normal    Nursing note and vitals reviewed            Labs:  Lab Results   Component Value Date    WBC 9 79 02/23/2018    HGB 15 2 02/23/2018    HCT 44 0 02/23/2018    MCV 93 02/23/2018     02/23/2018     Lab Results   Component Value Date     02/23/2018    K 4 8 02/23/2018     02/23/2018    CO2 28 02/23/2018    ANIONGAP 6 02/23/2018    BUN 21 02/23/2018    CREATININE 1 31 (H) 02/23/2018    GLUCOSE 116 02/23/2018    GLUF 120 (H) 03/29/2017    CALCIUM 9 4 02/23/2018    CORRECTEDCA 10 5 (H) 03/29/2017    AST 33 02/23/2018    ALT 49 02/23/2018    ALKPHOS 34 (L) 02/23/2018    PROT 7 4 02/23/2018    BILITOT 0 49 02/23/2018    EGFR 54 02/23/2018

## 2018-02-25 PROBLEM — I10 ESSENTIAL HYPERTENSION: Status: ACTIVE | Noted: 2018-02-25

## 2018-02-25 PROBLEM — T36.95XA ANTIBIOTIC-INDUCED YEAST INFECTION: Status: ACTIVE | Noted: 2018-02-25

## 2018-02-25 PROBLEM — B37.9 ANTIBIOTIC-INDUCED YEAST INFECTION: Status: ACTIVE | Noted: 2018-02-25

## 2018-02-25 PROBLEM — R63.5 WEIGHT GAIN, ABNORMAL: Status: ACTIVE | Noted: 2018-02-25

## 2018-02-25 PROBLEM — E11.649 HYPOGLYCEMIA ASSOCIATED WITH TYPE 2 DIABETES MELLITUS (HCC): Status: ACTIVE | Noted: 2018-02-25

## 2018-02-25 NOTE — PATIENT INSTRUCTIONS
Likely weight gain due to fluid overload- bp elevated, leg swelling sx, though will recheck labs before resuming HCTZ, check abd u/s given distention on exam today, no ascites signs on exam  Advised call urolog to schedule f/u appt   Adjustment of insulin as warranted

## 2018-03-01 ENCOUNTER — HOSPITAL ENCOUNTER (OUTPATIENT)
Dept: ULTRASOUND IMAGING | Facility: HOSPITAL | Age: 73
Discharge: HOME | End: 2018-03-01
Payer: MEDICARE

## 2018-03-01 ENCOUNTER — OFFICE VISIT (OUTPATIENT)
Dept: FAMILY MEDICINE CLINIC | Facility: CLINIC | Age: 73
End: 2018-03-01
Payer: MEDICARE

## 2018-03-01 VITALS
WEIGHT: 182 LBS | SYSTOLIC BLOOD PRESSURE: 130 MMHG | HEIGHT: 69 IN | OXYGEN SATURATION: 96 % | BODY MASS INDEX: 26.96 KG/M2 | HEART RATE: 98 BPM | DIASTOLIC BLOOD PRESSURE: 80 MMHG | TEMPERATURE: 98.5 F

## 2018-03-01 DIAGNOSIS — R93.422 ABNORMAL FINDING ON DIAGNOSTIC IMAGING OF LEFT KIDNEY: Primary | ICD-10-CM

## 2018-03-01 DIAGNOSIS — R14.0 ABDOMINAL DISTENTION, NON-GASEOUS: ICD-10-CM

## 2018-03-01 DIAGNOSIS — N20.0 NEPHROLITHIASIS: ICD-10-CM

## 2018-03-01 DIAGNOSIS — N18.30 CKD (CHRONIC KIDNEY DISEASE), STAGE III (HCC): ICD-10-CM

## 2018-03-01 PROCEDURE — 76700 US EXAM ABDOM COMPLETE: CPT

## 2018-03-01 PROCEDURE — 99213 OFFICE O/P EST LOW 20 MIN: CPT | Performed by: FAMILY MEDICINE

## 2018-03-06 DIAGNOSIS — R14.0 ABDOMINAL DISTENTION, NON-GASEOUS: ICD-10-CM

## 2018-03-06 DIAGNOSIS — R93.422 ABNORMAL FINDING ON DIAGNOSTIC IMAGING OF LEFT KIDNEY: Primary | ICD-10-CM

## 2018-03-06 DIAGNOSIS — N20.0 NEPHROLITHIASIS: ICD-10-CM

## 2018-03-06 DIAGNOSIS — N39.0 PSEUDOMONAS URINARY TRACT INFECTION: ICD-10-CM

## 2018-03-06 DIAGNOSIS — B96.5 PSEUDOMONAS URINARY TRACT INFECTION: ICD-10-CM

## 2018-03-06 DIAGNOSIS — N18.30 CKD (CHRONIC KIDNEY DISEASE), STAGE III (HCC): ICD-10-CM

## 2018-03-17 ENCOUNTER — HOSPITAL ENCOUNTER (OUTPATIENT)
Dept: MRI IMAGING | Facility: HOSPITAL | Age: 73
Discharge: HOME | End: 2018-03-17
Payer: MEDICARE

## 2018-03-17 DIAGNOSIS — R14.0 ABDOMINAL DISTENTION, NON-GASEOUS: ICD-10-CM

## 2018-03-17 DIAGNOSIS — N20.0 NEPHROLITHIASIS: ICD-10-CM

## 2018-03-17 DIAGNOSIS — N39.0 PSEUDOMONAS URINARY TRACT INFECTION: ICD-10-CM

## 2018-03-17 DIAGNOSIS — B96.5 PSEUDOMONAS URINARY TRACT INFECTION: ICD-10-CM

## 2018-03-17 DIAGNOSIS — N18.30 CKD (CHRONIC KIDNEY DISEASE), STAGE III (HCC): ICD-10-CM

## 2018-03-17 DIAGNOSIS — R93.422 ABNORMAL FINDING ON DIAGNOSTIC IMAGING OF LEFT KIDNEY: ICD-10-CM

## 2018-03-17 PROCEDURE — 74181 MRI ABDOMEN W/O CONTRAST: CPT

## 2018-03-20 ENCOUNTER — TELEPHONE (OUTPATIENT)
Dept: FAMILY MEDICINE CLINIC | Facility: CLINIC | Age: 73
End: 2018-03-20

## 2018-03-20 NOTE — TELEPHONE ENCOUNTER
Patient called stating he is very happy with Dr Janeth Zurita       She is brilliant! We should be receiving a consult report from Dr Janeth Zurita

## 2018-03-21 ENCOUNTER — TRANSCRIBE ORDERS (OUTPATIENT)
Dept: LAB | Facility: CLINIC | Age: 73
End: 2018-03-21

## 2018-03-21 DIAGNOSIS — N17.9 ACUTE RENAL FAILURE, UNSPECIFIED ACUTE RENAL FAILURE TYPE (HCC): Primary | ICD-10-CM

## 2018-03-23 ENCOUNTER — LAB (OUTPATIENT)
Dept: LAB | Facility: CLINIC | Age: 73
End: 2018-03-23
Payer: MEDICARE

## 2018-03-23 DIAGNOSIS — N17.9 ACUTE RENAL FAILURE, UNSPECIFIED ACUTE RENAL FAILURE TYPE (HCC): ICD-10-CM

## 2018-03-23 LAB
ALBUMIN SERPL BCP-MCNC: 4 G/DL (ref 3.5–5)
ALP SERPL-CCNC: 37 U/L (ref 46–116)
ALT SERPL W P-5'-P-CCNC: 37 U/L (ref 12–78)
ANION GAP SERPL CALCULATED.3IONS-SCNC: 3 MMOL/L (ref 4–13)
AST SERPL W P-5'-P-CCNC: 26 U/L (ref 5–45)
BACTERIA UR QL AUTO: ABNORMAL /HPF
BASOPHILS # BLD AUTO: 0.1 THOUSANDS/ΜL (ref 0–0.1)
BASOPHILS NFR BLD AUTO: 1 % (ref 0–1)
BILIRUB SERPL-MCNC: 0.65 MG/DL (ref 0.2–1)
BILIRUB UR QL STRIP: NEGATIVE
BUN SERPL-MCNC: 21 MG/DL (ref 5–25)
CA-I BLD-SCNC: 1.23 MMOL/L (ref 1.12–1.32)
CALCIUM SERPL-MCNC: 9.7 MG/DL (ref 8.3–10.1)
CAOX CRY URNS QL MICRO: ABNORMAL /HPF
CHLORIDE SERPL-SCNC: 105 MMOL/L (ref 100–108)
CLARITY UR: ABNORMAL
CO2 SERPL-SCNC: 31 MMOL/L (ref 21–32)
COLOR UR: YELLOW
CREAT SERPL-MCNC: 1.27 MG/DL (ref 0.6–1.3)
CREAT UR-MCNC: 133 MG/DL
EOSINOPHIL # BLD AUTO: 0.22 THOUSAND/ΜL (ref 0–0.61)
EOSINOPHIL NFR BLD AUTO: 2 % (ref 0–6)
ERYTHROCYTE [DISTWIDTH] IN BLOOD BY AUTOMATED COUNT: 13.5 % (ref 11.6–15.1)
GFR SERPL CREATININE-BSD FRML MDRD: 56 ML/MIN/1.73SQ M
GLUCOSE P FAST SERPL-MCNC: 90 MG/DL (ref 65–99)
GLUCOSE UR STRIP-MCNC: NEGATIVE MG/DL
HCT VFR BLD AUTO: 46.5 % (ref 36.5–49.3)
HGB BLD-MCNC: 15.9 G/DL (ref 12–17)
HGB UR QL STRIP.AUTO: NEGATIVE
HYALINE CASTS #/AREA URNS LPF: ABNORMAL /LPF
KETONES UR STRIP-MCNC: NEGATIVE MG/DL
LDLC SERPL DIRECT ASSAY-MCNC: 133 MG/DL (ref 0–100)
LEUKOCYTE ESTERASE UR QL STRIP: ABNORMAL
LYMPHOCYTES # BLD AUTO: 3.32 THOUSANDS/ΜL (ref 0.6–4.47)
LYMPHOCYTES NFR BLD AUTO: 33 % (ref 14–44)
MCH RBC QN AUTO: 31.8 PG (ref 26.8–34.3)
MCHC RBC AUTO-ENTMCNC: 34.2 G/DL (ref 31.4–37.4)
MCV RBC AUTO: 93 FL (ref 82–98)
MONOCYTES # BLD AUTO: 1.23 THOUSAND/ΜL (ref 0.17–1.22)
MONOCYTES NFR BLD AUTO: 12 % (ref 4–12)
NEUTROPHILS # BLD AUTO: 5.28 THOUSANDS/ΜL (ref 1.85–7.62)
NEUTS SEG NFR BLD AUTO: 52 % (ref 43–75)
NITRITE UR QL STRIP: NEGATIVE
NON-SQ EPI CELLS URNS QL MICRO: ABNORMAL /HPF
NRBC BLD AUTO-RTO: 0 /100 WBCS
PH UR STRIP.AUTO: 6 [PH] (ref 4.5–8)
PLATELET # BLD AUTO: 308 THOUSANDS/UL (ref 149–390)
PMV BLD AUTO: 10.2 FL (ref 8.9–12.7)
POTASSIUM SERPL-SCNC: 4.2 MMOL/L (ref 3.5–5.3)
PROT SERPL-MCNC: 7.7 G/DL (ref 6.4–8.2)
PROT UR STRIP-MCNC: NEGATIVE MG/DL
RBC # BLD AUTO: 5 MILLION/UL (ref 3.88–5.62)
RBC #/AREA URNS AUTO: ABNORMAL /HPF
SODIUM 24H UR-SCNC: 120 MOL/L
SODIUM SERPL-SCNC: 139 MMOL/L (ref 136–145)
SP GR UR STRIP.AUTO: 1.02 (ref 1–1.03)
URATE SERPL-MCNC: 6.7 MG/DL (ref 4.2–8)
UROBILINOGEN UR QL STRIP.AUTO: 1 E.U./DL
WBC # BLD AUTO: 10.21 THOUSAND/UL (ref 4.31–10.16)
WBC #/AREA URNS AUTO: ABNORMAL /HPF

## 2018-03-23 PROCEDURE — 87186 SC STD MICRODIL/AGAR DIL: CPT

## 2018-03-23 PROCEDURE — 84550 ASSAY OF BLOOD/URIC ACID: CPT

## 2018-03-23 PROCEDURE — 82330 ASSAY OF CALCIUM: CPT

## 2018-03-23 PROCEDURE — 36415 COLL VENOUS BLD VENIPUNCTURE: CPT

## 2018-03-23 PROCEDURE — 85025 COMPLETE CBC W/AUTO DIFF WBC: CPT

## 2018-03-23 PROCEDURE — 87077 CULTURE AEROBIC IDENTIFY: CPT

## 2018-03-23 PROCEDURE — 80053 COMPREHEN METABOLIC PANEL: CPT

## 2018-03-23 PROCEDURE — 81001 URINALYSIS AUTO W/SCOPE: CPT

## 2018-03-23 PROCEDURE — 82570 ASSAY OF URINE CREATININE: CPT

## 2018-03-23 PROCEDURE — 83721 ASSAY OF BLOOD LIPOPROTEIN: CPT

## 2018-03-23 PROCEDURE — 87086 URINE CULTURE/COLONY COUNT: CPT

## 2018-03-23 PROCEDURE — 84300 ASSAY OF URINE SODIUM: CPT

## 2018-03-25 LAB — BACTERIA UR CULT: ABNORMAL

## 2018-03-27 LAB
LEFT EYE DIABETIC RETINOPATHY: NORMAL
RIGHT EYE DIABETIC RETINOPATHY: NORMAL
SEVERITY (EYE EXAM): NORMAL

## 2018-04-02 ENCOUNTER — TELEPHONE (OUTPATIENT)
Dept: FAMILY MEDICINE CLINIC | Facility: CLINIC | Age: 73
End: 2018-04-02

## 2018-04-02 RX ORDER — CEPHALEXIN 250 MG/1
250 CAPSULE ORAL 3 TIMES DAILY
Refills: 0 | COMMUNITY
Start: 2018-03-28 | End: 2018-06-20

## 2018-04-02 NOTE — TELEPHONE ENCOUNTER
Farrukh Munoz from [place] [id number] is faxing a tier exception for novalog and lantus to be filled out by Dr Jagdish Munoz stated they are both a level 3 with an estimated copay of $87 for 3 months  Farrukh Munoz did state that most tier exceptions are denied but she was more then willing to fax the request to me for Dr Jagdish Kearns to fill out

## 2018-04-04 PROBLEM — R17 ELEVATED BILIRUBIN: Status: ACTIVE | Noted: 2017-01-05

## 2018-04-04 PROBLEM — N26.1 KIDNEY ATROPHY: Status: ACTIVE | Noted: 2017-03-24

## 2018-04-05 ENCOUNTER — TELEPHONE (OUTPATIENT)
Dept: FAMILY MEDICINE CLINIC | Facility: CLINIC | Age: 73
End: 2018-04-05

## 2018-04-05 ENCOUNTER — OFFICE VISIT (OUTPATIENT)
Dept: FAMILY MEDICINE CLINIC | Facility: CLINIC | Age: 73
End: 2018-04-05
Payer: MEDICARE

## 2018-04-05 ENCOUNTER — TELEPHONE (OUTPATIENT)
Dept: UROLOGY | Facility: CLINIC | Age: 73
End: 2018-04-05

## 2018-04-05 ENCOUNTER — APPOINTMENT (OUTPATIENT)
Dept: LAB | Facility: CLINIC | Age: 73
End: 2018-04-05
Payer: MEDICARE

## 2018-04-05 VITALS
WEIGHT: 185 LBS | RESPIRATION RATE: 17 BRPM | HEIGHT: 68 IN | SYSTOLIC BLOOD PRESSURE: 136 MMHG | BODY MASS INDEX: 28.04 KG/M2 | DIASTOLIC BLOOD PRESSURE: 70 MMHG | OXYGEN SATURATION: 98 % | TEMPERATURE: 97.7 F | HEART RATE: 85 BPM

## 2018-04-05 DIAGNOSIS — N39.0 PSEUDOMONAS URINARY TRACT INFECTION: ICD-10-CM

## 2018-04-05 DIAGNOSIS — B96.5 PSEUDOMONAS URINARY TRACT INFECTION: ICD-10-CM

## 2018-04-05 DIAGNOSIS — N39.0 PSEUDOMONAS URINARY TRACT INFECTION: Primary | ICD-10-CM

## 2018-04-05 DIAGNOSIS — B96.5 PSEUDOMONAS URINARY TRACT INFECTION: Primary | ICD-10-CM

## 2018-04-05 DIAGNOSIS — K21.9 GASTROESOPHAGEAL REFLUX DISEASE WITHOUT ESOPHAGITIS: ICD-10-CM

## 2018-04-05 PROCEDURE — 87086 URINE CULTURE/COLONY COUNT: CPT

## 2018-04-05 PROCEDURE — 99213 OFFICE O/P EST LOW 20 MIN: CPT | Performed by: PHYSICIAN ASSISTANT

## 2018-04-05 RX ORDER — PREDNISONE 10 MG/1
10 TABLET ORAL DAILY
Qty: 90 TABLET | Refills: 3 | Status: CANCELLED | OUTPATIENT
Start: 2018-04-05

## 2018-04-05 RX ORDER — FAMOTIDINE 20 MG/1
20 TABLET, FILM COATED ORAL DAILY
Qty: 90 TABLET | Refills: 3 | Status: SHIPPED | OUTPATIENT
Start: 2018-04-05 | End: 2019-09-10 | Stop reason: HOSPADM

## 2018-04-05 RX ORDER — MECLIZINE HCL 12.5 MG/1
12.5 TABLET ORAL DAILY
Qty: 30 TABLET | Refills: 3 | Status: CANCELLED | OUTPATIENT
Start: 2018-04-05

## 2018-04-05 NOTE — TELEPHONE ENCOUNTER
Patient called to update you that Dr Didier Dahl, Urologist, is away until Monday  He spoke with a [de-identified] assistant about having the kidney stones removed so he doesn't keep getting this  He also did his lab sample in [facility]  Thank you

## 2018-04-05 NOTE — TELEPHONE ENCOUNTER
Attempted to reach patient again, both numbers in chart not accepting calls at this time  Unable to leave message

## 2018-04-05 NOTE — TELEPHONE ENCOUNTER
Jason patient, current patient of Dr Demetrius Mcgrath  Patient left message he was recently hospitalized for pseudomonas UTI and he had PICC line for IV antibiotics, he has now completed antibiotics  Patient has history of chronic UTI and stones, patient states that he saw PCP Dr Akbar Butterfield , urine culture in progress from today and that he would like to be scheduled for procedure for his stones which are likely contributing to his infections  Attempted to reach patient,  Home phone and mobile phone not accepting calls at this time  Will attempt to reach again

## 2018-04-05 NOTE — TELEPHONE ENCOUNTER
Patient called office back  He completed his outpatient antibiotics and urine culture is pending from today, ordered by Dr Ravindra Tsai  Patient is having no acute concerns at this time but would like to have chronic stones removed as would like to avoid future UTI's and hospital admission for infection  He is concerned as he has Myasthenia Gravis and also diabetes and has allergies to antibiotics  I advised patient I will send message to advanced practitioner for recommendation and clinical will call him back after reviewed

## 2018-04-06 LAB — BACTERIA UR CULT: NORMAL

## 2018-04-10 ENCOUNTER — TELEPHONE (OUTPATIENT)
Dept: FAMILY MEDICINE CLINIC | Facility: CLINIC | Age: 73
End: 2018-04-10

## 2018-04-10 NOTE — TELEPHONE ENCOUNTER
Patient called stating that he will stop taking insulin when the two pens he has runs out, which would be about  Mid June 2018  The insulin, pens, and needles are just too costly for him, about $85 per month  That in addition to deductible and cost of insurance is just too much expense for him  He stated he has talked to the [id number] (his prescription plan), and Medicare and feels he does not have any alternative but to stop the insulin      He will not make an appointment with Dr Saad Guo until he hears from or sees   [facility], urologist

## 2018-04-10 NOTE — TELEPHONE ENCOUNTER
Per Dr Rashida Mortensen, the patient has options and needs to come in to see her to discuss them  I spoke with the patient relayed that information and the plan is that he will work on getting in with Dr Izabela Serrano, urologist, and see Dr Rashida Mortensen on 5/2/18

## 2018-04-11 NOTE — PROGRESS NOTES
Routine Follow-up    Willis Gutierrez 68 y o  male   Date:  4//2018    Assessment and Plan:    Karla Riggins was seen today for follow-up and medication refill  Diagnoses and all orders for this visit:    Pseudomonas urinary tract infection  -     Urine culture; Future  - s/p IV cefepime via PICC, he had repeat urine culture on 3/23 with 50-59, 000 cfu pseudomonas, given keflex from dr Beba Posada which he completed  - will repeat urine culture today, if worsening will likely need to return to hospital for IV abx, patient with limited abx choices due to allergies and myasthenia gravis  - no urinary symptoms at this     Gastroesophageal reflux disease without esophagitis  -     famotidine (PEPCID) 20 mg tablet; Take 1 tablet (20 mg total) by mouth daily      HPI:  Chief Complaint   Patient presents with    Follow-up     Had blood work done by Dr Beba Posada   Was told to watch diet  He is concerned about his WBC and Anion Gap  Pt was put on Keflex for 21 pills 3xdaily and he finished Tuesday    Medication Refill     Prefison, Famotidine and Meclzine     HPI   Patient is a 67 yo male with PMH below who presents to review his recent blood work that was ordered by Dr Beba Posada  He did have 50-59,000 psuedomonas culture and was given keflex which he completed  He denies urinary urgency, dysuria, hematuria, flank pain  He does have frequency and nocturia but unchanged from baseline  He denies fevers, chills  His blood work revealed wbc 10 2, ua with small leukocytes and ca oxalate crystal  He does have chronic kidney stones that may likely be colonized, will follow up with urology  He was questioning was medications would be covered by insurance as insulin is currently a tier 3 and did not qualify for tier reduction  ROS: Review of Systems   Constitutional: Negative for activity change, appetite change, chills, diaphoresis and fever  Respiratory: Negative for cough, chest tightness and shortness of breath      Cardiovascular: Negative for chest pain, palpitations and leg swelling  Gastrointestinal: Negative for abdominal pain, constipation, diarrhea, nausea and vomiting  Endocrine: Negative for polydipsia and polyphagia  Genitourinary: Positive for frequency  Negative for decreased urine volume, dysuria, flank pain, hematuria, penile pain and urgency  Musculoskeletal: Negative for arthralgias and joint swelling  Skin: Negative for color change, pallor and rash  Neurological: Negative for dizziness, syncope, weakness, light-headedness and headaches  Hematological: Negative for adenopathy  Does not bruise/bleed easily         Past Medical History:   Diagnosis Date    Anxiety     Cervical spinal stenosis     Diabetes mellitus (HCC)     type 2    Hx of chronic ulcerative colitis     Hx of diverticulitis of colon     Hyperlipidemia     Hypertension     Kidney stone     [facility] gravis ([facility] )     Neuropathy     RBBB     Renal disorder     Sleep apnea     UTI (urinary tract infection)      Patient Active Problem List   Diagnosis    Pseudomonas urinary tract infection    Myasthenia gravis ([facility] )    Nephrolithiasis    MARSHALL (acute kidney injury) ([facility] )    Type 2 diabetes mellitus with hyperglycemia ([facility])    CKD (chronic kidney disease), stage III    Hypercalcemia    Vertigo    Controlled type 2 diabetes mellitus with hypoglycemia, with long-term current use of insulin ([facility])    Abdominal distention, non-gaseous    Antibiotic-induced yeast infection    Hypoglycemia associated with type 2 diabetes mellitus ([facility])    Weight gain, abnormal    Essential hypertension    Abnormal finding on diagnostic imaging of left kidney    Actinic keratosis    Anxiety as acute reaction to gross stress    Benign non-nodular prostatic hyperplasia with lower urinary tract symptoms    Cervical radiculopathy    Cervical spinal stenosis    Diverticulosis    Elevated bilirubin    Hyperlipidemia    Kidney atrophy    Left anterior hemiblock    Microscopic hematuria    Polyneuropathy    RBBB (right bundle branch block)    Sleep apnea       Past Surgical History:   Procedure Laterality Date    COLECTOMY      partial sigmoid    COLECTOMY LAPAROSCOPIC      partial sigmoid colectomy    COLON SURGERY      lap  partial colectomy sigmoid    COLONOSCOPY      LITHOTRIPSY      OTHER SURGICAL HISTORY      parotid bx    SALIVARY GLAND SURGERY      biopsy       Social History     Social History    Marital status: /Civil Union     Spouse name: N/A    Number of children: N/A    Years of education: N/A     Occupational History    cleans doctors houses      Social History Main Topics    Smoking status: Never Smoker    Smokeless tobacco: Never Used    Alcohol use No    Drug use: No    Sexual activity: Not Asked     Other Topics Concern    None     Social History Narrative    No caffeine use    Uses seat belts       Family History   Problem Relation Age of Onset    Arthritis Mother     Diabetes Mother     Hypertension Mother     Osteoporosis Mother     Depression Father     Cancer Neg Hx     Heart disease Neg Hx        Allergies   Allergen Reactions    Levaquin [Levofloxacin]      Due to MG dx    Quinolones      Due to MG dx    Antihistamines, Chlorpheniramine-Type      Difficulty urinating    Benzodiazepines     Chlorpheniramine     Diphenhydramine     Erythromycin     Iodine     Loratadine     Other      Muscle relaxers d/t myasthenia gravis-they close my airway    Sulfa Antibiotics     Tetracyclines & Related          Current Outpatient Prescriptions:     ACCU-CHEK FASTCLIX LANCETS MISC, TEST twice a day, Disp: , Rfl: 0    ACCU-CHEK GUIDE test strip, TEST twice a day, Disp: , Rfl: 0    cephalexin (KEFLEX) 250 mg capsule, Take 250 mg by mouth 3 (three) times a day, Disp: , Rfl: 0    cholecalciferol (VITAMIN D3) 1,000 units tablet, Take 1 tablet by mouth daily, Disp: , Rfl:     clotrimazole (LOTRIMIN) 1 % cream, Apply topically 2 (two) times a day, Disp: 30 g, Rfl: 1    famotidine (PEPCID) 20 mg tablet, Take 1 tablet (20 mg total) by mouth daily, Disp: 90 tablet, Rfl: 3    hydrochlorothiazide (HYDRODIURIL) 25 mg tablet, Take 25 mg by mouth daily  , Disp: , Rfl:     insulin aspart (NovoLOG) 100 units/mL injection, Inject 3 Units under the skin 3 (three) times a day before meals for 30 days, Disp: 270 Units, Rfl: 0    insulin glargine (LANTUS SOLOSTAR) injection pen 100 units/mL, Inject 12 Units under the skin daily at bedtime, Disp: , Rfl:     meclizine (ANTIVERT) 12 5 MG tablet, Take 12 5 mg by mouth daily  , Disp: , Rfl:     Potassium 99 MG TABS, Take 2 tablets by mouth daily, Disp: , Rfl:     predniSONE 10 mg tablet, Take 10 mg by mouth daily  , Disp: , Rfl:     pyridoxine (VITAMIN B6) 100 mg tablet, Take 100 mg by mouth daily  , Disp: , Rfl:       Physical Exam:  /70 (BP Location: Left arm, Patient Position: Sitting, Cuff Size: Standard)   Pulse 85   Temp 97 7 °F (36 5 °C)   Resp 17   Ht 5' 8" (1 727 m)   Wt 83 9 kg (185 lb)   SpO2 98%   BMI 28 13 kg/m²     Physical Exam   Constitutional: He is oriented to person, place, and time  Vital signs are normal  He appears well-developed and well-nourished  No distress  HENT:   Head: Normocephalic and atraumatic  Right Ear: Tympanic membrane, external ear and ear canal normal    Left Ear: Tympanic membrane, external ear and ear canal normal    Nose: Nose normal    Mouth/Throat: Oropharynx is clear and moist    Eyes: Conjunctivae, EOM and lids are normal  Pupils are equal, round, and reactive to light  Neck: Trachea normal and normal range of motion  Neck supple  No thyromegaly present  Cardiovascular: Normal rate, regular rhythm, S1 normal, S2 normal, normal heart sounds and intact distal pulses  Exam reveals no gallop  No murmur heard  Pulmonary/Chest: Effort normal and breath sounds normal  No respiratory distress   He has no wheezes  He has no rhonchi  He has no rales  Abdominal: Soft  Normal appearance and bowel sounds are normal  There is no hepatosplenomegaly  There is no tenderness  There is no guarding  Musculoskeletal: Normal range of motion  He exhibits no edema or deformity  Lymphadenopathy:     He has no cervical adenopathy  Neurological: He is alert and oriented to person, place, and time  He has normal reflexes  No cranial nerve deficit or sensory deficit  Skin: Skin is warm and dry  No rash noted  No cyanosis  No pallor  Nails show no clubbing  Psychiatric: He has a normal mood and affect   His behavior is normal  Cognition and memory are normal          Labs:  Lab Results   Component Value Date    WBC 10 21 (H) 03/23/2018    HGB 15 9 03/23/2018    HCT 46 5 03/23/2018    MCV 93 03/23/2018     03/23/2018     Lab Results   Component Value Date     03/23/2018    K 4 2 03/23/2018     03/23/2018    CO2 31 03/23/2018    ANIONGAP 3 (L) 03/23/2018    BUN 21 03/23/2018    CREATININE 1 27 03/23/2018    GLUCOSE 116 02/23/2018    GLUF 90 03/23/2018    CALCIUM 9 7 03/23/2018    CORRECTEDCA 10 5 (H) 03/29/2017    AST 26 03/23/2018    ALT 37 03/23/2018    ALKPHOS 37 (L) 03/23/2018    PROT 7 7 03/23/2018    BILITOT 0 65 03/23/2018    EGFR 56 03/23/2018

## 2018-04-16 ENCOUNTER — OFFICE VISIT (OUTPATIENT)
Dept: UROLOGY | Facility: CLINIC | Age: 73
End: 2018-04-16
Payer: MEDICARE

## 2018-04-16 VITALS
DIASTOLIC BLOOD PRESSURE: 100 MMHG | BODY MASS INDEX: 28.43 KG/M2 | HEART RATE: 100 BPM | SYSTOLIC BLOOD PRESSURE: 150 MMHG | WEIGHT: 187 LBS

## 2018-04-16 DIAGNOSIS — N39.0 PSEUDOMONAS URINARY TRACT INFECTION: ICD-10-CM

## 2018-04-16 DIAGNOSIS — N20.0 NEPHROLITHIASIS: Primary | ICD-10-CM

## 2018-04-16 DIAGNOSIS — B96.5 PSEUDOMONAS URINARY TRACT INFECTION: ICD-10-CM

## 2018-04-16 PROCEDURE — 99214 OFFICE O/P EST MOD 30 MIN: CPT | Performed by: UROLOGY

## 2018-04-16 NOTE — PROGRESS NOTES
4/16/2018    [place]  1945  [id number]        Assessment  Resolved Pseudomonas UTI, bilateral nonobstructing lower pole calculi, left angiomyolipoma      Discussion  I provided Marquis with reassurance that the MRI shows a benign lesion in his left kidney most consistent with an angiomyolipoma  This requires no further intervention at this time based on its small size  This can be periodically surveyed with a retroperitoneal ultrasound  Lesions smaller than 4 cm can be managed conservatively  With regards to his pseudomonal UTI, he was provided with a prescription for urinalysis and urine culture and told to repeat this if he believes he has another infection  Follow-up will be in 3 months with a KUB to assess his stone burden along with a postvoid residual assessment and uroflow evaluation to ensure that he is emptying completely  He will continue to follow with his family physician as well as Nephrology  History of Present Illness  68 y o  male with a history of myasthenia gravis  He had a recent pseudomonal UTI  He was treated with IV antibiotics as he could not tolerate oral quinolones  His infection has resolved  We discussed that he has bilateral nonobstructing lower pole calculi measuring between 5 and 8 mm  It is unlikely that this is the nidus for his infectious source at such as small size  On a recent retroperitoneal ultrasound a mass in the upper pole of the left kidney was identified  A follow-up MRI confirmed the presence of a 1 8 cm left upper pole benign angiomyolipoma  He is otherwise doing well at this time  He is asymptomatic and without lower urinary tract symptoms  AUA Symptom Score      Review of Systems  Review of Systems   Constitutional: Negative  HENT: Negative  Eyes: Negative  Respiratory: Negative  Cardiovascular: Negative  Gastrointestinal: Negative  Endocrine: Negative  Genitourinary: Negative  Musculoskeletal: Negative  Skin: Negative  Allergic/Immunologic: Negative  Neurological: Negative  Hematological: Negative  Psychiatric/Behavioral: Negative            Past Medical History  Past Medical History:   Diagnosis Date    Anxiety     Cervical spinal stenosis     Diabetes mellitus ([facility] )     type 2    Hx of chronic ulcerative colitis     Hx of diverticulitis of colon     Hyperlipidemia     Hypertension     Kidney stone     Myasthenia gravis ([facility] )     Neuropathy     RBBB     Renal disorder     Sleep apnea     UTI (urinary tract infection)        Past Social History  Past Surgical History:   Procedure Laterality Date    COLECTOMY      partial sigmoid    COLECTOMY LAPAROSCOPIC      partial sigmoid colectomy    COLON SURGERY      lap  partial colectomy sigmoid    COLONOSCOPY      LITHOTRIPSY      OTHER SURGICAL HISTORY      parotid bx    SALIVARY GLAND SURGERY      biopsy       Past Family History  Family History   Problem Relation Age of Onset    Arthritis Mother     Diabetes Mother     Hypertension Mother     Osteoporosis Mother     Depression Father     Cancer Neg Hx     Heart disease Neg Hx        Past Social history  Social History     Social History    Marital status: /Civil Union     Spouse name: N/A    Number of children: N/A    Years of education: N/A     Occupational History    cleans [employer]      Social History Main Topics    Smoking status: Never Smoker    Smokeless tobacco: Never Used    Alcohol use No    Drug use: No    Sexual activity: Not on file     Other Topics Concern    Not on file     Social History Narrative    No caffeine use    Uses seat belts       Current Medications  Current Outpatient Prescriptions   Medication Sig Dispense Refill    ACCU-CHEK FASTCLIX LANCETS MISC TEST twice a day  0    ACCU-CHEK GUIDE test strip TEST twice a day  0    cephalexin (KEFLEX) 250 mg capsule Take 250 mg by mouth 3 (three) times a day  0    cholecalciferol (VITAMIN D3) 1,000 units tablet Take 1 tablet by mouth daily      clotrimazole (LOTRIMIN) 1 % cream Apply topically 2 (two) times a day 30 g 1    famotidine (PEPCID) 20 mg tablet Take 1 tablet (20 mg total) by mouth daily 90 tablet 3    hydrochlorothiazide (HYDRODIURIL) 25 mg tablet Take 25 mg by mouth daily        insulin aspart (NovoLOG) 100 units/mL injection Inject 3 Units under the skin 3 (three) times a day before meals for 30 days 270 Units 0    insulin glargine (LANTUS SOLOSTAR) injection pen 100 units/mL Inject 12 Units under the skin daily at bedtime      meclizine (ANTIVERT) 12 5 MG tablet Take 12 5 mg by mouth daily        Potassium 99 MG TABS Take 2 tablets by mouth daily      predniSONE 10 mg tablet Take 10 mg by mouth daily        pyridoxine (VITAMIN B6) 100 mg tablet Take 100 mg by mouth daily         No current facility-administered medications for this visit  Allergies  Allergies   Allergen Reactions    Levaquin [Levofloxacin]      Due to MG dx    Quinolones      Due to MG dx    Antihistamines, Chlorpheniramine-Type      Difficulty urinating    Benzodiazepines     Chlorpheniramine     Diphenhydramine     Erythromycin     Iodine     Loratadine     Other      Muscle relaxers d/t myasthenia gravis-they close my airway    Sulfa Antibiotics     Tetracyclines & Related        Past Medical History, Social History, Family History, medications and allergies were reviewed      Vitals  Vitals:    04/16/18 1304   BP: 150/100   Pulse: 100   Weight: 84 8 kg (187 lb)       Physical Exam  Physical Exam        Results  Lab Results   Component Value Date    PSA 0 4 02/24/2017    PSA 0 6 03/18/2016    PSA 0 7 03/25/2015     Lab Results   Component Value Date    GLUCOSE 116 02/23/2018    CALCIUM 9 7 03/23/2018     03/23/2018    K 4 2 03/23/2018    CO2 31 03/23/2018     03/23/2018    BUN 21 03/23/2018    CREATININE 1 27 03/23/2018     Lab Results   Component Value Date    WBC 10 21 (H) 03/23/2018    HGB 15 9 03/23/2018    HCT 46 5 03/23/2018    MCV 93 03/23/2018     03/23/2018         Office Urine Dip  No results found for this or any previous visit (from the past 1 hour(s))  ]      Total visit time was 25 minutes of which over 50% was spent on counseling

## 2018-04-24 RX ORDER — PEN NEEDLE, DIABETIC 31 GX5/16"
NEEDLE, DISPOSABLE MISCELLANEOUS
Refills: 0 | COMMUNITY
Start: 2018-04-09 | End: 2018-09-04 | Stop reason: ALTCHOICE

## 2018-04-26 ENCOUNTER — OFFICE VISIT (OUTPATIENT)
Dept: FAMILY MEDICINE CLINIC | Facility: CLINIC | Age: 73
End: 2018-04-26
Payer: MEDICARE

## 2018-04-26 VITALS
OXYGEN SATURATION: 95 % | DIASTOLIC BLOOD PRESSURE: 80 MMHG | TEMPERATURE: 98.5 F | BODY MASS INDEX: 27.85 KG/M2 | SYSTOLIC BLOOD PRESSURE: 132 MMHG | HEIGHT: 69 IN | HEART RATE: 99 BPM | WEIGHT: 188 LBS

## 2018-04-26 DIAGNOSIS — E11.649 CONTROLLED TYPE 2 DIABETES MELLITUS WITH HYPOGLYCEMIA, WITH LONG-TERM CURRENT USE OF INSULIN (HCC): Primary | ICD-10-CM

## 2018-04-26 DIAGNOSIS — Z79.4 CONTROLLED TYPE 2 DIABETES MELLITUS WITH HYPOGLYCEMIA, WITH LONG-TERM CURRENT USE OF INSULIN (HCC): Primary | ICD-10-CM

## 2018-04-26 DIAGNOSIS — N17.9 AKI (ACUTE KIDNEY INJURY) (HCC): ICD-10-CM

## 2018-04-26 DIAGNOSIS — R07.81 RIB PAIN ON LEFT SIDE: ICD-10-CM

## 2018-04-26 PROCEDURE — 99213 OFFICE O/P EST LOW 20 MIN: CPT | Performed by: PHYSICIAN ASSISTANT

## 2018-04-26 NOTE — PROGRESS NOTES
Ang Powers 68 y o  male   Date:  4/26/2018      Assessment and Plan:    Trev Martin was seen today for chest pain  Diagnoses and all orders for this visit:    Controlled type 2 diabetes mellitus with hypoglycemia, with long-term current use of insulin ([address] )  -     HEMOGLOBIN A1C W/ EAG ESTIMATION; Future  - well controlled with long acting lantus 16 units daily  - agree to withold short acting insulin when BG well controlled as very expensive for patient and cannot have tier reduction  - home BG checks have been raning 90-140s    MARSHALL (acute kidney injury) ([address] )  -     Comprehensive metabolic panel; Future  - repeat bmp in near future     Rib pain, left side  - s/p renal ultrasound  - resolving  - apply heat to area  - prn tylenol  - provided reassurance that this is not consistent with flank pain       HPI:  Chief Complaint   Patient presents with    Chest Pain     pain in ribs in back, pt states it started 3 weeks when he had a ultrasound     HPI   Patient is a 69 yo male with PMH below who presents for concern with mid thoracic left sided rib pain since having renal ultrasound completed  Patient states that they applied a lot of pressure with us probe  He has not taken anything OTC or used heat or ice  He feels it is nearly resolved  He originally created the appointment because he was concern that this may be consistent with flank pain  He denies dysuria or change in urinary stream, fevers, chills  He also states that he increased his lantus to 16 units daily and has backed off how many times he uses short acting insulin which has been very expensive for the patient  He notes that he only take it once a day, typically at biggest meal  He has been monitoring BG and has continued to range from 90-140s most of the time  After eating a big dinner out, it was highest at 179 but improved to 119 later in the day        ROS: Review of Systems   Constitutional: Negative for activity change, chills, fatigue, fever and unexpected weight change  Respiratory: Negative for cough, chest tightness, shortness of breath and wheezing  Cardiovascular: Negative for chest pain and palpitations  Gastrointestinal: Negative for abdominal pain, diarrhea, nausea and vomiting  Genitourinary: Positive for frequency (chronic)  Negative for decreased urine volume, dysuria, flank pain, hematuria and urgency  Musculoskeletal: Positive for back pain (as noted in HPI)  Negative for arthralgias, gait problem, joint swelling, neck pain and neck stiffness  Skin: Negative for color change, pallor and rash  Neurological: Negative for dizziness, weakness and numbness  Hematological: Negative for adenopathy  Does not bruise/bleed easily  Psychiatric/Behavioral: Negative for dysphoric mood and sleep disturbance  The patient is nervous/anxious          Past Medical History:   Diagnosis Date    Anxiety     Cervical spinal stenosis     Diabetes mellitus (HCC)     type 2    Hx of chronic ulcerative colitis     Hx of diverticulitis of colon     Hyperlipidemia     Hypertension     Kidney stone     [facility]     Neuropathy     RBBB     Renal disorder     Sleep apnea     UTI (urinary tract infection)      Patient Active Problem List   Diagnosis    Pseudomonas urinary tract infection    [facility]ast[facility]ia gravis ([facility] )    Nephrolithiasis    MARSHALL (acute kidney injury) ([facility] )    Type 2 diabetes mellitus with hyperglycemia [facility]    CKD (chronic kidney disease), stage III    Hypercalcemia    Vertigo    Controlled type 2 diabetes mellitus with hypoglycemia, with long-term current use of insulin ([facility])    Abdominal distention, non-gaseous    Antibiotic-induced yeast infection    Hypoglycemia associated with type 2 diabetes mellitus ([facility])    Weight gain, abnormal    Essential hypertension    Abnormal finding on diagnostic imaging of left kidney    Actinic keratosis    Anxiety as acute reaction to gross stress    Benign non-nodular prostatic hyperplasia with lower urinary tract symptoms    Cervical radiculopathy    Cervical spinal stenosis    Diverticulosis    Elevated bilirubin    Hyperlipidemia    Kidney atrophy    Left anterior hemiblock    Microscopic hematuria    Polyneuropathy    RBBB (right bundle branch block)    Sleep apnea       Past Surgical History:   Procedure Laterality Date    COLECTOMY      partial sigmoid    COLECTOMY LAPAROSCOPIC      partial sigmoid colectomy    COLON SURGERY      lap  partial colectomy sigmoid    COLONOSCOPY      LITHOTRIPSY      OTHER SURGICAL HISTORY      parotid bx    SALIVARY GLAND SURGERY      biopsy       Social History     Social History    Marital status: /Civil Union     Spouse name: N/A    Number of children: N/A    Years of education: N/A     Occupational History    cleans [employer]      Social History Main Topics    Smoking status: Never Smoker    Smokeless tobacco: Never Used    Alcohol use No    Drug use: No    Sexual activity: Not Asked     Other Topics Concern    None     Social History Narrative    No caffeine use    Uses seat belts       Family History   Problem Relation Age of Onset    Arthritis Mother     Diabetes Mother     Hypertension Mother     Osteoporosis Mother     Depression Father     Cancer Neg Hx     Heart disease Neg Hx        Allergies   Allergen Reactions    Levaquin [Levofloxacin]      Due to MG dx    Quinolones      Due to MG dx    Antihistamines, Chlorpheniramine-Type      Difficulty urinating    Benzodiazepines     Chlorpheniramine     Diphenhydramine     Erythromycin     Iodine     Loratadine     Other      Muscle relaxers d/t myasthenia gravis-they close my airway    Sulfa Antibiotics     Tetracyclines & Related          Current Outpatient Prescriptions:     ACCU-CHEK FASTCLIX LANCETS MISC, TEST twice a day, Disp: , Rfl: 0    ACCU-CHEK GUIDE test strip, TEST twice a day, Disp: , Rfl: 0    B-D UF III MINI PEN NEEDLES 31G X 5 MM MISC, , Disp: , Rfl: 0    cholecalciferol (VITAMIN D3) 1,000 units tablet, Take 1 tablet by mouth daily, Disp: , Rfl:     famotidine (PEPCID) 20 mg tablet, Take 1 tablet (20 mg total) by mouth daily, Disp: 90 tablet, Rfl: 3    insulin aspart (NovoLOG) 100 units/mL injection, Inject 3 Units under the skin 3 (three) times a day before meals for 30 days (Patient taking differently: Inject 3 Units under the skin 2 (two) times a day before meals  ), Disp: 270 Units, Rfl: 0    insulin glargine (LANTUS SOLOSTAR) injection pen 100 units/mL, Inject 16 Units under the skin daily at bedtime  , Disp: , Rfl:     meclizine (ANTIVERT) 12 5 MG tablet, Take 12 5 mg by mouth daily  , Disp: , Rfl:     Potassium 99 MG TABS, Take 2 tablets by mouth daily, Disp: , Rfl:     predniSONE 10 mg tablet, Take 10 mg by mouth daily  , Disp: , Rfl:     pyridoxine (VITAMIN B6) 100 mg tablet, Take 100 mg by mouth daily  , Disp: , Rfl:     cephalexin (KEFLEX) 250 mg capsule, Take 250 mg by mouth 3 (three) times a day, Disp: , Rfl: 0    clotrimazole (LOTRIMIN) 1 % cream, Apply topically 2 (two) times a day, Disp: 30 g, Rfl: 1    hydrochlorothiazide (HYDRODIURIL) 25 mg tablet, Take 25 mg by mouth daily  , Disp: , Rfl:       Physical Exam:  /80 (BP Location: Left arm, Patient Position: Sitting, Cuff Size: Standard)   Pulse 99   Temp 98 5 °F (36 9 °C) (Tympanic)   Ht 5' 8 66" (1 744 m)   Wt 85 3 kg (188 lb)   SpO2 95%   BMI 28 04 kg/m²     Physical Exam   Constitutional: He is oriented to person, place, and time  He appears well-developed and well-nourished  No distress  HENT:   Head: Normocephalic and atraumatic  Right Ear: External ear normal    Left Ear: External ear normal    Nose: Nose normal    Eyes: Conjunctivae are normal  Pupils are equal, round, and reactive to light  Neck: Normal range of motion  Neck supple     Cardiovascular: Normal rate, regular rhythm, normal heart sounds and intact distal pulses  No murmur heard  Pulmonary/Chest: Breath sounds normal  No respiratory distress  He has no wheezes  He has no rales  Abdominal: Soft  Bowel sounds are normal    Musculoskeletal: Normal range of motion  He exhibits no edema or deformity  No central spine tenderness, no rib pain or MSK pinpoint tenderness along thoracic region  No CVA tenderness   Lymphadenopathy:     He has no cervical adenopathy  Neurological: He is alert and oriented to person, place, and time  No cranial nerve deficit  Skin: Skin is warm and dry  No rash noted  He is not diaphoretic  No pallor  Psychiatric: He has a normal mood and affect   His behavior is normal          Labs:  Lab Results   Component Value Date    WBC 10 21 (H) 03/23/2018    HGB 15 9 03/23/2018    HCT 46 5 03/23/2018    MCV 93 03/23/2018     03/23/2018     Lab Results   Component Value Date     03/23/2018    K 4 2 03/23/2018     03/23/2018    CO2 31 03/23/2018    ANIONGAP 3 (L) 03/23/2018    BUN 21 03/23/2018    CREATININE 1 27 03/23/2018    GLUCOSE 116 02/23/2018    GLUF 90 03/23/2018    CALCIUM 9 7 03/23/2018    CORRECTEDCA 10 5 (H) 03/29/2017    AST 26 03/23/2018    ALT 37 03/23/2018    ALKPHOS 37 (L) 03/23/2018    PROT 7 7 03/23/2018    BILITOT 0 65 03/23/2018    EGFR 56 03/23/2018

## 2018-05-11 DIAGNOSIS — G70.00 MYASTHENIA GRAVIS (HCC): Primary | ICD-10-CM

## 2018-05-11 NOTE — TELEPHONE ENCOUNTER
Patient stopped in requesting a refill of Prednisone 10 mg to [facility], [facility]  Thank you

## 2018-05-14 RX ORDER — PREDNISONE 10 MG/1
10 TABLET ORAL DAILY
Qty: 90 TABLET | Refills: 0 | Status: SHIPPED | OUTPATIENT
Start: 2018-05-14 | End: 2018-09-04 | Stop reason: SDUPTHER

## 2018-05-25 ENCOUNTER — APPOINTMENT (OUTPATIENT)
Dept: LAB | Facility: CLINIC | Age: 73
End: 2018-05-25
Payer: MEDICARE

## 2018-05-25 ENCOUNTER — TRANSCRIBE ORDERS (OUTPATIENT)
Dept: LAB | Facility: CLINIC | Age: 73
End: 2018-05-25

## 2018-05-25 DIAGNOSIS — E11.649 CONTROLLED TYPE 2 DIABETES MELLITUS WITH HYPOGLYCEMIA, WITH LONG-TERM CURRENT USE OF INSULIN (HCC): ICD-10-CM

## 2018-05-25 DIAGNOSIS — Z79.4 CONTROLLED TYPE 2 DIABETES MELLITUS WITH HYPOGLYCEMIA, WITH LONG-TERM CURRENT USE OF INSULIN (HCC): ICD-10-CM

## 2018-05-25 DIAGNOSIS — N17.9 AKI (ACUTE KIDNEY INJURY) (HCC): ICD-10-CM

## 2018-05-25 LAB
ALBUMIN SERPL BCP-MCNC: 3.9 G/DL (ref 3.5–5)
ALP SERPL-CCNC: 35 U/L (ref 46–116)
ALT SERPL W P-5'-P-CCNC: 44 U/L (ref 12–78)
ANION GAP SERPL CALCULATED.3IONS-SCNC: 6 MMOL/L (ref 4–13)
AST SERPL W P-5'-P-CCNC: 24 U/L (ref 5–45)
BILIRUB SERPL-MCNC: 0.64 MG/DL (ref 0.2–1)
BUN SERPL-MCNC: 20 MG/DL (ref 5–25)
CALCIUM SERPL-MCNC: 9.7 MG/DL (ref 8.3–10.1)
CHLORIDE SERPL-SCNC: 105 MMOL/L (ref 100–108)
CO2 SERPL-SCNC: 29 MMOL/L (ref 21–32)
CREAT SERPL-MCNC: 1.35 MG/DL (ref 0.6–1.3)
EST. AVERAGE GLUCOSE BLD GHB EST-MCNC: 134 MG/DL
GFR SERPL CREATININE-BSD FRML MDRD: 52 ML/MIN/1.73SQ M
GLUCOSE P FAST SERPL-MCNC: 114 MG/DL (ref 65–99)
HBA1C MFR BLD: 6.3 % (ref 4.2–6.3)
POTASSIUM SERPL-SCNC: 3.9 MMOL/L (ref 3.5–5.3)
PROT SERPL-MCNC: 7.4 G/DL (ref 6.4–8.2)
SODIUM SERPL-SCNC: 140 MMOL/L (ref 136–145)

## 2018-05-25 PROCEDURE — 80053 COMPREHEN METABOLIC PANEL: CPT

## 2018-05-25 PROCEDURE — 83036 HEMOGLOBIN GLYCOSYLATED A1C: CPT

## 2018-05-25 PROCEDURE — 36415 COLL VENOUS BLD VENIPUNCTURE: CPT

## 2018-05-29 ENCOUNTER — APPOINTMENT (OUTPATIENT)
Dept: LAB | Facility: CLINIC | Age: 73
End: 2018-05-29
Payer: MEDICARE

## 2018-05-29 ENCOUNTER — OFFICE VISIT (OUTPATIENT)
Dept: FAMILY MEDICINE CLINIC | Facility: CLINIC | Age: 73
End: 2018-05-29
Payer: MEDICARE

## 2018-05-29 VITALS
DIASTOLIC BLOOD PRESSURE: 80 MMHG | WEIGHT: 186 LBS | RESPIRATION RATE: 16 BRPM | SYSTOLIC BLOOD PRESSURE: 150 MMHG | OXYGEN SATURATION: 98 % | BODY MASS INDEX: 27.74 KG/M2 | HEART RATE: 108 BPM | TEMPERATURE: 98.1 F

## 2018-05-29 DIAGNOSIS — Z79.4 CONTROLLED TYPE 2 DIABETES MELLITUS WITH HYPOGLYCEMIA, WITH LONG-TERM CURRENT USE OF INSULIN (HCC): ICD-10-CM

## 2018-05-29 DIAGNOSIS — R50.9 LOW GRADE FEVER: ICD-10-CM

## 2018-05-29 DIAGNOSIS — N18.30 CKD (CHRONIC KIDNEY DISEASE), STAGE III (HCC): ICD-10-CM

## 2018-05-29 DIAGNOSIS — M79.671 ACUTE PAIN OF RIGHT FOOT: ICD-10-CM

## 2018-05-29 DIAGNOSIS — M79.671 ACUTE PAIN OF RIGHT FOOT: Primary | ICD-10-CM

## 2018-05-29 DIAGNOSIS — L53.9 ERYTHEMA OF FOOT: ICD-10-CM

## 2018-05-29 DIAGNOSIS — E11.649 CONTROLLED TYPE 2 DIABETES MELLITUS WITH HYPOGLYCEMIA, WITH LONG-TERM CURRENT USE OF INSULIN (HCC): ICD-10-CM

## 2018-05-29 DIAGNOSIS — M79.89 FOOT SWELLING: ICD-10-CM

## 2018-05-29 LAB
BASOPHILS # BLD MANUAL: 0 THOUSAND/UL (ref 0–0.1)
BASOPHILS NFR MAR MANUAL: 0 % (ref 0–1)
EOSINOPHIL # BLD MANUAL: 0 THOUSAND/UL (ref 0–0.4)
EOSINOPHIL NFR BLD MANUAL: 0 % (ref 0–6)
ERYTHROCYTE [DISTWIDTH] IN BLOOD BY AUTOMATED COUNT: 13.3 % (ref 11.6–15.1)
HCT VFR BLD AUTO: 47.1 % (ref 36.5–49.3)
HGB BLD-MCNC: 16.8 G/DL (ref 12–17)
LYMPHOCYTES # BLD AUTO: 0.45 THOUSAND/UL (ref 0.6–4.47)
LYMPHOCYTES # BLD AUTO: 3 % (ref 14–44)
MCH RBC QN AUTO: 32.5 PG (ref 26.8–34.3)
MCHC RBC AUTO-ENTMCNC: 35.7 G/DL (ref 31.4–37.4)
MCV RBC AUTO: 91 FL (ref 82–98)
METAMYELOCYTES NFR BLD MANUAL: 1 % (ref 0–1)
MONOCYTES # BLD AUTO: 0.15 THOUSAND/UL (ref 0–1.22)
MONOCYTES NFR BLD: 1 % (ref 4–12)
NEUTROPHILS # BLD MANUAL: 14.21 THOUSAND/UL (ref 1.85–7.62)
NEUTS SEG NFR BLD AUTO: 95 % (ref 43–75)
NRBC BLD AUTO-RTO: 0 /100 WBCS
PLATELET # BLD AUTO: 369 THOUSANDS/UL (ref 149–390)
PLATELET BLD QL SMEAR: ADEQUATE
PMV BLD AUTO: 10.3 FL (ref 8.9–12.7)
RBC # BLD AUTO: 5.17 MILLION/UL (ref 3.88–5.62)
RBC MORPH BLD: NORMAL
URATE SERPL-MCNC: 8 MG/DL (ref 4.2–8)
WBC # BLD AUTO: 14.96 THOUSAND/UL (ref 4.31–10.16)

## 2018-05-29 PROCEDURE — 86430 RHEUMATOID FACTOR TEST QUAL: CPT

## 2018-05-29 PROCEDURE — 36415 COLL VENOUS BLD VENIPUNCTURE: CPT

## 2018-05-29 PROCEDURE — 99214 OFFICE O/P EST MOD 30 MIN: CPT | Performed by: FAMILY MEDICINE

## 2018-05-29 PROCEDURE — 84550 ASSAY OF BLOOD/URIC ACID: CPT

## 2018-05-29 PROCEDURE — 85007 BL SMEAR W/DIFF WBC COUNT: CPT

## 2018-05-29 PROCEDURE — 85027 COMPLETE CBC AUTOMATED: CPT

## 2018-05-29 RX ORDER — CEPHALEXIN 500 MG/1
CAPSULE ORAL
Refills: 0 | COMMUNITY
Start: 2018-05-28 | End: 2018-06-20

## 2018-05-29 RX ORDER — BLOOD SUGAR DIAGNOSTIC
STRIP MISCELLANEOUS
Qty: 100 EACH | Refills: 1 | Status: SHIPPED | OUTPATIENT
Start: 2018-05-29 | End: 2018-05-29 | Stop reason: SDUPTHER

## 2018-05-29 RX ORDER — BLOOD SUGAR DIAGNOSTIC
STRIP MISCELLANEOUS
Qty: 100 EACH | Refills: 0 | Status: SHIPPED | OUTPATIENT
Start: 2018-05-29 | End: 2018-07-24 | Stop reason: SDUPTHER

## 2018-05-29 NOTE — PROGRESS NOTES
Assessment/Plan:       Diagnoses and all orders for this visit:    Acute pain of right foot  -     Discontinue: ACCU-CHEK GUIDE test strip; Use as instructed bid scheduled and prn symptoms/signs blood sugar fluctuation  -     Uric acid; Future  -     CBC and differential; Future  -     RF Screen w/ Reflex to Titer; Future    Foot swelling  -     Uric acid; Future  -     CBC and differential; Future  -     RF Screen w/ Reflex to Titer; Future    Erythema of foot  -     Uric acid; Future  -     CBC and differential; Future  -     RF Screen w/ Reflex to Titer; Future    Low grade fever  -     Discontinue: ACCU-CHEK GUIDE test strip; Use as instructed bid scheduled and prn symptoms/signs blood sugar fluctuation  -     CBC and differential; Future  -     RF Screen w/ Reflex to Titer; Future    Controlled type 2 diabetes mellitus with hypoglycemia, with long-term current use of insulin (HCC)  -     Discontinue: ACCU-CHEK GUIDE test strip; Use as instructed bid scheduled and prn symptoms/signs blood sugar fluctuation  -     Uric acid; Future  -     CBC and differential; Future  -     RF Screen w/ Reflex to Titer; Future    CKD (chronic kidney disease), stage III  -     Discontinue: ACCU-CHEK GUIDE test strip; Use as instructed bid scheduled and prn symptoms/signs blood sugar fluctuation  -     Uric acid; Future  -     CBC and differential; Future  -     RF Screen w/ Reflex to Titer; Future    Other orders  -     cephalexin (KEFLEX) 500 mg capsule; take 1 capsule by mouth every 6 hours for 7 days          Subjective:   Chief Complaint   Patient presents with    Follow-up    Foot Pain        Patient ID: Olga Medellin is a 68 y o  male      Per c/c reviewed by me  Had called answering service on Sunday and reported had woken up with right foot painful and swollen, no redness or increased temp, otherwise was feeling fine, advised elevation, ice, rest, and monitor - to go to ER if sx worsened, and he ended up going to ER yesterday- notes reviewed by me, dx cellulitis and rx'd increased dose of his prednisone plus keflex  Given tetanus booster in ER   had 4 doses of keflex already, had 50 mg total prednisone yesterday, today will be 40, then 30 x 2 days, then 20 x 2 days, then back to his maintenance 10mg daily  Still hurts to put weight on it, still swollen and some redness present per pt  Has never had gout, no shellfish recently  Routine labs ok - drawn Friday- creatinine stable, HbA1c well-controlled, no cbc        The following portions of the patient's history were reviewed and updated as appropriate: allergies, current medications, past family history, past medical history, past social history, past surgical history and problem list     Review of Systems   Constitutional: Negative  Respiratory: Negative  Cardiovascular: Negative  Musculoskeletal:        Per hpi   Skin: Negative for rash and wound  Per hpi   Hematological: Negative  Objective:      /80 (BP Location: Left arm, Patient Position: Sitting, Cuff Size: Standard)   Pulse (!) 108   Temp 98 1 °F (36 7 °C)   Resp 16   Wt 84 4 kg (186 lb)   SpO2 98%   BMI 27 74 kg/m²          Physical Exam   Constitutional: He appears well-developed  He is cooperative  Non-toxic appearance  He does not have a sickly appearance  He does not appear ill  No distress  Cardiovascular: Normal rate, regular rhythm, normal heart sounds and normal pulses  Pulmonary/Chest: Effort normal and breath sounds normal    Musculoskeletal:        Feet:    Neurological: He is alert  Skin: Skin is warm and dry  He is not diaphoretic  No cyanosis  No pallor  Per musculoskel exam right foot   Psychiatric: He has a normal mood and affect  His behavior is normal    Nursing note and vitals reviewed

## 2018-05-30 LAB — RHEUMATOID FACT SER QL LA: NEGATIVE

## 2018-05-31 NOTE — PATIENT INSTRUCTIONS
Do agree likely cellulitis etiol, but will check labs for gout, other arthropathy causes, for completeness given his CKD, myasthenia and diabetes  Continue supportive measures/local care   Next OV f/u pt wishes to discuss possibly retesting to see if the myasthenia still active dx

## 2018-06-04 ENCOUNTER — TELEPHONE (OUTPATIENT)
Dept: FAMILY MEDICINE CLINIC | Facility: CLINIC | Age: 73
End: 2018-06-04

## 2018-06-04 DIAGNOSIS — M79.671 ACUTE PAIN OF RIGHT FOOT: ICD-10-CM

## 2018-06-04 DIAGNOSIS — Z79.4 CONTROLLED TYPE 2 DIABETES MELLITUS WITH HYPOGLYCEMIA, WITH LONG-TERM CURRENT USE OF INSULIN (HCC): ICD-10-CM

## 2018-06-04 DIAGNOSIS — M79.89 FOOT SWELLING: Primary | ICD-10-CM

## 2018-06-04 DIAGNOSIS — E11.649 CONTROLLED TYPE 2 DIABETES MELLITUS WITH HYPOGLYCEMIA, WITH LONG-TERM CURRENT USE OF INSULIN (HCC): ICD-10-CM

## 2018-06-04 NOTE — TELEPHONE ENCOUNTER
Patient called stating that his right foot is still swollen and burning/painful/throbbing, no redness, not warm to touch  He has not been on his feet all weekend so he does not know what to do  Today is last day of 28 days of antibiotics  And he took all the prednizone  [phone]  Or tell Nicole Mendoza when she comes in with Chuck Beaver  Thank you

## 2018-06-05 ENCOUNTER — HOSPITAL ENCOUNTER (OUTPATIENT)
Dept: RADIOLOGY | Facility: HOSPITAL | Age: 73
Discharge: HOME | End: 2018-06-05
Payer: MEDICARE

## 2018-06-05 DIAGNOSIS — M79.89 FOOT SWELLING: ICD-10-CM

## 2018-06-05 DIAGNOSIS — M79.671 ACUTE PAIN OF RIGHT FOOT: ICD-10-CM

## 2018-06-05 PROCEDURE — 73630 X-RAY EXAM OF FOOT: CPT

## 2018-06-07 ENCOUNTER — OFFICE VISIT (OUTPATIENT)
Dept: FAMILY MEDICINE CLINIC | Facility: CLINIC | Age: 73
End: 2018-06-07
Payer: MEDICARE

## 2018-06-07 ENCOUNTER — TRANSCRIBE ORDERS (OUTPATIENT)
Dept: ADMINISTRATIVE | Facility: HOSPITAL | Age: 73
End: 2018-06-07

## 2018-06-07 VITALS
DIASTOLIC BLOOD PRESSURE: 70 MMHG | SYSTOLIC BLOOD PRESSURE: 144 MMHG | HEIGHT: 68 IN | BODY MASS INDEX: 28.37 KG/M2 | WEIGHT: 187.2 LBS | HEART RATE: 84 BPM | OXYGEN SATURATION: 96 % | TEMPERATURE: 98.1 F

## 2018-06-07 DIAGNOSIS — M79.671 ACUTE PAIN OF RIGHT FOOT: ICD-10-CM

## 2018-06-07 DIAGNOSIS — Z87.2 HISTORY OF CELLULITIS: ICD-10-CM

## 2018-06-07 DIAGNOSIS — M79.89 FOOT SWELLING: Primary | ICD-10-CM

## 2018-06-07 DIAGNOSIS — Z79.4 CONTROLLED TYPE 2 DIABETES MELLITUS WITH HYPOGLYCEMIA, WITH LONG-TERM CURRENT USE OF INSULIN (HCC): ICD-10-CM

## 2018-06-07 DIAGNOSIS — M84.374A STRESS FRACTURE OF RIGHT FOOT, INITIAL ENCOUNTER: Primary | ICD-10-CM

## 2018-06-07 DIAGNOSIS — E11.649 CONTROLLED TYPE 2 DIABETES MELLITUS WITH HYPOGLYCEMIA, WITH LONG-TERM CURRENT USE OF INSULIN (HCC): ICD-10-CM

## 2018-06-07 PROBLEM — R50.9 LOW GRADE FEVER: Status: RESOLVED | Noted: 2018-05-29 | Resolved: 2018-06-07

## 2018-06-07 PROBLEM — L53.9 ERYTHEMA OF FOOT: Status: RESOLVED | Noted: 2018-05-29 | Resolved: 2018-06-07

## 2018-06-07 PROCEDURE — 99213 OFFICE O/P EST LOW 20 MIN: CPT | Performed by: FAMILY MEDICINE

## 2018-06-07 RX ORDER — PYRIDOXINE HCL (VITAMIN B6) 100 MG
4 TABLET ORAL DAILY
COMMUNITY
End: 2020-11-04 | Stop reason: ALTCHOICE

## 2018-06-07 RX ORDER — YOHIMBE BARK 500 MG
1 CAPSULE ORAL DAILY
COMMUNITY
End: 2020-11-04 | Stop reason: ALTCHOICE

## 2018-06-07 NOTE — PROGRESS NOTES
Assessment/Plan:         Diagnoses and all orders for this visit:    Foot swelling    Acute pain of right foot    History of cellulitis    Controlled type 2 diabetes mellitus with hypoglycemia, with long-term current use of insulin (HCC)    Other orders  -     Cranberry 500 MG CAPS; Take 1 capsule by mouth daily  -     Lactobacillus (ACIDOPHILUS) 100 MG CAPS; Take 1 capsule by mouth daily          Subjective:   Chief Complaint   Patient presents with    Foot Pain     Pain right foot, swelling is down, but pain on bottom of the foot  He says that he cannot bend his toes   Fall     Pt states that he tripped and fell over rugs because of pain in foot  Patient ID: Billie Montes is a 68 y o  male  Per c/c reviewed by me  Pt walked into our waiting room this morning trying to get result of his foot xray, had not yet been read  He also reported that he has since fallen twice and foot pain is worse, so he was put on schedule to be seen  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, "they buy them and put them over wall to wall carpet, and they don't care, I need to work, and they'll give the job to someone else if I don't do the work"  Second fall was on his deck at home, tripped again  [place], hurts to put weight on my foot"  Xray read/resulted as pt waited to be roomed- no osteomyelitis or fx  Hr completed abx course rx'd by ER for cellulitis right foot        The following portions of the patient's history were reviewed and updated as appropriate: allergies, current medications, past family history, past medical history, past social history, past surgical history and problem list     Review of Systems   Constitutional: Negative  Respiratory: Negative  Cardiovascular: Negative  Musculoskeletal:        Per hpi   Skin: Negative for pallor, rash and wound           Objective:      /70 (BP Location: Left arm, Patient Position: Sitting, Cuff Size: Standard)   Pulse 84 Temp 98 1 °F (36 7 °C) (Tympanic)   Ht 5' 8" (1 727 m)   Wt 84 9 kg (187 lb 3 2 oz)   SpO2 96%   BMI 28 46 kg/m²          Physical Exam   Constitutional: He appears well-developed  He is cooperative  Non-toxic appearance  He does not appear ill  No distress  Musculoskeletal:        Feet:    moderately swollen right forefoot, very faint erythema, no heat, +tender still/unchanged   Neurological: He is alert  He has normal strength and normal reflexes  No sensory deficit  Gait (limping right foot) abnormal    Skin: Skin is warm and dry  No rash noted  He is not diaphoretic  No cyanosis  No pallor  Nursing note and vitals reviewed

## 2018-06-08 ENCOUNTER — TELEPHONE (OUTPATIENT)
Dept: FAMILY MEDICINE CLINIC | Facility: CLINIC | Age: 73
End: 2018-06-08

## 2018-06-08 DIAGNOSIS — G62.9 POLYNEUROPATHY: ICD-10-CM

## 2018-06-08 DIAGNOSIS — M79.671 ACUTE PAIN OF RIGHT FOOT: ICD-10-CM

## 2018-06-08 DIAGNOSIS — Z79.4 CONTROLLED TYPE 2 DIABETES MELLITUS WITH HYPOGLYCEMIA, WITH LONG-TERM CURRENT USE OF INSULIN (HCC): Primary | ICD-10-CM

## 2018-06-08 DIAGNOSIS — G70.00 MYASTHENIA GRAVIS (HCC): ICD-10-CM

## 2018-06-08 DIAGNOSIS — I10 ESSENTIAL HYPERTENSION: ICD-10-CM

## 2018-06-08 DIAGNOSIS — E11.649 CONTROLLED TYPE 2 DIABETES MELLITUS WITH HYPOGLYCEMIA, WITH LONG-TERM CURRENT USE OF INSULIN (HCC): Primary | ICD-10-CM

## 2018-06-08 NOTE — TELEPHONE ENCOUNTER
Patient called with an update:   He got his boot for his foot and he has an MRI scheduled for 6/13/18 at [facility]  They are looking for a hair lined fracture that wouldn't have shown on the XR  He is looking to see if you would order an EMG, states he hasn't had one in years but he would like to rule everything out, states he's in a lot of pain

## 2018-06-13 ENCOUNTER — HOSPITAL ENCOUNTER (OUTPATIENT)
Dept: RADIOLOGY | Facility: HOSPITAL | Age: 73
Discharge: HOME | End: 2018-06-13
Attending: PODIATRIST
Payer: MEDICARE

## 2018-06-13 DIAGNOSIS — M84.374A STRESS FRACTURE OF RIGHT FOOT, INITIAL ENCOUNTER: ICD-10-CM

## 2018-06-13 PROCEDURE — 73718 MRI LOWER EXTREMITY W/O DYE: CPT

## 2018-06-14 ENCOUNTER — TELEPHONE (OUTPATIENT)
Dept: NEUROLOGY | Facility: CLINIC | Age: 73
End: 2018-06-14

## 2018-06-14 ENCOUNTER — TELEPHONE (OUTPATIENT)
Dept: FAMILY MEDICINE CLINIC | Facility: CLINIC | Age: 73
End: 2018-06-14

## 2018-06-14 NOTE — TELEPHONE ENCOUNTER
Reviewed chart, xray and mri negative for fracture    Please see if he can come on June 20 at noon  In [place]     If he can not  offer him a f/u with iram or ebonie barboza in next two weeks     This is a new problem    He should not have the emg until he is seen        Did he see orthro  or podiatrist ?     Let me know if he can come

## 2018-06-14 NOTE — TELEPHONE ENCOUNTER
Pt called and states that he has had multiple hospital admissions this year, was diagnosed w/ "diabetes and stage 3 renal failure"  He states that he got bit by a spider 24 days ago-right foot, mainly his toes are swollen  MRI right foot was done 6/13/18, which was normal- no stress fracture  He was advised by his PCP to contact his neurologist  Pt c/o right foot pain  Was ordered EMG prescribed Dr Keila Xiao  Last office visit was 2/3/2017  Scheduled appt 10/5/18 @ 10 am  Pt requesting earlier date appt  Placed on waitlist  Pls advise         [phone]

## 2018-06-15 NOTE — TELEPHONE ENCOUNTER
Called and advised pt of all of the below  He verbalized clear understanding of all instructions  Seen podiatrist, Dr Jaylen Eubanks appt 6/22/18  Pt called Dr Luke Piper office (ortho) but they declined to see him at this time until they see the MRI result  Pt did accepted appt on June 20 at noon in [facility] office      Thanks

## 2018-06-15 NOTE — TELEPHONE ENCOUNTER
Patient called that the MRI was negative   States the neurologist will see him 6/20 over their lunch break to discuss results and he will see Dr Jack Lemons on 6/22

## 2018-06-18 ENCOUNTER — TELEPHONE (OUTPATIENT)
Dept: FAMILY MEDICINE CLINIC | Facility: CLINIC | Age: 73
End: 2018-06-18

## 2018-06-18 DIAGNOSIS — E11.9 DIABETES MELLITUS TYPE 2 IN NONOBESE (HCC): Primary | ICD-10-CM

## 2018-06-18 NOTE — TELEPHONE ENCOUNTER
I called pt to see which pharm he needed his insulin sent to [facility]       While on the phone, pt mentioned his foot being big, red, and swollen  Pt stated the pharm looked at it while he was there and stated it was gout  Pt thinks he had a false reading on his lab tests and would like to know what else to do  I offered pt to come in and he said he is busy tomorrow- Tues 6/19/18 and he has an appt on Wednesday with Neuro  Pt stated he will call back on Wednesday to make an appt

## 2018-06-20 ENCOUNTER — OFFICE VISIT (OUTPATIENT)
Dept: NEUROLOGY | Facility: CLINIC | Age: 73
End: 2018-06-20
Payer: MEDICARE

## 2018-06-20 VITALS
BODY MASS INDEX: 28.04 KG/M2 | HEIGHT: 68 IN | WEIGHT: 185 LBS | SYSTOLIC BLOOD PRESSURE: 132 MMHG | DIASTOLIC BLOOD PRESSURE: 82 MMHG | HEART RATE: 80 BPM

## 2018-06-20 DIAGNOSIS — M79.671 RIGHT FOOT PAIN: ICD-10-CM

## 2018-06-20 DIAGNOSIS — G70.00 MYASTHENIA GRAVIS (HCC): Primary | ICD-10-CM

## 2018-06-20 DIAGNOSIS — G62.9 POLYNEUROPATHY: ICD-10-CM

## 2018-06-20 PROCEDURE — 99214 OFFICE O/P EST MOD 30 MIN: CPT | Performed by: PSYCHIATRY & NEUROLOGY

## 2018-06-20 NOTE — ASSESSMENT & PLAN NOTE
Myasthenia gravis is well controlled on prednisone 10 mg a day  Mestinon did result in GI distress and he is not on any immunosuppressive agents

## 2018-06-20 NOTE — TELEPHONE ENCOUNTER
He sees foot doctor this Friday 6/22  And he has a pending appt for EMG as soon as he gets squeezed in if someone cancels, he will get moved up  Neuro is thinking it might be tarsal tunnel or has tendons damage

## 2018-06-20 NOTE — PROGRESS NOTES
Patient ID: Dara Weiss is a 68 y o  male  Assessment/Plan:    Polyneuropathy  He has some mild symptoms of a polyneuropathy and he does report at some abnormal sensations  This could be due to his known history of diabetes  He a could be contributing todysesthesias in his right foot  I have asked him to continue with good dietary control  Right foot pain  Pain and redness on  medial aspect of the right foot  The right toe is swollen and red  He has undergone a uric acid level that was normal,  a MRI of the foot showed no evidence of fracture  The concern is that could have tarsal tunnel syndrome  I have ordered an EMG study of the lower extremities  I also suggest that he utilize Aspercreme over the counter  He is unable to utilize NSAIDs to his underlying kidney disease  He does have an appointment with Dr Camryn Rodriguez a podiatrist for further evaluation of a localize etiology  I did provide him with a script of voltaren gel , he will be checking the cost     Myasthenia gravis ([facility])  Myasthenia gravis is well controlled on prednisone 10 mg a day  Mestinon did result in GI distress and he is not on any immunosuppressive agents  Diagnoses and all orders for this visit:    Myasthenia gravis ([address] )    Right foot pain  -     EMG 2 limb lower extremity; Future  -     diclofenac sodium (VOLTAREN) 1 %; Apply 2 g topically 2 (two) times a day    Polyneuropathy         Subjective: This is a 69 y/o male with History of Myasthenia Gravis diagnosed in 2009 after presents for a follow up visit  He was last evaluated in 2017  And he is overall stable  On prednisone 10 mg daily    Today he presents for a new problem  Approximately 3 to 4 weeks ago he awoke with right foot pain  Two days prior he was cleaning a garage  He was noted to have redness and increased temperature around the right foot  He has been treated with a prednisone taper, high dose of Keflex    He underwent an x-ray of the right foot that was normal and a recent MRI of the right foot that failed to reveal any evidence for fracture  He is unable to her for any pressure on the bottom of the right foot  It is swollen in the medial aspect of the right foot including the toe  He he denies any knee pain hip pain or back pain  He underwent a uric acid evaluation for gout and this was normal   He  presents for further evaluation for any neuropathic cause  He reports the pain is associated with paresthesias and numbness and tingling in the right foot specially in the bottom of the right foot medial aspect and sometimes can  up to above the ankle  This occurs on a daily basis  pressure does increase symptoms  He was asked to utilize a right foot boot but did not due  to numerous reasons  He has  An appointment  with Dr Eleno Quach a podiatrist later on this week        Julisa Chang prior emg  In 2017Borderline findings suggestive of a mild, early, generalized, predominantly sensory peripheral  polyneuropathy without denervation and  A chronic left L5 radiculopathy without denervation  Imuran was tried but he developed gi symptoms so he stopped it   Kay Bookman did cause diarrhea         He has a diagnosis of Diabetes and HB a1c is better ( 6 1)    IMPRESSION:  6/13/18     Normal MRI of the right foot  No stress fracture  The following portions of the patient's history were reviewed and updated as appropriate:   He  has a past medical history of Anxiety; Cervical spinal stenosis; Diabetes mellitus ([address] ); chronic ulcerative colitis; diverticulitis of colon; Hyperlipidemia; Hypertension; Kidney stone; Myasthenia gravis ([address] ); Neuropathy; RBBB; Renal disorder; Renal failure; Sleep apnea; and UTI (urinary tract infection)  He  has a past surgical history that includes Lithotripsy; Colonoscopy; Colon surgery; Other surgical history; Salivary gland surgery; COLECTOMY LAPAROSCOPIC; and Colectomy    His family history includes Arthritis in his mother; Depression in his father; Diabetes in his mother; Hypertension in his mother; Osteoporosis in his mother  He  reports that he has never smoked  He has never used smokeless tobacco  He reports that he does not drink alcohol or use drugs  Current Outpatient Prescriptions   Medication Sig Dispense Refill    ACCU-CHEK FASTCLIX LANCETS MISC TEST twice a day  0    ACCU-CHEK GUIDE test strip Use as instructed bid scheduled and prn symptoms/signs blood sugar fluctuation 100 each 0    B-D UF III MINI PEN NEEDLES 31G X 5 MM MISC   0    cholecalciferol (VITAMIN D3) 1,000 units tablet Take 2 tablets by mouth daily        clotrimazole (LOTRIMIN) 1 % cream Apply topically 2 (two) times a day 30 g 1    Cranberry 500 MG CAPS Take 1 capsule by mouth daily      famotidine (PEPCID) 20 mg tablet Take 1 tablet (20 mg total) by mouth daily 90 tablet 3    insulin aspart (NovoLOG) 100 units/mL injection Inject 3 Units under the skin 2 (two) times a day before meals for 30 days 180 Units 0    insulin glargine (LANTUS SOLOSTAR) 100 units/mL injection pen Inject 16 Units under the skin daily at bedtime 5 pen 0    Lactobacillus (ACIDOPHILUS) 100 MG CAPS Take 1 capsule by mouth daily      meclizine (ANTIVERT) 12 5 MG tablet Take 12 5 mg by mouth daily        Potassium 99 MG TABS Take 2 tablets by mouth daily      predniSONE 10 mg tablet Take 1 tablet (10 mg total) by mouth daily 90 tablet 0    pyridoxine (VITAMIN B6) 100 mg tablet Take 100 mg by mouth daily        diclofenac sodium (VOLTAREN) 1 % Apply 2 g topically 2 (two) times a day 100 Tube 0     No current facility-administered medications for this visit  He is allergic to levaquin [levofloxacin]; quinolones; antihistamines, chlorpheniramine-type; benzodiazepines; chlorpheniramine; diphenhydramine; erythromycin; iodine; iodine solution [povidone iodine]; loratadine; other; sulfa antibiotics; and tetracyclines & related            Objective:    Blood pressure 132/82, pulse 80, height 5' 8" (1 727 m), weight 83 9 kg (185 lb)  Physical Exam   Constitutional: He appears well-developed  HENT:   Head: Normocephalic  Eyes: EOM are normal  Pupils are equal, round, and reactive to light  Neck: Normal range of motion  Cardiovascular: Normal rate  Psychiatric: His speech is normal        Neurological Exam    Mental Status  The patient is alert and oriented to person, place, time, and situation  He has no visuospatial neglect  His speech is normal  He has normal attention span and concentration  He has a normal fund of knowledge  Cranial Nerves    CN II: The patient's visual acuity and visual fields are normal   CN III, IV, VI: The patient's pupils are equally round and reactive to light and ocular movements are normal   CN V: The patient has normal facial sensation  CN VII:  The patient has symmetric facial movement  CN VIII:  The patient's hearing is normal   CN IX, X: The patient has symmetric palate movement and normal gag reflex  CN XI: The patient's shoulder shrug strength is normal   CN XII: The patient's tongue is midline without atrophy or fasciculations  No ptosis , no change in superior gaze      Motor    right medial foot , swollen and reddened ,  no tinels at ankle , tender to touch      Sensory    Temp/  [address] 15  in ankles,   5 sec in teos      Reflexes  Trace reflexes in aj , kj 1 , ue 1      Gait and Coordination   He has normal right finger to nose and normal left finger to nose coordination  Antalgic gait due to pain in plantar aspect of  Foot          ROS:    Review of Systems   Constitutional: Negative  HENT: Negative  Eyes: Negative  Respiratory: Negative  Cardiovascular: Negative  Gastrointestinal: Negative  Endocrine: Negative  Genitourinary: Positive for urgency  Musculoskeletal: Negative  Negative for gait problem  Skin: Negative  Allergic/Immunologic: Negative      Neurological: Positive for dizziness (occ)  Hematological: Negative  Psychiatric/Behavioral: Positive for sleep disturbance  The patient is nervous/anxious

## 2018-06-20 NOTE — TELEPHONE ENCOUNTER
Doubt it's gout- he was also seen by podiatrist and had MRI foot which was normal  I'll discuss more with him at f/u visit and we can repeat the uric acid level at some point

## 2018-06-20 NOTE — ASSESSMENT & PLAN NOTE
He has some mild symptoms of a polyneuropathy and he does report at some abnormal sensations  This could be due to his known history of diabetes  He a could be contributing todysesthesias in his right foot  I have asked him to continue with good dietary control

## 2018-06-20 NOTE — ASSESSMENT & PLAN NOTE
Pain and redness on  medial aspect of the right foot  The right toe is swollen and red  He has undergone a uric acid level that was normal,  a MRI of the foot showed no evidence of fracture  The concern is that could have tarsal tunnel syndrome  I have ordered an EMG study of the lower extremities  I also suggest that he utilize Aspercreme over the counter  He is unable to utilize NSAIDs to his underlying kidney disease  He does have an appointment with Dr Lizabeth Frankel a podiatrist for further evaluation of a localize etiology       I did provide him with a script of voltaren gel , he will be checking the cost

## 2018-06-26 ENCOUNTER — DOCUMENTATION (OUTPATIENT)
Dept: NEUROLOGY | Facility: CLINIC | Age: 73
End: 2018-06-26

## 2018-06-26 ENCOUNTER — TELEPHONE (OUTPATIENT)
Dept: FAMILY MEDICINE CLINIC | Facility: CLINIC | Age: 73
End: 2018-06-26

## 2018-06-26 ENCOUNTER — HOSPITAL ENCOUNTER (OUTPATIENT)
Dept: NEUROLOGY | Facility: AMBULATORY SURGERY CENTER | Age: 73
Discharge: HOME | End: 2018-06-26
Payer: MEDICARE

## 2018-06-26 DIAGNOSIS — G62.9 POLYNEUROPATHY: ICD-10-CM

## 2018-06-26 DIAGNOSIS — I10 ESSENTIAL HYPERTENSION: ICD-10-CM

## 2018-06-26 DIAGNOSIS — M79.671 RIGHT FOOT PAIN: ICD-10-CM

## 2018-06-26 DIAGNOSIS — E11.649 CONTROLLED TYPE 2 DIABETES MELLITUS WITH HYPOGLYCEMIA, WITH LONG-TERM CURRENT USE OF INSULIN (HCC): ICD-10-CM

## 2018-06-26 DIAGNOSIS — Z79.4 CONTROLLED TYPE 2 DIABETES MELLITUS WITH HYPOGLYCEMIA, WITH LONG-TERM CURRENT USE OF INSULIN (HCC): ICD-10-CM

## 2018-06-26 DIAGNOSIS — G70.00 MYASTHENIA GRAVIS (HCC): ICD-10-CM

## 2018-06-26 DIAGNOSIS — M79.671 ACUTE PAIN OF RIGHT FOOT: ICD-10-CM

## 2018-06-26 PROCEDURE — 95911 NRV CNDJ TEST 9-10 STUDIES: CPT | Performed by: PSYCHIATRY & NEUROLOGY

## 2018-06-26 PROCEDURE — 95886 MUSC TEST DONE W/N TEST COMP: CPT | Performed by: PSYCHIATRY & NEUROLOGY

## 2018-06-26 NOTE — TELEPHONE ENCOUNTER
Patient was seen today for EMG, he states he has tarsal tunnel on both sides of his foot  He is waiting to see Dr Regi Sampson this week and then he might have to have possible surgery

## 2018-06-26 NOTE — PROGRESS NOTES
emg performed of the right le    Swelling / redness much better , tingling still persists in right foot     emg showed a moderate tibial nerve entrapment neuropathy at the ankle at both division     Pt for appointment with his podiatrist in 2 weeks and potentially with Dr Chelsea Miranda    Pt given a copy of the results

## 2018-07-10 ENCOUNTER — TELEPHONE (OUTPATIENT)
Dept: FAMILY MEDICINE CLINIC | Facility: CLINIC | Age: 73
End: 2018-07-10

## 2018-07-10 NOTE — TELEPHONE ENCOUNTER
He wants to know if he is able to donate blood,     He also wanted to let you know that he is seeing the foot dr on Friday

## 2018-07-12 ENCOUNTER — HOSPITAL ENCOUNTER (OUTPATIENT)
Dept: RADIOLOGY | Facility: HOSPITAL | Age: 73
Discharge: HOME | End: 2018-07-12
Attending: UROLOGY
Payer: MEDICARE

## 2018-07-12 DIAGNOSIS — N20.0 NEPHROLITHIASIS: ICD-10-CM

## 2018-07-12 PROCEDURE — 74018 RADEX ABDOMEN 1 VIEW: CPT

## 2018-07-24 DIAGNOSIS — M79.671 ACUTE PAIN OF RIGHT FOOT: ICD-10-CM

## 2018-07-24 DIAGNOSIS — R50.9 LOW GRADE FEVER: ICD-10-CM

## 2018-07-24 DIAGNOSIS — N18.30 CKD (CHRONIC KIDNEY DISEASE), STAGE III (HCC): ICD-10-CM

## 2018-07-24 DIAGNOSIS — E11.649 CONTROLLED TYPE 2 DIABETES MELLITUS WITH HYPOGLYCEMIA, WITH LONG-TERM CURRENT USE OF INSULIN (HCC): ICD-10-CM

## 2018-07-24 DIAGNOSIS — Z79.4 CONTROLLED TYPE 2 DIABETES MELLITUS WITH HYPOGLYCEMIA, WITH LONG-TERM CURRENT USE OF INSULIN (HCC): ICD-10-CM

## 2018-07-24 RX ORDER — BLOOD SUGAR DIAGNOSTIC
1 STRIP MISCELLANEOUS 4 TIMES DAILY
Qty: 300 EACH | Refills: 0 | Status: SHIPPED | OUTPATIENT
Start: 2018-07-24 | End: 2018-11-15 | Stop reason: SDUPTHER

## 2018-07-24 NOTE — TELEPHONE ENCOUNTER
Patient stopped in the office requesting Accu-Check Test Strips, 4 times per day, 300 strips  Will cost the patient $69 99, therefore, saving him a fair amount of money  Please send to [facility]

## 2018-08-10 ENCOUNTER — TELEPHONE (OUTPATIENT)
Dept: FAMILY MEDICINE CLINIC | Facility: CLINIC | Age: 73
End: 2018-08-10

## 2018-08-10 NOTE — TELEPHONE ENCOUNTER
Patient stopped in stating he will need full lab work about 8/27/18 and needs orders put in, especially A1C  Thank you

## 2018-08-13 ENCOUNTER — TELEPHONE (OUTPATIENT)
Dept: FAMILY MEDICINE CLINIC | Facility: CLINIC | Age: 73
End: 2018-08-13

## 2018-08-13 DIAGNOSIS — E78.5 HYPERLIPIDEMIA, UNSPECIFIED HYPERLIPIDEMIA TYPE: ICD-10-CM

## 2018-08-13 DIAGNOSIS — Z79.4 TYPE 2 DIABETES MELLITUS WITH HYPERGLYCEMIA, WITH LONG-TERM CURRENT USE OF INSULIN (HCC): Primary | ICD-10-CM

## 2018-08-13 DIAGNOSIS — E11.65 TYPE 2 DIABETES MELLITUS WITH HYPERGLYCEMIA, WITH LONG-TERM CURRENT USE OF INSULIN (HCC): Primary | ICD-10-CM

## 2018-08-13 DIAGNOSIS — N18.30 CKD (CHRONIC KIDNEY DISEASE), STAGE III (HCC): ICD-10-CM

## 2018-08-13 DIAGNOSIS — I10 ESSENTIAL HYPERTENSION: ICD-10-CM

## 2018-08-13 NOTE — TELEPHONE ENCOUNTER
[id number] Sera Harrison- orders printed- also see task today re: his request to switch to oral diabetes med

## 2018-08-20 ENCOUNTER — LAB (OUTPATIENT)
Dept: LAB | Facility: CLINIC | Age: 73
End: 2018-08-20
Payer: MEDICARE

## 2018-08-20 ENCOUNTER — TELEPHONE (OUTPATIENT)
Dept: FAMILY MEDICINE CLINIC | Facility: CLINIC | Age: 73
End: 2018-08-20

## 2018-08-20 ENCOUNTER — TRANSCRIBE ORDERS (OUTPATIENT)
Dept: LAB | Facility: CLINIC | Age: 73
End: 2018-08-20

## 2018-08-20 DIAGNOSIS — Z20.1 CONTACT WITH AND (SUSPECTED) EXPOSURE TO TUBERCULOSIS: Primary | ICD-10-CM

## 2018-08-20 DIAGNOSIS — Z20.1 CONTACT WITH AND (SUSPECTED) EXPOSURE TO TUBERCULOSIS: ICD-10-CM

## 2018-08-20 PROCEDURE — 86480 TB TEST CELL IMMUN MEASURE: CPT

## 2018-08-20 PROCEDURE — 36415 COLL VENOUS BLD VENIPUNCTURE: CPT

## 2018-08-20 NOTE — TELEPHONE ENCOUNTER
Ok- order [placed for quantiferon blood test screening     (Other orders already had been sent for the routine labs he's due for)

## 2018-08-20 NOTE — TELEPHONE ENCOUNTER
Patient's granddaughter tested positive for TB  He would like a script for lab work   States it is contagious and he has no immune system

## 2018-08-22 ENCOUNTER — TELEPHONE (OUTPATIENT)
Dept: FAMILY MEDICINE CLINIC | Facility: CLINIC | Age: 73
End: 2018-08-22

## 2018-08-22 DIAGNOSIS — Z20.1 TUBERCULOSIS EXPOSURE: Primary | ICD-10-CM

## 2018-08-22 LAB
ANNOTATION COMMENT IMP: ABNORMAL
GAMMA INTERFERON BACKGROUND BLD IA-ACNC: 0.05 IU/ML
M TB IFN-G BLD-IMP: ABNORMAL
M TB IFN-G CD4+ BCKGRND COR BLD-ACNC: <0.01 IU/ML
M TB IFN-G CD4+ T-CELLS BLD-ACNC: 0.04 IU/ML
MITOGEN IGNF BLD-ACNC: 0.54 IU/ML
QUANTIFERON-TB GOLD IN TUBE: NORMAL
SERVICE CMNT-IMP: ABNORMAL

## 2018-08-22 NOTE — TELEPHONE ENCOUNTER
Patient stopped in asking for results of labs dated 8/20/18  He can be reached at his home number  Thank you

## 2018-08-22 NOTE — TELEPHONE ENCOUNTER
Patient is aware and understands  He stated he will get chest xray on Friday at [facility]  Thank you

## 2018-08-22 NOTE — TELEPHONE ENCOUNTER
Only thing back so far is the quantiferon gold -resulted this morning- was indeterminate  I'm ordering CXR due to the indeterminate result   Rest of lab tests not resulted yet

## 2018-08-23 ENCOUNTER — HOSPITAL ENCOUNTER (OUTPATIENT)
Dept: RADIOLOGY | Facility: HOSPITAL | Age: 73
Discharge: HOME | End: 2018-08-23
Payer: MEDICARE

## 2018-08-23 DIAGNOSIS — Z20.1 TUBERCULOSIS EXPOSURE: ICD-10-CM

## 2018-08-23 PROCEDURE — 71046 X-RAY EXAM CHEST 2 VIEWS: CPT

## 2018-08-24 ENCOUNTER — TELEPHONE (OUTPATIENT)
Dept: FAMILY MEDICINE CLINIC | Facility: CLINIC | Age: 73
End: 2018-08-24

## 2018-08-24 NOTE — TELEPHONE ENCOUNTER
Only lab result is the quantiferon that he was already told result of    Please do let him know that his cxr did NOT show evidence of TB, though cardiomegaly noted-

## 2018-08-27 ENCOUNTER — LAB (OUTPATIENT)
Dept: LAB | Facility: CLINIC | Age: 73
End: 2018-08-27
Payer: MEDICARE

## 2018-08-27 DIAGNOSIS — Z79.4 TYPE 2 DIABETES MELLITUS WITH HYPERGLYCEMIA, WITH LONG-TERM CURRENT USE OF INSULIN (HCC): ICD-10-CM

## 2018-08-27 DIAGNOSIS — E11.65 TYPE 2 DIABETES MELLITUS WITH HYPERGLYCEMIA, WITH LONG-TERM CURRENT USE OF INSULIN (HCC): ICD-10-CM

## 2018-08-27 DIAGNOSIS — E78.5 HYPERLIPIDEMIA, UNSPECIFIED HYPERLIPIDEMIA TYPE: ICD-10-CM

## 2018-08-27 DIAGNOSIS — I10 ESSENTIAL HYPERTENSION: ICD-10-CM

## 2018-08-27 DIAGNOSIS — N18.30 CKD (CHRONIC KIDNEY DISEASE), STAGE III (HCC): ICD-10-CM

## 2018-08-27 LAB
25(OH)D3 SERPL-MCNC: 31.5 NG/ML (ref 30–100)
ALBUMIN SERPL BCP-MCNC: 3.5 G/DL (ref 3.5–5)
ALP SERPL-CCNC: 37 U/L (ref 46–116)
ALT SERPL W P-5'-P-CCNC: 59 U/L (ref 12–78)
ANION GAP SERPL CALCULATED.3IONS-SCNC: 8 MMOL/L (ref 4–13)
AST SERPL W P-5'-P-CCNC: 34 U/L (ref 5–45)
BASOPHILS # BLD AUTO: 0.12 THOUSANDS/ΜL (ref 0–0.1)
BASOPHILS NFR BLD AUTO: 1 % (ref 0–1)
BILIRUB SERPL-MCNC: 0.59 MG/DL (ref 0.2–1)
BUN SERPL-MCNC: 18 MG/DL (ref 5–25)
CALCIUM SERPL-MCNC: 9.4 MG/DL (ref 8.3–10.1)
CHLORIDE SERPL-SCNC: 103 MMOL/L (ref 100–108)
CHOLEST SERPL-MCNC: 200 MG/DL (ref 50–200)
CO2 SERPL-SCNC: 26 MMOL/L (ref 21–32)
CREAT SERPL-MCNC: 1.25 MG/DL (ref 0.6–1.3)
CREAT UR-MCNC: 219 MG/DL
EOSINOPHIL # BLD AUTO: 0.15 THOUSAND/ΜL (ref 0–0.61)
EOSINOPHIL NFR BLD AUTO: 1 % (ref 0–6)
ERYTHROCYTE [DISTWIDTH] IN BLOOD BY AUTOMATED COUNT: 13.2 % (ref 11.6–15.1)
EST. AVERAGE GLUCOSE BLD GHB EST-MCNC: 126 MG/DL
GFR SERPL CREATININE-BSD FRML MDRD: 57 ML/MIN/1.73SQ M
GLUCOSE P FAST SERPL-MCNC: 113 MG/DL (ref 65–99)
HBA1C MFR BLD: 6 % (ref 4.2–6.3)
HCT VFR BLD AUTO: 49.3 % (ref 36.5–49.3)
HDLC SERPL-MCNC: 51 MG/DL (ref 40–60)
HGB BLD-MCNC: 16.3 G/DL (ref 12–17)
IMM GRANULOCYTES # BLD AUTO: 0.07 THOUSAND/UL (ref 0–0.2)
IMM GRANULOCYTES NFR BLD AUTO: 1 % (ref 0–2)
LDLC SERPL CALC-MCNC: 121 MG/DL (ref 0–100)
LYMPHOCYTES # BLD AUTO: 3.98 THOUSANDS/ΜL (ref 0.6–4.47)
LYMPHOCYTES NFR BLD AUTO: 35 % (ref 14–44)
MCH RBC QN AUTO: 31.4 PG (ref 26.8–34.3)
MCHC RBC AUTO-ENTMCNC: 33.1 G/DL (ref 31.4–37.4)
MCV RBC AUTO: 95 FL (ref 82–98)
MICROALBUMIN UR-MCNC: 38.6 MG/L (ref 0–20)
MICROALBUMIN/CREAT 24H UR: 18 MG/G CREATININE (ref 0–30)
MONOCYTES # BLD AUTO: 0.98 THOUSAND/ΜL (ref 0.17–1.22)
MONOCYTES NFR BLD AUTO: 9 % (ref 4–12)
NEUTROPHILS # BLD AUTO: 6.25 THOUSANDS/ΜL (ref 1.85–7.62)
NEUTS SEG NFR BLD AUTO: 53 % (ref 43–75)
NONHDLC SERPL-MCNC: 149 MG/DL
NRBC BLD AUTO-RTO: 0 /100 WBCS
PLATELET # BLD AUTO: 338 THOUSANDS/UL (ref 149–390)
PMV BLD AUTO: 10 FL (ref 8.9–12.7)
POTASSIUM SERPL-SCNC: 4.1 MMOL/L (ref 3.5–5.3)
PROT SERPL-MCNC: 7.6 G/DL (ref 6.4–8.2)
RBC # BLD AUTO: 5.19 MILLION/UL (ref 3.88–5.62)
SODIUM SERPL-SCNC: 137 MMOL/L (ref 136–145)
T4 FREE SERPL-MCNC: 1.1 NG/DL (ref 0.76–1.46)
TRIGL SERPL-MCNC: 138 MG/DL
TSH SERPL DL<=0.05 MIU/L-ACNC: 4.43 UIU/ML (ref 0.36–3.74)
WBC # BLD AUTO: 11.55 THOUSAND/UL (ref 4.31–10.16)

## 2018-08-27 PROCEDURE — 84439 ASSAY OF FREE THYROXINE: CPT

## 2018-08-27 PROCEDURE — 82043 UR ALBUMIN QUANTITATIVE: CPT | Performed by: FAMILY MEDICINE

## 2018-08-27 PROCEDURE — 80053 COMPREHEN METABOLIC PANEL: CPT

## 2018-08-27 PROCEDURE — 85025 COMPLETE CBC W/AUTO DIFF WBC: CPT

## 2018-08-27 PROCEDURE — 83036 HEMOGLOBIN GLYCOSYLATED A1C: CPT

## 2018-08-27 PROCEDURE — 82306 VITAMIN D 25 HYDROXY: CPT

## 2018-08-27 PROCEDURE — 36415 COLL VENOUS BLD VENIPUNCTURE: CPT

## 2018-08-27 PROCEDURE — 82570 ASSAY OF URINE CREATININE: CPT | Performed by: FAMILY MEDICINE

## 2018-08-27 PROCEDURE — 80061 LIPID PANEL: CPT

## 2018-08-27 PROCEDURE — 84443 ASSAY THYROID STIM HORMONE: CPT

## 2018-08-29 ENCOUNTER — TELEPHONE (OUTPATIENT)
Dept: FAMILY MEDICINE CLINIC | Facility: CLINIC | Age: 73
End: 2018-08-29

## 2018-08-29 NOTE — TELEPHONE ENCOUNTER
Patient called asking for lab results done 8/27/18  He wants to know due to medication refills  He can be reached at his home number  He knows you are not in the office today and he will be contacted on 8/30/2018  Thank you

## 2018-09-04 ENCOUNTER — TELEPHONE (OUTPATIENT)
Dept: FAMILY MEDICINE CLINIC | Facility: CLINIC | Age: 73
End: 2018-09-04

## 2018-09-04 ENCOUNTER — OFFICE VISIT (OUTPATIENT)
Dept: FAMILY MEDICINE CLINIC | Facility: CLINIC | Age: 73
End: 2018-09-04
Payer: MEDICARE

## 2018-09-04 VITALS
OXYGEN SATURATION: 97 % | HEART RATE: 90 BPM | HEIGHT: 68 IN | SYSTOLIC BLOOD PRESSURE: 134 MMHG | BODY MASS INDEX: 28.97 KG/M2 | WEIGHT: 191.13 LBS | TEMPERATURE: 97.8 F | DIASTOLIC BLOOD PRESSURE: 90 MMHG

## 2018-09-04 DIAGNOSIS — E11.649 CONTROLLED TYPE 2 DIABETES MELLITUS WITH HYPOGLYCEMIA, WITH LONG-TERM CURRENT USE OF INSULIN (HCC): Primary | ICD-10-CM

## 2018-09-04 DIAGNOSIS — G70.00 MYASTHENIA GRAVIS (HCC): ICD-10-CM

## 2018-09-04 DIAGNOSIS — Z65.8 PSYCHOSOCIAL STRESSORS: ICD-10-CM

## 2018-09-04 DIAGNOSIS — Z59.9 FINANCIAL DIFFICULTIES: ICD-10-CM

## 2018-09-04 DIAGNOSIS — Z79.4 CONTROLLED TYPE 2 DIABETES MELLITUS WITH HYPOGLYCEMIA, WITH LONG-TERM CURRENT USE OF INSULIN (HCC): Primary | ICD-10-CM

## 2018-09-04 DIAGNOSIS — Z79.899 MEDICATION MANAGEMENT: ICD-10-CM

## 2018-09-04 PROBLEM — E11.9 CONTROLLED TYPE 2 DIABETES MELLITUS WITHOUT COMPLICATION, WITH LONG-TERM CURRENT USE OF INSULIN (HCC): Status: ACTIVE | Noted: 2017-03-06

## 2018-09-04 PROCEDURE — 99214 OFFICE O/P EST MOD 30 MIN: CPT | Performed by: FAMILY MEDICINE

## 2018-09-04 RX ORDER — PREDNISONE 10 MG/1
10 TABLET ORAL DAILY
Qty: 90 TABLET | Refills: 0 | Status: SHIPPED | OUTPATIENT
Start: 2018-09-04 | End: 2018-11-27 | Stop reason: SDUPTHER

## 2018-09-04 SDOH — ECONOMIC STABILITY - INCOME SECURITY: PROBLEM RELATED TO HOUSING AND ECONOMIC CIRCUMSTANCES, UNSPECIFIED: Z59.9

## 2018-09-04 NOTE — PROGRESS NOTES
Assessment/Plan: Am starting trial on oral diabetes med- d/c all insulin    Diagnoses and all orders for this visit:    Controlled type 2 diabetes mellitus with hypoglycemia, with long-term current use of insulin (HCC)  -     metFORMIN (GLUCOPHAGE) 500 mg tablet; Take 1 tablet (500 mg total) by mouth 2 (two) times a day with meals For 1 week, then increase to 2 tablets BID with meals    Myasthenia gravis (HCC)  -     predniSONE 10 mg tablet; Take 1 tablet (10 mg total) by mouth daily    Psychosocial stressors    Financial difficulties    Medication management    Other orders  -     Cancel: Diabetic foot exam; Future          Subjective:   Chief Complaint   Patient presents with    Follow-up     Pt is here for his f/u exam to review recent blood work results  Patient ID: Nolberto Rowe is a 68 y o  male  Per c/c reviewed by me  Here to discuss switching to oral diabetes treatment - has financial hardship now that is not working very much and wants to go off of insulin due to high out of pocket cost   gained weight, states has been eating "loads of junk food" due to stress  No low blood sugars, last HbA1c 8/27 was 6 0%  Pt states that his granddaughter's further testing came back as false positive for TB- pt had been tested with quantiferon bloodwork due to reported exposure- his quantiferon result was indeterminate        The following portions of the patient's history were reviewed and updated as appropriate: allergies, current medications, past family history, past medical history, past social history, past surgical history and problem list     Review of Systems   Constitutional: Negative for activity change, chills, diaphoresis, fatigue and fever  HENT: Negative  Eyes: Negative  Respiratory: Negative  Cardiovascular: Negative  Gastrointestinal: Negative  Endocrine: Negative  Genitourinary: Negative  Skin: Negative      Neurological: Negative for seizures, syncope, facial asymmetry, speech difficulty and headaches  Chronic myasthenia gravis sx unchanged   Hematological: Negative  Psychiatric/Behavioral: Negative for agitation, behavioral problems, confusion, decreased concentration, dysphoric mood, hallucinations, self-injury and suicidal ideas  The patient is not hyperactive  Objective:      /90 (BP Location: Left arm, Patient Position: Sitting, Cuff Size: Standard)   Pulse 90   Temp 97 8 °F (36 6 °C) (Tympanic)   Ht 5' 8" (1 727 m)   Wt 86 7 kg (191 lb 2 oz)   SpO2 97%   BMI 29 06 kg/m²          Physical Exam   Constitutional: He appears well-developed  He is cooperative  Non-toxic appearance  He does not have a sickly appearance  He does not appear ill  No distress  HENT:   Mouth/Throat: Uvula is midline, oropharynx is clear and moist and mucous membranes are normal    Cardiovascular: Normal rate, regular rhythm, normal heart sounds and normal pulses  Pulmonary/Chest: Effort normal and breath sounds normal    Neurological: He is alert  Skin: Skin is warm and dry  He is not diaphoretic  No cyanosis  No pallor  Nails show no clubbing  Psychiatric: He has a normal mood and affect  His behavior is normal  Judgment and thought content normal    Nursing note and vitals reviewed

## 2018-09-04 NOTE — TELEPHONE ENCOUNTER
Patient called stating that he and his spouse received a letter from [address] [place] [address] about hydrochlorizide and that they need to stop taking it  The patient stated he stopped taking in about February 2018 but was on it for 14 years  His spouse is going to bring the letter in  Thank you

## 2018-09-10 ENCOUNTER — TELEPHONE (OUTPATIENT)
Dept: FAMILY MEDICINE CLINIC | Facility: CLINIC | Age: 73
End: 2018-09-10

## 2018-09-12 DIAGNOSIS — Z79.4 TYPE 2 DIABETES MELLITUS WITH COMPLICATION, WITH LONG-TERM CURRENT USE OF INSULIN (HCC): Primary | ICD-10-CM

## 2018-09-12 DIAGNOSIS — E11.8 TYPE 2 DIABETES MELLITUS WITH COMPLICATION, WITH LONG-TERM CURRENT USE OF INSULIN (HCC): Primary | ICD-10-CM

## 2018-09-12 NOTE — TELEPHONE ENCOUNTER
Patient was in office requesting script for 360 needles, 90 day supply, to [address] [place] [address]  Thank you

## 2018-09-13 ENCOUNTER — APPOINTMENT (OUTPATIENT)
Dept: LAB | Facility: CLINIC | Age: 73
DRG: 690 | End: 2018-09-13
Payer: MEDICARE

## 2018-09-13 ENCOUNTER — TELEPHONE (OUTPATIENT)
Dept: FAMILY MEDICINE CLINIC | Facility: CLINIC | Age: 73
End: 2018-09-13

## 2018-09-13 DIAGNOSIS — R30.0 DYSURIA: Primary | ICD-10-CM

## 2018-09-13 LAB
BACTERIA UR QL AUTO: ABNORMAL /HPF
BILIRUB UR QL STRIP: NEGATIVE
CLARITY UR: ABNORMAL
COLOR UR: ABNORMAL
GLUCOSE UR STRIP-MCNC: NEGATIVE MG/DL
HGB UR QL STRIP.AUTO: ABNORMAL
HYALINE CASTS #/AREA URNS LPF: ABNORMAL /LPF
KETONES UR STRIP-MCNC: NEGATIVE MG/DL
LEUKOCYTE ESTERASE UR QL STRIP: ABNORMAL
NITRITE UR QL STRIP: NEGATIVE
NON-SQ EPI CELLS URNS QL MICRO: ABNORMAL /HPF
PH UR STRIP.AUTO: 5.5 [PH] (ref 4.5–8)
PROT UR STRIP-MCNC: NEGATIVE MG/DL
RBC #/AREA URNS AUTO: ABNORMAL /HPF
SP GR UR STRIP.AUTO: 1.02 (ref 1–1.03)
UROBILINOGEN UR QL STRIP.AUTO: 1 E.U./DL
WBC #/AREA URNS AUTO: ABNORMAL /HPF

## 2018-09-13 PROCEDURE — 81001 URINALYSIS AUTO W/SCOPE: CPT | Performed by: FAMILY MEDICINE

## 2018-09-13 PROCEDURE — 87077 CULTURE AEROBIC IDENTIFY: CPT | Performed by: FAMILY MEDICINE

## 2018-09-13 PROCEDURE — 87086 URINE CULTURE/COLONY COUNT: CPT | Performed by: FAMILY MEDICINE

## 2018-09-13 PROCEDURE — 87186 SC STD MICRODIL/AGAR DIL: CPT | Performed by: FAMILY MEDICINE

## 2018-09-13 NOTE — TELEPHONE ENCOUNTER
He thinks he is getting another UTI wants to know if you can put in an order to get his urine checked

## 2018-09-15 ENCOUNTER — HOSPITAL ENCOUNTER (INPATIENT)
Facility: HOSPITAL | Age: 73
LOS: 3 days | Discharge: HOME HEALTH | DRG: 690 | End: 2018-09-18
Attending: EMERGENCY MEDICINE | Admitting: HOSPITALIST
Payer: MEDICARE

## 2018-09-15 ENCOUNTER — APPOINTMENT (INPATIENT)
Dept: CT IMAGING | Facility: HOSPITAL | Age: 73
DRG: 690 | End: 2018-09-15
Payer: MEDICARE

## 2018-09-15 DIAGNOSIS — N20.0 NEPHROLITHIASIS: ICD-10-CM

## 2018-09-15 DIAGNOSIS — N39.0 PSEUDOMONAS URINARY TRACT INFECTION: ICD-10-CM

## 2018-09-15 DIAGNOSIS — B96.5 PSEUDOMONAS URINARY TRACT INFECTION: ICD-10-CM

## 2018-09-15 DIAGNOSIS — N39.0 UTI (URINARY TRACT INFECTION): Primary | ICD-10-CM

## 2018-09-15 LAB
ALBUMIN SERPL BCP-MCNC: 3.7 G/DL (ref 3.5–5)
ALP SERPL-CCNC: 42 U/L (ref 46–116)
ALT SERPL W P-5'-P-CCNC: 60 U/L (ref 12–78)
ANION GAP SERPL CALCULATED.3IONS-SCNC: 8 MMOL/L (ref 4–13)
AST SERPL W P-5'-P-CCNC: 31 U/L (ref 5–45)
BACTERIA UR CULT: ABNORMAL
BACTERIA UR QL AUTO: ABNORMAL /HPF
BASOPHILS # BLD AUTO: 0.1 THOUSANDS/ΜL (ref 0–0.1)
BASOPHILS NFR BLD AUTO: 1 % (ref 0–1)
BILIRUB SERPL-MCNC: 0.51 MG/DL (ref 0.2–1)
BILIRUB UR QL STRIP: NEGATIVE
BUN SERPL-MCNC: 20 MG/DL (ref 5–25)
CALCIUM SERPL-MCNC: 9.7 MG/DL (ref 8.3–10.1)
CHLORIDE SERPL-SCNC: 106 MMOL/L (ref 100–108)
CLARITY UR: CLEAR
CO2 SERPL-SCNC: 27 MMOL/L (ref 21–32)
COLOR UR: ABNORMAL
CREAT SERPL-MCNC: 1.72 MG/DL (ref 0.6–1.3)
EOSINOPHIL # BLD AUTO: 0.06 THOUSAND/ΜL (ref 0–0.61)
EOSINOPHIL NFR BLD AUTO: 1 % (ref 0–6)
ERYTHROCYTE [DISTWIDTH] IN BLOOD BY AUTOMATED COUNT: 12.8 % (ref 11.6–15.1)
GFR SERPL CREATININE-BSD FRML MDRD: 39 ML/MIN/1.73SQ M
GLUCOSE SERPL-MCNC: 118 MG/DL (ref 65–140)
GLUCOSE UR STRIP-MCNC: ABNORMAL MG/DL
HCT VFR BLD AUTO: 45.8 % (ref 36.5–49.3)
HGB BLD-MCNC: 15.1 G/DL (ref 12–17)
HGB UR QL STRIP.AUTO: NEGATIVE
IMM GRANULOCYTES # BLD AUTO: 0.05 THOUSAND/UL (ref 0–0.2)
IMM GRANULOCYTES NFR BLD AUTO: 1 % (ref 0–2)
KETONES UR STRIP-MCNC: ABNORMAL MG/DL
LEUKOCYTE ESTERASE UR QL STRIP: ABNORMAL
LYMPHOCYTES # BLD AUTO: 2.13 THOUSANDS/ΜL (ref 0.6–4.47)
LYMPHOCYTES NFR BLD AUTO: 21 % (ref 14–44)
MCH RBC QN AUTO: 30.8 PG (ref 26.8–34.3)
MCHC RBC AUTO-ENTMCNC: 33 G/DL (ref 31.4–37.4)
MCV RBC AUTO: 93 FL (ref 82–98)
MONOCYTES # BLD AUTO: 1.11 THOUSAND/ΜL (ref 0.17–1.22)
MONOCYTES NFR BLD AUTO: 11 % (ref 4–12)
NEUTROPHILS # BLD AUTO: 6.7 THOUSANDS/ΜL (ref 1.85–7.62)
NEUTS SEG NFR BLD AUTO: 65 % (ref 43–75)
NITRITE UR QL STRIP: POSITIVE
NON-SQ EPI CELLS URNS QL MICRO: ABNORMAL /HPF
NRBC BLD AUTO-RTO: 0 /100 WBCS
PH UR STRIP.AUTO: 5 [PH] (ref 4.5–8)
PLATELET # BLD AUTO: 305 THOUSANDS/UL (ref 149–390)
PMV BLD AUTO: 9.3 FL (ref 8.9–12.7)
POTASSIUM SERPL-SCNC: 4 MMOL/L (ref 3.5–5.3)
PROT SERPL-MCNC: 7.5 G/DL (ref 6.4–8.2)
PROT UR STRIP-MCNC: ABNORMAL MG/DL
RBC # BLD AUTO: 4.91 MILLION/UL (ref 3.88–5.62)
RBC #/AREA URNS AUTO: ABNORMAL /HPF
SODIUM SERPL-SCNC: 141 MMOL/L (ref 136–145)
SP GR UR STRIP.AUTO: 1.02 (ref 1–1.03)
UROBILINOGEN UR QL STRIP.AUTO: >=8 E.U./DL
WBC # BLD AUTO: 10.15 THOUSAND/UL (ref 4.31–10.16)
WBC #/AREA URNS AUTO: ABNORMAL /HPF

## 2018-09-15 PROCEDURE — 96374 THER/PROPH/DIAG INJ IV PUSH: CPT

## 2018-09-15 PROCEDURE — 87186 SC STD MICRODIL/AGAR DIL: CPT | Performed by: EMERGENCY MEDICINE

## 2018-09-15 PROCEDURE — 74176 CT ABD & PELVIS W/O CONTRAST: CPT

## 2018-09-15 PROCEDURE — 87086 URINE CULTURE/COLONY COUNT: CPT | Performed by: EMERGENCY MEDICINE

## 2018-09-15 PROCEDURE — 81001 URINALYSIS AUTO W/SCOPE: CPT | Performed by: EMERGENCY MEDICINE

## 2018-09-15 PROCEDURE — 80053 COMPREHEN METABOLIC PANEL: CPT | Performed by: STUDENT IN AN ORGANIZED HEALTH CARE EDUCATION/TRAINING PROGRAM

## 2018-09-15 PROCEDURE — 36415 COLL VENOUS BLD VENIPUNCTURE: CPT | Performed by: STUDENT IN AN ORGANIZED HEALTH CARE EDUCATION/TRAINING PROGRAM

## 2018-09-15 PROCEDURE — 85025 COMPLETE CBC W/AUTO DIFF WBC: CPT | Performed by: STUDENT IN AN ORGANIZED HEALTH CARE EDUCATION/TRAINING PROGRAM

## 2018-09-15 PROCEDURE — 99223 1ST HOSP IP/OBS HIGH 75: CPT | Performed by: PHYSICIAN ASSISTANT

## 2018-09-15 PROCEDURE — 87077 CULTURE AEROBIC IDENTIFY: CPT | Performed by: EMERGENCY MEDICINE

## 2018-09-15 PROCEDURE — 99285 EMERGENCY DEPT VISIT HI MDM: CPT

## 2018-09-15 RX ORDER — CALCIUM CARBONATE 200(500)MG
1000 TABLET,CHEWABLE ORAL 2 TIMES DAILY PRN
Status: DISCONTINUED | OUTPATIENT
Start: 2018-09-15 | End: 2018-09-18 | Stop reason: HOSPADM

## 2018-09-15 RX ORDER — ACETAMINOPHEN 325 MG/1
650 TABLET ORAL EVERY 6 HOURS PRN
Status: DISCONTINUED | OUTPATIENT
Start: 2018-09-15 | End: 2018-09-18 | Stop reason: HOSPADM

## 2018-09-15 RX ORDER — MELATONIN
2000 DAILY
Status: DISCONTINUED | OUTPATIENT
Start: 2018-09-16 | End: 2018-09-18 | Stop reason: HOSPADM

## 2018-09-15 RX ORDER — FAMOTIDINE 20 MG/1
20 TABLET, FILM COATED ORAL DAILY
Status: DISCONTINUED | OUTPATIENT
Start: 2018-09-16 | End: 2018-09-18 | Stop reason: HOSPADM

## 2018-09-15 RX ORDER — PREDNISONE 1 MG/1
10 TABLET ORAL DAILY
Status: DISCONTINUED | OUTPATIENT
Start: 2018-09-16 | End: 2018-09-18 | Stop reason: HOSPADM

## 2018-09-15 RX ORDER — ONDANSETRON 2 MG/ML
4 INJECTION INTRAMUSCULAR; INTRAVENOUS EVERY 6 HOURS PRN
Status: DISCONTINUED | OUTPATIENT
Start: 2018-09-15 | End: 2018-09-18 | Stop reason: HOSPADM

## 2018-09-15 RX ORDER — HEPARIN SODIUM 5000 [USP'U]/ML
5000 INJECTION, SOLUTION INTRAVENOUS; SUBCUTANEOUS EVERY 8 HOURS SCHEDULED
Status: DISCONTINUED | OUTPATIENT
Start: 2018-09-15 | End: 2018-09-18 | Stop reason: HOSPADM

## 2018-09-15 RX ORDER — INSULIN GLARGINE 100 [IU]/ML
12 INJECTION, SOLUTION SUBCUTANEOUS
COMMUNITY
End: 2018-12-31 | Stop reason: SDUPTHER

## 2018-09-15 RX ORDER — SODIUM CHLORIDE 9 MG/ML
75 INJECTION, SOLUTION INTRAVENOUS CONTINUOUS
Status: DISCONTINUED | OUTPATIENT
Start: 2018-09-15 | End: 2018-09-17

## 2018-09-15 RX ADMIN — CEFEPIME HYDROCHLORIDE 2000 MG: 2 INJECTION, POWDER, FOR SOLUTION INTRAVENOUS at 21:16

## 2018-09-16 ENCOUNTER — TELEPHONE (OUTPATIENT)
Dept: UROLOGY | Facility: CLINIC | Age: 73
End: 2018-09-16

## 2018-09-16 PROBLEM — Z79.899 MEDICATION MANAGEMENT: Status: ACTIVE | Noted: 2018-09-16

## 2018-09-16 PROBLEM — Z59.9 FINANCIAL DIFFICULTIES: Status: ACTIVE | Noted: 2018-09-16

## 2018-09-16 LAB
ANION GAP SERPL CALCULATED.3IONS-SCNC: 8 MMOL/L (ref 4–13)
BUN SERPL-MCNC: 19 MG/DL (ref 5–25)
CALCIUM SERPL-MCNC: 8.9 MG/DL (ref 8.3–10.1)
CHLORIDE SERPL-SCNC: 108 MMOL/L (ref 100–108)
CO2 SERPL-SCNC: 26 MMOL/L (ref 21–32)
CREAT SERPL-MCNC: 1.3 MG/DL (ref 0.6–1.3)
ERYTHROCYTE [DISTWIDTH] IN BLOOD BY AUTOMATED COUNT: 12.8 % (ref 11.6–15.1)
GFR SERPL CREATININE-BSD FRML MDRD: 54 ML/MIN/1.73SQ M
GLUCOSE SERPL-MCNC: 101 MG/DL (ref 65–140)
GLUCOSE SERPL-MCNC: 136 MG/DL (ref 65–140)
GLUCOSE SERPL-MCNC: 149 MG/DL (ref 65–140)
GLUCOSE SERPL-MCNC: 93 MG/DL (ref 65–140)
GLUCOSE SERPL-MCNC: 97 MG/DL (ref 65–140)
HCT VFR BLD AUTO: 40.9 % (ref 36.5–49.3)
HGB BLD-MCNC: 13.9 G/DL (ref 12–17)
MCH RBC QN AUTO: 31.5 PG (ref 26.8–34.3)
MCHC RBC AUTO-ENTMCNC: 34 G/DL (ref 31.4–37.4)
MCV RBC AUTO: 93 FL (ref 82–98)
PLATELET # BLD AUTO: 252 THOUSANDS/UL (ref 149–390)
PLATELET # BLD AUTO: 259 THOUSANDS/UL (ref 149–390)
PMV BLD AUTO: 9.2 FL (ref 8.9–12.7)
PMV BLD AUTO: 9.4 FL (ref 8.9–12.7)
POTASSIUM SERPL-SCNC: 3.7 MMOL/L (ref 3.5–5.3)
RBC # BLD AUTO: 4.41 MILLION/UL (ref 3.88–5.62)
SODIUM SERPL-SCNC: 142 MMOL/L (ref 136–145)
WBC # BLD AUTO: 9.2 THOUSAND/UL (ref 4.31–10.16)

## 2018-09-16 PROCEDURE — 99222 1ST HOSP IP/OBS MODERATE 55: CPT | Performed by: UROLOGY

## 2018-09-16 PROCEDURE — 85027 COMPLETE CBC AUTOMATED: CPT | Performed by: PHYSICIAN ASSISTANT

## 2018-09-16 PROCEDURE — 99222 1ST HOSP IP/OBS MODERATE 55: CPT | Performed by: INTERNAL MEDICINE

## 2018-09-16 PROCEDURE — 99232 SBSQ HOSP IP/OBS MODERATE 35: CPT | Performed by: HOSPITALIST

## 2018-09-16 PROCEDURE — 85049 AUTOMATED PLATELET COUNT: CPT | Performed by: PHYSICIAN ASSISTANT

## 2018-09-16 PROCEDURE — 80048 BASIC METABOLIC PNL TOTAL CA: CPT | Performed by: PHYSICIAN ASSISTANT

## 2018-09-16 PROCEDURE — 82948 REAGENT STRIP/BLOOD GLUCOSE: CPT

## 2018-09-16 RX ADMIN — CEFEPIME HYDROCHLORIDE 1000 MG: 2 INJECTION, POWDER, FOR SOLUTION INTRAVENOUS at 08:36

## 2018-09-16 RX ADMIN — HEPARIN SODIUM 5000 UNITS: 5000 INJECTION INTRAVENOUS; SUBCUTANEOUS at 06:01

## 2018-09-16 RX ADMIN — VITAMIN D, TAB 1000IU (100/BT) 2000 UNITS: 25 TAB at 08:30

## 2018-09-16 RX ADMIN — FAMOTIDINE 20 MG: 20 TABLET ORAL at 08:30

## 2018-09-16 RX ADMIN — PREDNISONE 10 MG: 5 TABLET ORAL at 08:30

## 2018-09-16 RX ADMIN — HEPARIN SODIUM 5000 UNITS: 5000 INJECTION INTRAVENOUS; SUBCUTANEOUS at 14:51

## 2018-09-16 RX ADMIN — SODIUM CHLORIDE 75 ML/HR: 0.9 INJECTION, SOLUTION INTRAVENOUS at 13:30

## 2018-09-16 RX ADMIN — SODIUM CHLORIDE 75 ML/HR: 0.9 INJECTION, SOLUTION INTRAVENOUS at 00:22

## 2018-09-16 RX ADMIN — HEPARIN SODIUM 5000 UNITS: 5000 INJECTION INTRAVENOUS; SUBCUTANEOUS at 21:17

## 2018-09-16 RX ADMIN — CEFEPIME HYDROCHLORIDE 1000 MG: 2 INJECTION, POWDER, FOR SOLUTION INTRAVENOUS at 21:18

## 2018-09-16 NOTE — PATIENT INSTRUCTIONS
Check fingersticks QID for next week to 2 weeks with log, call with numbers in 5-7 days, sooner if more than one or two glucose readings >200

## 2018-09-16 NOTE — TELEPHONE ENCOUNTER
Christa Emery was seen for recurrent urinary tract infection at the [facility], he will need a visit with Dr Rachel Vidales in 2-4 weeks to discuss setting up ureteroscopy with laser lithotripsy to make him stone free given his history of recurrent urinary tract infection

## 2018-09-16 NOTE — PLAN OF CARE
DISCHARGE PLANNING     Discharge to home or other facility with appropriate resources Progressing        GENITOURINARY - ADULT     Maintains or returns to baseline urinary function Progressing     Absence of urinary retention Progressing     Urinary catheter remains patent Progressing        INFECTION - ADULT     Absence or prevention of progression during hospitalization Progressing     Absence of fever/infection during neutropenic period Progressing        PAIN - ADULT     Verbalizes/displays adequate comfort level or baseline comfort level Progressing        Potential for Falls     Patient will remain free of falls Progressing        SAFETY ADULT     Maintain or return to baseline ADL function Progressing     Maintain or return mobility status to optimal level Progressing

## 2018-09-16 NOTE — PROGRESS NOTES
Progress Note - Emmanuelle Doe 1945, 68 y o  male MRN: [id number]    Unit/Bed#: [address] 226[address]01 Encounter: [id number]    Primary Care Provider: Malcolm Hernandez DO   Date and time admitted to hospital: 9/15/2018  8:18 PM        * Pseudomonas urinary tract infection   Assessment & Plan    Recurrent Pseudomonas UTI  Appreciate Urology and ID eval  Will place PICC line when ok with ID  Need to make sure that blood cultures are negative        Nephrolithiasis   Assessment & Plan    Follows with urology  May need removal at some point        Myasthenia gravis ([address] )   Assessment & Plan    stable              Subjective:   No dysuria today  No fever or chills      Objective:     Vitals:   Temp (24hrs), Av 7 °F (36 5 °C), Min:97 6 °F (36 4 °C), Max:97 8 °F (36 6 °C)    HR:  [55-96] 61  Resp:  [14-19] 19  BP: (129-201)/() 140/85  SpO2:  [92 %-98 %] 94 %  There is no height or weight on file to calculate BMI  Input and Output Summary (last 24 hours): Intake/Output Summary (Last 24 hours) at 18 1459  Last data filed at 18 1330   Gross per 24 hour   Intake             1050 ml   Output                0 ml   Net             1050 ml       Physical Exam:     Physical Exam   HENT:   Head: Normocephalic and atraumatic  Eyes: EOM are normal  Pupils are equal, round, and reactive to light  Cardiovascular: Normal rate and regular rhythm  Exam reveals no gallop and no friction rub  No murmur heard  Pulmonary/Chest: Effort normal and breath sounds normal  He has no wheezes  He has no rales  Abdominal: Soft  There is no tenderness  Musculoskeletal: He exhibits no edema  Nursing note and vitals reviewed              Additional Data:     Labs:      Results from last 7 days  Lab Units 18  0545 09/15/18  2116   WBC Thousand/uL 9 20 10 15   HEMOGLOBIN g/dL 13 9 15 1   HEMATOCRIT % 40 9 45 8   PLATELETS Thousands/uL 259  252 305   NEUTROS PCT %  --  65   LYMPHS PCT %  --  21 MONOS PCT %  --  11   EOS PCT %  --  1       Results from last 7 days  Lab Units 09/16/18  0545 09/15/18  2116   SODIUM mmol/L 142 141   POTASSIUM mmol/L 3 7 4 0   CHLORIDE mmol/L 108 106   CO2 mmol/L 26 27   BUN mg/dL 19 20   CREATININE mg/dL 1 30 1 72*   CALCIUM mg/dL 8 9 9 7   ALK PHOS U/L  --  42*   ALT U/L  --  60   AST U/L  --  31           Results from last 7 days  Lab Units 09/16/18  1058 09/16/18  0712   POC GLUCOSE mg/dl 149* 93               * I Have Reviewed All Lab Data     Recent Cultures (last 7 days):       Results from last 7 days  Lab Units 09/13/18  1204   URINE CULTURE  >100,000 cfu/ml Pseudomonas aeruginosa*         Last 24 Hours Medication List:     Current Facility-Administered Medications:  acetaminophen 650 mg Oral Q6H PRN GUANAKITO Jimenez-JERRY    calcium carbonate 1,000 mg Oral BID PRN GUANAKITO Jimenez-JERRY    cefepime 1,000 mg Intravenous Q12H GUANAKITO Jimenez-JERRY Last Rate: 1,000 mg (09/16/18 0836)   cholecalciferol 2,000 Units Oral Daily GUANAKITO Jimenez-JERRY    famotidine 20 mg Oral Daily GUANAKITO Jimenez-JERRY    heparin (porcine) 5,000 Units Subcutaneous Q8H [address] GUANAKITO Jimenez-JERRY    insulin lispro 1-5 Units Subcutaneous HS GUANAKITO Jimenez-C    insulin lispro 1-6 Units Subcutaneous TID AC Cathy Amaya PA-C    ondansetron 4 mg Intravenous Q6H PRN GUANAKITO Jimenez-JERRY    predniSONE 10 mg Oral Daily GUANAKITO Jimenez-JERRY    sodium chloride 75 mL/hr Intravenous Continuous GUANAKITO Jimenez-JERRY Last Rate: 75 mL/hr (09/16/18 1330)         VTE Pharmacologic Prophylaxis:   Pharmacologic: Heparin        Current Length of Stay: 1 day(s)    Current Patient Status: Inpatient       Discharge Plan: home Tuesday or Wednesday with PICC depending on culture results    Code Status: Level 1 - Full Code           Today, Patient Was Seen By: Josh Sutton DO    ** Please Note: Dictation voice to text software may have been used in the creation of this document   **

## 2018-09-16 NOTE — ED PROVIDER NOTES
History  Chief Complaint   Patient presents with    Painful Urination     pt reports he was told by his family doctor to come to the ED for UTI  also reports he has kidney stones  reports burning with urination  states "I need to be admitted for IV antibiotics  " shares history of myasthenia gravis  This is a 66-year-old male with a past medical history of myasthenia gravis who presents to the emergency department this evening at the request of his primary care physician after his urine culture came back positive for Pseudomonas infection  Patient has a history of pseudomonal UTI earlier this year requiring admission and IV cefepime  Patient states that his only symptom currently has burning when he pees and that has been going on for approximately 2 to 3 days  Patient states that his primary care physician called him at approximately 1900 hours tonight and told him to go to the ER for admission and IV antibiotics  Patient also with a history of kidney stones  Patient denies any new medications  Prior to Admission Medications   Prescriptions Last Dose Informant Patient Reported? Taking?    ACCU-CHEK FASTCLIX LANCETS MISC  Self Yes No   Sig: TEST twice a day   ACCU-CHEK GUIDE test strip   No No   Si each by Other route 4 (four) times a day Use as instructed bid scheduled and prn symptoms/signs blood sugar fluctuation   Cranberry 500 MG CAPS  Self Yes No   Sig: Take 1 capsule by mouth daily   Insulin Pen Needle 29G X 12MM MISC   No No   Sig: by Does not apply route 4 (four) times a day   Lactobacillus (ACIDOPHILUS) 100 MG CAPS  Self Yes Yes   Sig: Take 1 capsule by mouth daily   Potassium 99 MG TABS  Self Yes Yes   Sig: Take 2 tablets by mouth daily   cholecalciferol (VITAMIN D3) 1,000 units tablet  Self Yes Yes   Sig: Take 2 tablets by mouth daily     clotrimazole (LOTRIMIN) 1 % cream  Self No No   Sig: Apply topically 2 (two) times a day   Patient not taking: Reported on 2018 diclofenac sodium (VOLTAREN) 1 %   No No   Sig: Apply 2 g topically 2 (two) times a day   Patient not taking: Reported on 9/4/2018    famotidine (PEPCID) 20 mg tablet  Self No Yes   Sig: Take 1 tablet (20 mg total) by mouth daily   insulin aspart (NovoLOG) 100 units/mL injection   Yes Yes   Sig: Inject 4 Units under the skin 3 (three) times a day before meals   insulin glargine (LANTUS) 100 units/mL subcutaneous injection   Yes Yes   Sig: Inject 12 Units under the skin daily at bedtime   meclizine (ANTIVERT) 12 5 MG tablet  Self Yes Yes   Sig: Take 12 5 mg by mouth daily     metFORMIN (GLUCOPHAGE) 500 mg tablet   No No   Sig: Take 1 tablet (500 mg total) by mouth 2 (two) times a day with meals For 1 week, then increase to 2 tablets BID with meals   predniSONE 10 mg tablet   No Yes   Sig: Take 1 tablet (10 mg total) by mouth daily   pyridoxine (VITAMIN B6) 100 mg tablet  Self Yes Yes   Sig: Take 100 mg by mouth daily        Facility-Administered Medications: None       Past Medical History:   Diagnosis Date    Anxiety     Cervical spinal stenosis     Diabetes mellitus ([facility] [address] )     type 2    Hx of chronic ulcerative colitis     Hx of diverticulitis of colon     Hyperlipidemia     Hypertension     Kidney stone     [facility]     Neuropathy     RBBB     Renal disorder     Renal failure     Sleep apnea     UTI (urinary tract infection)        Past Surgical History:   Procedure Laterality Date    COLECTOMY      partial sigmoid    COLECTOMY LAPAROSCOPIC      partial sigmoid colectomy    COLON SURGERY      lap  partial colectomy sigmoid    COLONOSCOPY      LITHOTRIPSY      OTHER SURGICAL HISTORY      parotid bx    SALIVARY GLAND SURGERY      biopsy       Family History   Problem Relation Age of Onset    Arthritis Mother     Diabetes Mother     Hypertension Mother     Osteoporosis Mother     Depression Father     Cancer Neg Hx     Heart disease Neg Hx      I have reviewed and agree with the history as documented  Social History   Substance Use Topics    Smoking status: Never Smoker    Smokeless tobacco: Never Used    Alcohol use No        Review of Systems   Constitutional: Negative for chills, diaphoresis and fever  HENT: Negative for congestion, rhinorrhea, sinus pressure and sore throat  Eyes: Negative for visual disturbance  Respiratory: Negative for cough, chest tightness and shortness of breath  Cardiovascular: Negative for chest pain  Gastrointestinal: Negative for abdominal pain, constipation, diarrhea, nausea and vomiting  Genitourinary: Positive for dysuria  Negative for difficulty urinating, frequency, hematuria and urgency  Musculoskeletal: Negative for arthralgias and myalgias  Skin: Negative for color change and rash  Neurological: Negative for dizziness, numbness and headaches  Physical Exam  ED Triage Vitals   Temperature Pulse Respirations Blood Pressure SpO2   09/15/18 2016 09/15/18 2016 09/15/18 2016 09/15/18 2016 09/15/18 2016   97 8 °F (36 6 °C) 96 14 (!) 201/103 96 %      Temp Source Heart Rate Source Patient Position - Orthostatic VS BP Location FiO2 (%)   09/15/18 2016 09/15/18 2016 09/15/18 2016 09/15/18 2016 --   Oral Monitor Sitting Right arm       Pain Score       09/15/18 2151       No Pain           Orthostatic Vital Signs  Vitals:    09/15/18 2016 09/15/18 2151   BP: (!) 201/103 150/83   Pulse: 96 86   Patient Position - Orthostatic VS: Sitting Lying       Physical Exam   Constitutional: He is oriented to person, place, and time  He appears well-developed and well-nourished  No distress  HENT:   Head: Normocephalic and atraumatic  Eyes: Conjunctivae are normal  Pupils are equal, round, and reactive to light  Right eye exhibits no discharge  Left eye exhibits no discharge  No scleral icterus  Neck: Normal range of motion  Neck supple  No JVD present  Cardiovascular: Normal rate, regular rhythm and normal heart sounds    Exam reveals no gallop and no friction rub  No murmur heard  Pulmonary/Chest: Effort normal and breath sounds normal  No stridor  No respiratory distress  He has no wheezes  He has no rales  Abdominal: Soft  Bowel sounds are normal  He exhibits no distension  There is no tenderness  There is no guarding  Musculoskeletal: Normal range of motion  He exhibits no edema, tenderness or deformity  Neurological: He is alert and oriented to person, place, and time  No cranial nerve deficit or sensory deficit  He exhibits normal muscle tone  Skin: Skin is warm and dry  No rash noted  He is not diaphoretic  No erythema  No pallor  Psychiatric: He has a normal mood and affect  His behavior is normal    Nursing note and vitals reviewed        ED Medications  Medications   cefepime (MAXIPIME) 1,000 mg in dextrose 5 % 50 mL IVPB (not administered)   insulin lispro (HumaLOG) 100 units/mL subcutaneous injection 1-6 Units (not administered)   insulin lispro (HumaLOG) 100 units/mL subcutaneous injection 1-5 Units (not administered)   cefepime (MAXIPIME) 2 g/50 mL dextrose IVPB (0 mg Intravenous Stopped 9/15/18 2150)       Diagnostic Studies  Results Reviewed     Procedure Component Value Units Date/Time    Comprehensive metabolic panel [[id number]]  (Abnormal) Collected:  09/15/18 2116    Lab Status:  Final result Specimen:  Blood from Arm, Left Updated:  09/15/18 2137     Sodium 141 mmol/L      Potassium 4 0 mmol/L      Chloride 106 mmol/L      CO2 27 mmol/L      ANION GAP 8 mmol/L      BUN 20 mg/dL      Creatinine 1 72 (H) mg/dL      Glucose 118 mg/dL      Calcium 9 7 mg/dL      AST 31 U/L      ALT 60 U/L      Alkaline Phosphatase 42 (L) U/L      Total Protein 7 5 g/dL      Albumin 3 7 g/dL      Total Bilirubin 0 51 mg/dL      eGFR 39 ml/min/1 73sq m     Narrative:         National Kidney Disease Education Program recommendations are as follows:  GFR calculation is accurate only with a steady state creatinine  Chronic Kidney disease less than 60 ml/min/1 73 sq  meters  Kidney failure less than 15 ml/min/1 73 sq  meters  CBC and differential [[id number]] Collected:  09/15/18 2116    Lab Status:  Final result Specimen:  Blood from Arm, Left Updated:  09/15/18 2125     WBC 10 15 Thousand/uL      RBC 4 91 Million/uL      Hemoglobin 15 1 g/dL      Hematocrit 45 8 %      MCV 93 fL      MCH 30 8 pg      MCHC 33 0 g/dL      RDW 12 8 %      MPV 9 3 fL      Platelets 389 Thousands/uL      nRBC 0 /100 WBCs      Neutrophils Relative 65 %      Immat GRANS % 1 %      Lymphocytes Relative 21 %      Monocytes Relative 11 %      Eosinophils Relative 1 %      Basophils Relative 1 %      Neutrophils Absolute 6 70 Thousands/µL      Immature Grans Absolute 0 05 Thousand/uL      Lymphocytes Absolute 2 13 Thousands/µL      Monocytes Absolute 1 11 Thousand/µL      Eosinophils Absolute 0 06 Thousand/µL      Basophils Absolute 0 10 Thousands/µL     Urine Microscopic [[id number]]  (Abnormal) Collected:  09/15/18 2037    Lab Status:  Final result Specimen:  Urine from Urine, Clean Catch Updated:  09/15/18 2056     RBC, UA None Seen /hpf      WBC, UA 10-20 (A) /hpf      Epithelial Cells Occasional /hpf      Bacteria, UA Occasional /hpf     Urine culture [[id number]] Collected:  09/15/18 2037    Lab Status:   In process Specimen:  Urine from Urine, Clean Catch Updated:  09/15/18 2056    UA w Reflex to Microscopic w Reflex to Culture [[id number]]  (Abnormal) Collected:  09/15/18 2037    Lab Status:  Final result Specimen:  Urine from Urine, Clean Catch Updated:  09/15/18 2053     Color, UA Red     Clarity, UA Clear     Specific Gravity, UA 1 025     pH, UA 5 0     Leukocytes, UA Trace (A)     Nitrite, UA Positive (A)     Protein,  (2+) (A) mg/dl      Glucose,  (1/4%) (A) mg/dl      Ketones, UA 15 (1+) (A) mg/dl      Urobilinogen, UA >=8 0 (A) E U /dl      Bilirubin, UA Negative     Blood, UA Negative                 CT renal stone study abdomen pelvis wo contrast   Final Result by Sarah Acuna MD (09/15 2208)      1  Bilateral nephrolithiasis as described above  Increasing calculus burden the left when compared with prior examination  No hydronephrosis  2   Redemonstrated tethering of the left dome of the bladder to the sigmoid colon may be due to an adhesion  There is no significant acute inflammatory change in this region  Workstation performed: [id number]               Procedures  Procedures      Phone Consults  ED Phone Contact    ED Course                               MDM  Number of Diagnoses or Management Options  Pseudomonas urinary tract infection:   UTI (urinary tract infection):   Diagnosis management comments: Patient was started on IV cefepime here in the emergency department  Patient had a CT renal stone study done to evaluate the kidney stones the patient notes are in his history  Called and spoke with ping who agreed to admit the patient as an inpatient  CritCare Time    Disposition  Final diagnoses:   UTI (urinary tract infection)   Pseudomonas urinary tract infection     Time reflects when diagnosis was documented in both MDM as applicable and the Disposition within this note     Time User Action Codes Description Comment    9/15/2018  9:15 PM Gabino Mackenzie [N39 [id number]] UTI (urinary tract infection)     9/15/2018  9:15 PM Earlene Guerrero [N39 0,  B96 5] Pseudomonas urinary tract infection       ED Disposition     ED Disposition Condition Comment    Admit  Case was discussed with PING and the patient's admission status was agreed to be Admission Status: inpatient status to the service of Dr Chin May           Follow-up Information    None         Current Discharge Medication List      CONTINUE these medications which have NOT CHANGED    Details   cholecalciferol (VITAMIN D3) 1,000 units tablet Take 2 tablets by mouth daily        famotidine (PEPCID) 20 mg tablet Take 1 tablet (20 mg total) by mouth daily  Qty: 90 tablet, Refills: 3    Associated Diagnoses: Gastroesophageal reflux disease without esophagitis      insulin aspart (NovoLOG) 100 units/mL injection Inject 4 Units under the skin 3 (three) times a day before meals      insulin glargine (LANTUS) 100 units/mL subcutaneous injection Inject 12 Units under the skin daily at bedtime      Lactobacillus (ACIDOPHILUS) 100 MG CAPS Take 1 capsule by mouth daily      meclizine (ANTIVERT) 12 5 MG tablet Take 12 5 mg by mouth daily        Potassium 99 MG TABS Take 2 tablets by mouth daily      predniSONE 10 mg tablet Take 1 tablet (10 mg total) by mouth daily  Qty: 90 tablet, Refills: 0    Associated Diagnoses: Myasthenia gravis ([facility])      pyridoxine (VITAMIN B6) 100 mg tablet Take 100 mg by mouth daily        ACCU-CHEK FASTCLIX LANCETS MISC TEST twice a day  Refills: 0      ACCU-CHEK GUIDE test strip 1 each by Other route 4 (four) times a day Use as instructed bid scheduled and prn symptoms/signs blood sugar fluctuation  Qty: 300 each, Refills: 0    Associated Diagnoses: Acute pain of right foot; Low grade fever;  Controlled type 2 diabetes mellitus with hypoglycemia, with long-term current use of insulin ([facility]); CKD (chronic kidney disease), stage III      clotrimazole (LOTRIMIN) 1 % cream Apply topically 2 (two) times a day  Qty: 30 g, Refills: 1    Associated Diagnoses: Intertriginous candidiasis      Cranberry 500 MG CAPS Take 1 capsule by mouth daily      diclofenac sodium (VOLTAREN) 1 % Apply 2 g topically 2 (two) times a day  Qty: 100 Tube, Refills: 0    Associated Diagnoses: Right foot pain      Insulin Pen Needle 29G X 12MM MISC by Does not apply route 4 (four) times a day  Qty: 360 each, Refills: 0    Associated Diagnoses: Type 2 diabetes mellitus with complication, with long-term current use of insulin ([facility])      metFORMIN (GLUCOPHAGE) 500 mg tablet Take 1 tablet (500 mg total) by mouth 2 (two) times a day with meals For 1 week, then increase to 2 tablets BID with meals  Qty: 120 tablet, Refills: 0    Associated Diagnoses: Controlled type 2 diabetes mellitus with hypoglycemia, with long-term current use of insulin ([facility]           No discharge procedures on file  ED Provider  Attending physically available and evaluated Osmany Quivers  I managed the patient along with the ED Attending      Electronically Signed by         Kristi Villarreal MD  09/15/18 2975

## 2018-09-16 NOTE — ASSESSMENT & PLAN NOTE
Recurrent Pseudomonas UTI  Appreciate Urology and ID eval  Will place PICC line when ok with ID  Need to make sure that blood cultures are negative

## 2018-09-16 NOTE — ED ATTENDING ATTESTATION
Adia Jaramillo DO, saw and evaluated the patient  I have discussed the patient with the resident/non-physician practitioner and agree with the resident's/non-physician practitioner's findings, Plan of Care, and MDM as documented in the resident's/non-physician practitioner's note, except where noted  All available labs and Radiology studies were reviewed  At this point I agree with the current assessment done in the Emergency Department  I have conducted an independent evaluation of this patient a history and physical is as follows:  Dysuria since Wednesday  Seen by PCP with culture +pseudomonsas  Patient called to come to for IV ABX given ABX sensitivities and concern about cross reactivities  On examination:  The patient is awake, alert and oriented  HEENT: Normocephalic/atraumatic  External examination of the ears is unremarkable  Pupils are equal round and reactive to light, there is no conjunctival injection or scleral icterus noted  Nares are patent without rhinorrhea  The oropharynx is moist without injection  The neck is supple  Lungs: Clear to auscultation bilaterally  Heart: Regular without murmurs rubs or gallops  Abdomen: Soft and nontender  There are positive bowel sounds  there is no rebound or guarding  Musculoskeletal: Normal range of motion with grossly normal strength  Neuro: Cranial nerves II through XII grossly intact   Nonfocal exam  Skin: No rash noted  Psych: Mood and affect normal      Results for orders placed or performed during the hospital encounter of 09/15/18   UA w Reflex to Microscopic w Reflex to Culture   Result Value Ref Range    Color, UA Red     Clarity, UA Clear     Specific Gravity, UA 1 025 1 003 - 1 030    pH, UA 5 0 4 5 - 8 0    Leukocytes, UA Trace (A) Negative    Nitrite, UA Positive (A) Negative    Protein,  (2+) (A) Negative mg/dl    Glucose,  (1/4%) (A) Negative mg/dl    Ketones, UA 15 (1+) (A) Negative mg/dl    Urobilinogen, UA >=8 0 (A) 0 2, 1 0 E U /dl E U /dl    Bilirubin, UA Negative Negative    Blood, UA Negative >=2000 (4+), Trace-Intact, Negative   Urine Microscopic   Result Value Ref Range    RBC, UA None Seen None Seen, 0-5 /hpf    WBC, UA 10-20 (A) None Seen, 0-5, 5-55, 5-65 /hpf    Epithelial Cells Occasional None Seen, Occasional /hpf    Bacteria, UA Occasional None Seen, Occasional /hpf   CBC and differential   Result Value Ref Range    WBC 10 15 4 31 - 10 16 Thousand/uL    RBC 4 91 3 88 - 5 62 Million/uL    Hemoglobin 15 1 12 0 - 17 0 g/dL    Hematocrit 45 8 36 5 - 49 3 %    MCV 93 82 - 98 fL    MCH 30 8 26 8 - 34 3 pg    MCHC 33 0 31 4 - 37 4 g/dL    RDW 12 8 11 6 - 15 1 %    MPV 9 3 8 9 - 12 7 fL    Platelets 794 568 - 145 Thousands/uL    nRBC 0 /100 WBCs    Neutrophils Relative 65 43 - 75 %    Immat GRANS % 1 0 - 2 %    Lymphocytes Relative 21 14 - 44 %    Monocytes Relative 11 4 - 12 %    Eosinophils Relative 1 0 - 6 %    Basophils Relative 1 0 - 1 %    Neutrophils Absolute 6 70 1 85 - 7 62 Thousands/µL    Immature Grans Absolute 0 05 0 00 - 0 20 Thousand/uL    Lymphocytes Absolute 2 13 0 60 - 4 47 Thousands/µL    Monocytes Absolute 1 11 0 17 - 1 22 Thousand/µL    Eosinophils Absolute 0 06 0 00 - 0 61 Thousand/µL    Basophils Absolute 0 10 0 00 - 0 10 Thousands/µL           Review of recent culture shows sensitivity to cefepime    Plan is for admission for IV antibiotics      Critical Care Time  CritCare Time    Procedures

## 2018-09-16 NOTE — CONSULTS
Consultation - Infectious Disease   Madeline Rudd 68 y o  male MRN: [id number]  Unit/Bed#: [place] [address] -01 Encounter: [id number]      IMPRESSION & RECOMMENDATIONS:   Impression/Recommendations:  1  Pseudomonal UTI  With dysuria, pyuria  In the setting of nephrolithiasis, BPH  Clinically stable without sepsis  Cannot get quinolones due to # 3  Rec:  · Continue cefepime for 7 days total through 9/21/18  · Will need PICC line placement  · D/C planning for home IV antibiotics  · Follow up repeat urine culture    2  Nephrolithiasis  CT shows no obstruction  Rec:  · Close urology follow-up ongoing    3  Myasthenia gravis  Stable    Antibiotics:  Cefepime # 2    Thank you for this consultation  We will follow along with you  HISTORY OF PRESENT ILLNESS:  Reason for Consult:  Pseudomonal UTI    HPI: Madeline Rudd is a 68 y o  male with diabetes and my senior gravis on chronic prednisone  Patient was recently started on metformin complicated by diarrhea  He also recently developed burning with urination and had an outpatient UA which showed 10-20 WBCs and a urine culture which grew Pseudomonas  Patient attempted over-the-counter agents but continued to have dysuria  Patient has been instructed not to receive quinolones due to his history of myasthenia gravis  Patient was referred to the hospital for IV antibiotics  Of note the patient has had multiple previous cultures growing Pseudomonas  Upon presentation to the emergency department he was noted to be afebrile with a normal WBC  He underwent a CTA/P which showed bilateral nephrolithiasis without hydronephrosis  He was started on cefepime  We are asked to comment on further evaluation and management  REVIEW OF SYSTEMS:  As per HPI  A complete system-based review of systems is otherwise negative      PAST MEDICAL HISTORY:  Past Medical History:   Diagnosis Date    Anxiety     Cervical spinal stenosis     Diabetes mellitus ([facility] [address] )     type 2    Hx of chronic ulcerative colitis     Hx of diverticulitis of colon     Hyperlipidemia     Hypertension     Kidney stone     Myasthenia gravis ([address]     Neuropathy     RBBB     Renal disorder     Renal failure     Sleep apnea     UTI (urinary tract infection)      Past Surgical History:   Procedure Laterality Date    COLECTOMY      partial sigmoid    COLECTOMY LAPAROSCOPIC      partial sigmoid colectomy    COLON SURGERY      lap  partial colectomy sigmoid    COLONOSCOPY      LITHOTRIPSY      OTHER SURGICAL HISTORY      parotid bx    SALIVARY GLAND SURGERY      biopsy       FAMILY HISTORY:  Non-contributory    SOCIAL HISTORY:  History   Alcohol Use No     History   Drug Use No     History   Smoking Status    Never Smoker   Smokeless Tobacco    Never Used       ALLERGIES:  Allergies   Allergen Reactions    Levaquin [Levofloxacin]      Due to MG dx    Quinolones      Due to MG dx    Antihistamines, Chlorpheniramine-Type      Difficulty urinating    Benzodiazepines     Chlorpheniramine     Diphenhydramine     Erythromycin     Iodine     Iodine Solution [Povidone Iodine]      ivp dye    Loratadine     Other      Muscle relaxers d/t myasthenia gravis-they close my airway    Sulfa Antibiotics     Tetracyclines & Related        MEDICATIONS:  All current active medications have been reviewed      PHYSICAL EXAM:  Vitals:  HR:  [55-96] 55  Resp:  [14-19] 19  BP: (129-201)/() 129/76  SpO2:  [92 %-98 %] 92 %  Temp (24hrs), Av 7 °F (36 5 °C), Min:97 6 °F (36 4 °C), Max:97 8 °F (36 6 °C)  Current: Temperature: 97 8 °F (36 6 °C)     Physical Exam:  General:  Well-nourished, well-developed, in no acute distress  Eyes:  Conjunctive clear with no hemorrhages or effusions  Oropharynx:  No ulcers, no lesions  Neck:  Supple, no lymphadenopathy  Lungs:  Clear to auscultation bilaterally, no accessory muscle use  Cardiac:  Regular rate and rhythm, no murmurs  Abdomen:  Soft, non-tender, non-distended  Extremities:  No peripheral cyanosis, clubbing, or edema  Skin:  No rashes, no ulcers  Neurological:  Moves all four extremities spontaneously, sensation grossly intact    LABS, IMAGING, & OTHER STUDIES:  Lab Results:  I have personally reviewed pertinent labs  Results from last 7 days  Lab Units 09/16/18  0545 09/15/18  2116   SODIUM mmol/L 142 141   POTASSIUM mmol/L 3 7 4 0   CHLORIDE mmol/L 108 106   CO2 mmol/L 26 27   BUN mg/dL 19 20   CREATININE mg/dL 1 30 1 72*   EGFR ml/min/1 73sq m 54 39   CALCIUM mg/dL 8 9 9 7   AST U/L  --  31   ALT U/L  --  60   ALK PHOS U/L  --  42*       Results from last 7 days  Lab Units 09/16/18  0545 09/15/18  2116   WBC Thousand/uL 9 20 10 15   HEMOGLOBIN g/dL 13 9 15 1   PLATELETS Thousands/uL 259  252 305       Results from last 7 days  Lab Units 09/13/18  1204   URINE CULTURE  >100,000 cfu/ml Pseudomonas aeruginosa*       Imaging Studies:   I have personally reviewed pertinent imaging study reports and images in PACS  CTA/P bilateral nephrolithiasis without obstruction    EKG, Pathology, and Other Studies:   I have personally reviewed pertinent reports

## 2018-09-16 NOTE — PLAN OF CARE
DISCHARGE PLANNING     Discharge to home or other facility with appropriate resources Progressing        GENITOURINARY - ADULT     Maintains or returns to baseline urinary function Progressing     Absence of urinary retention Progressing     Urinary catheter remains patent Progressing        INFECTION - ADULT     Absence or prevention of progression during hospitalization Progressing     Absence of fever/infection during neutropenic period Progressing        Knowledge Deficit     Patient/family/caregiver demonstrates understanding of disease process, treatment plan, medications, and discharge instructions Progressing        PAIN - ADULT     Verbalizes/displays adequate comfort level or baseline comfort level Progressing        Potential for Falls     Patient will remain free of falls Progressing        SAFETY ADULT     Maintain or return to baseline ADL function Progressing     Maintain or return mobility status to optimal level Progressing

## 2018-09-16 NOTE — H&P
History and Physical - [facility] Internal Medicine    Patient Information: Eliseo Machuca 68 y o  male MRN: [id number]  Unit/Bed#: [address] 2 [address] [id number] Encounter: [id number]  Admitting Physician: Aureliano Schmitz PA-C  PCP: Rosario Rodas DO  Date of Admission:  09/15/18    Assessment/Plan:    Hospital Problem List:     Principal Problem:    Pseudomonas urinary tract infection  Active Problems:    Myasthenia gravis ([facility] [address] )    Nephrolithiasis    MARSHALL (acute kidney injury) ([facility] [address] )    Controlled type 2 diabetes mellitus without complication, with long-term current use of insulin ([facility] )    Sleep apnea      Plan for the Primary Problem(s):  · Pseudomonas UTI  · Admit to med/surg  UA done as outpatient 2 days ago and resulted today  Patient's PCP called to recommend IV antibiotics and urology consult  CT scan report pending  Patient with PMH of myasthenia gravis, limited in antibiotics he can safely take  Has used IV cefepime in the past for similar UTI  Consult ID    Plan for Additional Problems:   · MG- continue home dose of steroids  Has never required intubation  · DM- recently taken off insulin because he couldn't afford it  Has been doing well at home on metformin  Recent a1c 6  · Sleep apnea- never has sleep study and does not use CPAP  · Kidney stones- awaiting CT results  · MARSHALL- creatinine today 1 72, prior was 1 25  Continue gentle IV fluids      VTE Prophylaxis: Heparin  / sequential compression device   Code Status: full code  POLST: There is no POLST form on file for this patient (pre-hospital)    Anticipated Length of Stay:  Patient will be admitted on an Inpatient basis with an anticipated length of stay of  Greater than 2 midnights  Justification for Hospital Stay: patient requires IV antibiotics    Total Time for Visit, including Counseling / Coordination of Care: 45 minutes  Greater than 50% of this total time spent on direct patient counseling and coordination of care      Chief Complaint:   Painful urination    History of Present Illness:    Carmen Noland is a 68 y o  male who presents with painful urination  Patient with known chronic kidney stones  He has had prior pseudomonas UTI  He had to be admitted for IV cefepime due to long list of antibiotics he cannot take with MG  He denies fever  He states this is the first time he had burning with his UTI  Review of Systems:    Review of Systems   Constitutional: Negative  HENT: Negative  Eyes: Negative  Respiratory: Negative  Cardiovascular: Negative  Gastrointestinal: Negative  Endocrine: Negative  Genitourinary: Positive for dysuria  Musculoskeletal: Negative  Skin: Negative  Allergic/Immunologic: Negative  Neurological: Negative  Hematological: Negative  Psychiatric/Behavioral: Negative  Past Medical and Surgical History:     Past Medical History:   Diagnosis Date    Anxiety     Cervical spinal stenosis     Diabetes mellitus ([facility] )     type 2    Hx of chronic ulcerative colitis     Hx of diverticulitis of colon     Hyperlipidemia     Hypertension     Kidney stone     Myasthenia gravis ([facility] )     Neuropathy     RBBB     Renal disorder     Renal failure     Sleep apnea     UTI (urinary tract infection)        Past Surgical History:   Procedure Laterality Date    COLECTOMY      partial sigmoid    COLECTOMY LAPAROSCOPIC      partial sigmoid colectomy    COLON SURGERY      lap  partial colectomy sigmoid    COLONOSCOPY      LITHOTRIPSY      OTHER SURGICAL HISTORY      parotid bx    SALIVARY GLAND SURGERY      biopsy       Meds/Allergies:    Prior to Admission medications    Medication Sig Start Date End Date Taking?  Authorizing Provider   cholecalciferol (VITAMIN D3) 1,000 units tablet Take 2 tablets by mouth daily     Yes Historical Provider, MD   famotidine (PEPCID) 20 mg tablet Take 1 tablet (20 mg total) by mouth daily 4/5/18  Yes Shalonda Peña PA-C   insulin aspart (NovoLOG) 100 units/mL injection Inject 4 Units under the skin 3 (three) times a day before meals   Yes Historical Provider, MD   insulin glargine (LANTUS) 100 units/mL subcutaneous injection Inject 12 Units under the skin daily at bedtime   Yes Historical Provider, MD   Lactobacillus (ACIDOPHILUS) 100 MG CAPS Take 1 capsule by mouth daily   Yes Historical Provider, MD   meclizine (ANTIVERT) 12 5 MG tablet Take 12 5 mg by mouth daily     Yes Historical Provider, MD   Potassium 99 MG TABS Take 2 tablets by mouth daily   Yes Historical Provider, MD   predniSONE 10 mg tablet Take 1 tablet (10 mg total) by mouth daily 9/4/18  Yes Diane Howe DO   pyridoxine (VITAMIN B6) 100 mg tablet Take 100 mg by mouth daily     Yes Historical Provider, MD   ACCU-CHEK FASTCLIX LANCETS MISC TEST twice a day 2/19/18   Historical Provider, MD   ACCU-CHEK GUIDE test strip 1 each by Other route 4 (four) times a day Use as instructed bid scheduled and prn symptoms/signs blood sugar fluctuation 7/24/18   Diane Howe DO   clotrimazole (LOTRIMIN) 1 % cream Apply topically 2 (two) times a day  Patient not taking: Reported on 9/4/2018 2/19/18   Diane Howe DO   Cranberry 500 MG CAPS Take 1 capsule by mouth daily    Historical Provider, MD   diclofenac sodium (VOLTAREN) 1 % Apply 2 g topically 2 (two) times a day  Patient not taking: Reported on 9/4/2018 6/20/18   Maranda Salinas DO   Insulin Pen Needle 29G X 12MM MISC by Does not apply route 4 (four) times a day 9/13/18   Diane Howe DO   metFORMIN (GLUCOPHAGE) 500 mg tablet Take 1 tablet (500 mg total) by mouth 2 (two) times a day with meals For 1 week, then increase to 2 tablets BID with meals 9/4/18   Diane Howe DO     I have reviewed home medications with patient personally  Allergies:    Allergies   Allergen Reactions    Levaquin [Levofloxacin]      Due to MG dx    Quinolones      Due to MG dx    Antihistamines, Chlorpheniramine-Type      Difficulty urinating    Benzodiazepines     Chlorpheniramine     Diphenhydramine     Erythromycin     Iodine     Iodine Solution [Povidone Iodine]      ivp dye    Loratadine     Other      Muscle relaxers d/t myasthenia gravis-they close my airway    Sulfa Antibiotics     Tetracyclines & Related        Social History:     Marital Status: /Civil Union   Occupation: house cleaning  Patient Pre-hospital Living Situation: lives with wife  Patient Pre-hospital Level of Mobility: ambulatory  Patient Pre-hospital Diet Restrictions: low carb  Substance Use History:   History   Alcohol Use No     History   Smoking Status    Never Smoker   Smokeless Tobacco    Never Used     History   Drug Use No       Family History:    non-contributory    Physical Exam:     Vitals:   Blood Pressure: 155/97 (09/15/18 2249)  Pulse: 92 (09/15/18 2249)  Temperature: 97 6 °F (36 4 °C) (09/15/18 2249)  Temp Source: Temporal (09/15/18 2249)  Respirations: 18 (09/15/18 2249)  SpO2: 97 % (09/15/18 2249)    Physical Exam   Constitutional: He is oriented to person, place, and time  He appears well-developed and well-nourished  No distress  HENT:   Head: Normocephalic and atraumatic  Eyes: Conjunctivae are normal  Pupils are equal, round, and reactive to light  Neck: Normal range of motion  Neck supple  No JVD present  No tracheal deviation present  No thyromegaly present  Cardiovascular: Normal rate and regular rhythm  Pulmonary/Chest: Effort normal and breath sounds normal  No respiratory distress  He has no wheezes  Abdominal: Soft  Bowel sounds are normal  He exhibits no distension  There is no tenderness  Musculoskeletal: Normal range of motion  He exhibits no edema, tenderness or deformity  Lymphadenopathy:     He has no cervical adenopathy  Neurological: He is alert and oriented to person, place, and time  Skin: Skin is warm and dry  He is not diaphoretic  No erythema  Psychiatric: He has a normal mood and affect   His behavior is normal    Vitals reviewed  Additional Data:     Lab Results: I have personally reviewed pertinent reports  Results from last 7 days  Lab Units 09/15/18  2116   WBC Thousand/uL 10 15   HEMOGLOBIN g/dL 15 1   HEMATOCRIT % 45 8   PLATELETS Thousands/uL 305   NEUTROS PCT % 65   LYMPHS PCT % 21   MONOS PCT % 11   EOS PCT % 1       Results from last 7 days  Lab Units 09/15/18  2116   SODIUM mmol/L 141   POTASSIUM mmol/L 4 0   CHLORIDE mmol/L 106   CO2 mmol/L 27   BUN mg/dL 20   CREATININE mg/dL 1 72*   CALCIUM mg/dL 9 7   ALK PHOS U/L 42*   ALT U/L 60   AST U/L 31           Imaging: I have personally reviewed pertinent reports  Xr Chest Pa & Lateral    Result Date: 8/24/2018  Narrative: CHEST INDICATION:   Z20 1: Contact with and (suspected) exposure to tuberculosis  COMPARISON:  January 10, 2017 EXAM PERFORMED/VIEWS:  XR CHEST PA & LATERAL  PA view was performed utilizing dual energy radiographic technique  Images: 4 FINDINGS: The cardiac silhouette is enlarged (cardiothoracic ratio 20 6/33 9)  The lungs are clear  No pneumothorax or pleural effusion  Osseous structures appear within normal limits for patient age  Impression: No acute pulmonary disease  No radiographic evidence of active pulmonary tuberculosis  Cardiomegaly  Workstation performed: [id number]     Ct Renal Stone Study Abdomen Pelvis Wo Contrast    Result Date: 9/15/2018  Narrative: CT ABDOMEN AND PELVIS WITHOUT IV CONTRAST - LOW DOSE RENAL STONE INDICATION:   Kidney stone, known, follow up  COMPARISON:  CT of the abdomen and pelvis on May 12, 2015  TECHNIQUE:  Low dose thin section CT examination of the abdomen and pelvis was performed without intravenous or oral contrast according to a protocol specifically designed to evaluate for urinary tract calculus  Axial, sagittal, and coronal 2D reformatted images were created from the source data and submitted for interpretation     Evaluation for pathology in the abdomen and pelvis that is unrelated to urinary tract calculi is limited  Radiation dose length product (DLP) for this visit:  398 mGy-cm   This examination, like all CT scans performed in the [facility], was performed utilizing techniques to minimize radiation dose exposure, including the use of iterative reconstruction and automated exposure control  FINDINGS: RIGHT KIDNEY AND URETER: There are 2 right renal calculi measuring up to 3 mm  No hydronephrosis  There are hypodense lesions within the kidney measuring up to 1 4 cm likely representing cysts  There is nonspecific perinephric stranding  LEFT KIDNEY AND URETER: There is a 7 mm calculus within lower pole of the left kidney  There is an additional punctate 2 mm calculus within the midpole  No hydronephrosis  Nonspecific perinephric stranding  Stable cyst of the left upper pole measures 2 1 cm  URINARY BLADDER: Tethering of the left dome of the bladder to the sigmoid colon without significant acute inflammatory change  No significant abnormality in the visualized lung bases  Limited low radiation dose noncontrast CT evaluation demonstrates no clinically significant abnormality of liver, spleen, pancreas, or adrenal glands  There are gallstone(s) within the gallbladder, without pericholecystic inflammatory changes  No ascites or bulky lymphadenopathy on this limited noncontrast study  No bowel obstruction  Unremarkable rectal anastomosis  The appendix is well seen and there is no evidence of acute appendicitis  Bilateral fat-containing inguinal hernias  No acute fracture or destructive osseous lesion is identified  Impression: 1  Bilateral nephrolithiasis as described above  Increasing calculus burden the left when compared with prior examination  No hydronephrosis  2   Redemonstrated tethering of the left dome of the bladder to the sigmoid colon may be due to an adhesion  There is no significant acute inflammatory change in this region  Workstation performed: [id number]       EKG, Pathology, and Other Studies Reviewed on Admission:   · EKG: none    Allscripts / Epic Records Reviewed: Yes     ** Please Note: This note has been constructed using a voice recognition system   **

## 2018-09-16 NOTE — CONSULTS
UROLOGY CONSULTATION NOTE     Patient Identifiers: Ariela Sun (MRN [id number])  Service Requesting Consultation:   [facility] Internal Medicine  Service Providing Consultation:  Urology, Niurka Avery MD    Date of Service: 9/16/2018  Consults    Reason for Consultation:   Urinary tract infection, nephrolithiasis, benign prostatic enlargement    History of Present Illness:     Ariela Sun is a 68 y o  old with a history of benign prostatic enlargement, nephrolithiasis, and recurrent urinary tract infection, previously with Pseudomonas, and also with E coli  This has been present for months to years and the patient has noted dysuria and discomfort bladder as associated symptoms  The current episode has been present for a number of days  Nothing in particular and antibiosis are identified as aggravating and alleviating factors, respectively  Previous therapies include intravenous antibiotics (these have been complicated due to his history of myasthenia gravis and report of multiple antibiotic allergies) and previous work-up includes imaging studies an outpatient urologic workup and evaluation  The patient has previously seen a urologist for this problem, my colleague Dr Chon Valdez  The patient otherwise endorses a history of urologic malady in the form of the nephrolithiasis listed above and a large prostate  The patient denies a family history of urologic malignancy or malady      He does report that he has been told previously by his primary care provider that his stone should be treated given his recurrent infections    Past Medical, Past Surgical History:     Past Medical History:   Diagnosis Date    Anxiety     Cervical spinal stenosis     Diabetes mellitus ([address] )     type 2    Hx of chronic ulcerative colitis     Hx of diverticulitis of colon     Hyperlipidemia     Hypertension     Kidney stone     Myasthenia gravis ([facility] [address] )     Neuropathy     RBBB     Renal disorder     Renal failure     Sleep apnea     UTI (urinary tract infection)    :    Past Surgical History:   Procedure Laterality Date    COLECTOMY      partial sigmoid    COLECTOMY LAPAROSCOPIC      partial sigmoid colectomy    COLON SURGERY      lap  partial colectomy sigmoid    COLONOSCOPY      LITHOTRIPSY      OTHER SURGICAL HISTORY      parotid bx    SALIVARY GLAND SURGERY      biopsy   :    Medications, Allergies:     Current Facility-Administered Medications   Medication Dose Route Frequency    acetaminophen (TYLENOL) tablet 650 mg  650 mg Oral Q6H PRN    calcium carbonate (TUMS) chewable tablet 1,000 mg  1,000 mg Oral BID PRN    cefepime (MAXIPIME) 1,000 mg in dextrose 5 % 50 mL IVPB  1,000 mg Intravenous Q12H    cholecalciferol (VITAMIN D3) tablet 2,000 Units  2,000 Units Oral Daily    famotidine (PEPCID) tablet 20 mg  20 mg Oral Daily    heparin (porcine) subcutaneous injection 5,000 Units  5,000 Units Subcutaneous Q8H [facility]    insulin lispro (HumaLOG) 100 units/mL subcutaneous injection 1-5 Units  1-5 Units Subcutaneous HS    insulin lispro (HumaLOG) 100 units/mL subcutaneous injection 1-6 Units  1-6 Units Subcutaneous TID AC    ondansetron (ZOFRAN) injection 4 mg  4 mg Intravenous Q6H PRN    predniSONE tablet 10 mg  10 mg Oral Daily    sodium chloride 0 9 % infusion  75 mL/hr Intravenous Continuous       Allergies:   Allergies   Allergen Reactions    Levaquin [Levofloxacin]      Due to MG dx    Quinolones      Due to MG dx    Antihistamines, Chlorpheniramine-Type      Difficulty urinating    Benzodiazepines     Chlorpheniramine     Diphenhydramine     Erythromycin     Iodine     Iodine Solution [Povidone Iodine]      ivp dye    Loratadine     Other      Muscle relaxers d/t myasthenia gravis-they close my airway    Sulfa Antibiotics     Tetracyclines & Related    :    Social and Family History:   Social History:   Social History   Substance Use Topics    Smoking status: Never Smoker    Smokeless tobacco: Never Used    Alcohol use No        History   Smoking Status    Never Smoker   Smokeless Tobacco    Never Used       Family History:  Family History   Problem Relation Age of Onset    Arthritis Mother     Diabetes Mother     Hypertension Mother     Osteoporosis Mother     Depression Father     Cancer Neg Hx     Heart disease Neg Hx    :     Review of Systems:     General: Fever, chills, or night sweats: negative  Cardiac: Negative for chest pain  Pulmonary: Negative for shortness of breath  Gastrointestinal: Abdominal pain positive  Nausea, vomiting, or diarrhea negative,  Genitourinary: See HPI above  Patient does not have hematuria  All other systems were queried and were negative except as listed above in HPI and here in the ROS  Physical Exam:   /76 (BP Location: Right arm)   Pulse 55   Temp 97 8 °F (36 6 °C) (Temporal)   Resp 19   SpO2 92% Temp (24hrs), Av 7 °F (36 5 °C), Min:97 6 °F (36 4 °C), Max:97 8 °F (36 6 °C)  current; Temperature: 97 8 °F (36 6 °C)  I/O last 24 hours: In: 48 [IV Piggyback:50]  Out: -   General: Patient is pleasant and in NAD  Awake and alert    Cardiac: Peripheral edema: negative, peripheral pulses are present and indicated a regular rate and rhythm    Pulmonary: Non-labored breathing, speaking in full sentences, no wheezing, no coughing    Abdomen: Soft, non-tender, non-distended  Genitourinary: Negative CVA tenderness, negative suprapubic tenderness  Neurological: Cranial nerves II-XII are intact, no sensory deficits, no neurological deficits    Musculoskeletal: Extremities are warm and symmetrical, ROM is not assessed    Psychiatric: The patient's train of thought is linear and logical, mood and affect are normal, the patient denies suicidal and homicidal ideations  Lymphatic: There is not adenopathy in the inguinal region      Skin:   Warm, no breakdown    CAZARES: none        Labs:     Lab Results   Component Value Date HGB 13 9 09/16/2018    HCT 40 9 09/16/2018    WBC 9 20 09/16/2018     09/16/2018     09/16/2018   ]    Lab Results   Component Value Date     09/16/2018    K 3 7 09/16/2018     09/16/2018    CO2 26 09/16/2018    BUN 19 09/16/2018    CREATININE 1 30 09/16/2018    CALCIUM 8 9 09/16/2018    GLUCOSE 124 12/11/2015   ]    Imaging:   I personally reviewed the images and report of the following studies, and reviewed them with the patient:    CT Abdomen/Pelvis: Bilateral nonobstructing stones, left greater than right  There is some calcification within the prostate is well and indication of prostatic swelling on imaging      ASSESSMENT:     68 y o  old male with  recurrent urinary tract infection, nonobstructing nephrolithiasis, and benign prostatic enlargement  I spoke with him about recurrent urinary tract infection in older male patients  We discussed the need for timed voiding, and potential need for transurethral resection of prostate in the future should he continue to get recurrent urinary tract infections  Additionally, I spoke with him regarding the use of methenamine hippurate and vitamin-C as chemo prophylaxis against recurrent infections  This must be used with continued monitoring of his renal function given some degree of chronic kidney disease in his case with an estimated GFR of 54  Finally, I do recommend that he eventually undergo stone treatment in the form of ureteroscopy with laser lithotripsy and stone extraction of his left kidney stones 1st, followed by his right kidney stones as stones are a known nidus for recurrent urinary tract infections      All of his questions and concerns were answered and addressed, there is no indication for acute urologic surgery at this time    PLAN:     Continued care per primary team     I will arrange for outpatient follow-up for this patient to see Dr Ruth Robison, his urologist, to discuss setting up ureteroscopy with laser lithotripsy once his acute infection has resolved  Please re-consult as necessary      Thank you for allowing me to participate in this patients care  Please do not hesitate to call with any additional questions    Guero Fraire MD

## 2018-09-17 LAB
GLUCOSE SERPL-MCNC: 118 MG/DL (ref 65–140)
GLUCOSE SERPL-MCNC: 141 MG/DL (ref 65–140)
GLUCOSE SERPL-MCNC: 81 MG/DL (ref 65–140)
GLUCOSE SERPL-MCNC: 92 MG/DL (ref 65–140)

## 2018-09-17 PROCEDURE — 99232 SBSQ HOSP IP/OBS MODERATE 35: CPT | Performed by: INTERNAL MEDICINE

## 2018-09-17 PROCEDURE — 82948 REAGENT STRIP/BLOOD GLUCOSE: CPT

## 2018-09-17 RX ADMIN — FAMOTIDINE 20 MG: 20 TABLET ORAL at 09:11

## 2018-09-17 RX ADMIN — CEFEPIME HYDROCHLORIDE 1000 MG: 2 INJECTION, POWDER, FOR SOLUTION INTRAVENOUS at 09:16

## 2018-09-17 RX ADMIN — HEPARIN SODIUM 5000 UNITS: 5000 INJECTION INTRAVENOUS; SUBCUTANEOUS at 21:36

## 2018-09-17 RX ADMIN — HEPARIN SODIUM 5000 UNITS: 5000 INJECTION INTRAVENOUS; SUBCUTANEOUS at 14:47

## 2018-09-17 RX ADMIN — VITAMIN D, TAB 1000IU (100/BT) 2000 UNITS: 25 TAB at 09:10

## 2018-09-17 RX ADMIN — CEFEPIME HYDROCHLORIDE 1000 MG: 2 INJECTION, POWDER, FOR SOLUTION INTRAVENOUS at 21:36

## 2018-09-17 RX ADMIN — SODIUM CHLORIDE 75 ML/HR: 0.9 INJECTION, SOLUTION INTRAVENOUS at 04:10

## 2018-09-17 RX ADMIN — PREDNISONE 10 MG: 5 TABLET ORAL at 09:12

## 2018-09-17 RX ADMIN — HEPARIN SODIUM 5000 UNITS: 5000 INJECTION INTRAVENOUS; SUBCUTANEOUS at 06:13

## 2018-09-17 NOTE — CASE MANAGEMENT
Initial Clinical Review    Admission: Date/Time/Statement: 9/15/18 @ 2116     Orders Placed This Encounter   Procedures    Inpatient Admission (expected length of stay for this patient is greater than two midnights)     Standing Status:   Standing     Number of Occurrences:   1     Order Specific Question:   Admitting Physician     Answer:   Wanda Pires [[id number]]     Order Specific Question:   Level of Care     Answer:   Med Surg [[id number]]     Order Specific Question:   Estimated length of stay     Answer:   More than 2 Midnights     Order Specific Question:   Certification     Answer:   I certify that inpatient services are medically necessary for this patient for a duration of greater than two midnights  See H&P and MD Progress Notes for additional information about the patient's course of treatment  ED: Date/Time/Mode of Arrival:   ED Arrival Information     Expected Arrival Acuity Means of Arrival Escorted By Service Admission Type    - 9/15/2018 20:11 Urgent Walk-In Self General Medicine Urgent    Arrival Complaint    kidney Issue          Chief Complaint:   Chief Complaint   Patient presents with    Painful Urination     pt reports he was told by his family doctor to come to the ED for UTI  also reports he has kidney stones  reports burning with urination  states "I need to be admitted for IV antibiotics  " shares history of myasthenia gravis  History of Illness: Dia Holguin is a 68 y o  male who presents with painful urination  Patient with known chronic kidney stones  He has had prior pseudomonas UTI  He had to be admitted for IV cefepime due to long list of antibiotics he cannot take with MG  He denies fever   He states this is the first time he had burning with his UTI         ED Vital Signs:   ED Triage Vitals   Temperature Pulse Respirations Blood Pressure SpO2   09/15/18 2016 09/15/18 2016 09/15/18 2016 09/15/18 2016 09/15/18 2016   97 8 °F (36 6 °C) 96 14 (!) 201/103 96 %      Temp Source Heart Rate Source Patient Position - Orthostatic VS BP Location FiO2 (%)   09/15/18 2016 09/15/18 2016 09/15/18 2016 09/15/18 2016 --   Oral Monitor Sitting Right arm       Pain Score       09/15/18 2151       No Pain        Wt Readings from Last 1 Encounters:   09/04/18 86 7 kg (191 lb 2 oz)       Vital Signs (abnormal):    above    Abnormal Labs/Diagnostic Test Results:    Creat  1 72  U/A   1+ ketone     Tr leukocyte     + nitrite     2+ protein       250 glucose  Urine  C/s    70,000-79,000  PA  Ct  Abd/pelvis:      Bilateral nephrolithiasis as described above   Increasing calculus burden the left when compared with prior examination   No hydronephrosis  2   Redemonstrated tethering of the left dome of the bladder to the sigmoid colon may be due to an adhesion   There is no significant acute inflammatory change in this region  ED Treatment:   Medication Administration from 09/15/2018 2011 to 09/15/2018 2208       Date/Time Order Dose Route Action Action by Comments     09/15/2018 2150 cefepime (MAXIPIME) 2 g/50 mL dextrose IVPB 0 mg Intravenous Stopped Lucetta LeakMALU chisholm      09/15/2018 2116 cefepime (MAXIPIME) 2 g/50 mL dextrose IVPB 2,000 mg Intravenous New Bag Lucetta Leaks, MALU           Past Medical/Surgical History:    Active Ambulatory Problems     Diagnosis Date Noted    Pseudomonas urinary tract infection     [facility]asthenia gravis ([facility] [address] [facility]     Nephrolithiasis     MARSHALL (acute kidney injury) ([facility] [address] ) 01/10/2017    Controlled type 2 diabetes mellitus without complication, with long-term current use of insulin ([facility] [address] ) 03/06/2017    CKD (chronic kidney disease), stage III 02/14/2018    Hypercalcemia 02/14/2018    Vertigo 02/14/2018    Antibiotic-induced yeast infection 02/25/2018    Essential hypertension 02/25/2018    Actinic keratosis 10/05/2016    Anxiety as acute reaction to gross stress 12/29/2015    Benign non-nodular prostatic hyperplasia with lower urinary tract symptoms 01/15/2016    Cervical radiculopathy 09/23/2014    Cervical spinal stenosis 10/31/2014    Diverticulosis 07/02/2015    Elevated bilirubin 01/05/2017    Hyperlipidemia 05/28/2014    Kidney atrophy 03/24/2017    Left anterior hemiblock 03/28/2016    Microscopic hematuria 09/20/2013    Polyneuropathy 04/23/2015    RBBB (right bundle branch block) 03/28/2016    Sleep apnea 02/04/2016    Tarsal tunnel syndrome of right side     Tuberculosis exposure 08/22/2018    Psychosocial stressors 09/04/2018    Dysuria 09/13/2018     Resolved Ambulatory Problems     Diagnosis Date Noted    Gastroesophageal reflux disease without esophagitis 03/06/2017    Erythema of foot 05/29/2018     Past Medical History:   Diagnosis Date    Anxiety     Cervical spinal stenosis     Hx of chronic ulcerative colitis     Hyperlipidemia     Myasthenia gravis (HCC)     Neuropathy     Renal failure     Sleep apnea     UTI (urinary tract infection)        Admitting Diagnosis: UTI (urinary tract infection) [N39 0]  Painful urination [R30 9]  Pseudomonas urinary tract infection [N39 0, B96 5]    Age/Sex: 68 y o  male    · Assessment/Plan: Pseudomonas UTI  ? Admit to med/surg  UA done as outpatient 2 days ago and resulted today  Patient's PCP called to recommend IV antibiotics and urology consult  CT scan report pending  Patient with PMH of myasthenia gravis, limited in antibiotics he can safely take  Has used IV cefepime in the past for similar UTI  Consult ID     Plan for Additional Problems:   · MG- continue home dose of steroids  Has never required intubation  · DM- recently taken off insulin because he couldn't afford it  Has been doing well at home on metformin  Recent a1c 6  · Sleep apnea- never has sleep study and does not use CPAP  · Kidney stones- awaiting CT results  · MRASHALL- creatinine today 1 72, prior was 1 25   Continue gentle IV fluids        VTE Prophylaxis: Heparin  / sequential compression device   Code Status: full code  POLST: There is no POLST form on file for this patient (pre-hospital)     Anticipated Length of Stay:  Patient will be admitted on an Inpatient basis with an anticipated length of stay of  Greater than 2 midnights     Justification for Hospital Stay: patient requires IV antibiotics    Admission Orders:   IP  9/15  @    0876  Scheduled Meds:   Current Facility-Administered Medications:  acetaminophen 650 mg Oral Q6H PRN Kim Jimenez PA-C    calcium carbonate 1,000 mg Oral BID PRN Kim Jimenez PA-C    cefepime 1,000 mg Intravenous Q12H Kim Jimenez PA-C Last Rate: 1,000 mg (09/17/18 0916)   cholecalciferol 2,000 Units Oral Daily Kim Jimenez PA-C    famotidine 20 mg Oral Daily Kim Jimenez PA-C    heparin (porcine) 5,000 Units Subcutaneous Q8H [facility] Cathy Amaya PA-C    ondansetron 4 mg Intravenous Q6H PRN Kim Jimenez PA-C    predniSONE 10 mg Oral Daily Kim Jimenez PA-C      Continuous Infusions:    PRN Meds:   acetaminophen    calcium carbonate    ondansetron    CCD diet  Cons  ID  Cons urology  Cons pICC  team

## 2018-09-17 NOTE — SOCIAL WORK
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 but states he has not been doing that recently  Patient state's family will provided transportation home at discharge  Patient has no dme at home no hx of VNA  Patient uses [facility] in [place]  Patient's PCP is Dr Jagdish Kearns and has also seen Dr Gabi Unger in the past  Patient has appointed his wife as POA  "     Patient will be discharged with a PICC line and needs 3-4 more days of IV ABX  CM set up [facility] services to provide meds and supplies which will be delivered to his home at a cost of $114 36 to patient  Patient is aware and in agreement of cost as well as [facility] for administration of IV ABX and PICC care   Patient will receive tomorrow's dose at 9pm

## 2018-09-17 NOTE — PROGRESS NOTES
Progress Note - Infectious Disease   Willis Gutierrez 68 y o  male MRN: [id number]  Unit/Bed#: [facility] 68 2 -01 Encounter: [id number]      Impression/Recommendations:  1  Pseudomonal UTI  With dysuria, pyuria  In the setting of nephrolithiasis, BPH  Clinically stable without sepsis  Cannot get quinolones due to # 3  Rec:  ? Continue cefepime for 7 days total through 18  ? Will need PICC line placement  ? D/C planning for home IV antibiotics  Rx left on chart for CM      2  Nephrolithiasis  CT shows no obstruction  Rec:  ? Close urology follow-up ongoing     3  Myasthenia gravis  Stable     The patient is stable from an ID standpoint for D/C home once IV antibiotics arranged  Antibiotics:  Cefepime # 3    Subjective:  Patient seen on AM rounds  Dysuria resolved  Denies fevers, chills, sweats, nausea, vomiting, or diarrhea  24 Hour Events:  No documented fevers, chills, sweats, nausea, vomiting, or diarrhea  Objective:  Vitals:  HR:  [61-76] 72  Resp:  [18-19] 18  BP: (107-145)/(72-85) 107/72  SpO2:  [94 %-95 %] 95 %  Temp (24hrs), Av 5 °F (36 4 °C), Min:97 °F (36 1 °C), Max:97 9 °F (36 6 °C)  Current: Temperature: 97 9 °F (36 6 °C)    Physical Exam:   General:  No acute distress  Psychiatric:  Awake and alert  Pulmonary:  Normal respiratory excursion without accessory muscle use  Abdomen:  Soft, nontender  Extremities:  No edema  Skin:  No rashes    Lab Results:  I have personally reviewed pertinent labs      Results from last 7 days  Lab Units 18  0545 09/15/18  2116   SODIUM mmol/L 142 141   POTASSIUM mmol/L 3 7 4 0   CHLORIDE mmol/L 108 106   CO2 mmol/L 26 27   BUN mg/dL 19 20   CREATININE mg/dL 1 30 1 72*   EGFR ml/min/1 73sq m 54 39   CALCIUM mg/dL 8 9 9 7   AST U/L  --  31   ALT U/L  --  60   ALK PHOS U/L  --  42*       Results from last 7 days  Lab Units 18  0545 09/15/18  2116   WBC Thousand/uL 9 20 10 15   HEMOGLOBIN g/dL 13 9 15 1   PLATELETS Thousands/uL 259  252 305 Results from last 7 days  Lab Units 09/15/18  2037 09/13/18  1204   URINE CULTURE  70,000-79,000 cfu/ml Oxidase Positive gram negative wendy* >100,000 cfu/ml Pseudomonas aeruginosa*       Imaging Studies:   I have personally reviewed pertinent imaging study reports and images in PACS  EKG, Pathology, and Other Studies:   I have personally reviewed pertinent reports

## 2018-09-17 NOTE — PLAN OF CARE
Problem: DISCHARGE PLANNING - CARE MANAGEMENT  Goal: Discharge to post-acute care or home with appropriate resources  INTERVENTIONS:  - Conduct assessment to determine patient/family and health care team treatment goals, and need for post-acute services based on payer coverage, community resources, and patient preferences, and barriers to discharge  - Address psychosocial, clinical, and financial barriers to discharge as identified in assessment in conjunction with the patient/family and health care team  - Arrange appropriate level of post-acute services according to patients   needs and preference and payer coverage in collaboration with the physician and health care team  - Communicate with and update the patient/family, physician, and health care team regarding progress on the discharge plan  - Arrange appropriate transportation to post-acute venues  Outcome: Progressing  CM set patient up to be discharged 9/18/18 with PICC for IV ABX for 3 more days  [facility] will deliver meds and supplies which they will schedule with patient directly  [facility] will come to patient's house at 8:00pm for 9pm IV ABX  CM to update patient

## 2018-09-18 ENCOUNTER — TRANSITIONAL CARE MANAGEMENT (OUTPATIENT)
Dept: FAMILY MEDICINE CLINIC | Facility: CLINIC | Age: 73
End: 2018-09-18

## 2018-09-18 VITALS
DIASTOLIC BLOOD PRESSURE: 83 MMHG | RESPIRATION RATE: 18 BRPM | HEART RATE: 73 BPM | SYSTOLIC BLOOD PRESSURE: 147 MMHG | TEMPERATURE: 97.7 F | OXYGEN SATURATION: 94 %

## 2018-09-18 DIAGNOSIS — B96.5 PSEUDOMONAS URINARY TRACT INFECTION: Primary | ICD-10-CM

## 2018-09-18 DIAGNOSIS — N39.0 PSEUDOMONAS URINARY TRACT INFECTION: Primary | ICD-10-CM

## 2018-09-18 LAB
BACTERIA UR CULT: ABNORMAL
BACTERIA UR CULT: ABNORMAL
GLUCOSE SERPL-MCNC: 96 MG/DL (ref 65–140)

## 2018-09-18 PROCEDURE — C1751 CATH, INF, PER/CENT/MIDLINE: HCPCS

## 2018-09-18 PROCEDURE — 05HY33Z INSERTION OF INFUSION DEVICE INTO UPPER VEIN, PERCUTANEOUS APPROACH: ICD-10-PCS | Performed by: INTERNAL MEDICINE

## 2018-09-18 PROCEDURE — 36569 INSJ PICC 5 YR+ W/O IMAGING: CPT

## 2018-09-18 PROCEDURE — 99239 HOSP IP/OBS DSCHRG MGMT >30: CPT | Performed by: INTERNAL MEDICINE

## 2018-09-18 PROCEDURE — 82948 REAGENT STRIP/BLOOD GLUCOSE: CPT

## 2018-09-18 PROCEDURE — 99232 SBSQ HOSP IP/OBS MODERATE 35: CPT | Performed by: INTERNAL MEDICINE

## 2018-09-18 RX ADMIN — HEPARIN SODIUM 5000 UNITS: 5000 INJECTION INTRAVENOUS; SUBCUTANEOUS at 06:10

## 2018-09-18 RX ADMIN — PREDNISONE 10 MG: 5 TABLET ORAL at 08:55

## 2018-09-18 RX ADMIN — FAMOTIDINE 20 MG: 20 TABLET ORAL at 08:55

## 2018-09-18 RX ADMIN — CEFEPIME HYDROCHLORIDE 1000 MG: 2 INJECTION, POWDER, FOR SOLUTION INTRAVENOUS at 08:55

## 2018-09-18 RX ADMIN — VITAMIN D, TAB 1000IU (100/BT) 2000 UNITS: 25 TAB at 08:55

## 2018-09-18 NOTE — DISCHARGE SUMMARY
Discharge Summary - Cely Garcia [birth year], [id number]        Admission Date: 9/15/2018  Discharge Date: 9/18/2018    Admitting Diagnosis: UTI (urinary tract infection) [N39 0]  Painful urination [R30 9]  Pseudomonas urinary tract infection [N39 0, B96 5]    Discharge Diagnosis:   1  Pseudomonas urinary tract infection  2  Myasthenia gravis  3  Acute kidney injury superimposed on chronic kidney disease stage 3  4  Type 2 diabetes  5  Nephrolithiasis  6  BPH  7  Hypertension    Consulting Physicians:  Dr Gabby Sharp, infectious Disease  Dr Lian Galeana, urology    Procedures Performed:   None    HPI:  The patient is a 70-year-old man who developed dysuria  He has known urinary tract stones and has a history of recurrent urinary tract infections  Outpatient evaluation revealed Pseudomonas urinary tract infection  He was not able to utilize oral therapy because of myasthenia gravis  He was admitted for further care  Hospital Course: The patient was admitted to the hospital and was started on intravenous cefepime  With this, his dysuria began to resolved  He was evaluated by Infectious Disease who agreed that considering his myasthenia gravis, the most appropriate treatment would be 7 days of IV cefepime  The patient was also evaluated in consultation by Urology  It was recommended that when the patient's urinary tract infection has cleared that he be re-evaluated by Dr Bree Edmonds as an outpatient with respect to the possibility of ureteroscopy and laser lithotripsy  The patient underwent PICC catheterization to facilitate his further treatment with IV antibiotics as an outpatient  The patient's myasthenia, diabetes, and hypertension remained stable throughout his hospitalization  On the day of discharge the patient was feeling well  Vital signs were stable  Lungs were clear  Cardiac exam was normal   The abdomen was soft and nontender  He had no edema      Disposition:  The patient was discharged home with visiting nurses on September 18th  He was asked to continue a diabetic diet  His activity will be as tolerated  He was asked to follow up with Dr Clayton Cooley within 1 week  He will also be seen in the near future by Urology  Discharge instructions/Information to patient and family:   See after visit summary for information provided to patient and family  Provisions for Follow-Up Care:  See after visit summary for information related to follow-up care and any pertinent home health orders  Planned Readmission: No    Discharge Statement   I spent 35 minutes discharging the patient  This time was spent on the day of discharge  I had direct contact with the patient on the day of discharge  Discharge Medications:  See after visit summary for reconciled discharge medications provided to patient and family

## 2018-09-18 NOTE — PROGRESS NOTES
Progress Note - Infectious Disease   Ronald Norris 68 y o  male MRN: [id number]  Unit/Bed#: [facility] 68 2 -01 Encounter: [id number]      Impression/Recommendations:  1   Pseudomonal UTI  With dysuria, pyuria  In the setting of nephrolithiasis, BPH  Clinically stable without sepsis  Cannot get quinolones due to # 3  Rec:  ? Continue cefepime for 7 days total through 18  ? D/C PICC after last dose IV antibiotics     2   Nephrolithiasis  CT shows no obstruction  Rec:  ? Close urology follow-up ongoing     3   Myasthenia gravis  Stable     The patient is stable from an ID standpoint for D/C home today     Antibiotics:  Cefepime # 4    Subjective:  Patient seen on AM rounds  Denies fevers, chills, sweats, nausea, vomiting, or diarrhea  Luis Stager to go home  24 Hour Events:  No documented fevers, chills, sweats, nausea, vomiting, or diarrhea  Objective:  Vitals:  HR:  [73-99] 73  Resp:  [18] 18  BP: (131-147)/() 147/83  SpO2:  [94 %-96 %] 94 %  Temp (24hrs), Av 5 °F (36 4 °C), Min:97 3 °F (36 3 °C), Max:97 7 °F (36 5 °C)  Current: Temperature: 97 7 °F (36 5 °C)    Physical Exam:   General:  No acute distress  Psychiatric:  Awake and alert  Pulmonary:  Normal respiratory excursion without accessory muscle use  Abdomen:  Soft, nontender  Extremities:  No edema  Skin:  No rashes    Lab Results:  I have personally reviewed pertinent labs      Results from last 7 days  Lab Units 18  0545 09/15/18  2116   SODIUM mmol/L 142 141   POTASSIUM mmol/L 3 7 4 0   CHLORIDE mmol/L 108 106   CO2 mmol/L 26 27   BUN mg/dL 19 20   CREATININE mg/dL 1 30 1 72*   EGFR ml/min/1 73sq m 54 39   CALCIUM mg/dL 8 9 9 7   AST U/L  --  31   ALT U/L  --  60   ALK PHOS U/L  --  42*       Results from last 7 days  Lab Units 18  0545 09/15/18  2116   WBC Thousand/uL 9 20 10 15   HEMOGLOBIN g/dL 13 9 15 1   PLATELETS Thousands/uL 259  252 305       Results from last 7 days  Lab Units 09/15/18  2037 09/13/18  1204   URINE CULTURE  70,000-79,000 cfu/ml Pseudomonas aeruginosa* >100,000 cfu/ml Pseudomonas aeruginosa*       Imaging Studies:   I have personally reviewed pertinent imaging study reports and images in PACS  EKG, Pathology, and Other Studies:   I have personally reviewed pertinent reports

## 2018-09-18 NOTE — PLAN OF CARE
DISCHARGE PLANNING     Discharge to home or other facility with appropriate resources Progressing        DISCHARGE PLANNING - CARE MANAGEMENT     Discharge to post-acute care or home with appropriate resources Progressing        GENITOURINARY - ADULT     Maintains or returns to baseline urinary function Progressing     Absence of urinary retention Progressing     Urinary catheter remains patent Progressing        INFECTION - ADULT     Absence or prevention of progression during hospitalization Progressing     Absence of fever/infection during neutropenic period Progressing        Knowledge Deficit     Patient/family/caregiver demonstrates understanding of disease process, treatment plan, medications, and discharge instructions Progressing        PAIN - ADULT     Verbalizes/displays adequate comfort level or baseline comfort level Progressing        Potential for Falls     Patient will remain free of falls Progressing        SAFETY ADULT     Maintain or return to baseline ADL function Progressing     Maintain or return mobility status to optimal level Progressing

## 2018-09-18 NOTE — PHYSICIAN ADVISOR
Current patient class: Inpatient  The patient is currently on Hospital Day: 3 at [facility] [facility]      The patient was admitted to the hospital at 2116 on 9/15/18 for the following diagnosis:  UTI (urinary tract infection) [N39 0]  Painful urination [R30 9]  Pseudomonas urinary tract infection [N39 0, B96 5]       There is documentation in the medical record of an expected length of stay of at least 2 midnights  The patient is therefore expected to satisfy the 2 midnight benchmark and given the 2 midnight presumption is appropriate for INPATIENT ADMISSION  Given this expectation of a satisfying stay, CMS instructs us that the patient is most often appropriate for inpatient admission under part A provided medical necessity is documented in the chart  After review of the relevant documentation, labs, vital signs and test results, the patient is appropriate for INPATIENT ADMISSION  Admission to the hospital as an inpatient is a complex decision making process which requires the practitioner to consider the patients presenting complaint, history and physical examination and all relevant testing  With this in mind, in this case, the patient was deemed appropriate for INPATIENT ADMISSION  After review of the documentation and testing available at the time of the admission I concur with this clinical determination of medical necessity  Rationale is as follows: The patient is a 68 yrs old Male who presented to the ED at 9/15/2018  8:18 PM with a chief complaint of Painful Urination (pt reports he was told by his family doctor to come to the ED for UTI  also reports he has kidney stones  reports burning with urination  states "I need to be admitted for IV antibiotics  " shares history of myasthenia gravis  )    Patient continues to remain hospitalized with Pseudomonas urinary tract infection, with a history of nephrolithiasis, BPH    Patient is noted to have history of myasthenia gravis, and does require a PICC line placement and cefepime infusion  The patient will remain for at least an additional midnight  Given the need for further hospitalization, and along with the documentation of medical necessity present in the chart, the patient is appropriate for inpatient admission  The patient is expected to satisfy the 2 midnight benchmark, and will require further acute medical care  The patient does have comorbid conditions which increases the risk for significant adverse outcome  Given this the patient is appropriate for inpatient admission        The patients vitals on arrival were ED Triage Vitals   Temperature Pulse Respirations Blood Pressure SpO2   09/15/18 2016 09/15/18 2016 09/15/18 2016 09/15/18 2016 09/15/18 2016   97 8 °F (36 6 °C) 96 14 (!) 201/103 96 %      Temp Source Heart Rate Source Patient Position - Orthostatic VS BP Location FiO2 (%)   09/15/18 2016 09/15/18 2016 09/15/18 2016 09/15/18 2016 --   Oral Monitor Sitting Right arm       Pain Score       09/15/18 2151       No Pain           Past Medical History:   Diagnosis Date    Anxiety     Cervical spinal stenosis     Hx of chronic ulcerative colitis     Hyperlipidemia     Myasthenia gravis ([facility] [address] )     Neuropathy     Renal failure     Sleep apnea     UTI (urinary tract infection)      Past Surgical History:   Procedure Laterality Date    COLECTOMY      partial sigmoid    COLECTOMY LAPAROSCOPIC      partial sigmoid colectomy    COLON SURGERY      lap  partial colectomy sigmoid    COLONOSCOPY      LITHOTRIPSY      OTHER SURGICAL HISTORY      parotid bx    SALIVARY GLAND SURGERY      biopsy           Consults have been placed to:   IP CONSULT TO INFECTIOUS DISEASES  IP CONSULT TO UROLOGY  IP CONSULT TO CASE MANAGEMENT  IP CONSULT TO PICC TEAM    Vitals:    09/16/18 2300 09/17/18 0725 09/17/18 1512 09/17/18 2159   BP: 145/81 107/72 (!) 142/105 131/90   BP Location: Right arm Right arm Right arm Right arm   Pulse: 76 72 99 85   Resp: 18 18 18 18   Temp: (!) 97 °F (36 1 °C) 97 9 °F (36 6 °C) 97 5 °F (36 4 °C) (!) 97 3 °F (36 3 °C)   TempSrc: Temporal Temporal Temporal Temporal   SpO2: 94% 95% 96% 96%       Most recent labs:    Recent Labs      09/15/18   2116  09/16/18   0545   WBC  10 15  9 20   HGB  15 1  13 9   HCT  45 8  40 9   PLT  305  259  252   K  4 0  3 7   NA  141  142   CALCIUM  9 7  8 9   BUN  20  19   CREATININE  1 72*  1 30   AST  31   --    ALT  60   --    ALKPHOS  42*   --        Scheduled Meds:  Current Facility-Administered Medications:  acetaminophen 650 mg Oral Q6H PRN Hammad Solorzano PA-C    calcium carbonate 1,000 mg Oral BID PRN Hammad Solorzano PA-C    cefepime 1,000 mg Intravenous Q12H Hammad Solorzano PA-C Last Rate: 1,000 mg (09/17/18 2136)   cholecalciferol 2,000 Units Oral Daily Hammad Solorzano PA-C    famotidine 20 mg Oral Daily Hammad Solorzano PA-C    heparin (porcine) 5,000 Units Subcutaneous Q8H [address] Cathy Amaya PA-C    ondansetron 4 mg Intravenous Q6H PRN Hammad Solorzano PA-C    predniSONE 10 mg Oral Daily Hammad Solorzano PA-C      Continuous Infusions:   PRN Meds:   acetaminophen    calcium carbonate    ondansetron    Surgical procedures (if appropriate):

## 2018-09-18 NOTE — PLAN OF CARE
DISCHARGE PLANNING     Discharge to home or other facility with appropriate resources Completed        DISCHARGE PLANNING - CARE MANAGEMENT     Discharge to post-acute care or home with appropriate resources Completed        GENITOURINARY - ADULT     Maintains or returns to baseline urinary function Completed     Absence of urinary retention Completed     Urinary catheter remains patent Completed        INFECTION - ADULT     Absence or prevention of progression during hospitalization Completed     Absence of fever/infection during neutropenic period Completed        Knowledge Deficit     Patient/family/caregiver demonstrates understanding of disease process, treatment plan, medications, and discharge instructions Completed        PAIN - ADULT     Verbalizes/displays adequate comfort level or baseline comfort level Completed        Potential for Falls     Patient will remain free of falls Completed        SAFETY ADULT     Maintain or return to baseline ADL function Completed     Maintain or return mobility status to optimal level Completed

## 2018-09-18 NOTE — PROCEDURES
Insert PICC line  Date/Time: 9/18/2018 11:37 AM  Performed by: Alber Palmer by: Luciano Ceron     Patient location:  Bedside  Consent:     Consent obtained:  Written    Consent given by:  Patient    Procedural risks discussed: consent obtained by physician  Paul protocol:     Procedure explained and questions answered to patient or proxy's satisfaction: yes      Relevant documents present and verified: yes      Test results available and properly labeled: yes      Radiology Images displayed and confirmed  If images not available, report reviewed: yes      Required blood products, implants, devices, and special equipment available: yes      Site/side marked: yes      Immediately prior to procedure, a time out was called: yes      Patient identity confirmed:  Verbally with patient, arm band, provided demographic data and hospital-assigned identification number  Pre-procedure details:     Hand hygiene: Hand hygiene performed prior to insertion      Sterile barrier technique: All elements of maximal sterile technique followed      Skin preparation:  ChloraPrep    Skin preparation agent: Skin preparation agent completely dried prior to procedure    Indications:     PICC line indications: long term antibiotics    Anesthesia (see MAR for exact dosages):      Anesthesia method:  Local infiltration    Local anesthetic:  Lidocaine 1% w/o epi  Procedure details:     Location:  Basilic    Vessel type: vein      Laterality:  Right    Approach: percutaneous technique used      Patient position:  Flat    Procedural supplies:  Double lumen    Catheter size:  5 Fr    Landmarks identified: yes      Ultrasound guidance: yes      Sterile ultrasound techniques: Sterile gel and sterile probe covers were used      Number of attempts:  1    Successful placement: yes      Vessel of catheter tip end:  Sherlock 3CG confirmed (sherlock 3cg procedure record confirmed placement sent to medical records)    Total catheter length (cm):  42    Catheter out on skin (cm):  0    Max flow rate:  999ml/hr    Arm circumference:  29cm  Post-procedure details:     Post-procedure:  Dressing applied and securement device placed    Assessment:  Blood return through all ports and free fluid flow (sherlock 3cg confirmed placement)    Post-procedure complications: none      Patient tolerance of procedure:   Tolerated well, no immediate complications  Comments:      Lot # [id number] 2019-09-30

## 2018-09-19 NOTE — TELEPHONE ENCOUNTER
Spoke with patient and he is scheduled for 10/8/18 with Dr Dave Blackmon at the [facility] location

## 2018-09-20 ENCOUNTER — OFFICE VISIT (OUTPATIENT)
Dept: FAMILY MEDICINE CLINIC | Facility: CLINIC | Age: 73
End: 2018-09-20
Payer: MEDICARE

## 2018-09-20 VITALS
HEART RATE: 100 BPM | WEIGHT: 186 LBS | BODY MASS INDEX: 28.28 KG/M2 | RESPIRATION RATE: 16 BRPM | DIASTOLIC BLOOD PRESSURE: 78 MMHG | TEMPERATURE: 98.7 F | SYSTOLIC BLOOD PRESSURE: 130 MMHG | OXYGEN SATURATION: 98 %

## 2018-09-20 DIAGNOSIS — N39.0 PSEUDOMONAS URINARY TRACT INFECTION: ICD-10-CM

## 2018-09-20 DIAGNOSIS — E11.9 CONTROLLED TYPE 2 DIABETES MELLITUS WITHOUT COMPLICATION, WITH LONG-TERM CURRENT USE OF INSULIN (HCC): ICD-10-CM

## 2018-09-20 DIAGNOSIS — B96.5 PSEUDOMONAS URINARY TRACT INFECTION: ICD-10-CM

## 2018-09-20 DIAGNOSIS — N20.0 NEPHROLITHIASIS: ICD-10-CM

## 2018-09-20 DIAGNOSIS — IMO0001 TRANSITION OF CARE PERFORMED WITH SHARING OF CLINICAL SUMMARY: Primary | ICD-10-CM

## 2018-09-20 DIAGNOSIS — Z79.4 CONTROLLED TYPE 2 DIABETES MELLITUS WITHOUT COMPLICATION, WITH LONG-TERM CURRENT USE OF INSULIN (HCC): ICD-10-CM

## 2018-09-20 PROCEDURE — 99496 TRANSJ CARE MGMT HIGH F2F 7D: CPT | Performed by: FAMILY MEDICINE

## 2018-09-20 RX ORDER — PEN NEEDLE, DIABETIC 29 G X1/2"
NEEDLE, DISPOSABLE MISCELLANEOUS
COMMUNITY
Start: 2018-09-14 | End: 2020-12-03 | Stop reason: SDUPTHER

## 2018-09-20 RX ORDER — INSULIN GLARGINE 100 [IU]/ML
INJECTION, SOLUTION SUBCUTANEOUS
Refills: 0 | COMMUNITY
Start: 2018-09-10 | End: 2018-10-07 | Stop reason: HOSPADM

## 2018-09-20 RX ORDER — CEPHALEXIN 250 MG/1
CAPSULE ORAL
COMMUNITY
Start: 2018-09-14 | End: 2018-10-04

## 2018-09-20 RX ORDER — INSULIN ASPART 100 [IU]/ML
INJECTION, SOLUTION INTRAVENOUS; SUBCUTANEOUS
Refills: 0 | COMMUNITY
Start: 2018-09-11 | End: 2018-10-07 | Stop reason: HOSPADM

## 2018-09-20 NOTE — PROGRESS NOTES
Assessment/Plan:        Diagnoses and all orders for this visit:    Transition of care performed with sharing of clinical summary    Pseudomonas urinary tract infection  -     UA w Reflex to Microscopic w Reflex to Culture -Lab Collect; Future  -     UA w Reflex to Microscopic w Reflex to Culture -Lab Collect; Future    Nephrolithiasis    Controlled type 2 diabetes mellitus without complication, with long-term current use of insulin ([facility])    Other orders  -     cephalexin (KEFLEX) 250 mg capsule;   -     NOVOLOG FLEXPEN 100 units/mL injection pen;   -     LANTUS SOLOSTAR 100 units/mL injection pen; inject 16 units subcutaneously at bedtime  -     BD ULTRA-FINE PEN NEEDLES 29G X 12 7MM MISC;      "other orders" are not orders, just reconciling at rooming, and patient is on IV cefepime currently, NOT oral keflex    Subjective:     Patient ID: Romeo Humphreys is a 68 y o  male  Per c/c and SARAVANAN notes reviewed by me  Has PICC line in place right arm for more of the IV abx course, VNA started   States urologist will be performing stone removal- utereroscopy with laser lithotripsy- has appt oct 8th for eval at his office  States he won't agree to stents being put in, because he wouldn't be able to work with the lifting restrictions, and can't afford to not work  Diabetes- control good- was 100-150 in hospital, so was not given any humalog per pt, now at home back on humalog plus the lantus  No low blood sugars  States urinary sx are gone, but has concerns about the stone and procedure to remove it        Review of Systems   Constitutional: Negative  HENT: Negative  Eyes: Negative  Respiratory: Negative  Cardiovascular: Negative  Gastrointestinal: Negative  Endocrine: Negative for cold intolerance, heat intolerance, polydipsia, polyphagia and polyuria  Per hpi   Genitourinary: Negative for decreased urine volume, difficulty urinating, dysuria, flank pain, frequency and hematuria          Per hpi   Musculoskeletal: Negative  Skin: Negative for color change, pallor and rash  Per hpi - PICC line   Hematological: Negative  Psychiatric/Behavioral: Negative  Objective:     Physical Exam   Constitutional: He is oriented to person, place, and time  He appears well-developed  He is cooperative  Non-toxic appearance  He does not have a sickly appearance  He does not appear ill  No distress  HENT:   Head: Normocephalic and atraumatic  Mouth/Throat: Uvula is midline, oropharynx is clear and moist and mucous membranes are normal    Eyes: Pupils are equal, round, and reactive to light  Conjunctivae are normal    Neck: Trachea normal  Neck supple  No JVD present  No thyroid mass and no thyromegaly present  Cardiovascular: Normal rate, regular rhythm, normal heart sounds and normal pulses  Pulmonary/Chest: Effort normal and breath sounds normal    Abdominal: Normal appearance and bowel sounds are normal  He exhibits no distension, no abdominal bruit, no ascites and no mass  There is no hepatosplenomegaly  There is no tenderness  There is no CVA tenderness  Lymphadenopathy:     He has no cervical adenopathy  Right: No supraclavicular adenopathy present  Left: No supraclavicular adenopathy present  Neurological: He is alert and oriented to person, place, and time  He displays no tremor  Gait normal    Skin: Skin is warm and dry  He is not diaphoretic  No cyanosis  No pallor  Psychiatric: He has a normal mood and affect  His speech is normal  Judgment and thought content normal    Nursing note and vitals reviewed  Vitals:    09/20/18 1246   BP: 130/78   Pulse: 100   Resp: 16   Temp: 98 7 °F (37 1 °C)   SpO2: 98%   Weight: 84 4 kg (186 lb)       Transitional Care Management Review:  Veronica Velazquez is a 68 y o  male here for TCM follow up       During the TCM phone call patient stated:    Date and time hospital follow up call was made:  9/18/2018  3:08 PM  Hospital care reviewed:  Records reviewed  Patient was hopsitalized at:  [address]  Date of admission:  9/15/18  Date of discharge:  9/18/18  Diagnosis:  UTI  Disposition:  Home  Were the patients medicaitons reviewed and updated:  No  Current symptoms:  Dizziness (Comment: FREQUENT URINATION AND ELEVATED BP)  Dizziness severity:  Mild  Quality Character:  Lightheadedness  How long does the episode last for:  TAKES MEDICATION BUT DID NOT GET IN HOSPITAL     Cause:  No known event  Cause certainty:  Probably  What makes the symptoms better:  Rest  Clinical progress:  Unchanged  Post hospital issues:  None  Should patient be enrolled in anticoag monitoring?:  No  Scheduled for follow up?:  Yes  Patients specialists:  Urologist  Urologist's Name:  [facility]  Referrals needed:  NO  Did you obtain your prescribed medications:  Yes  Do you need help managing your perscriptions or medications:  No  Is transportation to your appointments needed:  No  I have advised the patient to call PCP with any new or worsening symptoms (please type in name along with any credentials):  Angelica Cote MA  Living Arrangements:  Spouse or Significiant other, Family members  Support System:  Home care staff  Do you have social support:  Yes, as much as I need  Are you recieving outpatient services:  No  Are you recieving home care services:  Yes  Types of home care services:  Nurse visit  Are you using any community resources:  No  Current waiver service:  No  Have you fallen in the last 12 months:  No  Interperter language line required?:  No  Counseling:  Patient             Leny Asencio, DO

## 2018-09-24 ENCOUNTER — LAB (OUTPATIENT)
Dept: LAB | Facility: CLINIC | Age: 73
End: 2018-09-24
Payer: MEDICARE

## 2018-09-24 ENCOUNTER — TELEPHONE (OUTPATIENT)
Dept: FAMILY MEDICINE CLINIC | Facility: CLINIC | Age: 73
End: 2018-09-24

## 2018-09-24 DIAGNOSIS — B37.9 ANTIBIOTIC-INDUCED YEAST INFECTION: Primary | ICD-10-CM

## 2018-09-24 DIAGNOSIS — T36.95XA ANTIBIOTIC-INDUCED YEAST INFECTION: Primary | ICD-10-CM

## 2018-09-24 DIAGNOSIS — B96.5 PSEUDOMONAS URINARY TRACT INFECTION: ICD-10-CM

## 2018-09-24 DIAGNOSIS — N39.0 PSEUDOMONAS URINARY TRACT INFECTION: ICD-10-CM

## 2018-09-24 LAB
BACTERIA UR QL AUTO: ABNORMAL /HPF
BILIRUB UR QL STRIP: NEGATIVE
CAOX CRY URNS QL MICRO: ABNORMAL /HPF
CLARITY UR: ABNORMAL
COLOR UR: YELLOW
GLUCOSE UR STRIP-MCNC: ABNORMAL MG/DL
HGB UR QL STRIP.AUTO: ABNORMAL
KETONES UR STRIP-MCNC: NEGATIVE MG/DL
LEUKOCYTE ESTERASE UR QL STRIP: ABNORMAL
NITRITE UR QL STRIP: NEGATIVE
NON-SQ EPI CELLS URNS QL MICRO: ABNORMAL /HPF
PH UR STRIP.AUTO: 5.5 [PH] (ref 4.5–8)
PROT UR STRIP-MCNC: ABNORMAL MG/DL
RBC #/AREA URNS AUTO: ABNORMAL /HPF
SP GR UR STRIP.AUTO: 1.02 (ref 1–1.03)
UROBILINOGEN UR QL STRIP.AUTO: 1 E.U./DL
WBC #/AREA URNS AUTO: ABNORMAL /HPF

## 2018-09-24 PROCEDURE — 81001 URINALYSIS AUTO W/SCOPE: CPT

## 2018-09-24 RX ORDER — FLUCONAZOLE 150 MG/1
150 TABLET ORAL ONCE
Qty: 1 TABLET | Refills: 0 | Status: SHIPPED | OUTPATIENT
Start: 2018-09-24 | End: 2018-09-24

## 2018-09-24 NOTE — TELEPHONE ENCOUNTER
Patient stopped in office requesting a script for a pill form of something for yeast infection for his taking too many antiobiotics    he feels it could be flucytosine  [facility]  Patient is seeing Dr Petty Gage today at 2 pm for his 6m f/u  Urine culture sample was today  Thank you

## 2018-09-24 NOTE — TELEPHONE ENCOUNTER
I sent in rx for the diflucan by bong, but it went to \[facility]\"" due to him being in hospital recently and rx's switched to that pharmacy  [facility] answering service with pt there to  med, and I gave verbal order

## 2018-09-25 ENCOUNTER — TELEPHONE (OUTPATIENT)
Dept: FAMILY MEDICINE CLINIC | Facility: CLINIC | Age: 73
End: 2018-09-25

## 2018-09-25 NOTE — TELEPHONE ENCOUNTER
Patient called to advise that his sugars today were:    6 am = 111    1:30 pm = 224 just after  Lunch    4:30 pm = 90    Dr Melvina Melo will be sending a consult report  He will call tomorrow with tomorrow's results  Thank you

## 2018-09-25 NOTE — PROGRESS NOTES
Spoke to pt  His FS blood sugar today at 6 AM was 111  At 1:30pm (30 minutes after eating) 224  He will call at 4:30pm with another reading  Then I told him to take at least 3 readings tomorrow and call Thursday morning with the numbers

## 2018-09-27 ENCOUNTER — TELEPHONE (OUTPATIENT)
Dept: FAMILY MEDICINE CLINIC | Facility: CLINIC | Age: 73
End: 2018-09-27

## 2018-09-27 NOTE — TELEPHONE ENCOUNTER
Patient called with blood sugar numbers for Wednesday , 9/26/2018  At 3am - 112, 6am - 92, 1pm 149,(he then administered 16 units of Novalog) 4pm - 210, 7:30pm -124, 11pm - 108

## 2018-10-01 ENCOUNTER — APPOINTMENT (OUTPATIENT)
Dept: LAB | Facility: CLINIC | Age: 73
End: 2018-10-01
Payer: MEDICARE

## 2018-10-01 ENCOUNTER — TRANSCRIBE ORDERS (OUTPATIENT)
Dept: LAB | Facility: CLINIC | Age: 73
End: 2018-10-01

## 2018-10-01 DIAGNOSIS — N39.0 PSEUDOMONAS URINARY TRACT INFECTION: ICD-10-CM

## 2018-10-01 DIAGNOSIS — B96.5 PSEUDOMONAS URINARY TRACT INFECTION: ICD-10-CM

## 2018-10-01 LAB
BACTERIA UR QL AUTO: ABNORMAL /HPF
BILIRUB UR QL STRIP: NEGATIVE
CAOX CRY URNS QL MICRO: ABNORMAL /HPF
CLARITY UR: ABNORMAL
COLOR UR: ABNORMAL
GLUCOSE UR STRIP-MCNC: NEGATIVE MG/DL
HGB UR QL STRIP.AUTO: ABNORMAL
KETONES UR STRIP-MCNC: NEGATIVE MG/DL
LEUKOCYTE ESTERASE UR QL STRIP: ABNORMAL
NITRITE UR QL STRIP: POSITIVE
NON-SQ EPI CELLS URNS QL MICRO: ABNORMAL /HPF
PH UR STRIP.AUTO: 5.5 [PH] (ref 4.5–8)
PROT UR STRIP-MCNC: NEGATIVE MG/DL
RBC #/AREA URNS AUTO: ABNORMAL /HPF
SP GR UR STRIP.AUTO: 1.02 (ref 1–1.03)
UROBILINOGEN UR QL STRIP.AUTO: 1 E.U./DL
WBC #/AREA URNS AUTO: ABNORMAL /HPF

## 2018-10-01 PROCEDURE — 87186 SC STD MICRODIL/AGAR DIL: CPT

## 2018-10-01 PROCEDURE — 87086 URINE CULTURE/COLONY COUNT: CPT

## 2018-10-01 PROCEDURE — 81001 URINALYSIS AUTO W/SCOPE: CPT

## 2018-10-01 PROCEDURE — 87077 CULTURE AEROBIC IDENTIFY: CPT

## 2018-10-04 ENCOUNTER — TELEPHONE (OUTPATIENT)
Dept: FAMILY MEDICINE CLINIC | Facility: CLINIC | Age: 73
End: 2018-10-04

## 2018-10-04 ENCOUNTER — HOSPITAL ENCOUNTER (INPATIENT)
Facility: HOSPITAL | Age: 73
LOS: 2 days | Discharge: HOME | DRG: 690 | End: 2018-10-07
Attending: EMERGENCY MEDICINE | Admitting: FAMILY MEDICINE
Payer: MEDICARE

## 2018-10-04 DIAGNOSIS — Z88.1 ALLERGY TO MULTIPLE ANTIBIOTICS: ICD-10-CM

## 2018-10-04 DIAGNOSIS — N39.0 PSEUDOMONAS URINARY TRACT INFECTION: ICD-10-CM

## 2018-10-04 DIAGNOSIS — B96.5 PSEUDOMONAS URINARY TRACT INFECTION: ICD-10-CM

## 2018-10-04 DIAGNOSIS — G70.00 MYASTHENIA GRAVIS (HCC): ICD-10-CM

## 2018-10-04 DIAGNOSIS — N39.0 COMPLICATED UTI (URINARY TRACT INFECTION): Primary | ICD-10-CM

## 2018-10-04 DIAGNOSIS — N39.0 RECURRENT UTI: ICD-10-CM

## 2018-10-04 DIAGNOSIS — N20.0 NEPHROLITHIASIS: ICD-10-CM

## 2018-10-04 LAB
ANION GAP SERPL CALCULATED.3IONS-SCNC: 11 MMOL/L (ref 4–13)
BACTERIA UR CULT: ABNORMAL
BACTERIA UR QL AUTO: ABNORMAL /HPF
BASOPHILS # BLD AUTO: 0.07 THOUSANDS/ΜL (ref 0–0.1)
BASOPHILS NFR BLD AUTO: 1 % (ref 0–1)
BILIRUB UR QL STRIP: NEGATIVE
BUN SERPL-MCNC: 18 MG/DL (ref 5–25)
CALCIUM SERPL-MCNC: 9.8 MG/DL (ref 8.3–10.1)
CHLORIDE SERPL-SCNC: 105 MMOL/L (ref 100–108)
CLARITY UR: CLEAR
CO2 SERPL-SCNC: 25 MMOL/L (ref 21–32)
COLOR UR: YELLOW
COLOR, POC: YELLOW
CREAT SERPL-MCNC: 1.28 MG/DL (ref 0.6–1.3)
EOSINOPHIL # BLD AUTO: 0.03 THOUSAND/ΜL (ref 0–0.61)
EOSINOPHIL NFR BLD AUTO: 0 % (ref 0–6)
ERYTHROCYTE [DISTWIDTH] IN BLOOD BY AUTOMATED COUNT: 12.9 % (ref 11.6–15.1)
GFR SERPL CREATININE-BSD FRML MDRD: 55 ML/MIN/1.73SQ M
GLUCOSE SERPL-MCNC: 122 MG/DL (ref 65–140)
GLUCOSE SERPL-MCNC: 135 MG/DL (ref 65–140)
GLUCOSE SERPL-MCNC: 172 MG/DL (ref 65–140)
GLUCOSE UR STRIP-MCNC: NEGATIVE MG/DL
HCT VFR BLD AUTO: 45 % (ref 36.5–49.3)
HGB BLD-MCNC: 15.7 G/DL (ref 12–17)
HGB UR QL STRIP.AUTO: NEGATIVE
IMM GRANULOCYTES # BLD AUTO: 0.09 THOUSAND/UL (ref 0–0.2)
IMM GRANULOCYTES NFR BLD AUTO: 1 % (ref 0–2)
KETONES UR STRIP-MCNC: ABNORMAL MG/DL
LACTATE SERPL-SCNC: 1.5 MMOL/L (ref 0.5–2)
LEUKOCYTE ESTERASE UR QL STRIP: ABNORMAL
LYMPHOCYTES # BLD AUTO: 1.41 THOUSANDS/ΜL (ref 0.6–4.47)
LYMPHOCYTES NFR BLD AUTO: 13 % (ref 14–44)
MCH RBC QN AUTO: 31.7 PG (ref 26.8–34.3)
MCHC RBC AUTO-ENTMCNC: 34.9 G/DL (ref 31.4–37.4)
MCV RBC AUTO: 91 FL (ref 82–98)
MONOCYTES # BLD AUTO: 0.55 THOUSAND/ΜL (ref 0.17–1.22)
MONOCYTES NFR BLD AUTO: 5 % (ref 4–12)
NEUTROPHILS # BLD AUTO: 8.63 THOUSANDS/ΜL (ref 1.85–7.62)
NEUTS SEG NFR BLD AUTO: 80 % (ref 43–75)
NITRITE UR QL STRIP: NEGATIVE
NON-SQ EPI CELLS URNS QL MICRO: ABNORMAL /HPF
NRBC BLD AUTO-RTO: 0 /100 WBCS
PH UR STRIP.AUTO: 5.5 [PH] (ref 4.5–8)
PLATELET # BLD AUTO: 348 THOUSANDS/UL (ref 149–390)
PMV BLD AUTO: 9.4 FL (ref 8.9–12.7)
POTASSIUM SERPL-SCNC: 4.3 MMOL/L (ref 3.5–5.3)
PROT UR STRIP-MCNC: ABNORMAL MG/DL
RBC # BLD AUTO: 4.95 MILLION/UL (ref 3.88–5.62)
RBC #/AREA URNS AUTO: ABNORMAL /HPF
SODIUM SERPL-SCNC: 141 MMOL/L (ref 136–145)
SP GR UR STRIP.AUTO: 1.02 (ref 1–1.03)
UROBILINOGEN UR QL STRIP.AUTO: 0.2 E.U./DL
WBC # BLD AUTO: 10.78 THOUSAND/UL (ref 4.31–10.16)
WBC #/AREA URNS AUTO: ABNORMAL /HPF

## 2018-10-04 PROCEDURE — 99284 EMERGENCY DEPT VISIT MOD MDM: CPT

## 2018-10-04 PROCEDURE — 83605 ASSAY OF LACTIC ACID: CPT | Performed by: EMERGENCY MEDICINE

## 2018-10-04 PROCEDURE — 87077 CULTURE AEROBIC IDENTIFY: CPT

## 2018-10-04 PROCEDURE — 36415 COLL VENOUS BLD VENIPUNCTURE: CPT | Performed by: EMERGENCY MEDICINE

## 2018-10-04 PROCEDURE — 87040 BLOOD CULTURE FOR BACTERIA: CPT | Performed by: EMERGENCY MEDICINE

## 2018-10-04 PROCEDURE — 80048 BASIC METABOLIC PNL TOTAL CA: CPT | Performed by: EMERGENCY MEDICINE

## 2018-10-04 PROCEDURE — 81001 URINALYSIS AUTO W/SCOPE: CPT

## 2018-10-04 PROCEDURE — 99220 PR INITIAL OBSERVATION CARE/DAY 70 MINUTES: CPT | Performed by: FAMILY MEDICINE

## 2018-10-04 PROCEDURE — 82948 REAGENT STRIP/BLOOD GLUCOSE: CPT

## 2018-10-04 PROCEDURE — 87086 URINE CULTURE/COLONY COUNT: CPT

## 2018-10-04 PROCEDURE — 87186 SC STD MICRODIL/AGAR DIL: CPT

## 2018-10-04 PROCEDURE — 85025 COMPLETE CBC W/AUTO DIFF WBC: CPT | Performed by: EMERGENCY MEDICINE

## 2018-10-04 PROCEDURE — 96360 HYDRATION IV INFUSION INIT: CPT

## 2018-10-04 RX ORDER — MECLIZINE HCL 12.5 MG/1
12.5 TABLET ORAL DAILY
Status: DISCONTINUED | OUTPATIENT
Start: 2018-10-05 | End: 2018-10-07 | Stop reason: HOSPADM

## 2018-10-04 RX ORDER — HEPARIN SODIUM 5000 [USP'U]/ML
5000 INJECTION, SOLUTION INTRAVENOUS; SUBCUTANEOUS EVERY 8 HOURS SCHEDULED
Status: DISCONTINUED | OUTPATIENT
Start: 2018-10-04 | End: 2018-10-07 | Stop reason: HOSPADM

## 2018-10-04 RX ORDER — SODIUM CHLORIDE 9 MG/ML
75 INJECTION, SOLUTION INTRAVENOUS CONTINUOUS
Status: DISCONTINUED | OUTPATIENT
Start: 2018-10-04 | End: 2018-10-07 | Stop reason: HOSPADM

## 2018-10-04 RX ORDER — ACETAMINOPHEN 325 MG/1
650 TABLET ORAL EVERY 6 HOURS PRN
Status: DISCONTINUED | OUTPATIENT
Start: 2018-10-04 | End: 2018-10-07 | Stop reason: HOSPADM

## 2018-10-04 RX ORDER — INSULIN GLARGINE 100 [IU]/ML
12 INJECTION, SOLUTION SUBCUTANEOUS
Status: DISCONTINUED | OUTPATIENT
Start: 2018-10-04 | End: 2018-10-07 | Stop reason: HOSPADM

## 2018-10-04 RX ORDER — ONDANSETRON 2 MG/ML
4 INJECTION INTRAMUSCULAR; INTRAVENOUS EVERY 6 HOURS PRN
Status: DISCONTINUED | OUTPATIENT
Start: 2018-10-04 | End: 2018-10-07 | Stop reason: HOSPADM

## 2018-10-04 RX ORDER — PYRIDOXINE HCL (VITAMIN B6) 100 MG
1 TABLET ORAL DAILY
Status: DISCONTINUED | OUTPATIENT
Start: 2018-10-05 | End: 2018-10-04

## 2018-10-04 RX ORDER — FAMOTIDINE 20 MG/1
20 TABLET, FILM COATED ORAL DAILY
Status: DISCONTINUED | OUTPATIENT
Start: 2018-10-05 | End: 2018-10-07 | Stop reason: HOSPADM

## 2018-10-04 RX ORDER — SACCHAROMYCES BOULARDII 250 MG
250 CAPSULE ORAL 2 TIMES DAILY
Status: DISCONTINUED | OUTPATIENT
Start: 2018-10-04 | End: 2018-10-07 | Stop reason: HOSPADM

## 2018-10-04 RX ORDER — PYRIDOXINE HCL (VITAMIN B6) 50 MG
100 TABLET ORAL DAILY
Status: DISCONTINUED | OUTPATIENT
Start: 2018-10-05 | End: 2018-10-07 | Stop reason: HOSPADM

## 2018-10-04 RX ORDER — PREDNISONE 1 MG/1
10 TABLET ORAL DAILY
Status: DISCONTINUED | OUTPATIENT
Start: 2018-10-05 | End: 2018-10-07 | Stop reason: HOSPADM

## 2018-10-04 RX ADMIN — INSULIN GLARGINE 12 UNITS: 100 INJECTION, SOLUTION SUBCUTANEOUS at 21:43

## 2018-10-04 RX ADMIN — CEFEPIME HYDROCHLORIDE 2000 MG: 2 INJECTION, POWDER, FOR SOLUTION INTRAVENOUS at 14:51

## 2018-10-04 RX ADMIN — INSULIN LISPRO 1 UNITS: 100 INJECTION, SOLUTION INTRAVENOUS; SUBCUTANEOUS at 19:48

## 2018-10-04 RX ADMIN — HEPARIN SODIUM 5000 UNITS: 5000 INJECTION INTRAVENOUS; SUBCUTANEOUS at 21:43

## 2018-10-04 RX ADMIN — HEPARIN SODIUM 5000 UNITS: 5000 INJECTION INTRAVENOUS; SUBCUTANEOUS at 18:00

## 2018-10-04 RX ADMIN — Medication 250 MG: at 18:00

## 2018-10-04 RX ADMIN — SODIUM CHLORIDE 75 ML/HR: 0.9 INJECTION, SOLUTION INTRAVENOUS at 18:00

## 2018-10-04 RX ADMIN — SODIUM CHLORIDE 500 ML: 0.9 INJECTION, SOLUTION INTRAVENOUS at 12:54

## 2018-10-04 NOTE — H&P
History and Physical - [facility] Internal Medicine    Patient Information: Rodrick Hui 68 y o  male MRN: [id number]  Unit/Bed#: [address] [id number] Encounter: [id number]  Admitting Physician: FranciscoMARIPOSA BIOTECHNOLOGYermias Moraes PA-C  PCP: Jose Carlos Hernandez DO  Date of Admission:  10/04/18    Assessment/Plan:    Hospital Problem List:     Principal Problem:    Pseudomonas urinary tract infection  Active Problems:    Myasthenia gravis ([facility] [address] )    Controlled type 2 diabetes mellitus without complication, with long-term current use of insulin ([facility] [address] )    CKD (chronic kidney disease), stage III ([facility] [address] )    Essential hypertension      Primary Problem(s):  · Recurrent pseudomonas UTI  · Initially diagnosed 9/15/18 hospitalization and placed on a 7 day course of IV cefepime  Patient completed this course as an outpatient through a PICC line that was placed  · Underwent outpatient testing on 10/1/18 to ensure resolution of UTI however urine culture still growing Pseudomonas  Told by PCP to come to ED  Pt is asymptomatic at this time  · Upon review of hx, pt has had multiple occurrences of Pseudomonas growing in urine culture     -2/14/18    -3/23/18    -9/15/18     -10/1/18    -He has also received IV cefepime each time he was found to be growing pseudomonas  · Recieved 1 dose of IV cefepime 2 g in ED here  Continue with cefepime 1 g q 12 for now  · Await ID recommendations as if this is a true infection versus chronic colonization  Check procalcitonin in the meantime  Patient does have multiple kidney stones in place according to his 9/15/18 CT scan largest stone noted as 7 mm  · Consult ID and Urology    Additional Problems:   · Sirs criteria with possible sepsis:  Presenting with tachycardia and very mild leukocytosis of 10 78  In the setting of Pseudomonas urine culture  Fortunately no elevation in lactic acidosis  Continue IV antibiotics as above  Blood cultures pending      · Type 2 diabetes:  Home regimen of Lantus 16 units before bed and NovoLog 4 units before meals  Continue here and placed on insulin sliding scale  · Essential hypertension:  Currently maintained on no blood pressure medications  Blood pressure here 158/93, 152/91  Will continue to monitor  · Myasthenia gravis:  Diagnosed in 2009  Continue home dose of oral prednisone 10 mg daily  · GERD:  Continue Pepcid    · CKD stage 3:  Creatinine 1 28 appears within patient's baseline  VTE Prophylaxis: Heparin  / sequential compression device   Code Status:  Full code  Anticipated Length of Stay:  Patient will be admitted on an Observation basis with an anticipated length of stay of  Less than 2 midnights  Justification for Hospital Stay: UTI with need for further evaluation and IV abx    Chief Complaint:   Recurrent UTI    History of Present Illness:    Maximiliano Daniel is a 68 y o  male with past medical history of myasthenia gravis maintained on chronic oral prednisone,  type 2 diabetes, essential hypertension, GERD, CKD stage 3, history of recurrent Pseudomonas UTI  Thus far patient has received IV cefepime 4 times this year for positive urine cultures  He presents after being told by his PCP that his urinary culture was growing a Pseudomonas again  Patient with recent hospitalization 9/15/18 where he was found to have a recurrent Pseudomonas urinary tract infection  He completed a 7 day course of IV cefepime given his allergy to other antibiotics and history of myasthenia gravis  He had a repeat urine culture done by his PCP on 9/24/18 which revealed trace leukocytes, negative nitrites, 4-10 WBCs, no epithelial cells, no bacteria, occasional calcium oxalate crystals  PCP again repeat urine culture on 10/1/18 which revealed moderate leukocytes, positive nitrites, 20-30 WBCs, occasional epithelial cells, occasional bacteria, occasional calcium oxalate crystals  Currently patient reports that he is asymptomatic    He denies any dysuria, hesitancy, urgency, frequency, foul-smelling urine, or blood in urine  No fevers or chills  Upon presentation he had a mild tachycardia and very minimal elevated WBC count of 10 78  Patient states I need to get may kidney stones removed all in here  Had a outpatient urology follow-up with Dr Anastasia ramires in scheduled for tomorrow to discuss possible outpatient procedures for removal however this has been canceled as he is in-hospital   Patient is requesting to see a urologist   Further denies any other complaints  Lives at home with wife  Does not smoke, drink alcohol or use drugs  No chest pain, chest pressure, palpitations  Review of Systems:    General:   No Fever or chills;    EENT:   No ear pain, facial swelling; No sneezing, sore throat  Skin:   No rashes, color changes  Respiratory:     No shortness of breath, cough, wheezing, stridor  Cardiovascular:     No chest pain, palpitations  Gastrointestinal:    No nausea, vomiting, diarrhea; No abdominal pain  Musculoskeletal:     No arthralgias, myalgias, swelling  Neurologic:   No dizziness, numbness, weakness  No speech difficulties  Psych:   No agitation,     Otherwise, All other twelve-point review of systems normal      Past Medical and Surgical History:     Past Medical History:   Diagnosis Date    Anxiety     Cervical spinal stenosis     Hx of chronic ulcerative colitis     Hyperlipidemia     [facility]ast[facility]ia gravis ([facility] [address] )     Sleep apnea        Past Surgical History:   Procedure Laterality Date    COLECTOMY      partial sigmoid    COLECTOMY LAPAROSCOPIC      partial sigmoid colectomy    COLON SURGERY      lap  partial colectomy sigmoid    COLONOSCOPY      LITHOTRIPSY      OTHER SURGICAL HISTORY      parotid bx    SALIVARY GLAND SURGERY      biopsy       Meds/Allergies:    No current facility-administered medications for this encounter          Allergies   Allergen Reactions    Levaquin [Levofloxacin]      Due to MG dx    Quinolones Due to MG dx    Antihistamines, Chlorpheniramine-Type      Difficulty urinating    Benzodiazepines     Chlorpheniramine     Diphenhydramine     Erythromycin     Iodine     Iodine Solution [Povidone Iodine]      ivp dye    Loratadine     Other      Muscle relaxers d/t myasthenia gravis-they close my airway    Sulfa Antibiotics     Tetracyclines & Related        Allergies: Allergies   Allergen Reactions    Levaquin [Levofloxacin]      Due to MG dx    Quinolones      Due to MG dx    Antihistamines, Chlorpheniramine-Type      Difficulty urinating    Benzodiazepines     Chlorpheniramine     Diphenhydramine     Erythromycin     Iodine     Iodine Solution [Povidone Iodine]      ivp dye    Loratadine     Other      Muscle relaxers d/t myasthenia gravis-they close my airway    Sulfa Antibiotics     Tetracyclines & Related        Social History:     Marital Status: /Civil Union     Substance Use History:   History   Alcohol Use No     History   Smoking Status    Never Smoker   Smokeless Tobacco    Never Used     History   Drug Use No       Family History:    non-contributory    Physical Exam:     Vitals:   Blood Pressure: 158/93 (10/04/18 1519)  Pulse: 102 (10/04/18 1519)  Temperature: 97 5 °F (36 4 °C) (10/04/18 1519)  Temp Source: Temporal (10/04/18 1519)  Respirations: 19 (10/04/18 1519)  Weight - Scale: 84 4 kg (186 lb 1 1 oz) (10/04/18 1202)  SpO2: 96 % (10/04/18 1519)    Physical Exam   Constitutional: He is oriented to person, place, and time  He appears well-developed and well-nourished  No distress  HENT:   Head: Normocephalic and atraumatic  Eyes: Conjunctivae are normal    Cardiovascular: Normal rate, regular rhythm and normal heart sounds  Pulmonary/Chest: Effort normal and breath sounds normal  No respiratory distress  He has no wheezes  He has no rales  He exhibits no tenderness  Abdominal: Bowel sounds are normal  He exhibits distension  He exhibits no mass   There is no tenderness  There is no rebound and no guarding  Neurological: He is alert and oriented to person, place, and time  Skin: Skin is warm and dry  No rash noted  He is not diaphoretic  No erythema  No pallor  Psychiatric: He has a normal mood and affect  His behavior is normal    Nursing note and vitals reviewed  Additional Data:     Lab Results: I have personally reviewed pertinent reports  Results from last 7 days  Lab Units 10/04/18  1248   WBC Thousand/uL 10 78*   HEMOGLOBIN g/dL 15 7   HEMATOCRIT % 45 0   PLATELETS Thousands/uL 348   NEUTROS PCT % 80*   LYMPHS PCT % 13*   MONOS PCT % 5   EOS PCT % 0       Results from last 7 days  Lab Units 10/04/18  1248   SODIUM mmol/L 141   POTASSIUM mmol/L 4 3   CHLORIDE mmol/L 105   CO2 mmol/L 25   BUN mg/dL 18   CREATININE mg/dL 1 28   CALCIUM mg/dL 9 8           Imaging: I have personally reviewed pertinent reports  Ct Renal Stone Study Abdomen Pelvis Wo Contrast    Result Date: 9/15/2018  Narrative: CT ABDOMEN AND PELVIS WITHOUT IV CONTRAST - LOW DOSE RENAL STONE INDICATION:   Kidney stone, known, follow up  COMPARISON:  CT of the abdomen and pelvis on May 12, 2015  TECHNIQUE:  Low dose thin section CT examination of the abdomen and pelvis was performed without intravenous or oral contrast according to a protocol specifically designed to evaluate for urinary tract calculus  Axial, sagittal, and coronal 2D reformatted images were created from the source data and submitted for interpretation  Evaluation for pathology in the abdomen and pelvis that is unrelated to urinary tract calculi is limited  Radiation dose length product (DLP) for this visit:  398 mGy-cm   This examination, like all CT scans performed in the [facility], was performed utilizing techniques to minimize radiation dose exposure, including the use of iterative reconstruction and automated exposure control   FINDINGS: RIGHT KIDNEY AND URETER: There are 2 right renal calculi measuring up to 3 mm  No hydronephrosis  There are hypodense lesions within the kidney measuring up to 1 4 cm likely representing cysts  There is nonspecific perinephric stranding  LEFT KIDNEY AND URETER: There is a 7 mm calculus within lower pole of the left kidney  There is an additional punctate 2 mm calculus within the midpole  No hydronephrosis  Nonspecific perinephric stranding  Stable cyst of the left upper pole measures 2 1 cm  URINARY BLADDER: Tethering of the left dome of the bladder to the sigmoid colon without significant acute inflammatory change  No significant abnormality in the visualized lung bases  Limited low radiation dose noncontrast CT evaluation demonstrates no clinically significant abnormality of liver, spleen, pancreas, or adrenal glands  There are gallstone(s) within the gallbladder, without pericholecystic inflammatory changes  No ascites or bulky lymphadenopathy on this limited noncontrast study  No bowel obstruction  Unremarkable rectal anastomosis  The appendix is well seen and there is no evidence of acute appendicitis  Bilateral fat-containing inguinal hernias  No acute fracture or destructive osseous lesion is identified  Impression: 1  Bilateral nephrolithiasis as described above  Increasing calculus burden the left when compared with prior examination  No hydronephrosis  2   Redemonstrated tethering of the left dome of the bladder to the sigmoid colon may be due to an adhesion  There is no significant acute inflammatory change in this region  Workstation performed: [id number]       All[employer] Records Reviewed: Yes     Total Time for Visit, including Counseling / Coordination of Care: 45 minutes  Greater than 50% of this total time spent on direct patient counseling and coordination of care  ** Please Note: This note has been constructed using a voice recognition system   **

## 2018-10-04 NOTE — PLAN OF CARE
DISCHARGE PLANNING     Discharge to home or other facility with appropriate resources Progressing        GENITOURINARY - ADULT     Maintains or returns to baseline urinary function Progressing     Absence of urinary retention Progressing     Urinary catheter remains patent Progressing        INFECTION - ADULT     Absence or prevention of progression during hospitalization Progressing     Absence of fever/infection during neutropenic period Progressing        Knowledge Deficit     Patient/family/caregiver demonstrates understanding of disease process, treatment plan, medications, and discharge instructions Progressing        PAIN - ADULT     Verbalizes/displays adequate comfort level or baseline comfort level Progressing        Potential for Falls     Patient will remain free of falls Progressing        SAFETY ADULT     Maintain or return to baseline ADL function Progressing     Maintain or return mobility status to optimal level Progressing     Patient will remain free of falls Progressing

## 2018-10-04 NOTE — TELEPHONE ENCOUNTER
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

## 2018-10-04 NOTE — ED PROVIDER NOTES
History  Chief Complaint   Patient presents with    Possible UTI     pt recieved call from PCP who stated he has a UTI and needed to be evaluated, hx of uti      72-year-old male presents for evaluation of positive urine culture for Pseudomonas  Patient culture done On urinalysis from    Patient was treated for Pseudomonas urinary tract infection on admission on  he was discharged on   Patient was treated on a week of cefepime  Patient had repeat testing done as an outpatient was still positive for Pseudomonas  Patient has known urinary tract stones  History provided by:  Patient      Prior to Admission Medications   Prescriptions Last Dose Informant Patient Reported? Taking?    ACCU-CHEK FASTCLIX LANCETS MISC  Self Yes Yes   Sig: TEST twice a day   ACCU-CHEK GUIDE test strip   No Yes   Si each by Other route 4 (four) times a day Use as instructed bid scheduled and prn symptoms/signs blood sugar fluctuation   BD ULTRA-FINE PEN NEEDLES 29G X 12 7MM MISC   Yes Yes   Cranberry 500 MG CAPS  Self Yes Yes   Sig: Take 1 capsule by mouth daily   Insulin Pen Needle 29G X 12MM MISC   No Yes   Sig: by Does not apply route 4 (four) times a day   LANTUS SOLOSTAR 100 units/mL injection pen   Yes Yes   Sig: inject 16 units subcutaneously at bedtime   Lactobacillus (ACIDOPHILUS) 100 MG CAPS  Self Yes Yes   Sig: Take 1 capsule by mouth daily   NOVOLOG FLEXPEN 100 units/mL injection pen   Yes Yes   Potassium 99 MG TABS  Self Yes Yes   Sig: Take 2 tablets by mouth daily   cholecalciferol (VITAMIN D3) 1,000 units tablet  Self Yes Yes   Sig: Take 2 tablets by mouth daily     clotrimazole (LOTRIMIN) 1 % cream  Self No Yes   Sig: Apply topically 2 (two) times a day   famotidine (PEPCID) 20 mg tablet  Self No Yes   Sig: Take 1 tablet (20 mg total) by mouth daily   insulin aspart (NovoLOG) 100 units/mL injection   Yes Yes   Sig: Inject 4 Units under the skin 3 (three) times a day before meals   insulin glargine (LANTUS) 100 units/mL subcutaneous injection   Yes Yes   Sig: Inject 12 Units under the skin daily at bedtime   meclizine (ANTIVERT) 12 5 MG tablet  Self Yes Yes   Sig: Take 12 5 mg by mouth daily     predniSONE 10 mg tablet   No Yes   Sig: Take 1 tablet (10 mg total) by mouth daily   pyridoxine (VITAMIN B6) 100 mg tablet  Self Yes Yes   Sig: Take 100 mg by mouth daily        Facility-Administered Medications: None       Past Medical History:   Diagnosis Date    Anxiety     Cervical spinal stenosis     Hx of chronic ulcerative colitis     Hyperlipidemia     Myasthenia gravis ([facility] [address]     Sleep apnea        Past Surgical History:   Procedure Laterality Date    COLECTOMY      partial sigmoid    COLECTOMY LAPAROSCOPIC      partial sigmoid colectomy    COLON SURGERY      lap  partial colectomy sigmoid    COLONOSCOPY      LITHOTRIPSY      OTHER SURGICAL HISTORY      parotid bx    SALIVARY GLAND SURGERY      biopsy       Family History   Problem Relation Age of Onset    Arthritis Mother     Diabetes Mother     Hypertension Mother     Osteoporosis Mother     Depression Father     Cancer Neg Hx     Heart disease Neg Hx      I have reviewed and agree with the history as documented  Social History   Substance Use Topics    Smoking status: Never Smoker    Smokeless tobacco: Never Used    Alcohol use No        Review of Systems   Constitutional: Negative for activity change, appetite change, fatigue and fever  HENT: Negative for congestion, dental problem, ear pain, rhinorrhea and sore throat  Eyes: Negative for pain and redness  Respiratory: Negative for chest tightness, shortness of breath and wheezing  Cardiovascular: Negative for chest pain and palpitations  Gastrointestinal: Negative for abdominal pain, blood in stool, constipation, diarrhea, nausea and vomiting  Endocrine: Negative for cold intolerance and heat intolerance     Genitourinary: Negative for dysuria, flank pain, frequency and hematuria  Musculoskeletal: Negative for arthralgias and myalgias  Skin: Negative for color change, pallor and rash  Neurological: Negative for weakness and numbness  Hematological: Does not bruise/bleed easily  Psychiatric/Behavioral: Negative for agitation, hallucinations and suicidal ideas  Physical Exam  Physical Exam   Constitutional: He is oriented to person, place, and time  He appears well-developed and well-nourished  HENT:   Mouth/Throat: No oropharyngeal exudate  TMs normal bilaterally no pharyngeal erythema no rhinorrhea nontender palpation of sinuses, normal looking turbinates   Eyes: Conjunctivae and EOM are normal    Neck: Normal range of motion  Neck supple  No meningeal signs   Cardiovascular: Normal rate, regular rhythm, normal heart sounds and intact distal pulses  Pulmonary/Chest: Effort normal and breath sounds normal  No respiratory distress  He has no wheezes  He has no rales  He exhibits no tenderness  Abdominal: Soft  Bowel sounds are normal  He exhibits no distension and no mass  There is no tenderness  No hernia  No cvat   Musculoskeletal: Normal range of motion  He exhibits no edema  Lymphadenopathy:     He has no cervical adenopathy  Neurological: He is alert and oriented to person, place, and time  No cranial nerve deficit  Skin: No rash noted  No erythema  No edema   Psychiatric: He has a normal mood and affect  His behavior is normal    Nursing note and vitals reviewed        Vital Signs  ED Triage Vitals [10/04/18 1202]   Temperature Pulse Respirations Blood Pressure SpO2   98 5 °F (36 9 °C) (!) 128 18 165/95 96 %      Temp Source Heart Rate Source Patient Position - Orthostatic VS BP Location FiO2 (%)   Temporal Monitor Sitting Left arm --      Pain Score       No Pain           Vitals:    10/04/18 1202 10/04/18 1338   BP: 165/95 152/91   Pulse: (!) 128 100   Patient Position - Orthostatic VS: Sitting Visual Acuity      ED Medications  Medications   cefepime (MAXIPIME) 2 g/50 mL dextrose IVPB (not administered)   sodium chloride 0 9 % bolus 500 mL (0 mL Intravenous Stopped 10/4/18 1338)       Diagnostic Studies  Results Reviewed     Procedure Component Value Units Date/Time    Blood culture #2 [[id number]] Collected:  10/04/18 1252    Lab Status: In process Specimen:  Blood from Arm, Left Updated:  10/04/18 1409    Blood culture #1 [[id number]] Collected:  10/04/18 1406    Lab Status: In process Specimen:  Blood from Arm, Right Updated:  10/04/18 1409    Urine Microscopic [[id number]]  (Abnormal) Collected:  10/04/18 1349    Lab Status:  Final result Specimen:  Urine from Urine, Clean Catch Updated:  10/04/18 1401     RBC, UA None Seen /hpf      WBC, UA 20-30 (A) /hpf      Epithelial Cells Occasional /hpf      Bacteria, UA Occasional /hpf     Urine culture [[id number]] Collected:  10/04/18 1349    Lab Status: In process Specimen:  Urine from Urine, Clean Catch Updated:  10/04/18 1401    POCT urinalysis dipstick [[id number]]  (Abnormal) Resulted:  10/04/18 1340    Lab Status:  Final result Specimen:  Urine Updated:  10/04/18 1340     Color, UA yellow    ED Urine Macroscopic [[id number]]  (Abnormal) Collected:  10/04/18 1349    Lab Status:  Final result Specimen:  Urine Updated:  10/04/18 1338     Color, UA Yellow     Clarity, UA Clear     pH, UA 5 5     Leukocytes, UA Small (A)     Nitrite, UA Negative     Protein, UA 30 (1+) (A) mg/dl      Glucose, UA Negative mg/dl      Ketones, UA Trace (A) mg/dl      Urobilinogen, UA 0 2 E U /dl      Bilirubin, UA Negative     Blood, UA Negative     Specific Gravity, UA 1 025    Narrative:       CLINITEK RESULT    Lactic acid x2 Q2H [[id number]]  (Normal) Collected:  10/04/18 1248    Lab Status:  Final result Specimen:  Blood from Arm, Left Updated:  10/04/18 1319     LACTIC ACID 1 5 mmol/L     Narrative:         Result may be elevated if tourniquet was used during collection  Basic metabolic panel [[id number]] Collected:  10/04/18 1248    Lab Status:  Final result Specimen:  Blood from Arm, Left Updated:  10/04/18 1312     Sodium 141 mmol/L      Potassium 4 3 mmol/L      Chloride 105 mmol/L      CO2 25 mmol/L      ANION GAP 11 mmol/L      BUN 18 mg/dL      Creatinine 1 28 mg/dL      Glucose 135 mg/dL      Calcium 9 8 mg/dL      eGFR 55 ml/min/1 73sq m     Narrative:         National Kidney Disease Education Program recommendations are as follows:  GFR calculation is accurate only with a steady state creatinine  Chronic Kidney disease less than 60 ml/min/1 73 sq  meters  Kidney failure less than 15 ml/min/1 73 sq  meters      CBC and differential [[id number]]  (Abnormal) Collected:  10/04/18 1248    Lab Status:  Final result Specimen:  Blood from Arm, Left Updated:  10/04/18 1258     WBC 10 78 (H) Thousand/uL      RBC 4 95 Million/uL      Hemoglobin 15 7 g/dL      Hematocrit 45 0 %      MCV 91 fL      MCH 31 7 pg      MCHC 34 9 g/dL      RDW 12 9 %      MPV 9 4 fL      Platelets 676 Thousands/uL      nRBC 0 /100 WBCs      Neutrophils Relative 80 (H) %      Immat GRANS % 1 %      Lymphocytes Relative 13 (L) %      Monocytes Relative 5 %      Eosinophils Relative 0 %      Basophils Relative 1 %      Neutrophils Absolute 8 63 (H) Thousands/µL      Immature Grans Absolute 0 09 Thousand/uL      Lymphocytes Absolute 1 41 Thousands/µL      Monocytes Absolute 0 55 Thousand/µL      Eosinophils Absolute 0 03 Thousand/µL      Basophils Absolute 0 07 Thousands/µL     Lactic acid x2 Q2H [[id number]]     Lab Status:  No result Specimen:  Blood                  No orders to display              Procedures  Procedures       Phone Contacts  ED Phone Contact    ED Course  ED Course as of Oct 04 1419   Thu Oct 04, 2018   1340 Leukocytes, UA: (!) Small                               MDM  CritCare Time    Disposition  Final diagnoses:   Complicated UTI (urinary tract infection)     Time reflects when diagnosis was documented in both MDM as applicable and the Disposition within this note     Time User Action Codes Description Comment    10/4/2018  2:15 PM Boo Steele Add [A95 7] Complicated UTI (urinary tract infection)       ED Disposition     ED Disposition Condition Comment    Admit  Case was discussed with Dr Florida Negron and the patient's admission status was agreed to be Admission Status: observation status to the service of Dr Florida Negron   Follow-up Information    None         Patient's Medications   Discharge Prescriptions    No medications on file     No discharge procedures on file      ED Provider  Electronically Signed by           Martha Archer MD  10/04/18 7667

## 2018-10-05 ENCOUNTER — APPOINTMENT (OUTPATIENT)
Dept: CT IMAGING | Facility: HOSPITAL | Age: 73
DRG: 690 | End: 2018-10-05
Payer: MEDICARE

## 2018-10-05 LAB
ALBUMIN SERPL BCP-MCNC: 3.1 G/DL (ref 3.5–5)
ALP SERPL-CCNC: 36 U/L (ref 46–116)
ALT SERPL W P-5'-P-CCNC: 49 U/L (ref 12–78)
ANION GAP SERPL CALCULATED.3IONS-SCNC: 9 MMOL/L (ref 4–13)
AST SERPL W P-5'-P-CCNC: 29 U/L (ref 5–45)
BILIRUB SERPL-MCNC: 0.58 MG/DL (ref 0.2–1)
BUN SERPL-MCNC: 16 MG/DL (ref 5–25)
CALCIUM SERPL-MCNC: 8.8 MG/DL (ref 8.3–10.1)
CHLORIDE SERPL-SCNC: 107 MMOL/L (ref 100–108)
CO2 SERPL-SCNC: 26 MMOL/L (ref 21–32)
CREAT SERPL-MCNC: 1.22 MG/DL (ref 0.6–1.3)
ERYTHROCYTE [DISTWIDTH] IN BLOOD BY AUTOMATED COUNT: 12.9 % (ref 11.6–15.1)
GFR SERPL CREATININE-BSD FRML MDRD: 58 ML/MIN/1.73SQ M
GLUCOSE P FAST SERPL-MCNC: 90 MG/DL (ref 65–99)
GLUCOSE SERPL-MCNC: 119 MG/DL (ref 65–140)
GLUCOSE SERPL-MCNC: 195 MG/DL (ref 65–140)
GLUCOSE SERPL-MCNC: 89 MG/DL (ref 65–140)
GLUCOSE SERPL-MCNC: 89 MG/DL (ref 65–140)
GLUCOSE SERPL-MCNC: 90 MG/DL (ref 65–140)
HCT VFR BLD AUTO: 41.6 % (ref 36.5–49.3)
HGB BLD-MCNC: 13.9 G/DL (ref 12–17)
MCH RBC QN AUTO: 31 PG (ref 26.8–34.3)
MCHC RBC AUTO-ENTMCNC: 33.4 G/DL (ref 31.4–37.4)
MCV RBC AUTO: 93 FL (ref 82–98)
PLATELET # BLD AUTO: 270 THOUSANDS/UL (ref 149–390)
PMV BLD AUTO: 9.4 FL (ref 8.9–12.7)
POTASSIUM SERPL-SCNC: 3.8 MMOL/L (ref 3.5–5.3)
PROCALCITONIN SERPL-MCNC: <0.05 NG/ML
PROT SERPL-MCNC: 6.3 G/DL (ref 6.4–8.2)
RBC # BLD AUTO: 4.48 MILLION/UL (ref 3.88–5.62)
SODIUM SERPL-SCNC: 142 MMOL/L (ref 136–145)
WBC # BLD AUTO: 8.43 THOUSAND/UL (ref 4.31–10.16)

## 2018-10-05 PROCEDURE — 82948 REAGENT STRIP/BLOOD GLUCOSE: CPT

## 2018-10-05 PROCEDURE — 80053 COMPREHEN METABOLIC PANEL: CPT | Performed by: PHYSICIAN ASSISTANT

## 2018-10-05 PROCEDURE — 99223 1ST HOSP IP/OBS HIGH 75: CPT | Performed by: INTERNAL MEDICINE

## 2018-10-05 PROCEDURE — 99232 SBSQ HOSP IP/OBS MODERATE 35: CPT | Performed by: FAMILY MEDICINE

## 2018-10-05 PROCEDURE — 99221 1ST HOSP IP/OBS SF/LOW 40: CPT | Performed by: UROLOGY

## 2018-10-05 PROCEDURE — 84145 PROCALCITONIN (PCT): CPT | Performed by: PHYSICIAN ASSISTANT

## 2018-10-05 PROCEDURE — 85027 COMPLETE CBC AUTOMATED: CPT | Performed by: PHYSICIAN ASSISTANT

## 2018-10-05 PROCEDURE — 74176 CT ABD & PELVIS W/O CONTRAST: CPT

## 2018-10-05 RX ADMIN — CEFEPIME HYDROCHLORIDE 2000 MG: 2 INJECTION, POWDER, FOR SOLUTION INTRAVENOUS at 13:56

## 2018-10-05 RX ADMIN — Medication 250 MG: at 09:01

## 2018-10-05 RX ADMIN — MECLIZINE HCL 12.5 MG: 12.5 TABLET ORAL at 09:02

## 2018-10-05 RX ADMIN — PREDNISONE 10 MG: 5 TABLET ORAL at 09:01

## 2018-10-05 RX ADMIN — HEPARIN SODIUM 5000 UNITS: 5000 INJECTION INTRAVENOUS; SUBCUTANEOUS at 21:42

## 2018-10-05 RX ADMIN — HEPARIN SODIUM 5000 UNITS: 5000 INJECTION INTRAVENOUS; SUBCUTANEOUS at 13:56

## 2018-10-05 RX ADMIN — CEFEPIME HYDROCHLORIDE 1000 MG: 1 INJECTION, POWDER, FOR SOLUTION INTRAMUSCULAR; INTRAVENOUS at 00:56

## 2018-10-05 RX ADMIN — INSULIN GLARGINE 12 UNITS: 100 INJECTION, SOLUTION SUBCUTANEOUS at 21:42

## 2018-10-05 RX ADMIN — HEPARIN SODIUM 5000 UNITS: 5000 INJECTION INTRAVENOUS; SUBCUTANEOUS at 05:35

## 2018-10-05 RX ADMIN — INSULIN LISPRO 2 UNITS: 100 INJECTION, SOLUTION INTRAVENOUS; SUBCUTANEOUS at 18:43

## 2018-10-05 RX ADMIN — Medication 250 MG: at 18:01

## 2018-10-05 RX ADMIN — SODIUM CHLORIDE 75 ML/HR: 0.9 INJECTION, SOLUTION INTRAVENOUS at 04:11

## 2018-10-05 RX ADMIN — Medication 100 MG: at 09:02

## 2018-10-05 RX ADMIN — FAMOTIDINE 20 MG: 20 TABLET ORAL at 09:02

## 2018-10-05 NOTE — CONSULTS
Consultation - Infectious Disease   Joseph Reasoner 68 y o  male MRN: [id number]  Unit/Bed#: [place] [address] -01 Encounter: [id number]      IMPRESSION & RECOMMENDATIONS:   Impression/Recommendations:  1  Asymptomatic bacteriuria  Pseudomonas was again found on outpatient surveillance urine culture  This time, patient is with no abnormal urinary complaints  No dysuria  Urinalysis did reveal mild pyuria  No associated fevers, leukocytosis  Procalcitonin is negative  The Pseudomonas is likely a bladder colonizer  It will likely be impossible to permanently sterilize the urine secondary to significant stone burden  With no abnormal urinary symptoms and no clinical evidence of acute infection, there is no real indication for antibiotic for positive urine culture alone  However, if patient is to proceed with urologic intervention during hospitalization, it is reasonable to give antibiotic pre and post procedure     -continue IV cefepime for now  -follow up repeat urine culture  -follow-up Urology recommendations  -follow up pending blood cultures  -monitor symptoms  -monitor temperatures, WBC count, hemodynamics    2  History of recurrent Pseudomonas UTIs  Patient recently completed a 7 day course of IV cefepime through PICC line at home, which was subsequently discontinued after completion on 09/21/2018  Fluoroquinolones have been avoided due to history of myasthenia gravis  3  Bilateral nephrolithiasis  Most recent CT from mid September 2018 showed bilateral nephrolithiasis with no evidence of obstruction  Patient was supposed to go for outpatient ureteroscopy be with stone extraction and laser lithotripsy, however, this continues to be deferred secondary to positive urine cultures  It may be reasonable to perform this while in hospital with plan for pre and post procedure antibiotic  Followup further urology recommendations  4  Myasthenia gravis  On chronic prednisone 10 mg daily    Seems to be stable  5  CKD stage 3  Creatinine around baseline  Continue to monitor renal function closely and adjust antibiotic dosing as needed  Antibiotics:  Cefepime D2    Discussed above plan with patient, and with Vicky Rosales from Urology service, and with Dr Gelene Ormond  Thank you for this consultation  We will follow along with you  If still in hospital, will plan to see patient again on 10/08  Please call with any questions in the interim  HISTORY OF PRESENT ILLNESS:  Reason for Consult:  Pseudomonas in urine    HPI: Constantin Haddad is a 68 y o  male with history of diabetes, myasthenia gravis on chronic prednisone 10 mg daily, history of colovesicular fistula post sigmoid resection, recurrent positive urine cultures for Pseudomonas who was admitted on 10/04/2018 due to positive outpatient surveillance urine culture again for Pseudomonas  Patient most recently was hospitalized in mid September 2018 secondary to symptomatic UTI and urine culture which grew Pseudomonas  Patient has been instructed not to receive any quinolone antibiotics due to his history of myasthenia gravis  For this reason, he was hospitalized and started on IV cefepime  A PICC line was placed any completed a 7 day course of IV cefepime with subsequent discontinuation of PICC line  His urinary symptoms improved  After completion of antibiotic, patient has been getting surveillance urine cultures checked prior to consideration of urologic procedure  Urine culture from 10/01 is again positive for greater than 100,000 Pseudomonas  Patient was again referred to the hospital and was started on IV cefepime  This time, patient denies any abnormal urinary symptoms  Denies any fevers, chills, flank pain, suprapubic pain, rectal pain  REVIEW OF SYSTEMS:  A complete system-based review of systems is otherwise negative      PAST MEDICAL HISTORY:  Past Medical History:   Diagnosis Date    Anxiety     Cervical spinal stenosis     Hx of chronic ulcerative colitis     Hyperlipidemia     Myasthenia gravis ([facility] [address]     Sleep apnea      Past Surgical History:   Procedure Laterality Date    COLECTOMY      partial sigmoid    COLECTOMY LAPAROSCOPIC      partial sigmoid colectomy    COLON SURGERY      lap  partial colectomy sigmoid    COLONOSCOPY      LITHOTRIPSY      OTHER SURGICAL HISTORY      parotid bx    SALIVARY GLAND SURGERY      biopsy       FAMILY HISTORY:  Non-contributory    SOCIAL HISTORY:  History   Alcohol Use No     History   Drug Use No     History   Smoking Status    Never Smoker   Smokeless Tobacco    Never Used       ALLERGIES:  Allergies   Allergen Reactions    Levaquin [Levofloxacin]      Due to MG dx    Quinolones      Due to MG dx    Antihistamines, Chlorpheniramine-Type      Difficulty urinating    Benzodiazepines     Chlorpheniramine     Diphenhydramine     Erythromycin     Iodine     Iodine Solution [Povidone Iodine]      ivp dye    Loratadine     Other      Muscle relaxers d/t myasthenia gravis-they close my airway    Sulfa Antibiotics     Tetracyclines & Related        MEDICATIONS:  All current active medications have been reviewed      PHYSICAL EXAM:  Vitals:  HR:  [] 87  Resp:  [16-19] 18  BP: (125-165)/(61-96) 147/96  SpO2:  [92 %-98 %] 96 %  Temp (24hrs), Av 4 °F (36 9 °C), Min:97 5 °F (36 4 °C), Max:99 3 °F (37 4 °C)  Current: Temperature: 98 3 °F (36 8 °C)     Physical Exam:  General:  Well-nourished, well-developed, in no acute distress  Eyes:  Conjunctive clear with no hemorrhages or effusions  Oropharynx:  No ulcers, no lesions  Neck:  Supple, no lymphadenopathy  Lungs:  Clear to auscultation bilaterally, no accessory muscle use  Cardiac:  Regular rate and rhythm, no murmurs  Abdomen:  Soft, non-tender, non-distended  Extremities:  No peripheral cyanosis, clubbing, or edema  Skin:  No rashes, no ulcers  Neurological:  Moves all four extremities spontaneously, sensation grossly intact      LABS, IMAGING, & OTHER STUDIES:  Lab Results:  I have personally reviewed pertinent labs  Results from last 7 days  Lab Units 10/05/18  0550 10/04/18  1248   SODIUM mmol/L 142 141   POTASSIUM mmol/L 3 8 4 3   CHLORIDE mmol/L 107 105   CO2 mmol/L 26 25   BUN mg/dL 16 18   CREATININE mg/dL 1 22 1 28   EGFR ml/min/1 73sq m 58 55   CALCIUM mg/dL 8 8 9 8   AST U/L 29  --    ALT U/L 49  --    ALK PHOS U/L 36*  --        Results from last 7 days  Lab Units 10/05/18  0550 10/04/18  1248   WBC Thousand/uL 8 43 10 78*   HEMOGLOBIN g/dL 13 9 15 7   PLATELETS Thousands/uL 270 348       Results from last 7 days  Lab Units 10/01/18  0746   URINE CULTURE  >100,000 cfu/ml Pseudomonas aeruginosa*       Imaging Studies:   I have personally reviewed pertinent imaging study reports and images in PACS  Prior CT abdomen showed bilateral nephrolithiasis with increasing calculus burden on the left  No evidence of hydronephrosis  EKG, Pathology, and Other Studies:   I have personally reviewed pertinent reports

## 2018-10-05 NOTE — ASSESSMENT & PLAN NOTE
Overall feels voiding well  Plan:  Check PVR to evaluate for retention given recurrent Pseudomonas in urine

## 2018-10-05 NOTE — ASSESSMENT & PLAN NOTE
Lab Results   Component Value Date    HGBA1C 6 0 08/27/2018       Recent Labs      10/04/18   1737  10/04/18   2110  10/05/18   0756   POCGLU  172*  122  89       Blood Sugar Average: Last 72 hrs:  (P) 254 [id number]   Continue Lantus 12 units subcutaneous at bedtime daily, along with sliding scale insulin, diabetic diet, and Accu-Cheks q i d  Yvonne Galicia

## 2018-10-05 NOTE — ASSESSMENT & PLAN NOTE
Chronic and stable  Creatinine is currently 1 22 with an EGFR of 58  Avoid nephrotoxins, hypotension, anemia, and NSAIDs

## 2018-10-05 NOTE — PROGRESS NOTES
Progress Note Smith Alicia 1945, 68 y o  male MRN: [id number]    Unit/Bed#: [place] [address] [id number] Encounter: [id number]    Primary Care Provider: Drew Palacios DO   Date and time admitted to hospital: 10/4/2018 12:05 PM        * Pseudomonas urinary tract infection   Assessment & Plan    Patient has repeated episodes of Pseudomonas urinary tract infections may be due to his multiple kidney stones seeding the Pseudomonas into the urinary urinary tract  Continue IV cefepime and await Infectious Disease recommendations  Follow up blood and urine cultures as well as procalcitonin  Per Urology recommendations will need outpatient follow-up for treatment of stones once Pseudomonas infection is cleared  Nephrolithiasis   Assessment & Plan    Will need outpatient follow-up with Urology once the Pseudomonas infection in the urine has been completely cleared with antibiotics  Will need possible ureteroscopy with stone extraction and laser lithotripsy  Benign non-nodular prostatic hyperplasia with lower urinary tract symptoms   Assessment & Plan    Per Urology recommendations will check postvoid residuals to determine if patient has urinary retention that could be causing his recurrent Pseudomonas infection  Myasthenia gravis ([address] )   Assessment & Plan    Continue prednisone and pyridoxine  Controlled type 2 diabetes mellitus without complication, with long-term current use of insulin [facility])   Assessment & Plan    Lab Results   Component Value Date    HGBA1C 6 0 08/27/2018       Recent Labs      10/04/18   1737  10/04/18   2110  10/05/18   0756   POCGLU  172*  122  89       Blood Sugar Average: Last 72 hrs:  (P) 431 [id number]   Continue Lantus 12 units subcutaneous at bedtime daily, along with sliding scale insulin, diabetic diet, and Accu-Cheks q i d  Essential hypertension   Assessment & Plan    Blood pressure stable and under control     Currently not on any blood pressure medications at this time  CKD (chronic kidney disease), stage III ([facility])   Assessment & Plan    Chronic and stable  Creatinine is currently 1 22 with an EGFR of 58  Avoid nephrotoxins, hypotension, anemia, and NSAIDs  VTE Pharmacologic Prophylaxis:   Pharmacologic: Heparin  Mechanical VTE Prophylaxis in Place: No    Patient Centered Rounds: I have performed bedside rounds with nursing staff today  Discussions with Specialists or Other Care Team Provider:  Yes    Education and Discussions with Family / Patient:  Yes    Time Spent for Care: 20 minutes  More than 50% of total time spent on counseling and coordination of care as described above  Current Length of Stay: 0 day(s)    Current Patient Status: Observation   Certification Statement: The patient will continue to require additional inpatient hospital stay due to Complete workup for cause of Pseudomonas UTI    Discharge Plan:  Pending completion of Infectious Disease evaluation for Pseudomonas UTI    Code Status: Level 1 - Full Code      Subjective:   Patient has no complaints at this time  Objective:     Vitals:   Temp (24hrs), Av 4 °F (36 9 °C), Min:97 5 °F (36 4 °C), Max:99 3 °F (37 4 °C)    HR:  [] 87  Resp:  [16-19] 18  BP: (125-165)/(61-96) 147/96  SpO2:  [92 %-98 %] 96 %  Body mass index is 28 29 kg/m²  Input and Output Summary (last 24 hours): Intake/Output Summary (Last 24 hours) at 10/05/18 1033  Last data filed at 10/05/18 0411   Gross per 24 hour   Intake             1250 ml   Output                0 ml   Net             1250 ml       Physical Exam:     Physical Exam   Constitutional: He is oriented to person, place, and time  He appears well-developed and well-nourished  HENT:   Head: Normocephalic  Cardiovascular: Normal rate, regular rhythm and normal heart sounds  Pulmonary/Chest: Effort normal and breath sounds normal    Abdominal: Soft  Bowel sounds are normal  He exhibits no distension   There is no tenderness  Musculoskeletal: Normal range of motion  He exhibits no edema  Neurological: He is alert and oriented to person, place, and time  Skin: Skin is warm and dry  Additional Data:     Labs:      Results from last 7 days  Lab Units 10/05/18  0550 10/04/18  1248   WBC Thousand/uL 8 43 10 78*   HEMOGLOBIN g/dL 13 9 15 7   HEMATOCRIT % 41 6 45 0   PLATELETS Thousands/uL 270 348   NEUTROS PCT %  --  80*   LYMPHS PCT %  --  13*   MONOS PCT %  --  5   EOS PCT %  --  0       Results from last 7 days  Lab Units 10/05/18  0550   SODIUM mmol/L 142   POTASSIUM mmol/L 3 8   CHLORIDE mmol/L 107   CO2 mmol/L 26   BUN mg/dL 16   CREATININE mg/dL 1 22   ANION GAP mmol/L 9   CALCIUM mg/dL 8 8   ALBUMIN g/dL 3 1*   TOTAL BILIRUBIN mg/dL 0 58   ALK PHOS U/L 36*   ALT U/L 49   AST U/L 29           Results from last 7 days  Lab Units 10/05/18  0756 10/04/18  2110 10/04/18  1737   POC GLUCOSE mg/dl 89 122 172*           Results from last 7 days  Lab Units 10/04/18  1248   LACTIC ACID mmol/L 1 5           * I Have Reviewed All Lab Data Listed Above  * Additional Pertinent Lab Tests Reviewed:  [address] Admission Reviewed    Imaging:    Imaging Reports Reviewed Today Include:  CT renal stone study abdomen and pelvis without contrast   Imaging Personally Reviewed by Myself Includes:  None    Recent Cultures (last 7 days):       Results from last 7 days  Lab Units 10/01/18  0746   URINE CULTURE  >100,000 cfu/ml Pseudomonas aeruginosa*       Last 24 Hours Medication List:     Current Facility-Administered Medications:  acetaminophen 650 mg Oral Q6H PRN Iris Hollis, PA-C    cefepime 1,000 mg Intravenous Q12H Iris Hollis, PA-C Last Rate: 1,000 mg (10/05/18 0056)   famotidine 20 mg Oral Daily Iris Chatmanr, PA-C    heparin (porcine) 5,000 Units Subcutaneous Q8H [address] Iris Piger, PA-C    insulin glargine 12 Units Subcutaneous HS Iris Compar, PA-C    insulin lispro 1-6 Units Subcutaneous TID AC Katlyn Masters PA-C    insulin lispro 1-6 Units Subcutaneous HS GUANAKITO Wiley-C    meclizine 12 5 mg Oral Daily Iris Hollis, PA-C    ondansetron 4 mg Intravenous Q6H PRN Katlyn Masters PA-C    predniSONE 10 mg Oral Daily Iris Piger, PA-C    pyridoxine 100 mg Oral Daily Iris Piger, PA-C    saccharomyces boulardii 250 mg Oral BID Iris Piger, PA-C    sodium chloride 75 mL/hr Intravenous Continuous Iris Chatmanr, PA-C Last Rate: 75 mL/hr (10/05/18 0411)        Today, Patient Was Seen By: Prasanth De La Cruz MD    ** Please Note: Dictation voice to text software may have been used in the creation of this document   **

## 2018-10-05 NOTE — SOCIAL WORK
CM met with patient at bedside to present OBS notice  Patient explained that he wants to have his kidney stones removed prior to discharge as he has been hospitalized twice before, this being the third admission this year, for this same exact issue  He is currently still OBS  D/C plan will be determined by  once it is known whether he will stay to have stones removed or be sent home again on IV ABX  He was just d/c last month with IV ABX and [facility]

## 2018-10-05 NOTE — ASSESSMENT & PLAN NOTE
Persistent Pseudomonas on urine culture  He has had Pseudomonas positive cultures repeatedly since February of 2018 with multiple courses of prolonged IV antibiotics with a PICC at home  At this time he is asymptomatic, although he was tachycardic with a leukocytosis, normal lactate upon admission to the hospital   He has been afebrile with a normal heart rate and normal white blood cell count this morning

## 2018-10-05 NOTE — CONSULTS
Consult - Urology   Austinsadafmagui Kirk 1945, 68 y o  male MRN: [id number]    Unit/Bed#: [place] [id number] -[id number] Encounter: [id number]    Benign non-nodular prostatic hyperplasia with lower urinary tract symptoms   Assessment & Plan    Overall feels voiding well  Plan:  Check PVR to evaluate for retention given recurrent Pseudomonas in urine  Nephrolithiasis   Assessment & Plan    Bilateral nephrolithiasis seen on CT in mid September  He has left greater than right of bilateral intrarenal calculi without any obstructing ureteral stones  At this time he is asymptomatic from a pain perspective with no clear indication for repeat urologic imaging now  Will require a eventual ureteroscopy with laser lithotripsy to eliminate stone burden given his recurrent Pseudomonas infections  Appreciate infectious disease input relative to ongoing Pseudomonas growth, true infection versus colonization given an 8 month history of Pseudomonas positive cultures in urine  Patient is questioning possible ureteroscopy in this hospitalization, unlikely that this would be safe given the presence of Pseudomonas in his urine but will await ID input  Plan:  Outpatient follow-up once cleared of Pseudomonas to plan stage ureteroscopy with stone extraction and laser lithotripsy  * Pseudomonas urinary tract infection   Assessment & Plan    Persistent Pseudomonas on urine culture  He has had Pseudomonas positive cultures repeatedly since February of 2018 with multiple courses of prolonged IV antibiotics with a PICC at home  At this time he is asymptomatic, although he was tachycardic with a leukocytosis, normal lactate upon admission to the hospital   He has been afebrile with a normal heart rate and normal white blood cell count this morning  Subjective/Objective     Subjective: This 57-year-old male is known to Dr Degroot Gains from outpatient follow-up    He has a prior  history positive for chronic Pseudomonas UTI which is recurrent, nephrolithiasis over many years, BPH  He was most recently admitted about 3 weeks ago in the middle of September, at which time he was found to have Pseudomonas UT I  He has significant limitations in the antibiotics he is able to take secondary to myasthenia gravis in multiple allergies  He was seen at that time in consultation with recommendation for outpatient follow-up to discuss ureteroscopy and laser lithotripsy given bilateral renal stone bur noted on CT at that time with Pseudomonas infection  He was discharged home with PICC line and cefepime  He completed his cefepime  He presented to the family doctor for follow-up urine cultures to document clearance of his Pseudomonas in his urine  These cultures were taken on 10/1/2018 and resulted yesterday, at which time his primary care provider called him and recommended emergency room evaluation with subsequent medicine admission for Pseudomonas which persisted in his urine  Currently he is voiding well, feels he is emptying his bladder  He denies any hematuria, dysuria  He denies any incontinence  He is ambulatory and able to make it to the bathroom  He has not had any flank pain in months to years  He denies any vomiting, but notes he felt nauseated yesterday  He reports ongoing fatigue, present for a number of months  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68, cleaning houses and offices, caring for his grandchildren who range in age from 24-23, he and his wife are there primary guardians  Review of Systems   Constitutional: Negative for activity change and appetite change  HENT: Negative for congestion and ear pain  Eyes: Negative for pain  Respiratory: Negative for cough and shortness of breath  Cardiovascular: Negative for chest pain and palpitations  Gastrointestinal: Negative for abdominal distention, abdominal pain, blood in stool, constipation, diarrhea and nausea     Genitourinary: Negative for difficulty urinating, dysuria, flank pain, frequency, genital sores, hematuria, penile pain, penile swelling, scrotal swelling, testicular pain and urgency  Musculoskeletal: Negative for arthralgias and myalgias  Skin: Negative for rash  Allergic/Immunologic: Negative for immunocompromised state  Neurological: Negative for dizziness and headaches  Hematological: Negative for adenopathy  Does not bruise/bleed easily  Psychiatric/Behavioral: Negative for agitation  The patient is not nervous/anxious  Objective:  Vitals: Blood pressure 147/96, pulse 87, temperature 98 3 °F (36 8 °C), temperature source Temporal, resp  rate 18, weight 84 4 kg (186 lb 1 1 oz), SpO2 96 %  ,Body mass index is 28 29 kg/m²  Intake/Output Summary (Last 24 hours) at 10/05/18 8650  Last data filed at 10/05/18 0411   Gross per 24 hour   Intake             1250 ml   Output                0 ml   Net             1250 ml       Invasive Devices     Peripheral Intravenous Line            Peripheral IV 10/04/18 Left Forearm less than 1 day                Physical Exam   Constitutional: He is oriented to person, place, and time  He appears well-developed and well-nourished  He is cooperative  He does not appear ill  No distress  Pleasant talkative well-appearing 51-year-old male, obese  No acute distress  Sitting upright in bed  HENT:   Head: Normocephalic and atraumatic  Moist mucous membranes  Eyes: Conjunctivae and EOM are normal    Neck: Normal range of motion  Neck supple  No tracheal deviation present  Cardiovascular: Normal rate, regular rhythm and normal heart sounds  No murmur heard  Pulmonary/Chest: Effort normal and breath sounds normal  No respiratory distress  He has no wheezes  Good airflow bilaterally on deep inspiration  Abdominal: Soft  Bowel sounds are normal  He exhibits no distension and no mass  There is no tenderness  Abdomen obese but benign without significant tenderness    Multiple incisions noted  No suprapubic fullness  Genitourinary:   Genitourinary Comments: Bilateral CVA without tenderness  Musculoskeletal: Normal range of motion  He exhibits no edema  Neurological: He is alert and oriented to person, place, and time  Skin: Skin is warm and dry  No rash noted  He is not diaphoretic  No erythema  No pallor  Psychiatric: He has a normal mood and affect  His behavior is normal  Judgment and thought content normal    Nursing note and vitals reviewed  History:    Past Medical History:   Diagnosis Date    Anxiety     Cervical spinal stenosis     Hx of chronic ulcerative colitis     Hyperlipidemia     [address]asthenia gravis ([facility] [address] )     Sleep apnea      Past Surgical History:   Procedure Laterality Date    COLECTOMY      partial sigmoid    COLECTOMY LAPAROSCOPIC      partial sigmoid colectomy    COLON SURGERY      lap  partial colectomy sigmoid    COLONOSCOPY      LITHOTRIPSY      OTHER SURGICAL HISTORY      parotid bx    SALIVARY GLAND SURGERY      biopsy     Family History   Problem Relation Age of Onset    Arthritis Mother     Diabetes Mother     Hypertension Mother    Dimitri Abt Osteoporosis Mother     Depression Father     Cancer Neg Hx     Heart disease Neg Hx      Social History     Social History    Marital status: /Civil Union     Spouse name: N/A    Number of children: N/A    Years of education: N/A     Occupational History    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      Social History Main Topics    Smoking status: Never Smoker    Smokeless tobacco: Never Used    Alcohol use No    Drug use: No    Sexual activity: Not Asked     Other Topics Concern    None     Social History Narrative    No caffeine use    Uses seat belts       Imaging:  CT from 9/15/2015 with 2 right renal calculi measuring up to 3 mm without hydronephrosis  7 mm calculus within the lower pole of the left kidney with 2 mm calculus within the midpole  No left hydronephrosis seen    Tethering of the left dome of the bladder cyst sigmoid colon without significant acute inflammatory change (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)  His rectal anastomosis appears intact  Bilateral fat containing inguinal hernias noted  Imaging reviewed - both report and images personally reviewed  Labs:  Recent Labs      10/04/18   1248  10/05/18   0550   WBC  10 78*  8 43     Recent Labs      10/04/18   1248  10/05/18   0550   HGB  15 7  13 9       Recent Labs      10/04/18   1248  10/05/18   0550   CREATININE  1 28  1 22       Microbiology:  Urine culture from 10 1/20/2018 with Pseudomonas, susceptible to cefepime  Urine culture from 9/15/2018 with Pseudomonas and Oxy is positive gram-negative rods  Blood culture in process x2 from 10/4/2018 upon admission  Urine culture pending from 10/4/2018      Jaswant Merrill PA-C  Date: 10/5/2018 Time: 9:21 AM

## 2018-10-05 NOTE — ASSESSMENT & PLAN NOTE
Will need outpatient follow-up with Urology once the Pseudomonas infection in the urine has been completely cleared with antibiotics  Will need possible ureteroscopy with stone extraction and laser lithotripsy

## 2018-10-05 NOTE — ASSESSMENT & PLAN NOTE
Per Urology recommendations will check postvoid residuals to determine if patient has urinary retention that could be causing his recurrent Pseudomonas infection

## 2018-10-05 NOTE — ASSESSMENT & PLAN NOTE
Patient has repeated episodes of Pseudomonas urinary tract infections may be due to his multiple kidney stones seeding the Pseudomonas into the urinary urinary tract  Continue IV cefepime and await Infectious Disease recommendations  Follow up blood and urine cultures as well as procalcitonin  Per Urology recommendations will need outpatient follow-up for treatment of stones once Pseudomonas infection is cleared

## 2018-10-05 NOTE — ASSESSMENT & PLAN NOTE
Bilateral nephrolithiasis seen on CT in mid September  He has left greater than right of bilateral intrarenal calculi without any obstructing ureteral stones  At this time he is asymptomatic from a pain perspective with no clear indication for repeat urologic imaging now  Will require a eventual ureteroscopy with laser lithotripsy to eliminate stone burden given his recurrent Pseudomonas infections  Appreciate infectious disease input relative to ongoing Pseudomonas growth, true infection versus colonization given an 8 month history of Pseudomonas positive cultures in urine  Patient is questioning possible ureteroscopy in this hospitalization, unlikely that this would be safe given the presence of Pseudomonas in his urine but will await ID input  Plan:  Outpatient follow-up once cleared of Pseudomonas to plan stage ureteroscopy with stone extraction and laser lithotripsy

## 2018-10-05 NOTE — ASSESSMENT & PLAN NOTE
Blood pressure stable and under control  Currently not on any blood pressure medications at this time

## 2018-10-05 NOTE — CASE MANAGEMENT
Initial Clinical Review    Admission: Date/Time/Statement: ROMERO [email or web address]    Orders Placed This Encounter   Procedures    Place in Observation (expected length of stay for this patient is less than two midnights)     Standing Status:   Standing     Number of Occurrences:   1     Order Specific Question:   Admitting Physician     Answer:   Rose Shah [[id number]]     Order Specific Question:   Level of Care     Answer:   Med Surg [16]         ED: Date/Time/Mode of Arrival:   ED Arrival Information     Expected Arrival Acuity Means of Arrival Escorted By Service Admission Type    - 10/4/2018 11:48 Urgent Walk-In Self General Medicine Urgent    Arrival Complaint    Possible UTI           Chief Complaint:   Chief Complaint   Patient presents with    Possible UTI     pt recieved call from PCP who stated he has a UTI and needed to be evaluated, hx of uti        History of Illness: 68 y o  male with past medical history of myasthenia gravis maintained on chronic oral prednisone,  type 2 diabetes, essential hypertension, GERD, CKD stage 3, history of recurrent Pseudomonas UTI  Thus far patient has received IV cefepime 4 times this year for positive urine cultures      He presents after being told by his PCP that his urinary culture was growing a Pseudomonas again  Patient with recent hospitalization 9/15/18 where he was found to have a recurrent Pseudomonas urinary tract infection    He completed a 7 day course of IV cefepime given his allergy to other antibiotics and history of myasthenia gravis       He had a repeat urine culture done by his PCP on 9/24/18 which revealed trace leukocytes, negative nitrites, 4-10 WBCs, no epithelial cells, no bacteria, occasional calcium oxalate crystals        PCP again repeat urine culture on 10/1/18 which revealed moderate leukocytes, positive nitrites, 20-30 WBCs, occasional epithelial cells, occasional bacteria, occasional calcium oxalate crystals       Currently patient reports that he is asymptomatic  He denies any dysuria, hesitancy, urgency, frequency, foul-smelling urine, or blood in urine  No fevers or chills  Upon presentation he had a mild tachycardia and very minimal elevated WBC count of 10 78  Patient states I need to get may kidney stones removed all in here  Had a outpatient urology follow-up with Dr Da ramires in scheduled for tomorrow to discuss possible outpatient procedures for removal however this has been canceled as he is in-hospital   Patient is requesting to see a urologist      ED Vital Signs:   ED Triage Vitals [10/04/18 1202]   Temperature Pulse Respirations Blood Pressure SpO2   98 5 °F (36 9 °C) (!) 128 18 165/95 96 %      Temp Source Heart Rate Source Patient Position - Orthostatic VS BP Location FiO2 (%)   Temporal Monitor Sitting Left arm --      Pain Score       No Pain        Wt Readings from Last 1 Encounters:   10/04/18 84 4 kg (186 lb 1 1 oz)       Vital Signs (abnormal):   10/04/18 1519  97 5 °F (36 4 °C)  102  19  158/93  96 %  None (Room air)  Sitting   10/04/18 1338  --  100  16  152/91  98 %  None (Room air)  --   10/04/18 1202  98 5 °F (36 9 °C)   128  18  165/95  96 %  None (Room air)  Sitting         Abnormal Labs/Diagnostic Test Results:   Wbc 10 78    CT ABD:1   Bilateral nephrolithiasis as described above  Increasing calculus burden the left when compared with prior examination  No hydronephrosis  2   Redemonstrated tethering of the left dome of the bladder to the sigmoid colon may be due to an adhesion  There is no significant acute inflammatory change in this region      ED Treatment:   Medication Administration from 10/04/2018 1148 to 10/04/2018 1503       Date/Time Order Dose Route Action Action by Comments     10/04/2018 1338 sodium chloride 0 9 % bolus 500 mL 0 mL Intravenous Stopped Bibiana Gilbert RN      10/04/2018 1254 sodium chloride 0 9 % bolus 500 mL 500 mL Intravenous New Bag Lynn Deluna RN      10/04/2018 3488 cefepime (MAXIPIME) 2 g/50 mL dextrose IVPB 2,000 mg Intravenous New Ronn Paige RN           Past Medical/Surgical History:    Active Ambulatory Problems     Diagnosis Date Noted    Pseudomonas urinary tract infection     [facility]     Nephrolithiasis     MARSHALL [facility]acute kidney injury) ([facility] 01/10/2017    Controlled type 2 diabetes mellitus without complication, with long-term current use of insulin ([place] [address] ) 03/06/2017    CKD (chronic kidney disease), stage III ([place] [address] ) 02/14/2018    Hypercalcemia 02/14/2018    Vertigo 02/14/2018    Essential hypertension 02/25/2018    Actinic keratosis 10/05/2016    Benign non-nodular prostatic hyperplasia with lower urinary tract symptoms 01/15/2016    Cervical radiculopathy 09/23/2014    Cervical spinal stenosis 10/31/2014    Diverticulosis 07/02/2015    Hyperlipidemia 05/28/2014    Kidney atrophy 03/24/2017    Left anterior hemiblock 03/28/2016    Microscopic hematuria 09/20/2013    Polyneuropathy 04/23/2015    RBBB (right bundle branch block) 03/28/2016    Sleep apnea 02/04/2016    Tarsal tunnel syndrome of right side     Tuberculosis exposure 08/22/2018    Transition of care performed with sharing of clinical summary 09/20/2018    Antibiotic-induced yeast infection 11/10/1590    Complicated UTI (urinary tract infection) 10/04/2018    Allergy to multiple antibiotics 10/04/2018    Anxiety     UTI (urinary tract infection) 10/04/2018     Resolved Ambulatory Problems     Diagnosis Date Noted    Gastroesophageal reflux disease without esophagitis 03/06/2017     Past Medical History:   Diagnosis Date    Anxiety     Cervical spinal stenosis     Hx of chronic ulcerative colitis     Hyperlipidemia     [facility]     Sleep apnea        Admitting Diagnosis: UTI (urinary tract infection) [X76 8]  Complicated UTI (urinary tract infection) [N39 0]    Age/Sex: 68 y o  male    Assessment/Plan: 69 yo male  Principal Problem: Pseudomonas urinary tract infection  Active Problems:    Myasthenia gravis ([facility] )    Controlled type 2 diabetes mellitus without complication, with long-term current use of insulin ([facility])    CKD (chronic kidney disease), stage III ([facility] )    Essential hypertension        Primary Problem(s):  · Recurrent pseudomonas UTI  ? Initially diagnosed 9/15/18 hospitalization and placed on a 7 day course of IV cefepime  Patient completed this course as an outpatient through a PICC line that was placed  ? Underwent outpatient testing on 10/1/18 to ensure resolution of UTI however urine culture still growing Pseudomonas  Told by PCP to come to ED  Pt is asymptomatic at this time  ? Upon review of hx, pt has had multiple occurrences of Pseudomonas growing in urine culture                     -2/14/18                    -3/23/18                    -9/15/18                     -10/1/18                    -He has also received IV cefepime each time he was found to be growing pseudomonas  ? Recieved 1 dose of IV cefepime 2 g in ED here  Continue with cefepime 1 g q 12 for now  ? Await ID recommendations as if this is a true infection versus chronic colonization  Check procalcitonin in the meantime  Patient does have multiple kidney stones in place according to his 9/15/18 CT scan largest stone noted as 7 mm  ? Consult ID and Urology     Additional Problems:   · Sirs criteria with possible sepsis:  Presenting with tachycardia and very mild leukocytosis of 10 78  In the setting of Pseudomonas urine culture  Fortunately no elevation in lactic acidosis  Continue IV antibiotics as above  Blood cultures pending      · Type 2 diabetes:  Home regimen of Lantus 16 units before bed and NovoLog 4 units before meals  Continue here and placed on insulin sliding scale      · Essential hypertension:  Currently maintained on no blood pressure medications  Blood pressure here 158/93, 152/91   Will continue to monitor      · Myasthenia gravis:  Diagnosed in 2009  Continue home dose of oral prednisone 10 mg daily      · GERD:  Continue Pepcid     · CKD stage 3:  Creatinine 1 28 appears within patient's baseline  ·    Anticipated Length of Stay:  Patient will be admitted on an Observation basis with an anticipated length of stay of  Less than 2 midnights  Justification for Hospital Stay: UTI with need for further evaluation and IV abx    UROLOGY CONSULT:  Benign non-nodular prostatic hyperplasia with lower urinary tract symptoms   Assessment & Plan     Overall feels voiding well  Plan:  Check PVR to evaluate for retention given recurrent Pseudomonas in urine       Nephrolithiasis   Assessment & Plan     Bilateral nephrolithiasis seen on CT in mid September  He has left greater than right of bilateral intrarenal calculi without any obstructing ureteral stones  At this time he is asymptomatic from a pain perspective with no clear indication for repeat urologic imaging now  Will require a eventual ureteroscopy with laser lithotripsy to eliminate stone burden given his recurrent Pseudomonas infections  Appreciate infectious disease input relative to ongoing Pseudomonas growth, true infection versus colonization given an 8 month history of Pseudomonas positive cultures in urine  Patient is questioning possible ureteroscopy in this hospitalization, unlikely that this would be safe given the presence of Pseudomonas in his urine but will await ID input  Plan:  Outpatient follow-up once cleared of Pseudomonas to plan stage ureteroscopy with stone extraction and laser lithotripsy       * Pseudomonas urinary tract infection   Assessment & Plan     Persistent Pseudomonas on urine culture  He has had Pseudomonas positive cultures repeatedly since February of 2018 with multiple courses of prolonged IV antibiotics with a PICC at home    At this time he is asymptomatic, although he was tachycardic with a leukocytosis, normal lactate upon admission to the hospital   He has been afebrile with a normal heart rate and normal white blood cell count this morning              Consultation - Infectious Disease   Jing Saleem 68 y o  male MRN: [id number]  Unit/Bed#: [place] [address] -01 Encounter: [id number]        IMPRESSION & RECOMMENDATIONS:   Impression/Recommendations:  1  Asymptomatic bacteriuria  Pseudomonas was again found on outpatient surveillance urine culture  This time, patient is with no abnormal urinary complaints  No dysuria  Urinalysis did reveal mild pyuria  No associated fevers, leukocytosis  Procalcitonin is negative  It will likely be impossible to permanently sterilize the urine secondary to significant stone burden  With no abnormal urinary symptoms and no clinical evidence of acute infection, there is no real indication for antibiotic for positive urine culture alone  However, if patient is to proceed with urologic intervention, it is reasonable to give antibiotic pre and post procedure      -continue IV cefepime for now  -follow up repeat urine culture  -follow-up Urology recommendations  -follow up pending blood cultures  -monitor symptoms  -monitor temperatures, WBC count, hemodynamics     2  History of recurrent Pseudomonas UTIs  Patient recently completed a 7 day course of IV cefepime through PICC line at home, which was subsequently discontinued after completion on 09/21/2018      3  Bilateral nephrolithiasis  Most recent CT from mid September 2018 showed bilateral nephrolithiasis with no evidence of obstruction  Patient was supposed to go for outpatient ureteroscopy be with stone extraction and laser lithotripsy, however, this continues to be deferred secondary to positive urine cultures  It may be reasonable to perform this while in hospital with plan for pre and post procedure antibiotic  Followup further urology recommendations      4  Myasthenia gravis  On chronic prednisone 10 mg daily  Seems to be stable      5   CKD stage 3  Creatinine around baseline  Continue to monitor renal function closely and adjust antibiotic dosing as needed          Admission Orders:  CONSC ARB DIET  SEQ COMP DEVICE  OOB  CONSULT UROLOGY  CONSULT ID  REG DIET    Scheduled Meds:   Current Facility-Administered Medications:  acetaminophen 650 mg Oral Q6H PRN Iris Piger, PA-C    cefepime 1,000 mg Intravenous Q12H Iris Piger, PA-C Last Rate: 1,000 mg (10/05/18 0056)   famotidine 20 mg Oral Daily Iris Piger, PA-C    heparin (porcine) 5,000 Units Subcutaneous Q8H [facility] Iris Piger, PA-C    insulin glargine 12 Units Subcutaneous HS Iris Piger, PA-C    insulin lispro 1-6 Units Subcutaneous TID AC Iris Piger, PA-C    insulin lispro 1-6 Units Subcutaneous HS Iris Piger, PA-C    meclizine 12 5 mg Oral Daily Iris Piger, PA-C    ondansetron 4 mg Intravenous Q6H PRN Iris Piger, PA-C    predniSONE 10 mg Oral Daily Iris Piger, PA-C    pyridoxine 100 mg Oral Daily Iris Piger, PA-C    saccharomyces boulardii 250 mg Oral BID Iris Piger, PA-C    sodium chloride 75 mL/hr Intravenous Continuous Iris Piger, PA-C Last Rate: 75 mL/hr (10/05/18 0411)     Continuous Infusions:   sodium chloride 75 mL/hr Last Rate: 75 mL/hr (10/05/18 0411)     PRN Meds:   acetaminophen    ondansetron    PRIOR  Admission Date: 9/15/2018  Discharge Date: 9/18/2018     Admitting Diagnosis: UTI (urinary tract infection) [N39 0]  Painful urination [R30 9]  Pseudomonas urinary tract infection [N39 0, B96 5]     Discharge Diagnosis:   1  Pseudomonas urinary tract infection  2  Myasthenia gravis  3  Acute kidney injury superimposed on chronic kidney disease stage 3  4  Type 2 diabetes  5  Nephrolithiasis  6  BPH  7  Hypertension     Consulting Physicians:  Dr Tri Sosa, infectious Disease  Dr Elgin Garrison, urology     Procedures Performed:   None     HPI:  The patient is a 61-year-old man who developed dysuria    He has known urinary tract stones and has a history of recurrent urinary tract infections  Outpatient evaluation revealed Pseudomonas urinary tract infection  He was not able to utilize oral therapy because of myasthenia gravis  He was admitted for further care      Hospital Course: The patient was admitted to the hospital and was started on intravenous cefepime  With this, his dysuria began to resolved  He was evaluated by Infectious Disease who agreed that considering his myasthenia gravis, the most appropriate treatment would be 7 days of IV cefepime  The patient was also evaluated in consultation by Urology  It was recommended that when the patient's urinary tract infection has cleared that he be re-evaluated by Dr Rico Wright as an outpatient with respect to the possibility of ureteroscopy and laser lithotripsy  The patient underwent PICC catheterization to facilitate his further treatment with IV antibiotics as an outpatient      The patient's myasthenia, diabetes, and hypertension remained stable throughout his hospitalization      On the day of discharge the patient was feeling well  Vital signs were stable  Lungs were clear  Cardiac exam was normal   The abdomen was soft and nontender  He had no edema      Disposition:  The patient was discharged home with visiting nurses on September 18th  He was asked to continue a diabetic diet  His activity will be as tolerated  He was asked to follow up with Dr Maury Perez within 1 week    He will also be seen in the near future by Urology      D/C VS:  09/18/18 0727  97 7 °F (36 5 °C)  73  18  147/83  94 %  None (Room air)  Lying

## 2018-10-06 LAB
ANION GAP SERPL CALCULATED.3IONS-SCNC: 8 MMOL/L (ref 4–13)
BACTERIA UR CULT: ABNORMAL
BACTERIA UR CULT: ABNORMAL
BUN SERPL-MCNC: 15 MG/DL (ref 5–25)
CALCIUM SERPL-MCNC: 9.1 MG/DL (ref 8.3–10.1)
CHLORIDE SERPL-SCNC: 106 MMOL/L (ref 100–108)
CO2 SERPL-SCNC: 27 MMOL/L (ref 21–32)
CREAT SERPL-MCNC: 1.2 MG/DL (ref 0.6–1.3)
GFR SERPL CREATININE-BSD FRML MDRD: 60 ML/MIN/1.73SQ M
GLUCOSE SERPL-MCNC: 126 MG/DL (ref 65–140)
GLUCOSE SERPL-MCNC: 175 MG/DL (ref 65–140)
GLUCOSE SERPL-MCNC: 82 MG/DL (ref 65–140)
GLUCOSE SERPL-MCNC: 84 MG/DL (ref 65–140)
GLUCOSE SERPL-MCNC: 99 MG/DL (ref 65–140)
POTASSIUM SERPL-SCNC: 4.7 MMOL/L (ref 3.5–5.3)
SODIUM SERPL-SCNC: 141 MMOL/L (ref 136–145)

## 2018-10-06 PROCEDURE — 80048 BASIC METABOLIC PNL TOTAL CA: CPT | Performed by: FAMILY MEDICINE

## 2018-10-06 PROCEDURE — 99232 SBSQ HOSP IP/OBS MODERATE 35: CPT | Performed by: FAMILY MEDICINE

## 2018-10-06 PROCEDURE — 82948 REAGENT STRIP/BLOOD GLUCOSE: CPT

## 2018-10-06 RX ADMIN — HEPARIN SODIUM 5000 UNITS: 5000 INJECTION INTRAVENOUS; SUBCUTANEOUS at 22:39

## 2018-10-06 RX ADMIN — PREDNISONE 10 MG: 5 TABLET ORAL at 08:39

## 2018-10-06 RX ADMIN — INSULIN LISPRO 1 UNITS: 100 INJECTION, SOLUTION INTRAVENOUS; SUBCUTANEOUS at 17:26

## 2018-10-06 RX ADMIN — INSULIN GLARGINE 12 UNITS: 100 INJECTION, SOLUTION SUBCUTANEOUS at 22:42

## 2018-10-06 RX ADMIN — Medication 250 MG: at 17:26

## 2018-10-06 RX ADMIN — MECLIZINE HCL 12.5 MG: 12.5 TABLET ORAL at 08:39

## 2018-10-06 RX ADMIN — HEPARIN SODIUM 5000 UNITS: 5000 INJECTION INTRAVENOUS; SUBCUTANEOUS at 14:00

## 2018-10-06 RX ADMIN — FAMOTIDINE 20 MG: 20 TABLET ORAL at 08:39

## 2018-10-06 RX ADMIN — CEFEPIME HYDROCHLORIDE 2000 MG: 2 INJECTION, POWDER, FOR SOLUTION INTRAVENOUS at 14:00

## 2018-10-06 RX ADMIN — SODIUM CHLORIDE 75 ML/HR: 0.9 INJECTION, SOLUTION INTRAVENOUS at 19:56

## 2018-10-06 RX ADMIN — HEPARIN SODIUM 5000 UNITS: 5000 INJECTION INTRAVENOUS; SUBCUTANEOUS at 05:43

## 2018-10-06 RX ADMIN — SODIUM CHLORIDE 75 ML/HR: 0.9 INJECTION, SOLUTION INTRAVENOUS at 05:43

## 2018-10-06 RX ADMIN — Medication 250 MG: at 08:39

## 2018-10-06 RX ADMIN — CEFEPIME HYDROCHLORIDE 2000 MG: 2 INJECTION, POWDER, FOR SOLUTION INTRAVENOUS at 00:55

## 2018-10-06 RX ADMIN — Medication 100 MG: at 08:39

## 2018-10-06 NOTE — PHYSICIAN ADVISOR
Current patient class: Observation  The patient is currently on Hospital Day: 2 at [facility]        The patient was admitted to the hospital  on N/A at N/A for the following diagnosis:  UTI (urinary tract infection) [U00 4]  Complicated UTI (urinary tract infection) [N39 0]       There is documentation in the medical record of an expected length of stay of at least 2 midnights  The patient is therefore expected to satisfy the 2 midnight benchmark and given the 2 midnight presumption is appropriate for INPATIENT ADMISSION  Given this expectation of a satisfying stay, CMS instructs us that the patient is most often appropriate for inpatient admission under part A provided medical necessity is documented in the chart  After review of the relevant documentation, labs, vital signs and test results, the patient is appropriate for INPATIENT ADMISSION  Admission to the hospital as an inpatient is a complex decision making process which requires the practitioner to consider the patients presenting complaint, history and physical examination and all relevant testing  With this in mind, in this case, the patient was deemed appropriate for INPATIENT ADMISSION  After review of the documentation and testing available at the time of the admission I concur with this clinical determination of medical necessity  The patient does have an inpatient admission within the previous 30 days  The patient was admitted on 9/15/18 and discharged on 9/18/18 as an inpatient  The patient therefore required readmission review  In this case the patient should be considered a SEPARATE and UNRELATED INPATIENT ADMISSION  The patient had been discharged in stable condition with a completed care plan  There were no unresolved acute medical issues at the time of discharged which would have reasonably been expected to prompt this readmission  Rationale is as follows:     The patient is a 68 yrs   Male who presented to the ED at 10/4/2018 12:05 PM with a chief complaint of Possible UTI (pt recieved call from PCP who stated he has a UTI and needed to be evaluated, hx of uti )     The patient had a prior admission from 9/15-9/18 due to pseudomonas UTI  The patient was treated with IV abx and seen by ID and Urology on this admission and discharged in stable condition  The patient presented on this admission with recurrent UTI  The patient has SIRS criteria with possible sepsis  The plan of care includes IV abx, ID and Urology consultations  There is a question on CT scan as to whether or not there is a fistula connection between bowel and bladder  This patient is appropriate for INPATIENT admission; continued hospitalization is necessary to ensure stabilization of is clinical status  This admission is UNRELATED to his prior admission as he was treated and discharged in stable condition       The patients vitals on arrival were ED Triage Vitals [10/04/18 1202]   Temperature Pulse Respirations Blood Pressure SpO2   98 5 °F (36 9 °C) (!) 128 18 165/95 96 %      Temp Source Heart Rate Source Patient Position - Orthostatic VS BP Location FiO2 (%)   Temporal Monitor Sitting Left arm --      Pain Score       No Pain           Past Medical History:   Diagnosis Date    Anxiety     Cervical spinal stenosis     Hx of chronic ulcerative colitis     Hyperlipidemia     [facility]     Sleep apnea      Past Surgical History:   Procedure Laterality Date    COLECTOMY      partial sigmoid    COLECTOMY LAPAROSCOPIC      partial sigmoid colectomy    COLON SURGERY      lap  partial colectomy sigmoid    COLONOSCOPY      LITHOTRIPSY      OTHER SURGICAL HISTORY      parotid bx    SALIVARY GLAND SURGERY      biopsy           Consults have been placed to:   IP CONSULT TO INFECTIOUS DISEASES  IP CONSULT TO UROLOGY  IP CONSULT TO CASE MANAGEMENT    Vitals:    10/04/18 1519 10/04/18 2300 10/05/18 0751 10/05/18 1502   BP: 158/93 125/61 147/96 161/91   BP Location: Left arm Right arm Right arm Right arm   Pulse: 102 80 87 100   Resp: 19 17 18 19   Temp: 97 5 °F (36 4 °C) 99 3 °F (37 4 °C) 98 3 °F (36 8 °C) 97 5 °F (36 4 °C)   TempSrc: Temporal Temporal Temporal Temporal   SpO2: 96% 92% 96% 95%   Weight:           Most recent labs:    Recent Labs      10/05/18   0550   WBC  8 43   HGB  13 9   HCT  41 6   PLT  270   K  3 8   NA  142   CALCIUM  8 8   BUN  16   CREATININE  1 22   AST  29   ALT  49   ALKPHOS  36*       Scheduled Meds:  Current Facility-Administered Medications:  acetaminophen 650 mg Oral Q6H PRN Iris Hollis PA-C    cefepime 2,000 mg Intravenous Q12H Yojana Aldea DO Last Rate: 2,000 mg (10/05/18 1356)   famotidine 20 mg Oral Daily Iris Hollis, PA-C    heparin (porcine) 5,000 Units Subcutaneous Q8H [facility] Iris Hollis, PA-C    insulin glargine 12 Units Subcutaneous HS Iris Chatmanr, PA-C    insulin lispro 1-6 Units Subcutaneous TID AC Iris Chatmanr, PA-C    insulin lispro 1-6 Units Subcutaneous HS Iris Chatmanr, PA-C    meclizine 12 5 mg Oral Daily Iris Chatmanr, PA-C    ondansetron 4 mg Intravenous Q6H PRN Iris Chatmanr, PA-C    predniSONE 10 mg Oral Daily Iris Piger, PA-C    pyridoxine 100 mg Oral Daily Iris Chatmanr, PA-C    saccharomyces boulardii 250 mg Oral BID Iris Piger, PA-C    sodium chloride 75 mL/hr Intravenous Continuous Iris Piger, PA-C Last Rate: 75 mL/hr (10/05/18 0411)     Continuous Infusions:  sodium chloride 75 mL/hr Last Rate: 75 mL/hr (10/05/18 0411)     PRN Meds:   acetaminophen    ondansetron    Surgical procedures (if appropriate):

## 2018-10-06 NOTE — ASSESSMENT & PLAN NOTE
Lab Results   Component Value Date    HGBA1C 6 0 08/27/2018       Recent Labs      10/05/18   1116  10/05/18   1604  10/05/18   2117  10/06/18   0737   POCGLU  119  195*  89  84       Blood Sugar Average: Last 72 hrs:  (P) 124 [id number]   Continue Lantus 12 units subcutaneous at bedtime daily, along with sliding scale insulin, diabetic diet, and Accu-Cheks q i d  Bran Manifold

## 2018-10-06 NOTE — ASSESSMENT & PLAN NOTE
Chronic and stable  Creatinine is currently 1 20 with an EGFR of 60  Avoid nephrotoxins, hypotension, anemia, and NSAIDs

## 2018-10-06 NOTE — PLAN OF CARE
GENITOURINARY - ADULT     Urinary catheter remains patent Completed          DISCHARGE PLANNING     Discharge to home or other facility with appropriate resources Progressing        GENITOURINARY - ADULT     Maintains or returns to baseline urinary function Progressing     Absence of urinary retention Progressing        INFECTION - ADULT     Absence or prevention of progression during hospitalization Progressing     Absence of fever/infection during neutropenic period Progressing        Knowledge Deficit     Patient/family/caregiver demonstrates understanding of disease process, treatment plan, medications, and discharge instructions Progressing        PAIN - ADULT     Verbalizes/displays adequate comfort level or baseline comfort level Progressing        Potential for Falls     Patient will remain free of falls Progressing        SAFETY ADULT     Maintain or return to baseline ADL function Progressing     Maintain or return mobility status to optimal level Progressing     Patient will remain free of falls Progressing

## 2018-10-06 NOTE — PROGRESS NOTES
Progress Note Rosette Holden 1945, 68 y o  male MRN: [id number]    Unit/Bed#: [address] 219-01 Encounter: [id number]    Primary Care Provider: Nabil Donahue DO   Date and time admitted to hospital: 10/4/2018 12:05 PM        * Pseudomonas urinary tract infection   Assessment & Plan    Patient has repeated episodes of Pseudomonas urinary tract infections may be due to his multiple kidney stones seeding the Pseudomonas into the urinary urinary tract  Continue IV cefepime  Per Infectious Disease recommendations, continue patient on IV cefepime if urologic intervention to be done on this admission  Patient's repeated Pseudomonas UTIs may be due to colonization from multiple stones in the kidneys versus true infection  Urology is on evaluated patient-repeated CT of the abdomen and pelvis(renal stone study) without contrast and this showed bilateral nonobstructing renal calculi  Plan as per Urology is to have urologic intervention done this admission, so will continue him on IV cefepime until they state otherwise  Nephrolithiasis   Assessment & Plan    Will need possible ureteroscopy with stone extraction and laser lithotripsy  Benign non-nodular prostatic hyperplasia with lower urinary tract symptoms   Assessment & Plan    Per Urology recommendations will check postvoid residuals to determine if patient has urinary retention that could be causing his recurrent Pseudomonas infection  Myasthenia gravis ([facility] [address] )   Assessment & Plan    Continue prednisone and pyridoxine       Controlled type 2 diabetes mellitus without complication, with long-term current use of insulin [facility])   Assessment & Plan    Lab Results   Component Value Date    HGBA1C 6 0 08/27/2018       Recent Labs      10/05/18   1116  10/05/18   1604  10/05/18   2117  10/06/18   0737   POCGLU  119  195*  89  84       Blood Sugar Average: Last 72 hrs:  (P) 124 [id number]   Continue Lantus 12 units subcutaneous at bedtime daily, along with sliding scale insulin, diabetic diet, and Accu-Cheks q i d  Essential hypertension   Assessment & Plan    Blood pressure stable and under control  Currently not on any blood pressure medications at this time  CKD (chronic kidney disease), stage III (HCC)   Assessment & Plan    Chronic and stable  Creatinine is currently 1 20 with an EGFR of 60  Avoid nephrotoxins, hypotension, anemia, and NSAIDs  VTE Pharmacologic Prophylaxis:   Pharmacologic: Heparin  Mechanical VTE Prophylaxis in Place: No    Patient Centered Rounds: I have performed bedside rounds with nursing staff today  Discussions with Specialists or Other Care Team Provider:  Yes    Education and Discussions with Family / Patient:  Yes    Time Spent for Care: 20 minutes  More than 50% of total time spent on counseling and coordination of care as described above  Current Length of Stay: 1 day(s)    Current Patient Status: Inpatient   Certification Statement: The patient will continue to require additional inpatient hospital stay due to Treatment of Pseudomonas UTI requiring urologic intervention due to nephrolithiasis  Discharge Plan: To home once urologic evaluation completed  Code Status: Level 1 - Full Code      Subjective:   Patient has no complaints at this time, although he states he may have had some mild burning on urination  Objective:     Vitals:   Temp (24hrs), Av 7 °F (36 5 °C), Min:97 5 °F (36 4 °C), Max:97 8 °F (36 6 °C)    HR:  [] 79  Resp:  [18-19] 18  BP: (132-161)/(78-91) 143/89  SpO2:  [95 %] 95 %  Body mass index is 28 29 kg/m²  Input and Output Summary (last 24 hours): Intake/Output Summary (Last 24 hours) at 10/06/18 1001  Last data filed at 10/06/18 0900   Gross per 24 hour   Intake             1140 ml   Output              185 ml   Net              955 ml       Physical Exam:     Physical Exam   Constitutional: He is oriented to person, place, and time   He appears well-developed and well-nourished  No distress  HENT:   Head: Normocephalic  Eyes: Pupils are equal, round, and reactive to light  Cardiovascular: Normal rate, regular rhythm and normal heart sounds  Pulmonary/Chest: Effort normal and breath sounds normal  No respiratory distress  He has no wheezes  Abdominal: Soft  He exhibits no distension  There is no tenderness  Musculoskeletal: Normal range of motion  Neurological: He is alert and oriented to person, place, and time  Skin: He is not diaphoretic  Additional Data:     Labs:      Results from last 7 days  Lab Units 10/05/18  0550 10/04/18  1248   WBC Thousand/uL 8 43 10 78*   HEMOGLOBIN g/dL 13 9 15 7   HEMATOCRIT % 41 6 45 0   PLATELETS Thousands/uL 270 348   NEUTROS PCT %  --  80*   LYMPHS PCT %  --  13*   MONOS PCT %  --  5   EOS PCT %  --  0       Results from last 7 days  Lab Units 10/06/18  0513 10/05/18  0550   SODIUM mmol/L 141 142   POTASSIUM mmol/L 4 7 3 8   CHLORIDE mmol/L 106 107   CO2 mmol/L 27 26   BUN mg/dL 15 16   CREATININE mg/dL 1 20 1 22   ANION GAP mmol/L 8 9   CALCIUM mg/dL 9 1 8 8   ALBUMIN g/dL  --  3 1*   TOTAL BILIRUBIN mg/dL  --  0 58   ALK PHOS U/L  --  36*   ALT U/L  --  49   AST U/L  --  29           Results from last 7 days  Lab Units 10/06/18  0737 10/05/18  2117 10/05/18  1604 10/05/18  1116 10/05/18  0756 10/04/18  2110 10/04/18  1737   POC GLUCOSE mg/dl 84 89 195* 119 89 122 172*           Results from last 7 days  Lab Units 10/05/18  0550 10/04/18  1248   LACTIC ACID mmol/L  --  1 5   PROCALCITONIN ng/ml <0 05  --            * I Have Reviewed All Lab Data Listed Above  * Additional Pertinent Lab Tests Reviewed:  [address] Admission Reviewed    Imaging:    Imaging Reports Reviewed Today Include:  CT of the abdomen and pelvis (renal stone study) without contrast   Imaging Personally Reviewed by Myself Includes:  None    Recent Cultures (last 7 days):       Results from last 7 days  Lab Units 10/04/18  1406 10/04/18  1349 10/04/18  1252 10/01/18  0746   BLOOD CULTURE  No Growth at 24 hrs   --  No Growth at 24 hrs   --    URINE CULTURE   --  40,000-49,000 cfu/ml Pseudomonas aeruginosa*  <10,000 cfu/ml   --  >100,000 cfu/ml Pseudomonas aeruginosa*       Last 24 Hours Medication List:     Current Facility-Administered Medications:  acetaminophen 650 mg Oral Q6H PRN Iris Piger, PA-C    cefepime 2,000 mg Intravenous Q12H Yojana Aldea, DO Last Rate: 2,000 mg (10/06/18 0055)   famotidine 20 mg Oral Daily Iris Piger, PA-C    heparin (porcine) 5,000 Units Subcutaneous Q8H [address] Iris Piger, PA-C    insulin glargine 12 Units Subcutaneous HS Iris Piger, PA-C    insulin lispro 1-6 Units Subcutaneous TID AC Iris Piger, PA-C    insulin lispro 1-6 Units Subcutaneous HS Iris Piger, PA-C    meclizine 12 5 mg Oral Daily Iris Piger, PA-C    ondansetron 4 mg Intravenous Q6H PRN Iris Piger, PA-C    predniSONE 10 mg Oral Daily Iris Piger, PA-C    pyridoxine 100 mg Oral Daily Iris Piger, PA-C    saccharomyces boulardii 250 mg Oral BID Iris Piger, PA-C    sodium chloride 75 mL/hr Intravenous Continuous Iris Piger, PA-C Last Rate: 75 mL/hr (10/06/18 0543)        Today, Patient Was Seen By: Marcela Henderson MD    ** Please Note: Dictation voice to text software may have been used in the creation of this document   **

## 2018-10-06 NOTE — ASSESSMENT & PLAN NOTE
Patient has repeated episodes of Pseudomonas urinary tract infections may be due to his multiple kidney stones seeding the Pseudomonas into the urinary urinary tract  Continue IV cefepime  Per Infectious Disease recommendations, continue patient on IV cefepime if urologic intervention to be done on this admission  Patient's repeated Pseudomonas UTIs may be due to colonization from multiple stones in the kidneys versus true infection  Urology is on evaluated patient-repeated CT of the abdomen and pelvis(renal stone study) without contrast and this showed bilateral nonobstructing renal calculi  Plan as per Urology is to have urologic intervention done this admission, so will continue him on IV cefepime until they state otherwise

## 2018-10-07 VITALS
OXYGEN SATURATION: 95 % | WEIGHT: 186.07 LBS | TEMPERATURE: 97.3 F | HEART RATE: 73 BPM | SYSTOLIC BLOOD PRESSURE: 153 MMHG | RESPIRATION RATE: 18 BRPM | BODY MASS INDEX: 28.29 KG/M2 | DIASTOLIC BLOOD PRESSURE: 67 MMHG

## 2018-10-07 LAB
ANION GAP SERPL CALCULATED.3IONS-SCNC: 4 MMOL/L (ref 4–13)
BASOPHILS # BLD AUTO: 0.1 THOUSANDS/ΜL (ref 0–0.1)
BASOPHILS NFR BLD AUTO: 1 % (ref 0–1)
BUN SERPL-MCNC: 16 MG/DL (ref 5–25)
CALCIUM SERPL-MCNC: 9.4 MG/DL (ref 8.3–10.1)
CHLORIDE SERPL-SCNC: 108 MMOL/L (ref 100–108)
CO2 SERPL-SCNC: 28 MMOL/L (ref 21–32)
CREAT SERPL-MCNC: 1.29 MG/DL (ref 0.6–1.3)
EOSINOPHIL # BLD AUTO: 0.18 THOUSAND/ΜL (ref 0–0.61)
EOSINOPHIL NFR BLD AUTO: 2 % (ref 0–6)
ERYTHROCYTE [DISTWIDTH] IN BLOOD BY AUTOMATED COUNT: 12.9 % (ref 11.6–15.1)
GFR SERPL CREATININE-BSD FRML MDRD: 55 ML/MIN/1.73SQ M
GLUCOSE SERPL-MCNC: 122 MG/DL (ref 65–140)
GLUCOSE SERPL-MCNC: 177 MG/DL (ref 65–140)
GLUCOSE SERPL-MCNC: 70 MG/DL (ref 65–140)
GLUCOSE SERPL-MCNC: 85 MG/DL (ref 65–140)
HCT VFR BLD AUTO: 40.5 % (ref 36.5–49.3)
HGB BLD-MCNC: 13.7 G/DL (ref 12–17)
IMM GRANULOCYTES # BLD AUTO: 0.05 THOUSAND/UL (ref 0–0.2)
IMM GRANULOCYTES NFR BLD AUTO: 1 % (ref 0–2)
LYMPHOCYTES # BLD AUTO: 2.52 THOUSANDS/ΜL (ref 0.6–4.47)
LYMPHOCYTES NFR BLD AUTO: 32 % (ref 14–44)
MCH RBC QN AUTO: 31.4 PG (ref 26.8–34.3)
MCHC RBC AUTO-ENTMCNC: 33.8 G/DL (ref 31.4–37.4)
MCV RBC AUTO: 93 FL (ref 82–98)
MONOCYTES # BLD AUTO: 0.84 THOUSAND/ΜL (ref 0.17–1.22)
MONOCYTES NFR BLD AUTO: 11 % (ref 4–12)
NEUTROPHILS # BLD AUTO: 4.14 THOUSANDS/ΜL (ref 1.85–7.62)
NEUTS SEG NFR BLD AUTO: 53 % (ref 43–75)
NRBC BLD AUTO-RTO: 0 /100 WBCS
PLATELET # BLD AUTO: 266 THOUSANDS/UL (ref 149–390)
PMV BLD AUTO: 9.4 FL (ref 8.9–12.7)
POTASSIUM SERPL-SCNC: 4.5 MMOL/L (ref 3.5–5.3)
RBC # BLD AUTO: 4.37 MILLION/UL (ref 3.88–5.62)
SODIUM SERPL-SCNC: 140 MMOL/L (ref 136–145)
WBC # BLD AUTO: 7.83 THOUSAND/UL (ref 4.31–10.16)

## 2018-10-07 PROCEDURE — 82948 REAGENT STRIP/BLOOD GLUCOSE: CPT

## 2018-10-07 PROCEDURE — 99238 HOSP IP/OBS DSCHRG MGMT 30/<: CPT | Performed by: FAMILY MEDICINE

## 2018-10-07 PROCEDURE — 80048 BASIC METABOLIC PNL TOTAL CA: CPT | Performed by: FAMILY MEDICINE

## 2018-10-07 PROCEDURE — 85025 COMPLETE CBC W/AUTO DIFF WBC: CPT | Performed by: FAMILY MEDICINE

## 2018-10-07 RX ADMIN — MECLIZINE HCL 12.5 MG: 12.5 TABLET ORAL at 08:33

## 2018-10-07 RX ADMIN — CEFEPIME HYDROCHLORIDE 2000 MG: 2 INJECTION, POWDER, FOR SOLUTION INTRAVENOUS at 01:27

## 2018-10-07 RX ADMIN — FAMOTIDINE 20 MG: 20 TABLET ORAL at 08:33

## 2018-10-07 RX ADMIN — PREDNISONE 10 MG: 5 TABLET ORAL at 08:33

## 2018-10-07 RX ADMIN — SODIUM CHLORIDE 75 ML/HR: 0.9 INJECTION, SOLUTION INTRAVENOUS at 10:05

## 2018-10-07 RX ADMIN — HEPARIN SODIUM 5000 UNITS: 5000 INJECTION INTRAVENOUS; SUBCUTANEOUS at 05:32

## 2018-10-07 RX ADMIN — Medication 100 MG: at 08:33

## 2018-10-07 RX ADMIN — Medication 250 MG: at 08:33

## 2018-10-07 NOTE — ASSESSMENT & PLAN NOTE
Will need possible ureteroscopy with stone extraction and laser lithotripsy done by Urology as an outpatient

## 2018-10-07 NOTE — ASSESSMENT & PLAN NOTE
Chronic and stable  Creatinine is currently 1 29 with an EGFR of 55  Avoid nephrotoxins, hypotension, anemia, and NSAIDs

## 2018-10-07 NOTE — ASSESSMENT & PLAN NOTE
Lab Results   Component Value Date    HGBA1C 6 0 08/27/2018       Recent Labs      10/06/18   1543  10/06/18   2112  10/07/18   0814  10/07/18   1120   POCGLU  175*  99  70  177*       Blood Sugar Average: Last 72 hrs:  (P) 111 [id number]   Continue Lantus 12 units subcutaneous at bedtime daily, along with sliding scale insulin, diabetic diet, and Accu-Cheks q i d  Aleah Blend

## 2018-10-07 NOTE — ASSESSMENT & PLAN NOTE
Possibly contributing to his recurrent Pseudomonas infections in addition to his bilateral nephrolithiasis

## 2018-10-07 NOTE — PLAN OF CARE
DISCHARGE PLANNING     Discharge to home or other facility with appropriate resources Adequate for Discharge        GENITOURINARY - ADULT     Maintains or returns to baseline urinary function Adequate for Discharge     Absence of urinary retention Adequate for Discharge        INFECTION - ADULT     Absence or prevention of progression during hospitalization Adequate for Discharge     Absence of fever/infection during neutropenic period Adequate for Discharge        Knowledge Deficit     Patient/family/caregiver demonstrates understanding of disease process, treatment plan, medications, and discharge instructions Adequate for Discharge        PAIN - ADULT     Verbalizes/displays adequate comfort level or baseline comfort level Adequate for Discharge        Potential for Falls     Patient will remain free of falls Adequate for Discharge        SAFETY ADULT     Maintain or return to baseline ADL function Adequate for Discharge     Maintain or return mobility status to optimal level Adequate for Discharge     Patient will remain free of falls Adequate for Discharge

## 2018-10-07 NOTE — DISCHARGE SUMMARY
Discharge- Magen Corolla 1945, 68 y o  male MRN: [id number]    Unit/Bed#: [address] 219-01 Encounter: [id number]    Primary Care Provider: Leny Asencio DO   Date and time admitted to hospital: 10/4/2018 12:05 PM        * Pseudomonas urinary tract infection   Assessment & Plan    Patient has repeated episodes of Pseudomonas urinary tract infections may be due to his multiple kidney stones seeding the Pseudomonas into the urinary urinary tract  Continue IV cefepime  Per Infectious Disease recommendations, continue patient on IV cefepime if urologic intervention to be done on this admission  Patient's repeated Pseudomonas UTIs may be due to colonization from multiple stones in the kidneys versus true infection  Urology has seen and evaluated patient-recommended repeat CT of the abdomen and pelvis(renal stone study) without contrast and this showed bilateral nonobstructing renal calculi  Urology has no plans for any urologic intervention this admission  Patient is stable for discharge to home today and to follow up with Urology as an outpatient  Nephrolithiasis   Assessment & Plan    Will need possible ureteroscopy with stone extraction and laser lithotripsy done by Urology as an outpatient  Benign non-nodular prostatic hyperplasia with lower urinary tract symptoms   Assessment & Plan    Possibly contributing to his recurrent Pseudomonas infections in addition to his bilateral nephrolithiasis  Myasthenia gravis ([address] )   Assessment & Plan    Continue prednisone and pyridoxine       Controlled type 2 diabetes mellitus without complication, with long-term current use of insulin [facility])   Assessment & Plan    Lab Results   Component Value Date    HGBA1C 6 0 08/27/2018       Recent Labs      10/06/18   1543  10/06/18   2112  10/07/18   0814  10/07/18   1120   POCGLU  175*  99  70  177*       Blood Sugar Average: Last 72 hrs:  (P) 162 [id number]   Continue Lantus 12 units subcutaneous at bedtime daily, along with sliding scale insulin, diabetic diet, and Accu-Cheks q i d  Essential hypertension   Assessment & Plan    Blood pressure stable and under control  Currently not on any blood pressure medications at this time  CKD (chronic kidney disease), stage III (HCC)   Assessment & Plan    Chronic and stable  Creatinine is currently 1 29 with an EGFR of 55  Avoid nephrotoxins, hypotension, anemia, and NSAIDs  Discharging Physician / Practitioner: Paul Agarwal MD  PCP: Royce Serrano DO  Admission Date:   Admission Orders     Ordered        10/05/18 2109  Inpatient Admission  Once         10/04/18 1419  Place in Observation (expected length of stay for this patient is less than two midnights)  Once             Discharge Date: 10/07/18    Resolved Problems  Date Reviewed: 10/7/2018    None          Consultations During Hospital Stay:  · Urology, Infectious Diseases  Procedures Performed:   · CT renal stone study of abdomen and pelvis 10/05/2018  Significant Findings / Test Results:     · CT renal stone study of abdomen and pelvis 10/5-bilateral nonobstructing renal calculi are unchanged since 09/15/2018  Incidental Findings:   · None     Test Results Pending at Discharge (will require follow up): · None     Outpatient Tests Requested:  · None    Complications:  None    Reason for Admission:  Recurrent Pseudomonas urinary tract infection  Hospital Course:     Kelly Yanes is a 68 y o  male patient who originally presented to the hospital on 10/4/2018 due to recalcitrant Pseudomonas infection of urine  He was initially diagnosis September 15, 2018 with Pseudomonas UTI and placed on a 7 day course of IV cefepime which she completed as an outpatient with a PICC line  Repeat testing of his urine on October 1, 2018 showed that his urine culture was still growing Pseudomonas  At that time he was instructed by his PCP to come back to the ED for treatment    The patient has been asymptomatic throughout this period  He has had multiple occurrences of Pseudomonas in his urine throughout this year and each time he has been treated with IV cefepime  He cannot tolerate fluoroquinolones due to his myasthenia gravis  On this admission he has been maintained on IV cefepime  Infectious Disease consultation was obtained and after evaluation they did not feel that the patient's Pseudomonas UTI was a true infection rather colonization due to his multiple bilateral nonobstructing renal calculi  They recommended discontinuing the antibiotic if he was not going to undergo any urologic intervention during this admission  Urology saw and evaluated the patient and recommended a repeat CT renal stone study after the one he had previously in September of 2018  The repeat study showed no changes with the multiple bilateral nonobstructing renal calculi  They recommended that there will be no urologic intervention done this admission  The patient was taken off intravenous antibiotics and is hemodynamically stable for discharge to home today to follow up with Urology as an outpatient  Please see above list of diagnoses and related plan for additional information  Condition at Discharge: stable     Discharge Day Visit / Exam:     Subjective:  Patient has no complaints at this time  Vitals: Blood Pressure: 153/67 (10/07/18 0800)  Pulse: 73 (10/07/18 0800)  Temperature: (!) 97 3 °F (36 3 °C) (10/07/18 0800)  Temp Source: Temporal (10/07/18 0800)  Respirations: 18 (10/07/18 0800)  Weight - Scale: 84 4 kg (186 lb 1 1 oz) (10/04/18 1202)  SpO2: 95 % (10/07/18 0800)  Exam:   Physical Exam   Constitutional: He is oriented to person, place, and time  He appears well-developed and well-nourished  No distress  HENT:   Head: Normocephalic and atraumatic  Eyes: Pupils are equal, round, and reactive to light  EOM are normal    Neck: Normal range of motion  Neck supple     Cardiovascular: Normal rate, regular rhythm and normal heart sounds  Pulmonary/Chest: Effort normal and breath sounds normal    Abdominal: Soft  Bowel sounds are normal  He exhibits no distension  There is no tenderness  Genitourinary:   Genitourinary Comments: No bilateral CVA tenderness  Musculoskeletal: Normal range of motion  He exhibits no edema or tenderness  Neurological: He is alert and oriented to person, place, and time  Skin: Skin is warm and dry  He is not diaphoretic  Discussion with Family:  No    Discharge instructions/Information to patient and family:   See after visit summary for information provided to patient and family  Provisions for Follow-Up Care:  See after visit summary for information related to follow-up care and any pertinent home health orders  Disposition:     Home    For Discharges to [facility] SNF:   · Not Applicable to this Patient - Not Applicable to this Patient    Planned Readmission:  None     Discharge Statement:  I spent 25 minutes discharging the patient  This time was spent on the day of discharge  I had direct contact with the patient on the day of discharge  Greater than 50% of the total time was spent examining patient, answering all patient questions, arranging and discussing plan of care with patient as well as directly providing post-discharge instructions  Additional time then spent on discharge activities  Discharge Medications:  See after visit summary for reconciled discharge medications provided to patient and family        ** Please Note: This note has been constructed using a voice recognition system **

## 2018-10-07 NOTE — PLAN OF CARE
DISCHARGE PLANNING     Discharge to home or other facility with appropriate resources Progressing        GENITOURINARY - ADULT     Maintains or returns to baseline urinary function Progressing     Absence of urinary retention Progressing        INFECTION - ADULT     Absence or prevention of progression during hospitalization Progressing     Absence of fever/infection during neutropenic period Progressing        Knowledge Deficit     Patient/family/caregiver demonstrates understanding of disease process, treatment plan, medications, and discharge instructions Progressing        PAIN - ADULT     Verbalizes/displays adequate comfort level or baseline comfort level Progressing        Potential for Falls     Patient will remain free of falls Progressing        SAFETY ADULT     Maintain or return to baseline ADL function Progressing     Maintain or return mobility status to optimal level Progressing     Patient will remain free of falls Progressing

## 2018-10-07 NOTE — ASSESSMENT & PLAN NOTE
Patient has repeated episodes of Pseudomonas urinary tract infections may be due to his multiple kidney stones seeding the Pseudomonas into the urinary urinary tract  Continue IV cefepime  Per Infectious Disease recommendations, continue patient on IV cefepime if urologic intervention to be done on this admission  Patient's repeated Pseudomonas UTIs may be due to colonization from multiple stones in the kidneys versus true infection  Urology has seen and evaluated patient-recommended repeat CT of the abdomen and pelvis(renal stone study) without contrast and this showed bilateral nonobstructing renal calculi  Urology has no plans for any urologic intervention this admission  Patient is stable for discharge to home today and to follow up with Urology as an outpatient

## 2018-10-08 ENCOUNTER — OFFICE VISIT (OUTPATIENT)
Dept: UROLOGY | Facility: CLINIC | Age: 73
End: 2018-10-08
Payer: MEDICARE

## 2018-10-08 VITALS
WEIGHT: 188 LBS | SYSTOLIC BLOOD PRESSURE: 130 MMHG | BODY MASS INDEX: 27.85 KG/M2 | DIASTOLIC BLOOD PRESSURE: 80 MMHG | HEIGHT: 69 IN

## 2018-10-08 DIAGNOSIS — N20.0 CALCULUS OF KIDNEY: Primary | ICD-10-CM

## 2018-10-08 PROCEDURE — 99214 OFFICE O/P EST MOD 30 MIN: CPT | Performed by: UROLOGY

## 2018-10-08 RX ORDER — LISINOPRIL 2.5 MG/1
2.5 TABLET ORAL DAILY
COMMUNITY
Start: 2018-09-24 | End: 2019-09-18 | Stop reason: SDUPTHER

## 2018-10-08 RX ORDER — SODIUM CHLORIDE 9 MG/ML
125 INJECTION, SOLUTION INTRAVENOUS CONTINUOUS
Status: CANCELLED | OUTPATIENT
Start: 2018-10-08

## 2018-10-09 LAB
BACTERIA BLD CULT: NORMAL
BACTERIA BLD CULT: NORMAL

## 2018-10-10 ENCOUNTER — TELEPHONE (OUTPATIENT)
Dept: FAMILY MEDICINE CLINIC | Facility: CLINIC | Age: 73
End: 2018-10-10

## 2018-10-10 NOTE — TELEPHONE ENCOUNTER
Patient canceled hospital f/u appt on the voicemail  He stated he didn't need the appt and doesn't see a reason to keep it  Per Dr Hilda Jeronimo to f/u with Urology for UTI

## 2018-10-15 RX ORDER — CEFEPIME HYDROCHLORIDE 1 G/1
INJECTION, POWDER, FOR SOLUTION INTRAMUSCULAR; INTRAVENOUS
COMMUNITY
Start: 2018-09-18 | End: 2018-11-03 | Stop reason: ALTCHOICE

## 2018-10-16 ENCOUNTER — OFFICE VISIT (OUTPATIENT)
Dept: FAMILY MEDICINE CLINIC | Facility: CLINIC | Age: 73
End: 2018-10-16
Payer: MEDICARE

## 2018-10-16 VITALS
SYSTOLIC BLOOD PRESSURE: 148 MMHG | HEIGHT: 69 IN | HEART RATE: 82 BPM | DIASTOLIC BLOOD PRESSURE: 88 MMHG | BODY MASS INDEX: 27.81 KG/M2 | TEMPERATURE: 98.1 F | OXYGEN SATURATION: 98 % | WEIGHT: 187.8 LBS

## 2018-10-16 DIAGNOSIS — Z87.440 HISTORY OF RECURRENT UTIS: ICD-10-CM

## 2018-10-16 DIAGNOSIS — R53.83 FATIGUE, UNSPECIFIED TYPE: ICD-10-CM

## 2018-10-16 DIAGNOSIS — N20.0 NEPHROLITHIASIS: Primary | ICD-10-CM

## 2018-10-16 DIAGNOSIS — Z79.4 CONTROLLED TYPE 2 DIABETES MELLITUS WITHOUT COMPLICATION, WITH LONG-TERM CURRENT USE OF INSULIN (HCC): ICD-10-CM

## 2018-10-16 DIAGNOSIS — E11.9 CONTROLLED TYPE 2 DIABETES MELLITUS WITHOUT COMPLICATION, WITH LONG-TERM CURRENT USE OF INSULIN (HCC): ICD-10-CM

## 2018-10-16 PROBLEM — N17.9 AKI (ACUTE KIDNEY INJURY) (HCC): Status: RESOLVED | Noted: 2017-01-10 | Resolved: 2018-10-16

## 2018-10-16 PROBLEM — N39.0 UTI (URINARY TRACT INFECTION): Status: RESOLVED | Noted: 2018-10-04 | Resolved: 2018-10-16

## 2018-10-16 LAB
BACTERIA UR QL AUTO: ABNORMAL /HPF
BILIRUB UR QL STRIP: NEGATIVE
CLARITY UR: CLEAR
COLOR UR: YELLOW
GLUCOSE UR STRIP-MCNC: NEGATIVE MG/DL
HGB UR QL STRIP.AUTO: NEGATIVE
HYALINE CASTS #/AREA URNS LPF: ABNORMAL /LPF
KETONES UR STRIP-MCNC: NEGATIVE MG/DL
LEUKOCYTE ESTERASE UR QL STRIP: ABNORMAL
NITRITE UR QL STRIP: NEGATIVE
NON-SQ EPI CELLS URNS QL MICRO: ABNORMAL /HPF
PH UR STRIP.AUTO: 5.5 [PH] (ref 4.5–8)
PROT UR STRIP-MCNC: NEGATIVE MG/DL
RBC #/AREA URNS AUTO: ABNORMAL /HPF
SL AMB  POCT GLUCOSE, UA: ABNORMAL
SL AMB LEUKOCYTE ESTERASE,UA: ABNORMAL
SL AMB POCT BILIRUBIN,UA: ABNORMAL
SL AMB POCT BLOOD,UA: ABNORMAL
SL AMB POCT CLARITY,UA: CLEAR
SL AMB POCT COLOR,UA: ABNORMAL
SL AMB POCT KETONES,UA: 0.5
SL AMB POCT NITRITE,UA: ABNORMAL
SL AMB POCT PH,UA: 5
SL AMB POCT SPECIFIC GRAVITY,UA: 1.01
SL AMB POCT URINE PROTEIN: ABNORMAL
SL AMB POCT UROBILINOGEN: ABNORMAL
SP GR UR STRIP.AUTO: 1.01 (ref 1–1.03)
UROBILINOGEN UR QL STRIP.AUTO: 1 E.U./DL
WBC #/AREA URNS AUTO: ABNORMAL /HPF

## 2018-10-16 PROCEDURE — 99214 OFFICE O/P EST MOD 30 MIN: CPT | Performed by: FAMILY MEDICINE

## 2018-10-16 PROCEDURE — 87086 URINE CULTURE/COLONY COUNT: CPT | Performed by: FAMILY MEDICINE

## 2018-10-16 PROCEDURE — 81001 URINALYSIS AUTO W/SCOPE: CPT | Performed by: FAMILY MEDICINE

## 2018-10-16 PROCEDURE — 81002 URINALYSIS NONAUTO W/O SCOPE: CPT | Performed by: FAMILY MEDICINE

## 2018-10-16 RX ORDER — CEPHALEXIN 250 MG/1
250 CAPSULE ORAL 2 TIMES DAILY
COMMUNITY
Start: 2018-10-15 | End: 2018-10-16

## 2018-10-16 NOTE — PROGRESS NOTES
Assessment/Plan:         Diagnoses and all orders for this visit:    Nephrolithiasis  -     UA w Reflex to Microscopic w Reflex to Culture - Clinic Collect  -     Urine Microscopic  -     Urine culture; Future  -     Urine culture    History of recurrent UTIs  -     POCT urine dip  -     UA w Reflex to Microscopic w Reflex to Culture - Clinic Collect  -     Urine Microscopic  -     Urine culture; Future  -     Urine culture    Controlled type 2 diabetes mellitus without complication, with long-term current use of insulin ([facility])    Fatigue, unspecified type  -     UA w Reflex to Microscopic w Reflex to Culture - Clinic Collect  -     Urine Microscopic  -     Urine culture; Future  -     Urine culture    Other orders  -     cefepime (MAXIPIME) 1000 mg injection;   -     Discontinue: cephalexin (KEFLEX) 250 mg capsule; Take 250 mg by mouth 2 (two) times a day For 7 days        "other orders" are NOT orders, just reconciling at rooming      Subjective:   Chief Complaint   Patient presents with    Follow-up     from hospital, urine culture        Patient ID: Romeo Humphreys is a 68 y o  male      Pt had walked into office yesterday to ask questions about his care, so was scheduled for this appt today  Was recently admitted again for +pseudomonas culture of urine with tachycardia, seen by ID and advised that more likely to be colonization rather than another UTI and would keep on IV abx if lithotripsy was to be done during that admission, but urol decided to wait to do lithotripsy, so abx were d/c'd  Has lithotripsy scheduled 11/2 with dr Meaghan Cheney he's up every hour to urinate  No fever, chills or sweats, admits a little burning at first with urination, but then subsides, also ongoing intermittent feeling of "spasms" when he urinates  Other stressors- his wife having cardiac cath done in 2 days  Blood sugars good- fingersticks 100-102 fasting, at times 200 postprandial per pt  C/o "Very tired, falling asleep all the time as soon as sit down," so started taking "left-over" keflex          The following portions of the patient's history were reviewed and updated as appropriate: allergies, current medications, past family history, past medical history, past social history, past surgical history and problem list     Review of Systems   Constitutional: Positive for fatigue  Negative for appetite change, chills, diaphoresis, fever and unexpected weight change  Respiratory: Negative  Cardiovascular: Negative  Gastrointestinal: Negative  Endocrine: Negative  Genitourinary: Negative for decreased urine volume, difficulty urinating, flank pain and hematuria  Per hpi   Skin: Negative  Hematological: Negative  Objective:      /88 (BP Location: Left arm, Patient Position: Sitting, Cuff Size: Standard)   Pulse 82   Temp 98 1 °F (36 7 °C) (Tympanic)   Ht 5' 9" (1 753 m)   Wt 85 2 kg (187 lb 12 8 oz)   SpO2 98%   BMI 27 73 kg/m²          Physical Exam   Constitutional: He appears well-developed  He is cooperative  Non-toxic appearance  He does not have a sickly appearance  He does not appear ill  No distress  HENT:   Head: Normocephalic and atraumatic  Mouth/Throat: Uvula is midline, oropharynx is clear and moist and mucous membranes are normal    Eyes: Pupils are equal, round, and reactive to light  Conjunctivae and lids are normal    Neck: Trachea normal  Neck supple  Cardiovascular: Normal rate, regular rhythm and normal heart sounds  Pulmonary/Chest: Effort normal and breath sounds normal    Abdominal: Soft  Bowel sounds are normal  He exhibits no mass  There is no hepatosplenomegaly  There is no tenderness  There is no CVA tenderness  Lymphadenopathy:     He has no cervical adenopathy  Right: No supraclavicular adenopathy present  Left: No supraclavicular adenopathy present  Neurological: He is alert  Skin: Skin is warm and dry  He is not diaphoretic   No cyanosis  No pallor  Psychiatric: His speech is normal and behavior is normal  Judgment normal  His mood appears anxious  His affect is not angry, not labile and not inappropriate  He does not exhibit a depressed mood  Nursing note and vitals reviewed

## 2018-10-17 LAB — BACTERIA UR CULT: NORMAL

## 2018-10-19 PROBLEM — N20.0 CALCULUS OF KIDNEY: Status: ACTIVE | Noted: 2018-10-19

## 2018-10-23 ENCOUNTER — APPOINTMENT (OUTPATIENT)
Dept: LAB | Facility: CLINIC | Age: 73
End: 2018-10-23
Payer: MEDICARE

## 2018-10-23 ENCOUNTER — TELEPHONE (OUTPATIENT)
Dept: FAMILY MEDICINE CLINIC | Facility: CLINIC | Age: 73
End: 2018-10-23

## 2018-10-23 DIAGNOSIS — N20.0 KIDNEY STONE: Primary | ICD-10-CM

## 2018-10-23 PROCEDURE — 82360 CALCULUS ASSAY QUANT: CPT | Performed by: FAMILY MEDICINE

## 2018-10-23 NOTE — TELEPHONE ENCOUNTER
Called Dr Valentin Flores office [phone] spoke with Beverly Snell she spoke with Mildred Strong his nurse and they said yes to put in and order to have the stone checked

## 2018-10-23 NOTE — TELEPHONE ENCOUNTER
Please call Dr Roger Sewell office and ask if this would be necessary, given that they will be performing lithotripsy on the larger stone

## 2018-10-25 ENCOUNTER — ANESTHESIA EVENT (OUTPATIENT)
Dept: PERIOP | Facility: HOSPITAL | Age: 73
DRG: 551 | End: 2018-10-25
Payer: MEDICARE

## 2018-10-29 ENCOUNTER — HOSPITAL ENCOUNTER (OUTPATIENT)
Dept: NON INVASIVE DIAGNOSTICS | Facility: HOSPITAL | Age: 73
Discharge: HOME | End: 2018-10-29
Attending: UROLOGY
Payer: MEDICARE

## 2018-10-29 DIAGNOSIS — N20.0 KIDNEY STONE: ICD-10-CM

## 2018-10-29 PROCEDURE — 93005 ELECTROCARDIOGRAM TRACING: CPT | Performed by: UROLOGY

## 2018-10-30 LAB
ATRIAL RATE: 102 BPM
P AXIS: 62 DEGREES
PR INTERVAL: 140 MS
QRS AXIS: 37 DEGREES
QRSD INTERVAL: 112 MS
QT INTERVAL: 360 MS
QTC INTERVAL: 469 MS
T WAVE AXIS: 27 DEGREES
VENTRICULAR RATE: 102 BPM

## 2018-10-30 PROCEDURE — 93010 ELECTROCARDIOGRAM REPORT: CPT | Performed by: UROLOGY

## 2018-11-01 ENCOUNTER — TRANSCRIBE ORDERS (OUTPATIENT)
Dept: LAB | Facility: CLINIC | Age: 73
End: 2018-11-01

## 2018-11-01 ENCOUNTER — TELEPHONE (OUTPATIENT)
Dept: FAMILY MEDICINE CLINIC | Facility: CLINIC | Age: 73
End: 2018-11-01

## 2018-11-01 LAB
CA PHOS MFR STONE: 3 %
COLOR STONE: NORMAL
COM MFR STONE: 67 %
COMMENT-STONE3: NORMAL
COMPOSITION: NORMAL
LABORATORY COMMENT REPORT: NORMAL
NA URATE MFR STONE IR: 30 %
NIDUS STONE QL: NORMAL
PHOTO: NORMAL
SIZE STONE: NORMAL MM
STONE ANALYSIS-IMP: NORMAL
STONE ANALYSIS-IMP: NORMAL
WT STONE: 12.3 MG

## 2018-11-01 NOTE — TELEPHONE ENCOUNTER
Specimen that was sent out on 10/23 to [facility]  Pending possible results to be back on 11/4/2018

## 2018-11-02 ENCOUNTER — ANESTHESIA (OUTPATIENT)
Dept: PERIOP | Facility: HOSPITAL | Age: 73
DRG: 551 | End: 2018-11-02
Payer: MEDICARE

## 2018-11-02 ENCOUNTER — APPOINTMENT (OUTPATIENT)
Dept: RADIOLOGY | Facility: HOSPITAL | Age: 73
DRG: 551 | End: 2018-11-02
Attending: UROLOGY
Payer: MEDICARE

## 2018-11-02 PROCEDURE — 74018 RADEX ABDOMEN 1 VIEW: CPT

## 2018-11-02 RX ORDER — EPHEDRINE SULFATE 50 MG/ML
INJECTION, SOLUTION INTRAVENOUS AS NEEDED
Status: DISCONTINUED | OUTPATIENT
Start: 2018-11-02 | End: 2018-11-02 | Stop reason: SURG

## 2018-11-02 RX ORDER — PROPOFOL 10 MG/ML
INJECTION, EMULSION INTRAVENOUS AS NEEDED
Status: DISCONTINUED | OUTPATIENT
Start: 2018-11-02 | End: 2018-11-02 | Stop reason: SURG

## 2018-11-02 RX ORDER — FENTANYL CITRATE 50 UG/ML
INJECTION, SOLUTION INTRAMUSCULAR; INTRAVENOUS AS NEEDED
Status: DISCONTINUED | OUTPATIENT
Start: 2018-11-02 | End: 2018-11-02 | Stop reason: SURG

## 2018-11-02 RX ORDER — ONDANSETRON 2 MG/ML
INJECTION INTRAMUSCULAR; INTRAVENOUS AS NEEDED
Status: DISCONTINUED | OUTPATIENT
Start: 2018-11-02 | End: 2018-11-02 | Stop reason: SURG

## 2018-11-02 RX ORDER — LIDOCAINE HYDROCHLORIDE 10 MG/ML
INJECTION, SOLUTION INFILTRATION; PERINEURAL AS NEEDED
Status: DISCONTINUED | OUTPATIENT
Start: 2018-11-02 | End: 2018-11-02 | Stop reason: SURG

## 2018-11-02 RX ADMIN — FENTANYL CITRATE 25 MCG: 50 INJECTION, SOLUTION INTRAMUSCULAR; INTRAVENOUS at 12:37

## 2018-11-02 RX ADMIN — EPHEDRINE SULFATE 10 MG: 50 INJECTION, SOLUTION INTRAMUSCULAR; INTRAVENOUS; SUBCUTANEOUS at 12:47

## 2018-11-02 RX ADMIN — FENTANYL CITRATE 25 MCG: 50 INJECTION, SOLUTION INTRAMUSCULAR; INTRAVENOUS at 12:28

## 2018-11-02 RX ADMIN — HYDROCORTISONE SODIUM SUCCINATE 100 MG: 100 INJECTION, POWDER, FOR SOLUTION INTRAMUSCULAR; INTRAVENOUS at 12:30

## 2018-11-02 RX ADMIN — ONDANSETRON 4 MG: 2 INJECTION INTRAMUSCULAR; INTRAVENOUS at 12:27

## 2018-11-02 RX ADMIN — CEFAZOLIN SODIUM 2000 MG: 2 SOLUTION INTRAVENOUS at 12:22

## 2018-11-02 RX ADMIN — LIDOCAINE HYDROCHLORIDE 50 MG: 10 INJECTION, SOLUTION INFILTRATION; PERINEURAL at 12:25

## 2018-11-02 RX ADMIN — PROPOFOL 200 MG: 10 INJECTION, EMULSION INTRAVENOUS at 12:25

## 2018-11-02 NOTE — ANESTHESIA PREPROCEDURE EVALUATION
Review of Systems/Medical History  Patient summary reviewed        Cardiovascular  Negative cardio ROS Exercise tolerance (METS): >4,  Hyperlipidemia, Hypertension , Dysrhythmias ,    Pulmonary  Negative pulmonary ROS Sleep apnea ,        GI/Hepatic  Negative GI/hepatic ROS          Negative  ROS Kidney stones,        Endo/Other  Negative endo/other ROS Diabetes ,      GYN  Negative gynecology ROS          Hematology  Negative hematology ROS      Musculoskeletal  Negative musculoskeletal ROS        Neurology  Negative neurology ROS   Neuromuscular disease , myasthenia gravis,    Psychology   Negative psychology ROS Anxiety,              Physical Exam    Airway    Mallampati score: II  TM Distance: >3 FB  Neck ROM: full     Dental       Cardiovascular  Comment: Negative ROS,     Pulmonary      Other Findings        Anesthesia Plan  ASA Score- 3     Anesthesia Type- general with ASA Monitors  Additional Monitors:   Airway Plan: LMA  Comment: Patient seen and examined, history reviewed  Patient to be done under general anesthesia with LMA and routine monitors  Risks discussed with the patient, consent obtained        Plan Factors-    Induction- intravenous  Postoperative Plan-     Informed Consent- Anesthetic plan and risks discussed with patient  I personally reviewed this patient with the CRNA  Discussed and agreed on the Anesthesia Plan with the CRNA  Germania Chau

## 2018-11-02 NOTE — ANESTHESIA POSTPROCEDURE EVALUATION
Post-Op Assessment Note      CV Status:  Stable    Mental Status:  Awake (sleepy)    Hydration Status:  Euvolemic    PONV Controlled:  Controlled    Airway Patency:  Patent    Post Op Vitals Reviewed: Yes          Staff: Anesthesiologist, CRNA       Comments: VSS, reflexes intact, maintains own airway            /76 (11/02/18 1305)    Temp 97 9 °F (36 6 °C) (11/02/18 1305)    Pulse 101 (11/02/18 1305)   Resp 16 (11/02/18 1305)    SpO2 98 % (11/02/18 1305)

## 2018-11-03 ENCOUNTER — APPOINTMENT (EMERGENCY)
Dept: CT IMAGING | Facility: HOSPITAL | Age: 73
DRG: 551 | End: 2018-11-03
Payer: MEDICARE

## 2018-11-03 ENCOUNTER — HOSPITAL ENCOUNTER (INPATIENT)
Facility: HOSPITAL | Age: 73
LOS: 3 days | Discharge: HOME HEALTH | DRG: 551 | End: 2018-11-07
Attending: EMERGENCY MEDICINE | Admitting: FAMILY MEDICINE
Payer: MEDICARE

## 2018-11-03 DIAGNOSIS — R53.1 WEAKNESS: Primary | ICD-10-CM

## 2018-11-03 DIAGNOSIS — S09.90XA CLOSED HEAD INJURY, INITIAL ENCOUNTER: ICD-10-CM

## 2018-11-03 DIAGNOSIS — N39.0 COMPLICATED UTI (URINARY TRACT INFECTION): ICD-10-CM

## 2018-11-03 DIAGNOSIS — E11.9 CONTROLLED TYPE 2 DIABETES MELLITUS WITHOUT COMPLICATION, WITH LONG-TERM CURRENT USE OF INSULIN (HCC): ICD-10-CM

## 2018-11-03 DIAGNOSIS — R42 DIZZINESS: ICD-10-CM

## 2018-11-03 DIAGNOSIS — R00.0 SINUS TACHYCARDIA: ICD-10-CM

## 2018-11-03 DIAGNOSIS — D72.829 LEUKOCYTOSIS: ICD-10-CM

## 2018-11-03 DIAGNOSIS — S32.019A CLOSED L1 VERTEBRAL FRACTURE (HCC): ICD-10-CM

## 2018-11-03 DIAGNOSIS — Z79.4 CONTROLLED TYPE 2 DIABETES MELLITUS WITHOUT COMPLICATION, WITH LONG-TERM CURRENT USE OF INSULIN (HCC): ICD-10-CM

## 2018-11-03 DIAGNOSIS — N17.9 ACUTE KIDNEY INJURY (HCC): ICD-10-CM
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
## 2018-11-03 PROBLEM — A41.9 SEPSIS (HCC): Status: ACTIVE | Noted: 2018-11-03

## 2018-11-03 PROBLEM — E87.2 LACTIC ACIDOSIS: Status: ACTIVE | Noted: 2018-11-03

## 2018-11-03 LAB
ALBUMIN SERPL BCP-MCNC: 3.8 G/DL (ref 3.5–5)
ALP SERPL-CCNC: 52 U/L (ref 46–116)
ALT SERPL W P-5'-P-CCNC: 56 U/L (ref 12–78)
ANION GAP SERPL CALCULATED.3IONS-SCNC: 13 MMOL/L (ref 4–13)
APTT PPP: 28 SECONDS (ref 24–36)
AST SERPL W P-5'-P-CCNC: 41 U/L (ref 5–45)
BASOPHILS # BLD AUTO: 0.09 THOUSANDS/ΜL (ref 0–0.1)
BASOPHILS NFR BLD AUTO: 0 % (ref 0–1)
BILIRUB SERPL-MCNC: 0.98 MG/DL (ref 0.2–1)
BUN SERPL-MCNC: 23 MG/DL (ref 5–25)
CALCIUM SERPL-MCNC: 9.7 MG/DL (ref 8.3–10.1)
CHLORIDE SERPL-SCNC: 97 MMOL/L (ref 100–108)
CO2 SERPL-SCNC: 22 MMOL/L (ref 21–32)
CREAT SERPL-MCNC: 1.83 MG/DL (ref 0.6–1.3)
EOSINOPHIL # BLD AUTO: 0.03 THOUSAND/ΜL (ref 0–0.61)
EOSINOPHIL NFR BLD AUTO: 0 % (ref 0–6)
ERYTHROCYTE [DISTWIDTH] IN BLOOD BY AUTOMATED COUNT: 12.8 % (ref 11.6–15.1)
GFR SERPL CREATININE-BSD FRML MDRD: 36 ML/MIN/1.73SQ M
GLUCOSE SERPL-MCNC: 185 MG/DL (ref 65–140)
GLUCOSE SERPL-MCNC: 81 MG/DL (ref 65–140)
HCT VFR BLD AUTO: 47.1 % (ref 36.5–49.3)
HGB BLD-MCNC: 16.1 G/DL (ref 12–17)
IMM GRANULOCYTES # BLD AUTO: 0.33 THOUSAND/UL (ref 0–0.2)
IMM GRANULOCYTES NFR BLD AUTO: 1 % (ref 0–2)
INR PPP: 1.1 (ref 0.86–1.17)
LACTATE SERPL-SCNC: 3.6 MMOL/L (ref 0.5–2)
LYMPHOCYTES # BLD AUTO: 1.51 THOUSANDS/ΜL (ref 0.6–4.47)
LYMPHOCYTES NFR BLD AUTO: 6 % (ref 14–44)
MCH RBC QN AUTO: 31.1 PG (ref 26.8–34.3)
MCHC RBC AUTO-ENTMCNC: 34.2 G/DL (ref 31.4–37.4)
MCV RBC AUTO: 91 FL (ref 82–98)
MONOCYTES # BLD AUTO: 2.57 THOUSAND/ΜL (ref 0.17–1.22)
MONOCYTES NFR BLD AUTO: 10 % (ref 4–12)
NEUTROPHILS # BLD AUTO: 20.48 THOUSANDS/ΜL (ref 1.85–7.62)
NEUTS SEG NFR BLD AUTO: 83 % (ref 43–75)
NRBC BLD AUTO-RTO: 0 /100 WBCS
PLATELET # BLD AUTO: 352 THOUSANDS/UL (ref 149–390)
PMV BLD AUTO: 9.6 FL (ref 8.9–12.7)
POTASSIUM SERPL-SCNC: 4.3 MMOL/L (ref 3.5–5.3)
PROT SERPL-MCNC: 7.9 G/DL (ref 6.4–8.2)
PROTHROMBIN TIME: 14.3 SECONDS (ref 11.8–14.2)
RBC # BLD AUTO: 5.18 MILLION/UL (ref 3.88–5.62)
SODIUM SERPL-SCNC: 132 MMOL/L (ref 136–145)
WBC # BLD AUTO: 25.01 THOUSAND/UL (ref 4.31–10.16)

## 2018-11-03 PROCEDURE — 71250 CT THORAX DX C-: CPT

## 2018-11-03 PROCEDURE — 82948 REAGENT STRIP/BLOOD GLUCOSE: CPT

## 2018-11-03 PROCEDURE — 80053 COMPREHEN METABOLIC PANEL: CPT | Performed by: EMERGENCY MEDICINE

## 2018-11-03 PROCEDURE — 99285 EMERGENCY DEPT VISIT HI MDM: CPT

## 2018-11-03 PROCEDURE — 85610 PROTHROMBIN TIME: CPT | Performed by: EMERGENCY MEDICINE

## 2018-11-03 PROCEDURE — 36415 COLL VENOUS BLD VENIPUNCTURE: CPT | Performed by: EMERGENCY MEDICINE

## 2018-11-03 PROCEDURE — 96365 THER/PROPH/DIAG IV INF INIT: CPT

## 2018-11-03 PROCEDURE — 96361 HYDRATE IV INFUSION ADD-ON: CPT

## 2018-11-03 PROCEDURE — 72125 CT NECK SPINE W/O DYE: CPT

## 2018-11-03 PROCEDURE — 74176 CT ABD & PELVIS W/O CONTRAST: CPT

## 2018-11-03 PROCEDURE — 87040 BLOOD CULTURE FOR BACTERIA: CPT | Performed by: EMERGENCY MEDICINE

## 2018-11-03 PROCEDURE — 70450 CT HEAD/BRAIN W/O DYE: CPT

## 2018-11-03 PROCEDURE — 93005 ELECTROCARDIOGRAM TRACING: CPT

## 2018-11-03 PROCEDURE — 85730 THROMBOPLASTIN TIME PARTIAL: CPT | Performed by: EMERGENCY MEDICINE

## 2018-11-03 PROCEDURE — 83605 ASSAY OF LACTIC ACID: CPT | Performed by: EMERGENCY MEDICINE

## 2018-11-03 PROCEDURE — 96374 THER/PROPH/DIAG INJ IV PUSH: CPT

## 2018-11-03 PROCEDURE — 85025 COMPLETE CBC W/AUTO DIFF WBC: CPT | Performed by: EMERGENCY MEDICINE

## 2018-11-03 RX ORDER — SODIUM CHLORIDE 9 MG/ML
600 INJECTION, SOLUTION INTRAVENOUS ONCE
Status: COMPLETED | OUTPATIENT
Start: 2018-11-04 | End: 2018-11-04

## 2018-11-03 RX ORDER — ONDANSETRON 2 MG/ML
4 INJECTION INTRAMUSCULAR; INTRAVENOUS ONCE
Status: COMPLETED | OUTPATIENT
Start: 2018-11-03 | End: 2018-11-03

## 2018-11-03 RX ADMIN — SODIUM CHLORIDE 1000 ML: 0.9 INJECTION, SOLUTION INTRAVENOUS at 22:42

## 2018-11-03 RX ADMIN — SODIUM CHLORIDE 1000 ML: 0.9 INJECTION, SOLUTION INTRAVENOUS at 23:20

## 2018-11-03 RX ADMIN — ONDANSETRON 4 MG: 2 INJECTION INTRAMUSCULAR; INTRAVENOUS at 22:45

## 2018-11-03 RX ADMIN — CEFAZOLIN SODIUM 2000 MG: 2 SOLUTION INTRAVENOUS at 23:20

## 2018-11-04 ENCOUNTER — APPOINTMENT (INPATIENT)
Dept: RADIOLOGY | Facility: HOSPITAL | Age: 73
DRG: 551 | End: 2018-11-04
Payer: MEDICARE

## 2018-11-04 LAB
ANION GAP SERPL CALCULATED.3IONS-SCNC: 8 MMOL/L (ref 4–13)
ATRIAL RATE: 133 BPM
BACTERIA UR QL AUTO: ABNORMAL /HPF
BILIRUB UR QL STRIP: NEGATIVE
BUN SERPL-MCNC: 19 MG/DL (ref 5–25)
CALCIUM SERPL-MCNC: 8.5 MG/DL (ref 8.3–10.1)
CHLORIDE SERPL-SCNC: 103 MMOL/L (ref 100–108)
CLARITY UR: CLEAR
CO2 SERPL-SCNC: 26 MMOL/L (ref 21–32)
COLOR UR: YELLOW
CREAT SERPL-MCNC: 1.33 MG/DL (ref 0.6–1.3)
ERYTHROCYTE [DISTWIDTH] IN BLOOD BY AUTOMATED COUNT: 12.9 % (ref 11.6–15.1)
GFR SERPL CREATININE-BSD FRML MDRD: 53 ML/MIN/1.73SQ M
GLUCOSE SERPL-MCNC: 112 MG/DL (ref 65–140)
GLUCOSE SERPL-MCNC: 119 MG/DL (ref 65–140)
GLUCOSE SERPL-MCNC: 123 MG/DL (ref 65–140)
GLUCOSE SERPL-MCNC: 126 MG/DL (ref 65–140)
GLUCOSE SERPL-MCNC: 137 MG/DL (ref 65–140)
GLUCOSE SERPL-MCNC: 71 MG/DL (ref 65–140)
GLUCOSE UR STRIP-MCNC: NEGATIVE MG/DL
HCT VFR BLD AUTO: 41 % (ref 36.5–49.3)
HGB BLD-MCNC: 13.8 G/DL (ref 12–17)
HGB UR QL STRIP.AUTO: ABNORMAL
KETONES UR STRIP-MCNC: ABNORMAL MG/DL
LACTATE SERPL-SCNC: 1.6 MMOL/L (ref 0.5–2)
LEUKOCYTE ESTERASE UR QL STRIP: ABNORMAL
MCH RBC QN AUTO: 31.1 PG (ref 26.8–34.3)
MCHC RBC AUTO-ENTMCNC: 33.7 G/DL (ref 31.4–37.4)
MCV RBC AUTO: 92 FL (ref 82–98)
NITRITE UR QL STRIP: NEGATIVE
NON-SQ EPI CELLS URNS QL MICRO: ABNORMAL /HPF
P AXIS: 59 DEGREES
PH UR STRIP.AUTO: 5.5 [PH] (ref 4.5–8)
PLATELET # BLD AUTO: 232 THOUSANDS/UL (ref 149–390)
PLATELET # BLD AUTO: 240 THOUSANDS/UL (ref 149–390)
PMV BLD AUTO: 9.5 FL (ref 8.9–12.7)
PMV BLD AUTO: 9.7 FL (ref 8.9–12.7)
POTASSIUM SERPL-SCNC: 4.4 MMOL/L (ref 3.5–5.3)
PR INTERVAL: 136 MS
PROCALCITONIN SERPL-MCNC: 0.72 NG/ML
PROT UR STRIP-MCNC: ABNORMAL MG/DL
QRS AXIS: 124 DEGREES
QRSD INTERVAL: 106 MS
QT INTERVAL: 304 MS
QTC INTERVAL: 452 MS
RBC # BLD AUTO: 4.44 MILLION/UL (ref 3.88–5.62)
RBC #/AREA URNS AUTO: ABNORMAL /HPF
SODIUM SERPL-SCNC: 137 MMOL/L (ref 136–145)
SP GR UR STRIP.AUTO: 1.02 (ref 1–1.03)
T WAVE AXIS: 46 DEGREES
UROBILINOGEN UR QL STRIP.AUTO: 1 E.U./DL
VENTRICULAR RATE: 133 BPM
WBC # BLD AUTO: 16.22 THOUSAND/UL (ref 4.31–10.16)
WBC #/AREA URNS AUTO: ABNORMAL /HPF

## 2018-11-04 PROCEDURE — 99223 1ST HOSP IP/OBS HIGH 75: CPT | Performed by: NURSE PRACTITIONER

## 2018-11-04 PROCEDURE — 82948 REAGENT STRIP/BLOOD GLUCOSE: CPT

## 2018-11-04 PROCEDURE — 87086 URINE CULTURE/COLONY COUNT: CPT | Performed by: EMERGENCY MEDICINE

## 2018-11-04 PROCEDURE — 87186 SC STD MICRODIL/AGAR DIL: CPT | Performed by: EMERGENCY MEDICINE

## 2018-11-04 PROCEDURE — 97163 PT EVAL HIGH COMPLEX 45 MIN: CPT

## 2018-11-04 PROCEDURE — 36415 COLL VENOUS BLD VENIPUNCTURE: CPT | Performed by: EMERGENCY MEDICINE

## 2018-11-04 PROCEDURE — 85027 COMPLETE CBC AUTOMATED: CPT | Performed by: NURSE PRACTITIONER

## 2018-11-04 PROCEDURE — 72100 X-RAY EXAM L-S SPINE 2/3 VWS: CPT

## 2018-11-04 PROCEDURE — G8978 MOBILITY CURRENT STATUS: HCPCS

## 2018-11-04 PROCEDURE — 81001 URINALYSIS AUTO W/SCOPE: CPT | Performed by: EMERGENCY MEDICINE

## 2018-11-04 PROCEDURE — 85049 AUTOMATED PLATELET COUNT: CPT | Performed by: NURSE PRACTITIONER

## 2018-11-04 PROCEDURE — 99232 SBSQ HOSP IP/OBS MODERATE 35: CPT | Performed by: INTERNAL MEDICINE

## 2018-11-04 PROCEDURE — 93010 ELECTROCARDIOGRAM REPORT: CPT | Performed by: INTERNAL MEDICINE

## 2018-11-04 PROCEDURE — 99223 1ST HOSP IP/OBS HIGH 75: CPT | Performed by: INTERNAL MEDICINE

## 2018-11-04 PROCEDURE — 83605 ASSAY OF LACTIC ACID: CPT | Performed by: EMERGENCY MEDICINE

## 2018-11-04 PROCEDURE — 97116 GAIT TRAINING THERAPY: CPT

## 2018-11-04 PROCEDURE — 97760 ORTHOTIC MGMT&TRAING 1ST ENC: CPT

## 2018-11-04 PROCEDURE — G8979 MOBILITY GOAL STATUS: HCPCS

## 2018-11-04 PROCEDURE — 84145 PROCALCITONIN (PCT): CPT | Performed by: NURSE PRACTITIONER

## 2018-11-04 PROCEDURE — 80048 BASIC METABOLIC PNL TOTAL CA: CPT | Performed by: NURSE PRACTITIONER

## 2018-11-04 RX ORDER — FAMOTIDINE 20 MG/1
20 TABLET, FILM COATED ORAL DAILY
Status: DISCONTINUED | OUTPATIENT
Start: 2018-11-04 | End: 2018-11-07 | Stop reason: HOSPADM

## 2018-11-04 RX ORDER — ACETAMINOPHEN 325 MG/1
650 TABLET ORAL EVERY 6 HOURS PRN
Status: DISCONTINUED | OUTPATIENT
Start: 2018-11-04 | End: 2018-11-04

## 2018-11-04 RX ORDER — SODIUM CHLORIDE 9 MG/ML
75 INJECTION, SOLUTION INTRAVENOUS CONTINUOUS
Status: DISCONTINUED | OUTPATIENT
Start: 2018-11-04 | End: 2018-11-05

## 2018-11-04 RX ORDER — LIDOCAINE 50 MG/G
1 PATCH TOPICAL DAILY
Status: DISCONTINUED | OUTPATIENT
Start: 2018-11-04 | End: 2018-11-07 | Stop reason: HOSPADM

## 2018-11-04 RX ORDER — ACETAMINOPHEN 325 MG/1
975 TABLET ORAL EVERY 8 HOURS PRN
Status: DISCONTINUED | OUTPATIENT
Start: 2018-11-04 | End: 2018-11-07 | Stop reason: HOSPADM

## 2018-11-04 RX ORDER — PREDNISONE 10 MG/1
10 TABLET ORAL DAILY
Status: DISCONTINUED | OUTPATIENT
Start: 2018-11-04 | End: 2018-11-07 | Stop reason: HOSPADM

## 2018-11-04 RX ORDER — ONDANSETRON 2 MG/ML
4 INJECTION INTRAMUSCULAR; INTRAVENOUS EVERY 4 HOURS PRN
Status: DISCONTINUED | OUTPATIENT
Start: 2018-11-04 | End: 2018-11-07 | Stop reason: HOSPADM

## 2018-11-04 RX ORDER — HEPARIN SODIUM 5000 [USP'U]/ML
5000 INJECTION, SOLUTION INTRAVENOUS; SUBCUTANEOUS EVERY 8 HOURS SCHEDULED
Status: DISCONTINUED | OUTPATIENT
Start: 2018-11-04 | End: 2018-11-07 | Stop reason: HOSPADM

## 2018-11-04 RX ADMIN — LIDOCAINE 1 PATCH: 50 PATCH TOPICAL at 08:10

## 2018-11-04 RX ADMIN — HEPARIN SODIUM 5000 UNITS: 5000 INJECTION INTRAVENOUS; SUBCUTANEOUS at 13:26

## 2018-11-04 RX ADMIN — CEFEPIME HYDROCHLORIDE 1000 MG: 1 INJECTION, POWDER, FOR SOLUTION INTRAMUSCULAR; INTRAVENOUS at 02:02

## 2018-11-04 RX ADMIN — ONDANSETRON 4 MG: 2 INJECTION INTRAMUSCULAR; INTRAVENOUS at 05:38

## 2018-11-04 RX ADMIN — SODIUM CHLORIDE 600 ML/HR: 0.9 INJECTION, SOLUTION INTRAVENOUS at 00:21

## 2018-11-04 RX ADMIN — PREDNISONE 10 MG: 10 TABLET ORAL at 08:11

## 2018-11-04 RX ADMIN — HEPARIN SODIUM 5000 UNITS: 5000 INJECTION INTRAVENOUS; SUBCUTANEOUS at 22:07

## 2018-11-04 RX ADMIN — HEPARIN SODIUM 5000 UNITS: 5000 INJECTION INTRAVENOUS; SUBCUTANEOUS at 05:16

## 2018-11-04 RX ADMIN — SODIUM CHLORIDE 125 ML/HR: 0.9 INJECTION, SOLUTION INTRAVENOUS at 01:11

## 2018-11-04 RX ADMIN — INSULIN LISPRO 12 UNITS: 100 INJECTION, SOLUTION INTRAVENOUS; SUBCUTANEOUS at 17:48

## 2018-11-04 RX ADMIN — FAMOTIDINE 20 MG: 20 TABLET ORAL at 08:11

## 2018-11-04 RX ADMIN — SODIUM CHLORIDE 75 ML/HR: 0.9 INJECTION, SOLUTION INTRAVENOUS at 12:38

## 2018-11-04 NOTE — H&P
History & Physical Exam - Critical Care   Miguelina Madsen 68 y o  male MRN: [id number]  Unit/Bed#: ED 16 Encounter: [id number]      Assessment/Plan:  1  Sepsis without shock likely due to a complicated urinary tract infection  1  Patient will be admitted as inpatient and require a greater than 2 midnight stay  2  Patient received 2 6 liters normal saline bolus  3  Continue antibiotics but will transition to Cefepime as patient was treated for pseudomonas and followed by infectious disease in October 4  Repeat lactic acid  5  Send urine culture and blood cultures  2  Probable complicated urinary tract   1  Possible pseudomonas infection after lithotripsy yesterday by urology  2  Await Ct scan results  3  Continue Cefepime 1 g Q12H  4  Consult infectious disease  3  Acute on chronic renal failure stage 3  1  Continue IV fluids and monitor urine output  4  Type II DM  1  Continue lantus and SSI  5  Mechanical fall with Acute L1 fracture  1  Trauma and neurosurgery consulted in ER  Know indication for transfer at this time  2  Pain management prn  3  neurosurgery recommends brace   6  Myasthenia Gravis  1  Continue prednisone 10 mg daily  7  GERD: continue pepcid    Critical Care Time:   Documented critical care time excludes any procedures documented elsewhere  It also excludes any family updates    _____________________________________________________________________      HPI:    Miguelina Madsen is a 68 y o  male with a past medical history of chronic UTI, kidney stones s/p lithotripsy on 11/2/18 by urology  The patient presented to the emergency after a mechanical fall at home last night  He states he has not been feeling well since his lithotripsy on Friday  He states, he has been weak, with chills, and general malaise  He got up to go to the bathroom and fell backwards into a dresser  He needed assistance getting up and was brought into the emergency department by his family   In the ER, he was tachycardic, with an elevated white count and a lactic acidosis  A sepsis alert was called and the patient was treated appropriately  Review of Systems:  Review of Systems  Full 14 point review of systems was performed  Aside from what was mentioned in the HPI, it is otherwise negative        Historical Information   Past Medical History:   Diagnosis Date    Anxiety     Cervical spinal stenosis     Hx of chronic ulcerative colitis     Hyperlipidemia     Myasthenia gravis ([facility] [address] )     Sleep apnea      Past Surgical History:   Procedure Laterality Date    COLECTOMY      partial sigmoid    COLECTOMY LAPAROSCOPIC      partial sigmoid colectomy    COLON SURGERY      lap  partial colectomy sigmoid    COLONOSCOPY      LITHOTRIPSY      OTHER SURGICAL HISTORY      parotid bx    SALIVARY GLAND SURGERY      biopsy     Social History   History   Alcohol Use No     History   Drug Use No     History   Smoking Status    Never Smoker   Smokeless Tobacco    Never Used       Family History:   Family History   Problem Relation Age of Onset    Arthritis Mother     Diabetes Mother     Hypertension Mother     Osteoporosis Mother     Depression Father     Cancer Neg Hx     Heart disease Neg Hx        Medications:  Pertinent medications were reviewed    Current Facility-Administered Medications:  insulin lispro 1-5 Units Subcutaneous HS KACEY Whitney    insulin lispro 12 Units Subcutaneous Daily With [employer]KACEY    insulin lispro 2-12 Units Subcutaneous TID AC KACEY Whitney    predniSONE 10 mg Oral Daily KACEY Whitney    sodium chloride 1,000 mL Intravenous Once [employer] , DO Last Rate: 1,000 mL (11/03/18 2320)   sodium chloride 600 mL/hr Intravenous Once KACEY Thayer          Allergies   Allergen Reactions    Levaquin [Levofloxacin]      Due to MG dx    Quinolones      Due to MG dx    Antihistamines, Chlorpheniramine-Type      Difficulty urinating    Benzodiazepines     Chlorpheniramine     Diphenhydramine     Erythromycin     Iodine     Iodine Solution [Povidone Iodine]      ivp dye    Loratadine     Other      Muscle relaxers d/t myasthenia gravis-they close my airway      Sulfa Antibiotics     Tetracyclines & Related          Vitals:   /58 (BP Location: Left arm)   Pulse (!) 124   Temp 99 4 °F (37 4 °C)   Resp 18   Ht 5' 9" (1 753 m)   Wt 85 7 kg (189 lb)   SpO2 93%   BMI 27 91 kg/m²   Body mass index is 27 91 kg/m²  SpO2: 93 %,   SpO2 Activity: At Rest,   O2 Device: None (Room air)      Intake/Output Summary (Last 24 hours) at 11/04/18 0015  Last data filed at 11/04/18 0009   Gross per 24 hour   Intake               50 ml   Output                0 ml   Net               50 ml     Invasive Devices     Peripheral Intravenous Line            Peripheral IV 11/03/18 Left Antecubital less than 1 day    Peripheral IV 11/03/18 Right Antecubital less than 1 day                Physical Exam:  Gen: pleasant, no distress  HE: NC/AT PERRLA EOMI  ENT: oropharynx moist  Neck/lymph: supple, no JVD, trachea midline, no adenopathy  Chest: CTA  Cor: Clear S1 and S2  Abd: soft NT/Nd +BS  Ext: no edema  Neuro: A&Ox3, non-focal exam  Skin: W/D/I      Diagnostic Data:  Lab: I have personally reviewed pertinent lab results  ,   CBC:    Results from last 7 days  Lab Units 11/03/18  2211   WBC Thousand/uL 25 01*   HEMOGLOBIN g/dL 16 1   HEMATOCRIT % 47 1   PLATELETS Thousands/uL 352      CMP: Lab Results   Component Value Date    K 4 3 11/03/2018    CL 97 (L) 11/03/2018    CO2 22 11/03/2018    BUN 23 11/03/2018    CREATININE 1 83 (H) 11/03/2018    CALCIUM 9 7 11/03/2018    AST 41 11/03/2018    ALT 56 11/03/2018    ALKPHOS 52 11/03/2018    EGFR 36 11/03/2018   ,   PT/INR:   Lab Results   Component Value Date    INR 1 10 11/03/2018   ,   Troponin: No results found for: TROPONINI,   Magnesium: No components found for: MAG,  Phosphorous: No results found for: PHOS    ABG: No results found for: PHART, NLO6CBG, PO2ART, KII9WMN, U4YAHXTN, BEART, SOURCE,     Microbiology:  UA pending  BCX: pending    Imaging: I have personally reviewed the pertinent imaging studies on the PACS system  CTc/a/p: Acute fracture of the superior endplate of the L1 vertebral body with mild depression  2   5 mm nodule within the left upper lobe, stable since 2010  No further follow-up is recommended  3   Cholelithiasis without evidence of cholecystitis  4   New punctate hyperdensities at the bilateral ureterovesicular junctions may reflect calculi, less likely postprocedural calcifications  There are bilateral punctate nonobstructing nephrolithiasis  No hydronephrosis or significant hydroureter  5   Small hiatal hernia  EKG/telemetry/Echo:   Sinus tachycardia      VTE Prophylaxis: Heparin    Code Status: Prior    Portions of the record may have been created with voice recognition software  Occasional wrong word or "sound a like" substitutions may have occurred due to the inherent limitations of voice recognition software  Read the chart carefully and recognize, using context, where substitutions have occurred

## 2018-11-04 NOTE — PHYSICAL THERAPY NOTE
PT EVALUATION  Time-In: 0900  Time-Out: 0930  Total Time: 30 minutes    68 y o     [id number]    Sinus tachycardia [R00 0]  Dizziness [R42]  Leukocytosis [D72 829]  Weakness [R53 1]  Fever [R50 9]  Acute kidney injury (HCC) [O73 7]  Complicated UTI (urinary tract infection) [N39 0]  Closed L1 vertebral fracture (HCC) [S32 019A]  Closed head injury, initial encounter [S09 90XA]    Length of Stay: 0    Past Medical History:   Diagnosis Date    Anxiety     Cervical spinal stenosis     Hx of chronic ulcerative colitis     Hyperlipidemia     [facility]asthenia gravis ([facility] [address]     Sleep apnea          Past Surgical History:   Procedure Laterality Date    COLECTOMY      partial sigmoid    COLECTOMY LAPAROSCOPIC      partial sigmoid colectomy    COLON SURGERY      lap  partial colectomy sigmoid    COLONOSCOPY      LITHOTRIPSY      OTHER SURGICAL HISTORY      parotid bx    SALIVARY GLAND SURGERY      biopsy                         Orthotic Note            Date: 11/4/2018      Patient Name: Joe House            Reason for Consult:TLSO  Pt provided and fitted appropriately with TLSO brace  Pt prefers straps to go underneath his armpits  PT reviewed donning and doffing brace, as well as, over all management  Pt has orders to wear brace when mobilizing  Pt feels comfortable with brace and reports it to help decrease his pain  Jia Young, PT,DPT    Actual treatment time [id number]   11/04/18 1000   Note Type   Note type Eval/Treat   Pain Assessment   Pain Assessment 0-10   Pain Score 5   Pain Type Acute pain   Pain Location Back   Pain Orientation Lower   Pain Frequency Constant/continuous   Clinical Progression Not changed   Effect of Pain on Daily Activities pain increases with activity and limits overall tolerance   Patient's Stated Pain Goal No pain   Hospital Pain Intervention(s) Repositioned; Ambulation/increased activity; Elevated; Emotional support; Rest   Response to Interventions tolerated PT   Multiple Pain Sites No   Home Living   Type of Home Mobile home   Home Layout One level;Stairs to enter with rails  (4-5 FLORENCIO w/ railing)   Bathroom Shower/Tub Tub/shower unit   Bathroom Toilet Standard   Home Equipment Walker;Cane   Prior Function   Level of [place] Independent with ADLs and functional mobility   Lives With Spouse  (grandson who is senior in high school and is autistic)   Receives Help From Family  (wife is anticipating open heart surgery in a couple weeks)   ADL Assistance Independent   IADLs Independent   Falls in the last 6 months 1 to 4  (just fall leading to admission)   Vocational Full time employment  ([employer])   Comments Pt reports being ambulatory and performing functional mobility at home w/o AD, (+) , (+) works full-time   Restrictions/Precautions   [place] Bearing Precautions Per Order No   Braces or Orthoses TLSO   Other Precautions Fall Risk;Pain;Multiple lines;O2  (TLSO brace; spinal precautions)   General   Additional Pertinent History acute fracture L1 endplate of vetebral body; h/o myasthenia gravis   Family/Caregiver Present No   Cognition   Overall Cognitive Status WFL   Arousal/Participation Alert   Orientation Level Oriented X4   Memory Within functional limits   Following Commands Follows one step commands without difficulty   RUE Assessment   RUE Assessment WFL   LUE Assessment   LUE Assessment WFL   RLE Assessment   RLE Assessment WFL  (4/5)   LLE Assessment   LLE Assessment WFL  (4/5)   Coordination   Movements are Fluid and Coordinated 1   Sensation WFL   Light Touch   RLE Light Touch Grossly intact   LLE Light Touch Grossly intact   Proprioception   RLE Proprioception Grossly intact   LLE Proprioception Grossly Intact   Bed Mobility   Supine to Sit 4  Minimal assistance   Additional items Assist x 1; Increased time required;Verbal cues;LE management; Bedrails;HOB elevated  (log roll technique for back)   Additional Comments Pt received supine in bed     Transfers   Sit to Stand 4  Minimal assistance   Additional items Assist x 1; Increased time required;Verbal cues   Stand to Sit 4  Minimal assistance   Additional items Assist x 1; Increased time required;Verbal cues   Additional Comments verbal cueing for hand placement and technique  Performed transfers with RW  Ambulation/Elevation   Gait pattern Forward Flexion; Short stride   Gait Assistance 5  Supervision   Additional items Assist x 1;Verbal cues   Assistive Device Rolling walker   Distance ambulated [id number]   Stair Management Assistance Not tested   Balance   Static Sitting Fair +   Dynamic Sitting Fair   Static Standing Fair -   Dynamic Standing Fair -   Ambulatory Fair -   Endurance Deficit   Endurance Deficit Yes   Endurance Deficit Description pain   Activity Tolerance   Activity Tolerance Patient limited by pain   Nurse Made Aware Estee Taylor RN and Batsheva Lee RN   Assessment   Prognosis Good   Problem List Decreased strength;Decreased endurance; Impaired balance;Decreased mobility; Decreased safety awareness;Decreased skin integrity;Orthopedic restrictions;Pain   Assessment PT consult received and evaluation complete  Pt is 68 y o  Male admitted from home w/ wife for sepsis s/p mechnical fall at home that resulted in acute L1 endplate vertebral body fracture  Pt has orders for activity as tolerated and TLSO brace  Pt with h/o of admission 10/4-10/7 for UTI at [facility] and s/p lithrotripsy [facility] 11/2  Pt agreeable to participate w/ therapy and identified by 2 patient identifiers: name and birth date  Pt presenting supine in bed w/ O2 nasal cannula and B SCD pumps  PTA pt was independent w/ all functional mobility w/ no DME, lived in one floor environment and had 4-5 FLORENCIO w/ railing, independent ADLS, and  independent w/ IADLS, yes driving, yes recent falls 1, and employed   Pt presents w/ RLE MMT 4/5, LLE MMT 4/5, supine>sit minAx1 log roll technique minAx1, sit<>stand minAx1 to RW w/ verbal cueing, gait training supervision w/ RW, and low back pain 5/10 pain  Pt would benefit from continued skilled PT for deficits in strength, balance, locomotion, stair negotiation, functional endurance, functional mobility and safety awareness with mobility,TLSO management At this time recommend d/c Home PT vs  STR, home with family support and with rolling walker History: co-morbidities, fall risk, FLORENCIO, multiple lines and recent admission Exam: strength, balance, locomotion, functional endurance, functional mobility and safety awareness with mobility Barthel Index: 65 Modified Jenn: 5 Clinical: unstable/unpredictable: pain and use of AD TLSO brace spinal precautions fall risk ongoing management of primary diagnosis Complexity: high   Barriers to Discharge Inaccessible home environment;Decreased caregiver support   Barriers to Discharge Comments FLORENCIO; wife works 9 hours days   Goals   Patient Goals "to have less pain"   LTG Expiration Date 11/14/18   Long Term Goal #1 In 10 days pt will demonstrate: bed mobility (I) w/ log roll techniquefor home function OOB, sit<>stand and functional transfers mod (I) w/ RW or least restrictive AD for home function, gait training 200ft mod (I) w/ RW or least restrictive AD for home distances, 4-5 steps mod (I) w/ railing for home entrance, improve BLE by 1/2 grade strength to optimize functional mobility, improve balance by 1 grade to decrease fall risk, able to teach back and demonstrate appropriate donning and doffing/management of TLSO brace w/o cueing, pt education on risk, role, benefits, POC, and goals of PT to promote discharge to least restrictive environment at highest functional level  Treatment Day 1   Plan   Treatment/Interventions Functional transfer training;LE strengthening/ROM; Therapeutic exercise; Endurance training;Patient/family training;Equipment eval/education; Bed mobility;Gait training;Spoke to nursing;OT   PT Frequency (4-6x/wk)   Recommendation   Recommendation Home with family support;Home PT  (home PT vs  STR pending progress and family support)   Equipment Recommended Walker   PT - OK to Discharge No   Additional Comments Would benefit from progressing towards IP PT goals prior to discharge home  Modified Rochester Scale   Modified Rochester Scale 3   Barthel Index   Feeding 10   Bathing 0   Grooming Score 5   Dressing Score 5   Bladder Score 10   Bowels Score 10   Toilet Use Score 5   Transfers (Bed/Chair) Score 10   Mobility (Level Surface) Score 10   Stairs Score 0   Barthel Index Score 65       Ewelina Paul, PT ,DPT     PT TREATMENT IMMEDIATELY FOLLOWING EVALUATION    Time in: 0930  Time out:  0947  Total Time: 17 minutes      S: "This brace helps "  O: Pt progressed ambulation by ambulating [id number] w/ RW  Pt demonstrates forward flexed posture  Pt provided with verbal cueing for safety and sequencing  Pt performed sit>stand to bed supervision assist w/ verbal cueing for hand placement  Pt performed sit>supine minAx1 log roll technique to assist with BLE  Pt left supine in bed w/ all needs in reach, call bell and phone in hand, O2 sat reading 93% room air, TLSO brace donned because patient feels most comfortable with it on, B SCD pumps donned and RN aware of patient status  PT reiterated education on spinal precautions for comfort and orders for TLSO brace use  A: Pt tolerated treatment fairly well  Pt reports TLSO brace to assist decreasing back pain  Pt would benefit from continued skilled PT to progress towards IP PT goals to improve gait, balance, mobility, strength and stairs  Anticipate discharge home w/ family support and continued PT will be most appropriate  P: Continue PT POC as written in initial evaluation      Betzaida Gaspar, PT,DPT

## 2018-11-04 NOTE — ED NOTES
Patient placed on 2L of oxygen due to an oxygen saturation of 87%-88%   Patients current oxygen saturation 98% on 2L     Anastasiia Talley RN  11/04/18 2074

## 2018-11-04 NOTE — CONSULTS
Consultation - Infectious Disease   Nirmala Morel 68 y o  male MRN: [id number]  Unit/Bed#: [address] 210-01 Encounter: [id number]      IMPRESSION & RECOMMENDATIONS:   Impression/Recommendations:  1  Severe sepsis, POA  Leukocytosis, tachycardia, lactic acidosis  Likely all secondary to #2  Seems to be clinically improving on IV cefepime  Leukocytosis trending downward  No fevers  Lactic acid improved  -continue with IV cefepime  Increase to 2 g IV Q 24 based on improving renal function  -follow up pending blood cultures  -monitor temperatures and hemodynamics  -check CBC in a m   -supportive care    2  Acute complicated cystitis  In the setting of #3  Urinalysis is consistent with infection  Urine culture is still pending  Patient with multiple prior urine cultures positive for Pseudomonas  CT abdomen with no evidence of obstruction     -continue with antibiotic as above  -follow up urine culture and blood cultures to guide further recommendations    3  Nephrolithiasis  Status post recent lithotripsy on 11/02/2018  CT abdomen on admission reveals nonobstructing nephrolithiasis  No hydronephrosis or other acute findings  4  Acute kidney injury  On CKD 3  In the setting of severe sepsis  Creatinine much improved today     -will adjust dose of cefepime as above  -check BMP in a m     5  Myasthenia gravis  On chronic prednisone 10 mg daily  Antibiotics:  Cefepime D2    Discussed above plan in detail with patient and his wife at bedside  Thank you for this consultation  We will follow along with you  HISTORY OF PRESENT ILLNESS:  Reason for Consult:  Suspected urosepsis    HPI: Nirmala Morel is a 68 y o  male with history of diabetes, myasthenia gravis on chronic prednisone, history of colovesicular fistula post sigmoid resection, 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, nephrolithiasis who recently underwent lithotripsy on 11/02/2018    Patient subsequently presented on 11/03 to the ER after mechanical fall at home  Also with associated weakness, chills, generalized malaise  On admission, patient was noted to have a leukocytosis of 25 and lactic acid of 3 6  He was started empirically on IV cefepime  Urinalysis was suggestive of acute UTI  Today, patient does state he is feeling overall better  No fevers, chills  Still urinating frequently, but no dysuria  No abdominal pain or flank pain  Patient had hematuria postoperatively, but none currently  Did have some nausea which improved  No vomiting or diarrhea  REVIEW OF SYSTEMS:    A complete system-based review of systems is otherwise negative      PAST MEDICAL HISTORY:  Past Medical History:   Diagnosis Date    Anxiety     Cervical spinal stenosis     Hx of chronic ulcerative colitis     Hyperlipidemia     [facility]ia gravis [facility] [facility] )     Sleep apnea      Past Surgical History:   Procedure Laterality Date    COLECTOMY      partial sigmoid    COLECTOMY LAPAROSCOPIC      partial sigmoid colectomy    COLON SURGERY      lap  partial colectomy sigmoid    COLONOSCOPY      LITHOTRIPSY      OTHER SURGICAL HISTORY      parotid bx    SALIVARY GLAND SURGERY      biopsy       FAMILY HISTORY:  Non-contributory    SOCIAL HISTORY:  History   Alcohol Use No     History   Drug Use No     History   Smoking Status    Never Smoker   Smokeless Tobacco    Never Used       ALLERGIES:  Allergies   Allergen Reactions    Levaquin [Levofloxacin]      Due to MG dx    Quinolones      Due to MG dx    Antihistamines, Chlorpheniramine-Type      Difficulty urinating    Benzodiazepines     Chlorpheniramine     Diphenhydramine     Erythromycin     Iodine     Iodine Solution [Povidone Iodine]      ivp dye    Loratadine     Other      Muscle relaxers d/t myasthenia gravis-they close my airway      Sulfa Antibiotics     Tetracyclines & Related        MEDICATIONS:  All current active medications have been reviewed  PHYSICAL EXAM:  Vitals:  Temp:  [98 5 °F (36 9 °C)-99 4 °F (37 4 °C)] 98 6 °F (37 °C)  HR:  [] 98  Resp:  [18-25] 20  BP: (122-146)/(58-88) 140/79  SpO2:  [93 %-94 %] 94 %  Temp (24hrs), Av 8 °F (37 1 °C), Min:98 5 °F (36 9 °C), Max:99 4 °F (37 4 °C)  Current: Temperature: 98 6 °F (37 °C)     Physical Exam:  General:  No acute distress  Eyes:  Conjunctive clear with no hemorrhages or effusions  Oropharynx:  No ulcers, no lesions  Neck:  Supple, no lymphadenopathy  Lungs:  Clear to auscultation bilaterally, no accessory muscle use  Cardiac:  Regular rate and rhythm, no murmurs  Abdomen:  Soft, non-tender, non-distended  Extremities:  No peripheral cyanosis, clubbing, or edema  Skin:  No rashes, no ulcers  Neurological:  Moves all four extremities spontaneously, sensation grossly intact  Back brace in place      LABS, IMAGING, & OTHER STUDIES:  Lab Results:  I have personally reviewed pertinent labs  Results from last 7 days  Lab Units 18  0537 18  2211   POTASSIUM mmol/L 4 4 4 3   CHLORIDE mmol/L 103 97*   CO2 mmol/L 26 22   BUN mg/dL 19 23   CREATININE mg/dL 1 33* 1 83*   EGFR ml/min/1 73sq m 53 36   CALCIUM mg/dL 8 5 9 7   AST U/L  --  41   ALT U/L  --  56   ALK PHOS U/L  --  52       Results from last 7 days  Lab Units 18  0537 18  0116 18  2211   WBC Thousand/uL 16 22*  --  25 01*   HEMOGLOBIN g/dL 13 8  --  16 1   PLATELETS Thousands/uL 232 240 352       Imaging Studies:   I have personally reviewed pertinent imaging study reports and images in PACS  CT chest/abdomen/pelvis shows acute fracture of the endplate of L1  No hyperdensities at the bilateral ureteral vesicular junction  No hydronephrosis  EKG, Pathology, and Other Studies:   I have personally reviewed pertinent reports

## 2018-11-04 NOTE — PLAN OF CARE
DISCHARGE PLANNING     Discharge to home or other facility with appropriate resources Progressing        GENITOURINARY - ADULT     Maintains or returns to baseline urinary function Progressing     Absence of urinary retention Progressing        INFECTION - ADULT     Absence or prevention of progression during hospitalization Progressing     Absence of fever/infection during neutropenic period Progressing        Knowledge Deficit     Patient/family/caregiver demonstrates understanding of disease process, treatment plan, medications, and discharge instructions Progressing        METABOLIC, FLUID AND ELECTROLYTES - ADULT     Electrolytes maintained within normal limits Progressing     Fluid balance maintained Progressing     Glucose maintained within target range Progressing        PAIN - ADULT     Verbalizes/displays adequate comfort level or baseline comfort level Progressing        Potential for Falls     Patient will remain free of falls Progressing        RESPIRATORY - ADULT     Achieves optimal ventilation and oxygenation Progressing        SAFETY ADULT     Maintain or return to baseline ADL function Progressing     Maintain or return mobility status to optimal level Progressing        SKIN/TISSUE INTEGRITY - ADULT     Skin integrity remains intact Progressing     Incision(s), wounds(s) or drain site(s) healing without S/S of infection Progressing     Oral mucous membranes remain intact Progressing

## 2018-11-04 NOTE — PLAN OF CARE
Problem: SAFETY ADULT  Goal: Maintain or return mobility status to optimal level  INTERVENTIONS:  - Assess patient's baseline mobility status (ambulation, transfers, stairs, etc )    - Identify cognitive and physical deficits and behaviors that affect mobility  - Identify mobility aids required to assist with transfers and/or ambulation (gait belt, sit-to-stand, lift, walker, cane, etc )  - [place] fall precautions as indicated by assessment  - Record patient progress and toleration of activity level on Mobility SBAR; progress patient to next Phase/Stage  - Instruct patient to call for assistance with activity based on assessment  - Request Rehabilitation consult to assist with strengthening/weightbearing, etc    Outcome: Progressing      Problem: DISCHARGE PLANNING  Goal: Discharge to home or other facility with appropriate resources  INTERVENTIONS:  - Identify barriers to discharge w/patient and caregiver  - Arrange for needed discharge resources and transportation as appropriate  - Identify discharge learning needs (meds, wound care, etc )  - Arrange for interpretive services to assist at discharge as needed  - Refer to Case Management Department for coordinating discharge planning if the patient needs post-hospital services based on physician/advanced practitioner order or complex needs related to functional status, cognitive ability, or social support system   Outcome: Progressing      Problem: Knowledge Deficit  Goal: Patient/family/caregiver demonstrates understanding of disease process, treatment plan, medications, and discharge instructions  Complete learning assessment and assess knowledge base    Interventions:  - Provide teaching at level of understanding  - Provide teaching via preferred learning methods   Outcome: Progressing

## 2018-11-04 NOTE — PROGRESS NOTES
Trauma Surgery    Called by Dr Brown Tee re acute superior endplate fx of L1 vertebral body with mild depression s/p fall  Pt is reportedly neurologically intact  I discussed the case with Dr Adilene Morrison from Neurosurgery - ok for patient to remain at [facility] provided that patient remains neurologically intact  Will need TLSO brace for fracture, and upright spine films once brace obtained to ensure stability  If there are any questions/concerns or changes in neurologic exam, please notify the trauma service at [facility]      Verner Hatchet, MD

## 2018-11-04 NOTE — PLAN OF CARE
Problem: PHYSICAL THERAPY ADULT  Goal: Performs mobility at highest level of function for planned discharge setting  See evaluation for individualized goals  Treatment/Interventions: Functional transfer training, LE strengthening/ROM, Therapeutic exercise, Endurance training, Patient/family training, Equipment eval/education, Bed mobility, Gait training, Spoke to nursing, OT  Equipment Recommended: Collin Lenz       See flowsheet documentation for full assessment, interventions and recommendations  Outcome: Progressing  Prognosis: Good  Problem List: Decreased strength, Decreased endurance, Impaired balance, Decreased mobility, Decreased safety awareness, Decreased skin integrity, Orthopedic restrictions, Pain  Assessment: PT consult received and evaluation complete  Pt is 68 y o  Male admitted from home w/ wife for sepsis s/p mechnical fall at home that resulted in acute L1 endplate vertebral body fracture  Pt has orders for activity as tolerated and TLSO brace  Pt with h/o of admission 10/4-10/7 for UTI at [facility] and s/p lithrotripsy [facility] 11/2  Pt agreeable to participate w/ therapy and identified by 2 patient identifiers: name and birth date  Pt presenting supine in bed w/ O2 nasal cannula and B SCD pumps  PTA pt was independent w/ all functional mobility w/ no DME, lived in one floor environment and had 4-5 FLORENCIO w/ railing, independent ADLS, and  independent w/ IADLS, yes driving, yes recent falls 1, and employed  Pt presents w/ RLE MMT 4/5, LLE MMT 4/5, supine>sit minAx1 log roll technique minAx1, sit<>stand minAx1 to RW w/ verbal cueing, gait training supervision w/ RW, and low back pain 5/10 pain    Pt would benefit from continued skilled PT for deficits in strength, balance, locomotion, stair negotiation, functional endurance, functional mobility and safety awareness with mobility,TLSO management At this time recommend d/c Home PT vs  STR, home with family support and with rolling walker History: co-morbidities, fall risk, FLORENCIO, multiple lines and recent admission Exam: strength, balance, locomotion, functional endurance, functional mobility and safety awareness with mobility Barthel Index: 65 Modified Bibb: 5 Clinical: unstable/unpredictable: pain and use of AD TLSO brace spinal precautions fall risk ongoing management of primary diagnosis Complexity: high  Barriers to Discharge: Inaccessible home environment, Decreased caregiver support  Barriers to Discharge Comments: FLORENCIO; wife works 9 hours days  Recommendation: Home with family support, Home PT (home PT vs  STR pending progress and family support)     PT - OK to Discharge: No    See flowsheet documentation for full assessment         Comments: Lacy Valdez, PT,DPT

## 2018-11-04 NOTE — PROGRESS NOTES
[facility] Internal Medicine Progress Note  Patient: Kenna Marshr 68 y o  male   MRN: [id number]  PCP: Cristiano Aguirre DO  [facility] Encounter: [id number]  Date Of Visit: 11/04/18    Assessment:    Principal Problem:    Sepsis ([facility] )  Active Problems:    Myasthenia gravis ([facility] )    Controlled type 2 diabetes mellitus without complication, with long-term current use of insulin ([facility])    CKD (chronic kidney disease), stage III ([facility] )    Essential hypertension    Complicated UTI (urinary tract infection)    Lactic acidosis      Plan: This is a 68-year-old male with history of chronic UTI, kidney stones status post lithotripsy on 11/02/2018 by Urology presenting on 11/03/2018 for evaluation of fall at home found to be in sepsis  1 ) Severe sepsis present on admission secondary to UTI  -evidence of leukocytosis, tachycardia, lactic acidosis  -patient receive IV fluids  -WBC trending down from 25 to 16  -likely secondary to UTI  -continue with cefepime  -infectious Disease consultation appreciated    2 ) Acute on chronic CKD stage 3  -creatinine improved to 1 33 with IV fluids  -will monitor renal function, avoid nephrotoxin    3 ) Diabetes mellitus  -continue Lantus, monitor Accu-Cheks, sliding scale for coverage    4 ) Mechanical fall with acute L1 fracture  -Trauma and Neurosurgery consulted ED  -patient neurologically intact, okay to remain at [facility], no need to transfer  -command TLSO brace for fracture  -PT OT eval    5 ) Myasthenia gravis  -continue present 10 mg daily    6 ) Nephrolithiasis status post recent lithotripsy  -CT abdomen pelvis reveals nonobstructing nephrolithiasis      VTE Pharmacologic Prophylaxis:   Pharmacologic:  Heparin    Patient Centered Rounds: I have performed bedside rounds with nursing staff today  Time Spent for Care: 20 minutes  More than 50% of total time spent on counseling and coordination of care as described above      Current Length of Stay: 0 day(s)    Current Patient Status: Inpatient   Certification Statement: The patient will continue to require additional inpatient hospital stay due to Sepsis    Discharge Plan / Estimated Discharge Date: 2-3 days    Code Status: Level 1 - Full Code      Subjective:   Patient seen and examined at bedside, complaining of back pain, denies any nausea, vomiting, diarrhea, fever, chills  Objective:     Vitals:   Temp (24hrs), Av 8 °F (37 1 °C), Min:98 5 °F (36 9 °C), Max:99 4 °F (37 4 °C)    Temp:  [98 5 °F (36 9 °C)-99 4 °F (37 4 °C)] 98 6 °F (37 °C)  HR:  [] 98  Resp:  [18-25] 20  BP: (122-146)/(58-88) 140/79  SpO2:  [93 %-94 %] 94 %  Body mass index is 27 91 kg/m²  Input and Output Summary (last 24 hours): Intake/Output Summary (Last 24 hours) at 18 1454  Last data filed at 18 1238   Gross per 24 hour   Intake             3650 ml   Output              625 ml   Net             3025 ml       Physical Exam:    Constitutional: Patient is oriented to person, place and time, no acute distress  HEENT:  Normocephalic, atraumatic, EOMI, PERRLA, no scleral icterus, no pallor, moist oral mucosa  Neck:  Supple, no masses, no thyromegaly, no bruits Normal range of motion  Lymph nodes:  No lymphadenopathy  Cardiovascular: Normal S1S2, RRR, No murmurs/rubs/gallops appreciated  Pulmonary: Clear to auscultation bilaterally, No rhonchi/rales/wheezing appreciated  Abdominal: Soft, Bowel sounds present, Non-tender, Non-distended, No rebound/guarding, no hepatomegaly   Musculoskeletal:  Back brace in place  Extremities:  No cyanosis, clubbing or edema  Peripheral pulses palpable and equal bilaterally  Neurological: Cranial nerves II-XII grossly intact, sensation intact, otherwise no focal neurological symptoms  Skin: Skin is warm and dry, no rashes      Additional Data:     Labs:      Results from last 7 days  Lab Units 18  0537  18  2211   WBC Thousand/uL 16 22*  --  25 01*   HEMOGLOBIN g/dL 13 8  --  16 1   HEMATOCRIT % 41 0  --  47 1   PLATELETS Thousands/uL 232  < > 352   NEUTROS PCT %  --   --  83*   LYMPHS PCT %  --   --  6*   MONOS PCT %  --   --  10   EOS PCT %  --   --  0   < > = values in this interval not displayed  Results from last 7 days  Lab Units 11/04/18  0537 11/03/18  2211   POTASSIUM mmol/L 4 4 4 3   CHLORIDE mmol/L 103 97*   CO2 mmol/L 26 22   BUN mg/dL 19 23   CREATININE mg/dL 1 33* 1 83*   CALCIUM mg/dL 8 5 9 7   ALK PHOS U/L  --  52   ALT U/L  --  56   AST U/L  --  41       Results from last 7 days  Lab Units 11/03/18  2211   INR  1 10        I Have Reviewed All Lab Data Listed Above        Recent Cultures (last 7 days):           Last 24 Hours Medication List:     Current Facility-Administered Medications:  acetaminophen 975 mg Oral Q8H PRN Kurt Viktoriya, CRNP    [START ON 11/5/2018] cefepime 2,000 mg Intravenous Q24H Yojana Aldea, DO    famotidine 20 mg Oral Daily Kurt Viktoriya, CRNP    heparin (porcine) 5,000 Units Subcutaneous Q8H [address] Darshana NimeshKACEY    insulin lispro 1-5 Units Subcutaneous HS Kurt Viktoriya, CRNP    insulin lispro 12 Units Subcutaneous Daily With [employer], CRNP    insulin lispro 2-12 Units Subcutaneous TID AC Kurt Viktoriya, CRNP    lidocaine 1 patch Topical Daily Darshana NimeshKACEY    ondansetron 4 mg Intravenous Q4H PRN Kurt Viktoriya, CRNP    predniSONE 10 mg Oral Daily Kurt Viktoriya, CRNP    sodium chloride 75 mL/hr Intravenous Continuous Kurt Viktoriya, CRNP Last Rate: 75 mL/hr (11/04/18 1238)        Today, Patient Was Seen By: Oliver Cai MD

## 2018-11-05 LAB
ANION GAP SERPL CALCULATED.3IONS-SCNC: 10 MMOL/L (ref 4–13)
BASOPHILS # BLD AUTO: 0.08 THOUSANDS/ΜL (ref 0–0.1)
BASOPHILS NFR BLD AUTO: 1 % (ref 0–1)
BUN SERPL-MCNC: 16 MG/DL (ref 5–25)
CALCIUM SERPL-MCNC: 8.7 MG/DL (ref 8.3–10.1)
CHLORIDE SERPL-SCNC: 102 MMOL/L (ref 100–108)
CO2 SERPL-SCNC: 24 MMOL/L (ref 21–32)
CREAT SERPL-MCNC: 1.09 MG/DL (ref 0.6–1.3)
EOSINOPHIL # BLD AUTO: 0.06 THOUSAND/ΜL (ref 0–0.61)
EOSINOPHIL NFR BLD AUTO: 1 % (ref 0–6)
ERYTHROCYTE [DISTWIDTH] IN BLOOD BY AUTOMATED COUNT: 12.6 % (ref 11.6–15.1)
GFR SERPL CREATININE-BSD FRML MDRD: 67 ML/MIN/1.73SQ M
GLUCOSE SERPL-MCNC: 110 MG/DL (ref 65–140)
GLUCOSE SERPL-MCNC: 125 MG/DL (ref 65–140)
GLUCOSE SERPL-MCNC: 144 MG/DL (ref 65–140)
GLUCOSE SERPL-MCNC: 166 MG/DL (ref 65–140)
GLUCOSE SERPL-MCNC: 97 MG/DL (ref 65–140)
HCT VFR BLD AUTO: 40 % (ref 36.5–49.3)
HGB BLD-MCNC: 13.4 G/DL (ref 12–17)
IMM GRANULOCYTES # BLD AUTO: 0.07 THOUSAND/UL (ref 0–0.2)
IMM GRANULOCYTES NFR BLD AUTO: 1 % (ref 0–2)
LYMPHOCYTES # BLD AUTO: 1.61 THOUSANDS/ΜL (ref 0.6–4.47)
LYMPHOCYTES NFR BLD AUTO: 12 % (ref 14–44)
MCH RBC QN AUTO: 30.7 PG (ref 26.8–34.3)
MCHC RBC AUTO-ENTMCNC: 33.5 G/DL (ref 31.4–37.4)
MCV RBC AUTO: 92 FL (ref 82–98)
MONOCYTES # BLD AUTO: 1.36 THOUSAND/ΜL (ref 0.17–1.22)
MONOCYTES NFR BLD AUTO: 11 % (ref 4–12)
NEUTROPHILS # BLD AUTO: 9.79 THOUSANDS/ΜL (ref 1.85–7.62)
NEUTS SEG NFR BLD AUTO: 74 % (ref 43–75)
NRBC BLD AUTO-RTO: 0 /100 WBCS
PLATELET # BLD AUTO: 221 THOUSANDS/UL (ref 149–390)
PMV BLD AUTO: 9.7 FL (ref 8.9–12.7)
POTASSIUM SERPL-SCNC: 3.7 MMOL/L (ref 3.5–5.3)
RBC # BLD AUTO: 4.36 MILLION/UL (ref 3.88–5.62)
SODIUM SERPL-SCNC: 136 MMOL/L (ref 136–145)
WBC # BLD AUTO: 12.97 THOUSAND/UL (ref 4.31–10.16)

## 2018-11-05 PROCEDURE — 97166 OT EVAL MOD COMPLEX 45 MIN: CPT

## 2018-11-05 PROCEDURE — 82948 REAGENT STRIP/BLOOD GLUCOSE: CPT

## 2018-11-05 PROCEDURE — 85025 COMPLETE CBC W/AUTO DIFF WBC: CPT | Performed by: INTERNAL MEDICINE

## 2018-11-05 PROCEDURE — 80048 BASIC METABOLIC PNL TOTAL CA: CPT | Performed by: INTERNAL MEDICINE

## 2018-11-05 PROCEDURE — G8987 SELF CARE CURRENT STATUS: HCPCS

## 2018-11-05 PROCEDURE — 99232 SBSQ HOSP IP/OBS MODERATE 35: CPT | Performed by: INTERNAL MEDICINE

## 2018-11-05 PROCEDURE — G8988 SELF CARE GOAL STATUS: HCPCS

## 2018-11-05 RX ORDER — LISINOPRIL 2.5 MG/1
2.5 TABLET ORAL DAILY
Status: DISCONTINUED | OUTPATIENT
Start: 2018-11-05 | End: 2018-11-07 | Stop reason: HOSPADM

## 2018-11-05 RX ORDER — DOCUSATE SODIUM 100 MG/1
100 CAPSULE, LIQUID FILLED ORAL 2 TIMES DAILY PRN
Status: DISCONTINUED | OUTPATIENT
Start: 2018-11-05 | End: 2018-11-07 | Stop reason: HOSPADM

## 2018-11-05 RX ORDER — MECLIZINE HCL 12.5 MG/1
12.5 TABLET ORAL DAILY
Status: DISCONTINUED | OUTPATIENT
Start: 2018-11-05 | End: 2018-11-07 | Stop reason: HOSPADM

## 2018-11-05 RX ADMIN — ONDANSETRON 4 MG: 2 INJECTION INTRAMUSCULAR; INTRAVENOUS at 08:00

## 2018-11-05 RX ADMIN — LISINOPRIL 2.5 MG: 2.5 TABLET ORAL at 10:29

## 2018-11-05 RX ADMIN — SODIUM CHLORIDE 75 ML/HR: 0.9 INJECTION, SOLUTION INTRAVENOUS at 01:18

## 2018-11-05 RX ADMIN — INSULIN LISPRO 2 UNITS: 100 INJECTION, SOLUTION INTRAVENOUS; SUBCUTANEOUS at 17:35

## 2018-11-05 RX ADMIN — ACETAMINOPHEN 975 MG: 325 TABLET, FILM COATED ORAL at 09:33

## 2018-11-05 RX ADMIN — CEFEPIME HYDROCHLORIDE 1000 MG: 1 INJECTION, POWDER, FOR SOLUTION INTRAMUSCULAR; INTRAVENOUS at 12:52

## 2018-11-05 RX ADMIN — CEFEPIME HYDROCHLORIDE 2000 MG: 1 INJECTION, POWDER, FOR SOLUTION INTRAMUSCULAR; INTRAVENOUS at 01:19

## 2018-11-05 RX ADMIN — DOCUSATE SODIUM 100 MG: 100 CAPSULE, LIQUID FILLED ORAL at 10:29

## 2018-11-05 RX ADMIN — PREDNISONE 10 MG: 10 TABLET ORAL at 08:00

## 2018-11-05 RX ADMIN — HEPARIN SODIUM 5000 UNITS: 5000 INJECTION INTRAVENOUS; SUBCUTANEOUS at 21:42

## 2018-11-05 RX ADMIN — MECLIZINE 12.5 MG: 12.5 TABLET ORAL at 10:29

## 2018-11-05 RX ADMIN — ONDANSETRON 4 MG: 2 INJECTION INTRAMUSCULAR; INTRAVENOUS at 01:25

## 2018-11-05 RX ADMIN — HEPARIN SODIUM 5000 UNITS: 5000 INJECTION INTRAVENOUS; SUBCUTANEOUS at 05:51

## 2018-11-05 RX ADMIN — LIDOCAINE 1 PATCH: 50 PATCH TOPICAL at 08:00

## 2018-11-05 RX ADMIN — HEPARIN SODIUM 5000 UNITS: 5000 INJECTION INTRAVENOUS; SUBCUTANEOUS at 13:42

## 2018-11-05 RX ADMIN — FAMOTIDINE 20 MG: 20 TABLET ORAL at 08:00

## 2018-11-05 NOTE — PROGRESS NOTES
[facility] Internal Medicine Progress Note  Patient: Yoni Lyn 68 y o  male   MRN: [id number]  PCP: Nikita Acosta DO  Unit/Bed#: [place] 2 -01 Encounter: [id number]  Date Of Visit: 11/05/18    Assessment:    Principal Problem:    Sepsis ([facility] )  Active Problems:    Myasthenia gravis ([facility] )    Controlled type 2 diabetes mellitus without complication, with long-term current use of insulin ([facility])    CKD (chronic kidney disease), stage III ([facility] )    Essential hypertension    Complicated UTI (urinary tract infection)    Lactic acidosis      Plan:     This is a 51-year-old male with history of chronic UTI, kidney stones status post lithotripsy on 11/02/2018 by Urology presenting on 11/03/2018 for evaluation of fall at home found to be in sepsis      1 ) Severe sepsis present on admission secondary to UTI  -evidence of leukocytosis, tachycardia, lactic acidosis  -patient receive IV fluids  -WBC trending down from 25 to 12 97  -likely secondary to UTI  -continue with cefepime, urien cx reveals pseudomonas, will need 14 days of Rx  -patient was a full need PICC line  -infectious Disease consultation appreciated     2 ) Acute on chronic CKD stage 3  -creatinine improved to 1 09 with IV fluids  -will monitor renal function, avoid nephrotoxin     3 ) Diabetes mellitus  -continue Lantus, monitor Accu-Cheks, sliding scale for coverage     4 ) Mechanical fall with acute L1 fracture  -Trauma and Neurosurgery consulted ED  -patient neurologically intact, okay to remain at [facility], no need to transfer  -command TLSO brace for fracture  -PT OT recommending home with family support versus rehab pending progress     5 ) Myasthenia gravis  -continue present 10 mg daily     6 ) Nephrolithiasis status post recent lithotripsy  -CT abdomen pelvis reveals nonobstructing nephrolithiasis       VTE Pharmacologic Prophylaxis:   Pharmacologic: heparin    Patient Centered Rounds: I have performed bedside rounds with nursing staff today     Time Spent for Care: 20 minutes  More than 50% of total time spent on counseling and coordination of care as described above  Current Length of Stay: 1 day(s)    Current Patient Status: Inpatient   Certification Statement: The patient will continue to require additional inpatient hospital stay due to UTI    Discharge Plan / Estimated Discharge Date: 2-3 days    Code Status: Level 1 - Full Code      Subjective:   Patient seen and examined at bedside, currently denies any chest pain, palpitations, nausea, vomiting  Objective:     Vitals:   Temp (24hrs), Av 8 °F (36 6 °C), Min:97 4 °F (36 3 °C), Max:98 1 °F (36 7 °C)    Temp:  [97 4 °F (36 3 °C)-98 1 °F (36 7 °C)] 97 4 °F (36 3 °C)  HR:  [84-97] 84  Resp:  [20] 20  BP: (142-172)/(68-81) 144/74  SpO2:  [94 %-95 %] 95 %  Body mass index is 27 91 kg/m²  Input and Output Summary (last 24 hours): Intake/Output Summary (Last 24 hours) at 18 1233  Last data filed at 18 0801   Gross per 24 hour   Intake          2533 75 ml   Output             1125 ml   Net          1408 75 ml       Physical Exam:    Constitutional: Patient is oriented to person, place and time, no acute distress  HEENT:  Normocephalic, atraumatic, EOMI, PERRLA, no scleral icterus, no pallor, moist oral mucosa  Neck:  Supple, no masses, no thyromegaly, no bruits Normal range of motion  Lymph nodes:  No lymphadenopathy  Cardiovascular: Normal S1S2, RRR, No murmurs/rubs/gallops appreciated  Pulmonary: Clear to auscultation bilaterally, No rhonchi/rales/wheezing appreciated  Abdominal: Soft, Bowel sounds present, Non-tender, Non-distended, No rebound/guarding, no hepatomegaly   Musculoskeletal: No tenderness/abnormality   Extremities:  No cyanosis, clubbing or edema  Peripheral pulses palpable and equal bilaterally  Neurological: Cranial nerves II-XII grossly intact, sensation intact, otherwise no focal neurological symptoms     Skin: Skin is warm and dry, no daniella  Additional Data:     Labs:      Results from last 7 days  Lab Units 11/05/18  0453   WBC Thousand/uL 12 97*   HEMOGLOBIN g/dL 13 4   HEMATOCRIT % 40 0   PLATELETS Thousands/uL 221   NEUTROS PCT % 74   LYMPHS PCT % 12*   MONOS PCT % 11   EOS PCT % 1       Results from last 7 days  Lab Units 11/05/18  0453  11/03/18  2211   POTASSIUM mmol/L 3 7  < > 4 3   CHLORIDE mmol/L 102  < > 97*   CO2 mmol/L 24  < > 22   BUN mg/dL 16  < > 23   CREATININE mg/dL 1 09  < > 1 83*   CALCIUM mg/dL 8 7  < > 9 7   ALK PHOS U/L  --   --  52   ALT U/L  --   --  56   AST U/L  --   --  41   < > = values in this interval not displayed  Results from last 7 days  Lab Units 11/03/18  2211   INR  1 10        I Have Reviewed All Lab Data Listed Above  Recent Cultures (last 7 days):       Results from last 7 days  Lab Units 11/04/18  0028 11/03/18  2214 11/03/18  2211   BLOOD CULTURE   --  No Growth at 24 hrs  No Growth at 24 hrs     URINE CULTURE  60,000-69,000 cfu/ml Pseudomonas aeruginosa*  --   --        Last 24 Hours Medication List:     Current Facility-Administered Medications:  acetaminophen 975 mg Oral Q8H PRN KACEY Black    cefepime 1,000 mg Intravenous Q12H Donnie Nava MD    docusate sodium 100 mg Oral BID PRN Shana Noland MD    famotidine 20 mg Oral Daily KACEY Black    heparin (porcine) 5,000 Units Subcutaneous Q8H [address] KACEY Whitney    insulin lispro 1-5 Units Subcutaneous HS KACEY Black    insulin lispro 12 Units Subcutaneous Daily With [employer]    insulin lispro 2-12 Units Subcutaneous TID AC KACEY Black    lidocaine 1 patch Topical Daily KACEY Whitney    lisinopril 2 5 mg Oral Daily Shana Noland MD    meclizine 12 5 mg Oral Daily Shana Noland MD    ondansetron 4 mg Intravenous Q4H PRN KACEY Black    predniSONE 10 mg Oral Daily KACEY Black    sodium chloride 75 mL/hr Intravenous Continuous Wilhemena KACEY Costa Last Rate: 75 mL/hr (11/05/18 0118)        Today, Patient Was Seen By: Radha Corona MD

## 2018-11-05 NOTE — PROGRESS NOTES
PICC consult: chart reviewed, bc negative 24 hours, cont to monitor BC  Waiting consent and will review [facility] tomorrow for PICC

## 2018-11-05 NOTE — PHYSICIAN ADVISOR
Current patient class: Inpatient  The patient is currently on Hospital Day: 3 at [facility]        The patient was admitted to the hospital  on 11/4/18 at 0001 for the following diagnosis:  Sinus tachycardia [R00 0]  Dizziness [R42]  Leukocytosis [D72 829]  Weakness [R53 1]  Fever [R50 9]  Acute kidney injury ([facility] [address] ) [C78 6]  Complicated UTI (urinary tract infection) [N39 0]  Closed L1 vertebral fracture ([address] ) [S32 019A]  Closed head injury, initial encounter [S09 90XA]       There is documentation in the medical record of an expected length of stay of at least 2 midnights  The patient is therefore expected to satisfy the 2 midnight benchmark and given the 2 midnight presumption is appropriate for INPATIENT ADMISSION  Given this expectation of a satisfying stay, CMS instructs us that the patient is most often appropriate for inpatient admission under part A provided medical necessity is documented in the chart  After review of the relevant documentation, labs, vital signs and test results, the patient is appropriate for INPATIENT ADMISSION  Admission to the hospital as an inpatient is a complex decision making process which requires the practitioner to consider the patients presenting complaint, history and physical examination and all relevant testing  With this in mind, in this case, the patient was deemed appropriate for INPATIENT ADMISSION  After review of the documentation and testing available at the time of the admission I concur with this clinical determination of medical necessity  The patient does have an inpatient admission within the previous 30 days  The patient was admitted on 10/4/18 and discharged on 10/7/18 as an inpatient  The patient therefore required readmission review   At that time, the patient was admitted with with nephrolithiasis with pseudomonas UTI, treated with IV antibiotics and with plan for ultimate ureteroscopy with stone extractions and laser lithotripsy  In this case the patient should be considered a SEPARATE and UNRELATED INPATIENT ADMISSION  The patient had been discharged in stable condition with a completed care plan  There were no unresolved acute medical issues at the time of discharged which would have reasonably been expected to prompt this readmission  Rationale is as follows: The patient is a 68 yrs male with history of myasthenia gravis, DM, BPH and pseudomonas UTI with nephrolithiasis s/p lithotripsy on 11/2/18 who initially presented to the ED s/p dizziness, malaise and fall backward hitting his head and back  PT also noted that he has had chills and difficulty urinating since his procedure  On exam, he was found to be tachycardic, tachypneic with dry MM, and tenderness on palpation of his lumbar spine  Workup in the ED revealed a urinalysis with 30-50 RBC and 20-30 WBC as well as leukocytes, + lactic acidosis of 3 6, hyponatremia at 132, MARSHALL with a creatinine of 1 83 and a leukocytosis of 25,000  Imaging revealed an acute fracture of the superior endplate of L1 as well as new punctate hyper-densities at the b/l ureterovesicular junctions  Given the the physical exam and findings, the patient was treated according to sepsis guidelines with IVF hydration and IV antibiotics  The plan of care upon admission includes continuation of IVF hydration, IV cefepime 2 g BID, repeat blood work, cultures and lactic acid as well as telemetry monitoring and trending of temperatures  The patient also has consultations to infectious disease, urology, neurosurgery and PT  Given the patient's need for continuation of treatment for sepsis secondary to UTI with MARSHALL and acute L1 fx in the setting of diabetes, MG and recent urologic procedure, the patient is appropriate for INPATIENT ADMISSION  Furthermore, the current admission should be considered SEPARATE AND UNRELATED to previous as the patient had been discharged in stable condition  The patients vitals on arrival were ED Triage Vitals   Temperature Pulse Respirations Blood Pressure SpO2   11/03/18 2155 11/03/18 2155 11/03/18 2155 11/03/18 2155 11/03/18 2155   99 4 °F (37 4 °C) (!) 135 (!) 25 146/84 94 %      Temp Source Heart Rate Source Patient Position - Orthostatic VS BP Location FiO2 (%)   11/04/18 0200 11/03/18 2321 11/03/18 2155 11/03/18 2155 --   Temporal Monitor Lying Right arm       Pain Score       11/03/18 2155       5           Past Medical History:   Diagnosis Date    Anxiety     Cervical spinal stenosis     Hx of chronic ulcerative colitis     Hyperlipidemia     Myasthenia gravis ([address] )     Sleep apnea      Past Surgical History:   Procedure Laterality Date    COLECTOMY      partial sigmoid    COLECTOMY LAPAROSCOPIC      partial sigmoid colectomy    COLON SURGERY      lap  partial colectomy sigmoid    COLONOSCOPY      LITHOTRIPSY      OTHER SURGICAL HISTORY      parotid bx    SALIVARY GLAND SURGERY      biopsy           Consults have been placed to:   IP CONSULT TO INFECTIOUS DISEASES    Vitals:    11/04/18 1507 11/04/18 2334 11/04/18 2349 11/05/18 0758   BP: 142/81 (!) 172/80 158/68 144/74   BP Location: Right arm Right arm Right arm Right arm   Pulse: 97 92  84   Resp: 20 20  20   Temp: 97 8 °F (36 6 °C) 98 1 °F (36 7 °C)  (!) 97 4 °F (36 3 °C)   TempSrc: Temporal Tympanic  Tympanic   SpO2: 95% 94%  95%   Weight:       Height:           Most recent labs:    Recent Labs      11/03/18 2211 11/05/18   0453   WBC  25 01*   < >  12 97*   HGB  16 1   < >  13 4   HCT  47 1   < >  40 0   PLT  352   < >  221   K  4 3   < >  3 7   CALCIUM  9 7   < >  8 7   BUN  23   < >  16   CREATININE  1 83*   < >  1 09   INR  1 10   --    --    AST  41   --    --    ALT  56   --    --    ALKPHOS  52   --    --     < > = values in this interval not displayed         Scheduled Meds:  Current Facility-Administered Medications:  acetaminophen 975 mg Oral Q8H PRN KACEY Samano cefepime 1,000 mg Intravenous Q12H Pelon Suero MD    docusate sodium 100 mg Oral BID PRN Gracie Tinajero MD    famotidine 20 mg Oral Daily Charl Alisa, CRNP    heparin (porcine) 5,000 Units Subcutaneous Q8H [address] Darshana Beavers, KACEY    insulin lispro 1-5 Units Subcutaneous HS Charl Alisa, CRNP    insulin lispro 12 Units Subcutaneous Daily [employer], KACEY    insulin lispro 2-12 Units Subcutaneous TID AC Charl Alisa, CRNP    lidocaine 1 patch Topical Daily KACEY Whitney    lisinopril 2 5 mg Oral Daily Gracie Tinajero MD    meclizine 12 5 mg Oral Daily Gracie Tinajero MD    ondansetron 4 mg Intravenous Q4H PRN Charl Alisa, CRNP    predniSONE 10 mg Oral Daily Charl Alisa, CRNP    sodium chloride 75 mL/hr Intravenous Continuous Viraj CoffmansKACEY Last Rate: 75 mL/hr (11/05/18 0118)     Continuous Infusions:  sodium chloride 75 mL/hr Last Rate: 75 mL/hr (11/05/18 0118)     PRN Meds:   acetaminophen    docusate sodium    ondansetron    Surgical procedures (if appropriate):

## 2018-11-05 NOTE — PLAN OF CARE
Problem: DISCHARGE PLANNING - CARE MANAGEMENT  Goal: Discharge to post-acute care or home with appropriate resources  INTERVENTIONS:  - Conduct assessment to determine patient/family and health care team treatment goals, and need for post-acute services based on payer coverage, community resources, and patient preferences, and barriers to discharge  - Address psychosocial, clinical, and financial barriers to discharge as identified in assessment in conjunction with the patient/family and health care team  - Arrange appropriate level of post-acute services according to patients   needs and preference and payer coverage in collaboration with the physician and health care team  - Communicate with and update the patient/family, physician, and health care team regarding progress on the discharge plan  - Arrange appropriate transportation to post-acute venues  Outcome: Progressing  Needs Iv atbx assessment, Picc LIne ?  VNA VS STR VS Infusion Center    Comments: Home  Infusion with PICC VS infusion Center

## 2018-11-05 NOTE — PLAN OF CARE
DISCHARGE PLANNING     Discharge to home or other facility with appropriate resources Progressing        GENITOURINARY - ADULT     Maintains or returns to baseline urinary function Progressing     Absence of urinary retention Progressing        INFECTION - ADULT     Absence or prevention of progression during hospitalization Progressing     Absence of fever/infection during neutropenic period Progressing        Knowledge Deficit     Patient/family/caregiver demonstrates understanding of disease process, treatment plan, medications, and discharge instructions Progressing        METABOLIC, FLUID AND ELECTROLYTES - ADULT     Electrolytes maintained within normal limits Progressing     Fluid balance maintained Progressing     Glucose maintained within target range Progressing        PAIN - ADULT     Verbalizes/displays adequate comfort level or baseline comfort level Progressing        Potential for Falls     Patient will remain free of falls Progressing        Prexisting or High Potential for Compromised Skin Integrity     Skin integrity is maintained or improved Progressing        RESPIRATORY - ADULT     Achieves optimal ventilation and oxygenation Progressing        SAFETY ADULT     Maintain or return to baseline ADL function Progressing     Maintain or return mobility status to optimal level Progressing        SKIN/TISSUE INTEGRITY - ADULT     Skin integrity remains intact Progressing     Incision(s), wounds(s) or drain site(s) healing without S/S of infection Progressing     Oral mucous membranes remain intact Progressing

## 2018-11-05 NOTE — PROGRESS NOTES
Progress Note - Infectious Disease   Lucero Corea 68 y o  male MRN: [id number]  Unit/Bed#: [place] 2 - Encounter: [id number]      Impression/Recommendations:  1  Severe sepsis, POA  Leukocytosis, tachycardia, lactic acidosis  Likely all secondary to UTI  Patient is clinically much improved on IV cefepime  Fever resolved  WBC decreasing  Lactic acidosis resolved  Blood cultures are negative so far  Antibiotic plan as in below  Monitor temperature/WBC  Monitor hemodynamics  Follow-up on pending blood cultures      2  Acute complicated  UTI, post lithotripsy  CT abdomen with no evidence of obstruction  Patient is clinically improved on IV cefepime  Urine culture is growing Pseudomonas again  Given allergy/intolerance to fluoroquinolones, there is no p o  Antibiotic option  Continue IV cefepime  Follow up on final susceptibility of Pseudomonas in urine culture  Treat x 14 days total    Patient will most likely need PICC line      3  Nephrolithiasis  Status post recent lithotripsy on 2018  CT abdomen on admission reveals nonobstructing nephrolithiasis  No hydronephrosis or other acute findings      4  MARSHALL, superimposed on CKD 3  This is most likely secondary to sepsis  Creatinine has decreased, back to baseline  Antibiotic at full dose  Monitor creatinine      5  Myasthenia gravis  On chronic prednisone 10 mg daily  Discussed with patient in detail regarding the above plan       Antibiotics:  Cefepime # 3    Subjective:  Patient feels better  No abdominal or flank pain  No urinary symptoms  Mental status is good, back to baseline  Temperature stays down  No chills  He is tolerating antibiotic well  No nausea, vomiting or diarrhea      Objective:  Vitals:  Temp:  [97 4 °F (36 3 °C)-98 1 °F (36 7 °C)] 97 4 °F (36 3 °C)  HR:  [84-97] 84  Resp:  [20] 20  BP: (142-172)/(68-81) 144/74  SpO2:  [94 %-95 %] 95 %  Temp (24hrs), Av 8 °F (36 6 °C), Min:97 4 °F (36 3 °C), Max:98 1 °F (36 7 °C)  Current: Temperature: (!) 97 4 °F (36 3 °C)    Physical Exam:     General: Awake, alert, cooperative, no distress  Neck:  Supple  No mass  No lymphadenopathy  Lungs: Expansion symmetric, no rales, no wheezing, respirations unlabored  Heart:  Regular rate and rhythm, S1 and S2 normal, no murmur  Abdomen: Soft, nondistended, non-tender, bowel sounds active all four quadrants,        no masses, no organomegaly  Extremities: No edema  No erythema/warmth  No ulcer  Nontender to palpation  Skin:  No rash  Neuro: Moves all extremities  Invasive Devices     Peripheral Intravenous Line            Peripheral IV 11/03/18 Left Antecubital 1 day    Peripheral IV 11/03/18 Right Antecubital 1 day                Labs studies:   I have personally reviewed pertinent labs  Results from last 7 days  Lab Units 11/05/18  0453 11/04/18  0537 11/03/18  2211   POTASSIUM mmol/L 3 7 4 4 4 3   CHLORIDE mmol/L 102 103 97*   CO2 mmol/L 24 26 22   BUN mg/dL 16 19 23   CREATININE mg/dL 1 09 1 33* 1 83*   EGFR ml/min/1 73sq m 67 53 36   CALCIUM mg/dL 8 7 8 5 9 7   AST U/L  --   --  41   ALT U/L  --   --  56   ALK PHOS U/L  --   --  52       Results from last 7 days  Lab Units 11/05/18  0453 11/04/18  0537 11/04/18  0116 11/03/18  2211   WBC Thousand/uL 12 97* 16 22*  --  25 01*   HEMOGLOBIN g/dL 13 4 13 8  --  16 1   PLATELETS Thousands/uL 221 232 240 352       Results from last 7 days  Lab Units 11/04/18  0028 11/03/18  2214 11/03/18  2211   BLOOD CULTURE   --  No Growth at 24 hrs  No Growth at 24 hrs  URINE CULTURE  60,000-69,000 cfu/ml Pseudomonas aeruginosa*  --   --        Imaging Studies:   I have personally reviewed pertinent imaging study reports and images in PACS  EKG, Pathology, and Other Studies:   I have personally reviewed pertinent reports

## 2018-11-05 NOTE — SOCIAL WORK
CM met with Patient and his niece  He lives with his wife in mobile home  He has 3 FLORENCIO  Patient works cleaning offices and homes  His wife works for [employer] Volunte[employer] in the community  [facility] or [place]  Pcp Janneth Yancey  Patient has Hx of Myesthenia Gravis  Patient voiced concerns that he will need a PICC line and weeks of Iv Antibiotics which could interfere with his Myasthenia Gravis which he states he is currently in remission from  CM assured the patient that the infectious disease doctor would research any antibiotic he prescribes to make sure it would not interfere with is (MG)  CM mentioned infusion Center and STR if IV antibiotics are too expensive for his family to administer at home  Patient did not want to discuss rehab

## 2018-11-05 NOTE — PLAN OF CARE
Problem: OCCUPATIONAL THERAPY ADULT  Goal: Performs self-care activities at highest level of function for planned discharge setting  See evaluation for individualized goals  Treatment Interventions: ADL retraining, Functional transfer training, UE strengthening/ROM, Endurance training, Patient/family training, Equipment evaluation/education, Compensatory technique education, Energy conservation, Activityengagement          See flowsheet documentation for full assessment, interventions and recommendations  Limitation: Decreased ADL status, Decreased UE strength, Decreased endurance, Decreased self-care trans, Decreased high-level ADLs  Prognosis: Good  Assessment: Pt is a 68 y o  male seen for OT evaluation s/p adm to [facility] on 11/3/2018 s/p fall resulting in acute L1 endplate vertebral body fracture  Pt w/ orders for TLSO brace when OOB  Comorbidities affecting pts functional performance include a significant PMH of anxiety, cervical spinal stenosis, chronic ulcerative colitis, HLD, Myasthenia gravis, and sleep apnea  Pt with active OT orders and activity orders for Activity as tolerated  Pt lives with spouse and grandson in a one level house with 4-5 FLORENCIO  At baseline, pt was I w/ ADLs, IADLs, and functional mobility/transfers w/o use of AD, (+) , FT employed, and reports 1 fall PTA  Upon evaluation, pt currently requires Min A-Supervision for overall ADLs, Supervision for bed mobility, and Min A-Supervision for functional mobility/transfers 2* the following deficits impacting occupational performance: weakness, decreased strength, decreased balance, decreased tolerance and increased pain   These impairments, as well at pts steps to enter environment, limited home support, difficulty performing ADLS and difficulty performing IADLS  limit pts ability to safely engage in all baseline areas of occupation, including grooming, bathing, dressing, toileting, functional mobility/transfers, community mobility, laundry , house maintenance, meal prep, cleaning and leisure activities   Pt scored overall 65/100 on the Barthel Index  Based on the aforementioned OT evaluation, functional performance deficits, and assessments, pt has been identified as a moderate complexity evaluation  Pt to continue to benefit from continued acute OT services during hospital stay to address defined deficits and to maximize level of functional independence in the following Occupational Performance areas: grooming, bathing/shower, toilet hygiene, dressing, medication management, socialization, health maintenance, functional mobility, community mobility, clothing management, cleaning, meal prep, household maintenance and social participation  From OT standpoint, recommend Home OT upon D/C   OT will continue to follow pt 3-5x/wk to address the following goals to  w/in 7-10 days:     OT Discharge Recommendation: Home OT  OT - OK to Discharge:  (when medically cleared pending stair training with PT)

## 2018-11-05 NOTE — OCCUPATIONAL THERAPY NOTE
[address] Evaluation     Patient Name: Jing Ochoa  YKRBV'E Date: 11/5/2018  Problem List  Patient Active Problem List   Diagnosis    Pseudomonas urinary tract infection    [facility]asthenia gravis ([facility]    Nephrolithiasis    Controlled type 2 diabetes mellitus without complication, with long-term current use of insulin ([facility] [facility] )    CKD (chronic kidney disease), stage III (HCC)    Hypercalcemia    Vertigo    Essential hypertension    Actinic keratosis    Benign non-nodular prostatic hyperplasia with lower urinary tract symptoms    Cervical radiculopathy    Cervical spinal stenosis    Diverticulosis    Hyperlipidemia    Kidney atrophy    Left anterior hemiblock    Microscopic hematuria    Polyneuropathy    RBBB (right bundle branch block)    Sleep apnea    Tarsal tunnel syndrome of right side    Tuberculosis exposure    Transition of care performed with sharing of clinical summary    Antibiotic-induced yeast infection    Complicated UTI (urinary tract infection)    Allergy to multiple antibiotics    Anxiety    History of recurrent UTIs    Fatigue    Calculus of kidney    Sepsis ([address]    Lactic acidosis     Past Medical History  Past Medical History:   Diagnosis Date    Anxiety     Cervical spinal stenosis     Hx of chronic ulcerative colitis     Hyperlipidemia     Myasthenia gravis ([facility] [address] [facility]     Sleep apnea      Past Surgical History  Past Surgical History:   Procedure Laterality Date    COLECTOMY      partial sigmoid    COLECTOMY LAPAROSCOPIC      partial sigmoid colectomy    COLON SURGERY      lap  partial colectomy sigmoid    COLONOSCOPY      LITHOTRIPSY      OTHER SURGICAL HISTORY      parotid bx    SALIVARY GLAND SURGERY      biopsy         11/05/18 1247   Note Type   Note type Eval only   Restrictions/Precautions   Weight Bearing Precautions Per Order No   Braces or Orthoses TLSO   Other Precautions Fall Risk;Pain;Multiple lines;Spinal precautions   Pain Assessment   Pain Assessment 0-10   Pain Score 4   Pain Type Acute pain   Pain Location Back   Pain Frequency Constant/continuous   Pain Onset Ongoing   Effect of Pain on Daily Activities No report of increased pain with activity   Patient's Stated Pain Goal No pain   Hospital Pain Intervention(s) Repositioned; Ambulation/increased activity; Emotional support; Rest   Response to Interventions Tolerated OT   Multiple Pain Sites No   Home Living   Type of Home Mobile home   Home Layout One level;Stairs to enter with rails  (4-5 FLORENCIO)   Bathroom Shower/Tub Tub/shower unit   Bathroom Toilet Standard   Bathroom Equipment Other (Comment)  (none per pt report)   Bathroom Accessibility Accessible   Home Equipment Walker;Cane   Additional Comments Pt lives with spouse and grandson in a one level house with 4-5 FLORENCIO  Prior Function   Level of [place] Independent with ADLs and functional mobility   Lives With Spouse; Other (Comment)  (Grandson, reports in high school and has Autism)   Receives Help From Family  (spouse is anticipating open heart surgery in a couple weeks)   ADL Assistance Independent   IADLs Independent   Falls in the last 6 months 1 to 4  (1 leading to admission)   Vocational Full time employment   Comments At baseline, pt was I w/ ADLs, IADLs, and functional mobility/transfers w/o use of AD, (+) , FT employed, and reports 1 fall PTA  Lifestyle   Autonomy At baseline, pt was I w/ ADLs, IADLs, and functional mobility/transfers w/o use of AD, (+) , FT employed, and reports 1 fall PTA     Reciprocal Relationships Lives with spouse and grandson   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   Intrinsic Gratification Spending time with family   Psychosocial   Psychosocial (WDL) WDL   ADL   Eating Assistance 6  Modified independent   Grooming Assistance 5  Supervision/Setup   UB Bathing Assistance 5  Supervision/Setup   LB Bathing Assistance 4  Minimal Assistance   [address] Supervision/Setup   LB Dressing Assistance 4  Minimal Assistance   Toileting Assistance  4  Minimal Assistance   Bed Mobility   Supine to Sit 5  Supervision   Additional items Assist x 1;HOB elevated; Bedrails; Increased time required;Verbal cues;LE management  (log roll technique)   Sit to Supine 5  Supervision   Additional items Increased time required;Verbal cues   Additional Comments Pt lying supine at end of session with call bell and phone within reach  All needs met and pt reports no further questions for OT at this time   Transfers   Sit to Stand 4  Minimal assistance   Additional items Assist x 1; Increased time required;Verbal cues   Stand to Sit 4  Minimal assistance   Additional items Assist x 1; Increased time required;Verbal cues   Additional Comments Cues for safe technique and hand placement   Functional Mobility   Functional Mobility 5  Supervision   Additional Comments Assist x1   Additional items Rolling walker   Balance   Static Sitting Fair +   Dynamic Sitting Fair   Static Standing Fair   Dynamic Standing Fair -   Ambulatory Fair -   Activity Tolerance   Activity Tolerance Patient limited by pain   Nurse Made Aware Pt appropriate to be seen per Ike Shoemaker RN   RUE Assessment   RUE Assessment [facility]   RUE Strength   RUE Overall Strength Within Functional Limits - able to perform ADL tasks with strength  (3+/5 throughout)   LUE Assessment   LUE Assessment WFL   LUE Strength   LUE Overall Strength Within Functional Limits - able to perform ADL tasks with strength  (3+/5 throughout)   Hand Function   Gross Motor Coordination Functional   Fine Motor Coordination Functional   Sensation   Light Touch No apparent deficits   Sharp/Dull No apparent deficits   Proprioception   Proprioception No apparent deficits   Vision-Basic Assessment   Current Vision Wears glasses all the time   Vision - Complex Assessment   Ocular Range of Motion [facility]   Acuity Able to read clock/calendar on wall without difficulty   Perception Inattention/Neglect Appears intact   Cognition   Overall Cognitive Status WFL   Arousal/Participation Alert; Cooperative   Attention Within functional limits   Orientation Level Oriented X4   Memory Within functional limits   Following Commands Follows one step commands without difficulty   Comments Pt pleasant and cooperative; Engages in conversation appropriately   Assessment   Limitation Decreased ADL status; Decreased UE strength;Decreased endurance;Decreased self-care trans;Decreased high-level ADLs   Prognosis Good   Assessment Pt is a 68 y o  male seen for OT evaluation s/p adm to [facility] on 11/3/2018 s/p fall resulting in acute L1 endplate vertebral body fracture  Pt w/ orders for TLSO brace when OOB  Comorbidities affecting pts functional performance include a significant PMH of anxiety, cervical spinal stenosis, chronic ulcerative colitis, HLD, Myasthenia gravis, and sleep apnea  Pt with active OT orders and activity orders for Activity as tolerated  Pt lives with spouse and grandson in a one level house with 4-5 FLORENCIO  At baseline, pt was I w/ ADLs, IADLs, and functional mobility/transfers w/o use of AD, (+) , FT employed, and reports 1 fall PTA  Upon evaluation, pt currently requires Min A-Supervision for overall ADLs, Supervision for bed mobility, and Min A-Supervision for functional mobility/transfers 2* the following deficits impacting occupational performance: weakness, decreased strength, decreased balance, decreased tolerance and increased pain  These impairments, as well at pts steps to enter environment, limited home support, difficulty performing ADLS and difficulty performing IADLS  limit pts ability to safely engage in all baseline areas of occupation, including grooming, bathing, dressing, toileting, functional mobility/transfers, community mobility, laundry , house maintenance, meal prep, cleaning and leisure activities   Pt scored overall 65/100 on the Barthel Index  Based on the aforementioned OT evaluation, functional performance deficits, and assessments, pt has been identified as a moderate complexity evaluation  Pt to continue to benefit from continued acute OT services during hospital stay to address defined deficits and to maximize level of functional independence in the following Occupational Performance areas: grooming, bathing/shower, toilet hygiene, dressing, medication management, socialization, health maintenance, functional mobility, community mobility, clothing management, cleaning, meal prep, household maintenance and social participation  From OT standpoint, recommend Home OT upon D/C  OT will continue to follow pt 3-5x/wk to address the following goals to  w/in 7-10 days:   Goals   Patient Goals To get back to work   LTG Time Frame 7-10   Long Term Goal Please refer to LTGs listed below   Plan   Treatment Interventions ADL retraining;Functional transfer training;UE strengthening/ROM; Endurance training;Patient/family training;Equipment evaluation/education; Compensatory technique education; Energy conservation; Activityengagement   Goal Expiration Date 11/15/18   Treatment Day 0   OT Frequency 3-5x/wk   Recommendation   OT Discharge Recommendation Home OT   OT - OK to Discharge (when medically cleared pending stair training with PT)   Barthel Index   Feeding 10   Bathing 0   Grooming Score 5   Dressing Score 5   Bladder Score 10   Bowels Score 10   Toilet Use Score 5   Transfers (Bed/Chair) Score 10   Mobility (Level Surface) Score 10   Stairs Score 0   Barthel Index Score 65   Modified [place] Scale   Modified [place] Scale 3        GOALS    1) Pt will improve activity tolerance to G for min 30 min txment sessions    2) Pt will complete bed mobility at a Mod I level (log roll technique) w/ G balance/safety demonstrated     3) Pt will demonstrate 100% adherence to spinal precautions during all functional activities w/o cues from therapist     4) Pt will complete UB/LB dressing/self care w/ mod I using adaptive device and DME as needed    5) Pt will complete bathing w/ Mod I w/ use of AE and DME as needed    6) Pt will complete toileting w/ mod I w/ G hygiene/thoroughness using DME as needed    7) Pt will improve functional transfers to Mod I on/off all surfaces using DME as needed w/ G balance/safety     8) Pt will improve functional mobility during ADL/IADL/leisure tasks to Mod I using DME as needed w/ G balance/safety     9) Pt will participate in simulated IADL management task to increase independence to Mod I w/ G safety and endurance    10) Pt will engage in ongoing cognitive assessment w/ G participation w/ mod I to assist w/ safe d/c planning/recommendations    11) Pt will demonstrate G carryover of pt/caregiver education and training as appropriate w/ mod I w/o cues w/ good tolerance    12) Pt will demonstrate 100% carryover of energy conservation techniques w/ mod I t/o functional I/ADL/leisure tasks w/o cues s/p skilled education     13) Pt will increase BUE strength by 1 MM grade to increase independence in ADLs and transfers      Storm Massey, OTR/L

## 2018-11-05 NOTE — UTILIZATION REVIEW
Initial Clinical Review    Admission: Date/Time/Statement: 11/4/18 @ 0001 Inpatient Written     Orders Placed This Encounter   Procedures    Inpatient Admission     Standing Status:   Standing     Number of Occurrences:   1     Order Specific Question:   Admitting Physician     Answer:   Amelia Marr [[id number]]     Order Specific Question:   Level of Care     Answer:   Med Surg [[id number]]     Order Specific Question:   Estimated length of stay     Answer:   More than 2 Midnights     Order Specific Question:   Certification     Answer:   I certify that inpatient services are medically necessary for this patient for a duration of greater than two midnights  See H&P and MD Progress Notes for additional information about the patient's course of treatment  ED: Date/Time/Mode of Arrival:   ED Arrival Information     Expected Arrival Acuity Means of Arrival Escorted By Service Admission Type    - 11/3/2018 21:50 Emergent Wheelchair Family Member Hospitalist Urgent    Arrival Complaint    Weakness          Chief Complaint:   Chief Complaint   Patient presents with    Fever - 9 weeks to 74 years     Pt reports having lithotripsy on Friday and since then has not been able to urinate appropriately  Pt states he was feeling warm all day and when he got up to urinate after a nap he got dizzy and fell backwards 10 feet and his his dresser  Pt reports back pain due to fall, but complains of no neckpain   Dizziness     Pt states he is also naseous  History of Illness: Patient presents for evaluation after a fall tonight  He states that he recently was discharged from [facility] for lithotripsy  He has been home for a day  He states that he has vertigo and has chronic dizziness but states that tonight was different  He has been feeling weak, tired and dizzy since he came home  He states that tonight he was laying in bed, got up and fell backwards about 8-10 feet and hit his dresser    States that he hit his head and his low back  He was unable to get up  Patient denies any loss of consciousness  He is not currently on any blood thinners  States that he has not been eating or drinking well  He has been extremely fatigued with chills  He states that he feels warm but is not sure if he has an actual fever  States that he has also not urinating appropriately since his procedure  He states that no stones were passed yet he feels that he is blocked  ED Vital Signs:   ED Triage Vitals   Temperature Pulse Respirations Blood Pressure SpO2   11/03/18 2155 11/03/18 2155 11/03/18 2155 11/03/18 2155 11/03/18 2155   99 4 °F (37 4 °C) (!) 135 (!) 25 146/84 94 %      Temp Source Heart Rate Source Patient Position - Orthostatic VS BP Location FiO2 (%)   11/04/18 0200 11/03/18 2321 11/03/18 2155 11/03/18 2155 --   Temporal Monitor Lying Right arm       Pain Score       11/03/18 2155       5        Wt Readings from Last 1 Encounters:   11/03/18 85 7 kg (189 lb)       Vital Signs (abnormal): -135, /80    Abnormal Labs/Diagnostic Test Results:   WBC 25 01*     CL 97 (L)   CREATININE 1 83 (H)     Leukocytes, UA Small     Protein, UA 30 (1+)     Ketones, UA 15 (1+)     Blood, UA Large       RBC, UA 30-50     WBC, UA 20-30       CTc/a/p: Acute fracture of the superior endplate of the L1 vertebral body with mild depression  2   5 mm nodule within the left upper lobe, stable since 2010   No further follow-up is recommended  3   Cholelithiasis without evidence of cholecystitis  4   New punctate hyperdensities at the bilateral ureterovesicular junctions may reflect calculi, less likely postprocedural calcifications   There are bilateral punctate nonobstructing nephrolithiasis   No hydronephrosis or significant hydroureter  5   Small hiatal hernia  CT Head:  No intracranial hemorrhage or calvarial fracture  CT Cspine:  No cervical spine fracture or traumatic malalignment    XR Lspine:  Slightly increased moderate compression deformity of L1  No subluxation  EKG:   Sinus tachycardia    ED Treatment:   Medication Administration from 11/03/2018 2150 to 11/04/2018 0153       Date/Time Order Dose Route Action     11/03/2018 2242 sodium chloride 0 9 % bolus 1,000 mL 1,000 mL Intravenous New Bag     11/03/2018 2245 ondansetron (ZOFRAN) injection 4 mg 4 mg Intravenous Given     11/03/2018 2320 ceFAZolin (ANCEF) IVPB (premix) 2,000 mg 2,000 mg Intravenous New Bag     11/03/2018 2320 sodium chloride 0 9 % bolus 1,000 mL 1,000 mL Intravenous New Bag     11/04/2018 0021 sodium chloride 0 9 % infusion 600 mL/hr Intravenous New Bag     11/04/2018 0111 sodium chloride 0 9 % infusion 125 mL/hr Intravenous New Bag          Past Medical/Surgical History:    Active Ambulatory Problems     Diagnosis Date Noted    Pseudomonas urinary tract infection     [facility]asthenia gravis ([facility] [address] )     Nephrolithiasis     Controlled type 2 diabetes mellitus without complication, with long-term current use of insulin ([facility] [address] ) 03/06/2017    CKD (chronic kidney disease), stage III ([facility] [address] ) 02/14/2018    Hypercalcemia 02/14/2018    Vertigo 02/14/2018    Essential hypertension 02/25/2018    Actinic keratosis 10/05/2016    Benign non-nodular prostatic hyperplasia with lower urinary tract symptoms 01/15/2016    Cervical radiculopathy 09/23/2014    Cervical spinal stenosis 10/31/2014    Diverticulosis 07/02/2015    Hyperlipidemia 05/28/2014    Kidney atrophy 03/24/2017    Left anterior hemiblock 03/28/2016    Microscopic hematuria 09/20/2013    Polyneuropathy 04/23/2015    RBBB (right bundle branch block) 03/28/2016    Sleep apnea 02/04/2016    Tarsal tunnel syndrome of right side     Tuberculosis exposure 08/22/2018    Transition of care performed with sharing of clinical summary 09/20/2018    Antibiotic-induced yeast infection 90/11/3505    Complicated UTI (urinary tract infection) 10/04/2018    Allergy to multiple antibiotics 10/04/2018    Anxiety  History of recurrent UTIs 10/16/2018    Fatigue 10/16/2018    Calculus of kidney 10/19/2018     Resolved Ambulatory Problems     Diagnosis Date Noted    MARSHALL (acute kidney injury) ([facility] [address] ) 01/10/2017    Gastroesophageal reflux disease without esophagitis 03/06/2017    UTI (urinary tract infection) 10/04/2018     Past Medical History:   Diagnosis Date    Anxiety     Cervical spinal stenosis     Hx of chronic ulcerative colitis     Hyperlipidemia     Myasthenia gravis ([facility] [address] )     Sleep apnea        Admitting Diagnosis: Sinus tachycardia [R00 0]  Dizziness [R42]  Leukocytosis [D72 829]  Weakness [R53 1]  Fever [R50 9]  Acute kidney injury ([facility] [address] ) [H10 7]  Complicated UTI (urinary tract infection) [N39 0]  Closed L1 vertebral fracture (HCC) [S32 019A]  Closed head injury, initial encounter [S09 90XA]    Age/Sex: 68 y o  male    Assessment/Plan: Pt direct to ED from home  Admit for sepsis, UTI, renal failure stage 3, DM, fall, Mysasthenia Gravis  IVF bolus 2 6L NSS, continue ABX, repeat labs, blood and urine cxs, consult ID, monitor urine output    VTE Prophylaxis: Heparin  Patient will be admitted as inpatient and require a greater than 2 midnight stay    Admission Orders:  Telemetry   Shukri/Carb controlled diet  OOB as alejandro, spine brace  Accuchecks QAC and QHS with coverage  Pt, ot  Sequential compression device   Blood cxs pending  EKG    Scheduled Meds:   Current Facility-Administered Medications:  acetaminophen 975 mg Oral Q8H PRN   cefepime 2,000 mg Intravenous Q24H   docusate sodium 100 mg Oral BID PRN   famotidine 20 mg Oral Daily   heparin (porcine) 5,000 Units Subcutaneous Q8H [facility]   insulin lispro 1-5 Units Subcutaneous HS   insulin lispro 12 Units Subcutaneous Daily With Dinner   insulin lispro 2-12 Units Subcutaneous TID AC   lidocaine 1 patch Topical Daily   lisinopril 2 5 mg Oral Daily   meclizine 12 5 mg Oral Daily   ondansetron 4 mg Intravenous Q4H PRN   predniSONE 10 mg Oral Daily   sodium chloride 75 mL/hr Intravenous Continuous     Continuous Infusions:   sodium chloride 75 mL/hr Last Rate: 75 mL/hr (11/05/18 0118)     PRN Meds:   Acetaminophen 975mg PO x1 thus far    docusate sodium 100mg PO x1 thus far    Ondansetron 4mg IV x3 thus far    [address]  Utilization Review Department  Phone: [phone]; Fax [phone]  ATTENTION: Please call with any questions or concerns to [phone]  and carefully listen to the prompts so that you are directed to the right person  Send all requests for admission clinical reviews, approved or denied determinations and any other requests to fax [phone]   All voicemails are confidential

## 2018-11-06 LAB
ANION GAP SERPL CALCULATED.3IONS-SCNC: 10 MMOL/L (ref 4–13)
BACTERIA UR CULT: ABNORMAL
BASOPHILS # BLD AUTO: 0.05 THOUSANDS/ΜL (ref 0–0.1)
BASOPHILS NFR BLD AUTO: 0 % (ref 0–1)
BUN SERPL-MCNC: 20 MG/DL (ref 5–25)
CALCIUM SERPL-MCNC: 9.3 MG/DL (ref 8.3–10.1)
CHLORIDE SERPL-SCNC: 103 MMOL/L (ref 100–108)
CO2 SERPL-SCNC: 26 MMOL/L (ref 21–32)
CREAT SERPL-MCNC: 1.21 MG/DL (ref 0.6–1.3)
EOSINOPHIL # BLD AUTO: 0.22 THOUSAND/ΜL (ref 0–0.61)
EOSINOPHIL NFR BLD AUTO: 2 % (ref 0–6)
ERYTHROCYTE [DISTWIDTH] IN BLOOD BY AUTOMATED COUNT: 12.8 % (ref 11.6–15.1)
GFR SERPL CREATININE-BSD FRML MDRD: 59 ML/MIN/1.73SQ M
GLUCOSE SERPL-MCNC: 101 MG/DL (ref 65–140)
GLUCOSE SERPL-MCNC: 108 MG/DL (ref 65–140)
GLUCOSE SERPL-MCNC: 119 MG/DL (ref 65–140)
GLUCOSE SERPL-MCNC: 168 MG/DL (ref 65–140)
GLUCOSE SERPL-MCNC: 99 MG/DL (ref 65–140)
HCT VFR BLD AUTO: 43.1 % (ref 36.5–49.3)
HGB BLD-MCNC: 14.9 G/DL (ref 12–17)
IMM GRANULOCYTES # BLD AUTO: 0.09 THOUSAND/UL (ref 0–0.2)
IMM GRANULOCYTES NFR BLD AUTO: 1 % (ref 0–2)
LYMPHOCYTES # BLD AUTO: 1.72 THOUSANDS/ΜL (ref 0.6–4.47)
LYMPHOCYTES NFR BLD AUTO: 14 % (ref 14–44)
MCH RBC QN AUTO: 31.6 PG (ref 26.8–34.3)
MCHC RBC AUTO-ENTMCNC: 34.6 G/DL (ref 31.4–37.4)
MCV RBC AUTO: 92 FL (ref 82–98)
MONOCYTES # BLD AUTO: 1.1 THOUSAND/ΜL (ref 0.17–1.22)
MONOCYTES NFR BLD AUTO: 9 % (ref 4–12)
NEUTROPHILS # BLD AUTO: 8.73 THOUSANDS/ΜL (ref 1.85–7.62)
NEUTS SEG NFR BLD AUTO: 74 % (ref 43–75)
NRBC BLD AUTO-RTO: 0 /100 WBCS
PLATELET # BLD AUTO: 251 THOUSANDS/UL (ref 149–390)
PMV BLD AUTO: 9.7 FL (ref 8.9–12.7)
POTASSIUM SERPL-SCNC: 3.8 MMOL/L (ref 3.5–5.3)
RBC # BLD AUTO: 4.71 MILLION/UL (ref 3.88–5.62)
SODIUM SERPL-SCNC: 139 MMOL/L (ref 136–145)
WBC # BLD AUTO: 11.91 THOUSAND/UL (ref 4.31–10.16)

## 2018-11-06 PROCEDURE — 99232 SBSQ HOSP IP/OBS MODERATE 35: CPT | Performed by: INTERNAL MEDICINE

## 2018-11-06 PROCEDURE — C1751 CATH, INF, PER/CENT/MIDLINE: HCPCS

## 2018-11-06 PROCEDURE — 85025 COMPLETE CBC W/AUTO DIFF WBC: CPT | Performed by: INTERNAL MEDICINE

## 2018-11-06 PROCEDURE — 82948 REAGENT STRIP/BLOOD GLUCOSE: CPT

## 2018-11-06 PROCEDURE — 97535 SELF CARE MNGMENT TRAINING: CPT

## 2018-11-06 PROCEDURE — 97530 THERAPEUTIC ACTIVITIES: CPT

## 2018-11-06 PROCEDURE — 05HB33Z INSERTION OF INFUSION DEVICE INTO RIGHT BASILIC VEIN, PERCUTANEOUS APPROACH: ICD-10-PCS | Performed by: INTERNAL MEDICINE

## 2018-11-06 PROCEDURE — 97116 GAIT TRAINING THERAPY: CPT

## 2018-11-06 PROCEDURE — 80048 BASIC METABOLIC PNL TOTAL CA: CPT | Performed by: INTERNAL MEDICINE

## 2018-11-06 PROCEDURE — 36569 INSJ PICC 5 YR+ W/O IMAGING: CPT

## 2018-11-06 RX ADMIN — FAMOTIDINE 20 MG: 20 TABLET ORAL at 08:59

## 2018-11-06 RX ADMIN — LISINOPRIL 2.5 MG: 2.5 TABLET ORAL at 08:59

## 2018-11-06 RX ADMIN — CEFEPIME HYDROCHLORIDE 1000 MG: 1 INJECTION, POWDER, FOR SOLUTION INTRAMUSCULAR; INTRAVENOUS at 12:41

## 2018-11-06 RX ADMIN — LIDOCAINE 1 PATCH: 50 PATCH TOPICAL at 09:00

## 2018-11-06 RX ADMIN — HEPARIN SODIUM 5000 UNITS: 5000 INJECTION INTRAVENOUS; SUBCUTANEOUS at 22:08

## 2018-11-06 RX ADMIN — PREDNISONE 10 MG: 10 TABLET ORAL at 08:59

## 2018-11-06 RX ADMIN — ONDANSETRON 4 MG: 2 INJECTION INTRAMUSCULAR; INTRAVENOUS at 09:05

## 2018-11-06 RX ADMIN — HEPARIN SODIUM 5000 UNITS: 5000 INJECTION INTRAVENOUS; SUBCUTANEOUS at 13:45

## 2018-11-06 RX ADMIN — CEFEPIME HYDROCHLORIDE 1000 MG: 1 INJECTION, POWDER, FOR SOLUTION INTRAMUSCULAR; INTRAVENOUS at 01:40

## 2018-11-06 RX ADMIN — ACETAMINOPHEN 975 MG: 325 TABLET, FILM COATED ORAL at 09:05

## 2018-11-06 RX ADMIN — ACETAMINOPHEN 975 MG: 325 TABLET, FILM COATED ORAL at 19:29

## 2018-11-06 RX ADMIN — INSULIN LISPRO 2 UNITS: 100 INJECTION, SOLUTION INTRAVENOUS; SUBCUTANEOUS at 17:59

## 2018-11-06 RX ADMIN — MECLIZINE 12.5 MG: 12.5 TABLET ORAL at 08:59

## 2018-11-06 RX ADMIN — HEPARIN SODIUM 5000 UNITS: 5000 INJECTION INTRAVENOUS; SUBCUTANEOUS at 05:40

## 2018-11-06 NOTE — SOCIAL WORK
Per Dr Ruth Amezquita and ID, pt to be discharged tomorrow on home IV abx  CM sent referral and scripts to [facility]  [facility] can meet pt at home at 1:30 to assist in educating pt on self administering  Cm made Dr Ruth Amezquita aware of this discharge time  Pt is able to have friend transport home  Pt is agreeable to [phone] copay cost in total of cefepine and supplies  Cm following

## 2018-11-06 NOTE — PROGRESS NOTES
[facility] Internal Medicine Progress Note  Patient: Emely Porter 68 y o  male   MRN: [id number]  PCP: Keisha Paul DO  Unit/Bed#: [place] [facility] 2 -01 Encounter: [id number]  Date Of Visit: 11/06/18    Assessment:    Principal Problem:    Sepsis ([facility] )  Active Problems:    Myasthenia gravis ([facility] [address] )    Controlled type 2 diabetes mellitus without complication, with long-term current use of insulin ([facility])    CKD (chronic kidney disease), stage III ([facility] [address] )    Essential hypertension    Complicated UTI (urinary tract infection)    Lactic acidosis      Plan: This is a 66-year-old male with history of chronic UTI, kidney stones status post lithotripsy on 11/02/2018 by Urology presenting on 11/03/2018 for evaluation of fall at home found to be in sepsis      1 ) Severe sepsis present on admission secondary to UTI  -evidence of leukocytosis, tachycardia, lactic acidosis  -patient receive IV fluids  -WBC trending down from 25 to 12 97  -likely secondary to UTI  -continue with cefepime, urien cx reveals pseudomonas, will need 14 days of Rx, today is day 4  -status post PICC line on on 11/06/2018  -infectious Disease consultation appreciated     2  ) Acute on chronic CKD stage 3  -creatinine improved to 1 21   -will monitor renal function, avoid nephrotoxin     3 ) Diabetes mellitus  -continue Lantus, monitor Accu-Cheks, sliding scale for coverage     4  ) Mechanical fall with acute L1 fracture  -Trauma and Neurosurgery consulted ED  -patient neurologically intact, okay to remain at [facility], no need to transfer  -command TLSO brace for fracture  -PT OT recommending home with family support versus rehab pending progress     5 ) Myasthenia gravis  -continue present 10 mg daily     6  ) Nephrolithiasis status post recent lithotripsy  -CT abdomen pelvis reveals nonobstructing nephrolithiasis     VTE Pharmacologic Prophylaxis:   Pharmacologic: heparin    Patient Centered Rounds: I have performed bedside rounds with nursing staff today     Discussions with Specialists or Other Care Team Provider:  Infectious Disease    Time Spent for Care: 20 minutes  More than 50% of total time spent on counseling and coordination of care as described above  Current Length of Stay: 2 day(s)    Current Patient Status: Inpatient   Certification Statement: The patient will continue to require additional inpatient hospital stay due to UTI    Discharge Plan / Estimated Discharge Date: 24-48 hours    Code Status: Level 1 - Full Code      Subjective:   Patient seen and examined at bedside, currently denies any complaints  Objective:     Vitals:   Temp (24hrs), Av 5 °F (36 4 °C), Min:97 °F (36 1 °C), Max:98 2 °F (36 8 °C)    Temp:  [97 °F (36 1 °C)-98 2 °F (36 8 °C)] 98 2 °F (36 8 °C)  HR:  [80-90] 88  Resp:  [18-20] 18  BP: (117-169)/(74-83) 154/74  SpO2:  [93 %-94 %] 93 %  Body mass index is 27 91 kg/m²  Input and Output Summary (last 24 hours): Intake/Output Summary (Last 24 hours) at 18 1501  Last data filed at 18 0859   Gross per 24 hour   Intake                0 ml   Output             1075 ml   Net            -1075 ml       Physical Exam:    Constitutional: Patient is oriented to person, place and time, no acute distress  HEENT:  Normocephalic, atraumatic, EOMI, PERRLA, no scleral icterus, no pallor, moist oral mucosa  Neck:  Supple, no masses, no thyromegaly, no bruits Normal range of motion  Lymph nodes:  No lymphadenopathy  Cardiovascular: Normal S1S2, RRR, No murmurs/rubs/gallops appreciated  Pulmonary: Clear to auscultation bilaterally, No rhonchi/rales/wheezing appreciated  Abdominal: Soft, Bowel sounds present, Non-tender, Non-distended, No rebound/guarding, no hepatomegaly   Musculoskeletal: No tenderness/abnormality   Extremities:  No cyanosis, clubbing or edema   Peripheral pulses palpable and equal bilaterally  Neurological: Cranial nerves II-XII grossly intact, sensation intact, otherwise no focal neurological symptoms  Skin: Skin is warm and dry, no rashes  Additional Data:     Labs:      Results from last 7 days  Lab Units 11/06/18  0502   WBC Thousand/uL 11 91*   HEMOGLOBIN g/dL 14 9   HEMATOCRIT % 43 1   PLATELETS Thousands/uL 251   NEUTROS PCT % 74   LYMPHS PCT % 14   MONOS PCT % 9   EOS PCT % 2       Results from last 7 days  Lab Units 11/06/18  0502  11/03/18  2211   POTASSIUM mmol/L 3 8  < > 4 3   CHLORIDE mmol/L 103  < > 97*   CO2 mmol/L 26  < > 22   BUN mg/dL 20  < > 23   CREATININE mg/dL 1 21  < > 1 83*   CALCIUM mg/dL 9 3  < > 9 7   ALK PHOS U/L  --   --  52   ALT U/L  --   --  56   AST U/L  --   --  41   < > = values in this interval not displayed  Results from last 7 days  Lab Units 11/03/18  2211   INR  1 10        I Have Reviewed All Lab Data Listed Above  Recent Cultures (last 7 days):       Results from last 7 days  Lab Units 11/04/18  0028 11/03/18  2214 11/03/18  2211   BLOOD CULTURE   --  No Growth at 48 hrs  No Growth at 48 hrs     URINE CULTURE  60,000-69,000 cfu/ml Pseudomonas aeruginosa*  --   --        Last 24 Hours Medication List:     Current Facility-Administered Medications:  acetaminophen 975 mg Oral Q8H PRN KACEY Gustafson    cefepime 1,000 mg Intravenous Q12H Mark Sexton MD Last Rate: 1,000 mg (11/06/18 1241)   docusate sodium 100 mg Oral BID PRN Zaria Bello MD    famotidine 20 mg Oral Daily KACEY Gustafson    heparin (porcine) 5,000 Units Subcutaneous Q8H [address] KACEY Whitney    insulin lispro 1-5 Units Subcutaneous HS KACEY Gustafson    insulin lispro 12 Units Subcutaneous Daily With [employer]icky CompanyKACEY    insulin lispro 2-12 Units Subcutaneous TID AC KACEY Gustafson    lidocaine 1 patch Topical Daily KACEY Whitney    lisinopril 2 5 mg Oral Daily Zaria Bello MD    meclizine 12 5 mg Oral Daily Zaria Bello MD    ondansetron 4 mg Intravenous Q4H PRN KACEY Gustafson    predniSONE 10 mg Oral Daily KACEY Gustafson         Today, Patient Was Seen By: Ashwini Marshall MD

## 2018-11-06 NOTE — PROGRESS NOTES
Progress Note - Infectious Disease   Kenna Dear 68 y o  male MRN: [id number]  Unit/Bed#: [place] 2 -01 Encounter: [id number]      Impression/Recommendations:  1  Severe sepsis, POA   Leukocytosis, tachycardia, lactic acidosis   Likely all secondary to UTI  Patient is clinically much improved on IV cefepime  Fever resolved  WBC decreasing, near normalized  Lactic acidosis resolved  Blood cultures remain negative  Antibiotic plan as in below  Monitor temperature/WBC  Monitor hemodynamics  Follow-up on pending blood cultures      2  Acute complicated  UTI, post lithotripsy   CT abdomen with no evidence of obstruction  Patient is clinically improved on IV cefepime  Urine culture is growing Pseudomonas again  Given allergy/intolerance to fluoroquinolones, there is no p o  Antibiotic option  Continue IV cefepime  Follow up on final susceptibility of Pseudomonas in urine culture  Treat x 14 days total, another 10 days  PICC placement      3  Nephrolithiasis   Status post recent lithotripsy on 11/02/2018   CT abdomen on admission reveals nonobstructing nephrolithiasis   No hydronephrosis or other acute findings      4  MARSHALL, superimposed on CKD 3  This is most likely secondary to sepsis  Creatinine has decreased, back to baseline  Antibiotic at full dose  Monitor creatinine      5  Myasthenia gravis   On chronic prednisone 10 mg daily        Discussed with patient in detail regarding the above plan    Discussed with Dr Jacki Norwood from [facility] service  Hopefully discharge in 24 hr      Antibiotics:  Cefepime # 4     Subjective:  Patient feels well  No abdominal or flank pain  No urinary symptoms  Mental status is good, at baseline  Temperature stays down  No chills  He is tolerating antibiotic well  No nausea, vomiting or diarrhea      Objective:  Vitals:  Temp:  [97 °F (36 1 °C)-98 2 °F (36 8 °C)] 98 2 °F (36 8 °C)  HR:  [80-90] 88  Resp:  [18-20] 18  BP: (117-169)/(74-83) 154/74  SpO2:  [93 %-94 %] 93 %  Temp (24hrs), Av 5 °F (36 4 °C), Min:97 °F (36 1 °C), Max:98 2 °F (36 8 °C)  Current: Temperature: 98 2 °F (36 8 °C)    Physical Exam:     General: Awake, alert, cooperative, no distress  Neck:  Supple  No mass  No lymphadenopathy  Lungs: Expansion symmetric, no rales, no wheezing, respirations unlabored  Heart:  Regular rate and rhythm, S1 and S2 normal, no murmur  Abdomen: Soft, nondistended, non-tender, bowel sounds active all four quadrants,        no masses, no organomegaly  Extremities: No edema  No erythema/warmth  No ulcer  Nontender to palpation  Skin:  No rash  Neuro: Moves all extremities  Invasive Devices     Peripherally Inserted Central Catheter Line            PICC Line 15/44/61 Right Basilic less than 1 day                Labs studies:   I have personally reviewed pertinent labs  Results from last 7 days  Lab Units 18  0502 18  0453 18  0537 181   POTASSIUM mmol/L 3 8 3 7 4 4 4 3   CHLORIDE mmol/L 103 102 103 97*   CO2 mmol/L 26 24 26 22   BUN mg/dL 20 16 19 23   CREATININE mg/dL 1 21 1 09 1 33* 1 83*   EGFR ml/min/1 73sq m 59 67 53 36   CALCIUM mg/dL 9 3 8 7 8 5 9 7   AST U/L  --   --   --  41   ALT U/L  --   --   --  56   ALK PHOS U/L  --   --   --  52       Results from last 7 days  Lab Units 18  0502 18  0453 18  0537   WBC Thousand/uL 11 91* 12 97* 16 22*   HEMOGLOBIN g/dL 14 9 13 4 13 8   PLATELETS Thousands/uL 251 221 232       Results from last 7 days  Lab Units 18  0028 18  2214 18  2211   BLOOD CULTURE   --  No Growth at 48 hrs  No Growth at 48 hrs  URINE CULTURE  60,000-69,000 cfu/ml Pseudomonas aeruginosa*  --   --        Imaging Studies:   I have personally reviewed pertinent imaging study reports and images in PACS  EKG, Pathology, and Other Studies:   I have personally reviewed pertinent reports

## 2018-11-06 NOTE — NURSING NOTE
Patient was complaining of SOB  His o2 stats were 88%  Put patient on 2l and is currently stating at 95% non-symptomatic

## 2018-11-06 NOTE — TREATMENT PLAN
Contacted by a trauma team regarding L1 fracture  Images reviewed personally by attending  CT with superior endplate compression of L1 without posterior element involvement  Upright x-rays revealed grossly stable anatomical alignment with expected further loss of height of L1 related to settling/axial loading  Continue conservative management with TLSO brace when out of bed  Will plan for outpatient follow-up in 2-4 weeks with repeat upright x-rays    Continue to monitor neurological exam and contact service if patient were to developed any severe increase in back pain, lower extremity pain, numbness, tingling and or weakness along with any bowel or bladder incontinence/retention

## 2018-11-06 NOTE — PROCEDURES
Insert PICC line  Date/Time: 11/6/2018 10:50 AM  Performed by: Param Das by: Cecy Da Silva     Patient location:  Bedside  Consent:     Consent obtained:  Written    Consent given by:  Patient    Procedural risks discussed: consent obtained by physician  [place] protocol:     Procedure explained and questions answered to patient or proxy's satisfaction: yes      Relevant documents present and verified: yes      Test results available and properly labeled: yes      Radiology Images displayed and confirmed  If images not available, report reviewed: yes      Required blood products, implants, devices, and special equipment available: yes      Site/side marked: yes      Immediately prior to procedure, a time out was called: yes      Patient identity confirmed:  Verbally with patient, arm band, provided demographic data and hospital-assigned identification number  Pre-procedure details:     Hand hygiene: Hand hygiene performed prior to insertion      Sterile barrier technique: All elements of maximal sterile technique followed      Skin preparation:  ChloraPrep    Skin preparation agent: Skin preparation agent completely dried prior to procedure    Indications:     PICC line indications: long term antibiotics    Anesthesia (see MAR for exact dosages):      Anesthesia method:  Local infiltration (3ml)    Local anesthetic:  Lidocaine 1% w/o epi  Procedure details:     Location:  Basilic    Vessel type: vein      Laterality:  Right    Approach: percutaneous technique used      Patient position:  Flat    Procedural supplies:  Double lumen    Catheter size:  5 Fr    Landmarks identified: yes      Ultrasound guidance: yes      Sterile ultrasound techniques: Sterile gel and sterile probe covers were used      Number of attempts:  1    Successful placement: yes      Vessel of catheter tip end:  Sherlock 3CG confirmed (sherlock 3cg procedure record confirmed placement sent to medical records)    Total catheter length (cm):  41    Catheter out on skin (cm):  0    Max flow rate:  999ml/hr    Arm circumference:  29cm  Post-procedure details:     Post-procedure:  Dressing applied and securement device placed    Assessment:  Blood return through all ports and free fluid flow (sherlock 3cg confirmed placement)    Post-procedure complications: none      Patient tolerance of procedure:   Tolerated well, no immediate complications  Comments:      WBC decreased 11 91, BC negative 48 hours, confirmed with Dr Bill kang for picc insertion  Lot# [id number] 2019-12-31

## 2018-11-07 VITALS
SYSTOLIC BLOOD PRESSURE: 138 MMHG | WEIGHT: 189 LBS | OXYGEN SATURATION: 95 % | TEMPERATURE: 97.2 F | HEIGHT: 69 IN | RESPIRATION RATE: 14 BRPM | HEART RATE: 79 BPM | DIASTOLIC BLOOD PRESSURE: 76 MMHG | BODY MASS INDEX: 27.99 KG/M2

## 2018-11-07 DIAGNOSIS — Z23 ENCOUNTER FOR IMMUNIZATION: ICD-10-CM

## 2018-11-07 DIAGNOSIS — G70.00 MYASTHENIA GRAVIS WITHOUT (ACUTE) EXACERBATION (HCC): ICD-10-CM

## 2018-11-07 DIAGNOSIS — D72.829 ELEVATED WHITE BLOOD CELL COUNT: ICD-10-CM

## 2018-11-07 DIAGNOSIS — R31.29 OTHER MICROSCOPIC HEMATURIA: ICD-10-CM

## 2018-11-07 DIAGNOSIS — Z00.00 ENCOUNTER FOR GENERAL ADULT MEDICAL EXAMINATION WITHOUT ABNORMAL FINDINGS: ICD-10-CM

## 2018-11-07 DIAGNOSIS — R19.5 OTHER FECAL ABNORMALITIES: ICD-10-CM

## 2018-11-07 DIAGNOSIS — N20.0 CALCULUS OF KIDNEY: ICD-10-CM

## 2018-11-07 PROBLEM — A41.9 SEPSIS (HCC): Status: RESOLVED | Noted: 2018-11-03 | Resolved: 2018-11-07

## 2018-11-07 LAB
ANION GAP SERPL CALCULATED.3IONS-SCNC: 7 MMOL/L (ref 4–13)
BASOPHILS # BLD AUTO: 0.06 THOUSANDS/ΜL (ref 0–0.1)
BASOPHILS NFR BLD AUTO: 1 % (ref 0–1)
BUN SERPL-MCNC: 21 MG/DL (ref 5–25)
CALCIUM SERPL-MCNC: 8.1 MG/DL (ref 8.3–10.1)
CHLORIDE SERPL-SCNC: 109 MMOL/L (ref 100–108)
CO2 SERPL-SCNC: 27 MMOL/L (ref 21–32)
CREAT SERPL-MCNC: 1 MG/DL (ref 0.6–1.3)
EOSINOPHIL # BLD AUTO: 0.15 THOUSAND/ΜL (ref 0–0.61)
EOSINOPHIL NFR BLD AUTO: 2 % (ref 0–6)
ERYTHROCYTE [DISTWIDTH] IN BLOOD BY AUTOMATED COUNT: 12.9 % (ref 11.6–15.1)
GFR SERPL CREATININE-BSD FRML MDRD: 74 ML/MIN/1.73SQ M
GLUCOSE SERPL-MCNC: 92 MG/DL (ref 65–140)
GLUCOSE SERPL-MCNC: 94 MG/DL (ref 65–140)
GLUCOSE SERPL-MCNC: 99 MG/DL (ref 65–140)
HCT VFR BLD AUTO: 41.6 % (ref 36.5–49.3)
HGB BLD-MCNC: 14.1 G/DL (ref 12–17)
IMM GRANULOCYTES # BLD AUTO: 0.07 THOUSAND/UL (ref 0–0.2)
IMM GRANULOCYTES NFR BLD AUTO: 1 % (ref 0–2)
LYMPHOCYTES # BLD AUTO: 1.81 THOUSANDS/ΜL (ref 0.6–4.47)
LYMPHOCYTES NFR BLD AUTO: 23 % (ref 14–44)
MCH RBC QN AUTO: 31.1 PG (ref 26.8–34.3)
MCHC RBC AUTO-ENTMCNC: 33.9 G/DL (ref 31.4–37.4)
MCV RBC AUTO: 92 FL (ref 82–98)
MONOCYTES # BLD AUTO: 0.77 THOUSAND/ΜL (ref 0.17–1.22)
MONOCYTES NFR BLD AUTO: 10 % (ref 4–12)
NEUTROPHILS # BLD AUTO: 5.13 THOUSANDS/ΜL (ref 1.85–7.62)
NEUTS SEG NFR BLD AUTO: 63 % (ref 43–75)
NRBC BLD AUTO-RTO: 0 /100 WBCS
PLATELET # BLD AUTO: 209 THOUSANDS/UL (ref 149–390)
PMV BLD AUTO: 9.3 FL (ref 8.9–12.7)
POTASSIUM SERPL-SCNC: 3.3 MMOL/L (ref 3.5–5.3)
RBC # BLD AUTO: 4.53 MILLION/UL (ref 3.88–5.62)
SODIUM SERPL-SCNC: 143 MMOL/L (ref 136–145)
WBC # BLD AUTO: 7.99 THOUSAND/UL (ref 4.31–10.16)

## 2018-11-07 PROCEDURE — 97530 THERAPEUTIC ACTIVITIES: CPT

## 2018-11-07 PROCEDURE — 80048 BASIC METABOLIC PNL TOTAL CA: CPT | Performed by: INTERNAL MEDICINE

## 2018-11-07 PROCEDURE — 99232 SBSQ HOSP IP/OBS MODERATE 35: CPT | Performed by: INTERNAL MEDICINE

## 2018-11-07 PROCEDURE — 82948 REAGENT STRIP/BLOOD GLUCOSE: CPT

## 2018-11-07 PROCEDURE — 97116 GAIT TRAINING THERAPY: CPT

## 2018-11-07 PROCEDURE — 97760 ORTHOTIC MGMT&TRAING 1ST ENC: CPT

## 2018-11-07 PROCEDURE — 99239 HOSP IP/OBS DSCHRG MGMT >30: CPT | Performed by: INTERNAL MEDICINE

## 2018-11-07 PROCEDURE — 85025 COMPLETE CBC W/AUTO DIFF WBC: CPT | Performed by: INTERNAL MEDICINE

## 2018-11-07 RX ORDER — LIDOCAINE 50 MG/G
1 PATCH TOPICAL DAILY
Qty: 30 PATCH | Refills: 0 | Status: SHIPPED | OUTPATIENT
Start: 2018-11-08 | End: 2020-09-08

## 2018-11-07 RX ADMIN — PREDNISONE 10 MG: 10 TABLET ORAL at 09:40

## 2018-11-07 RX ADMIN — LISINOPRIL 2.5 MG: 2.5 TABLET ORAL at 09:15

## 2018-11-07 RX ADMIN — ONDANSETRON 4 MG: 2 INJECTION INTRAMUSCULAR; INTRAVENOUS at 06:27

## 2018-11-07 RX ADMIN — HEPARIN SODIUM 5000 UNITS: 5000 INJECTION INTRAVENOUS; SUBCUTANEOUS at 06:27

## 2018-11-07 RX ADMIN — CEFEPIME HYDROCHLORIDE 1000 MG: 1 INJECTION, POWDER, FOR SOLUTION INTRAMUSCULAR; INTRAVENOUS at 01:36

## 2018-11-07 RX ADMIN — LIDOCAINE 1 PATCH: 50 PATCH TOPICAL at 09:15

## 2018-11-07 RX ADMIN — ACETAMINOPHEN 975 MG: 325 TABLET, FILM COATED ORAL at 09:16

## 2018-11-07 RX ADMIN — FAMOTIDINE 20 MG: 20 TABLET ORAL at 09:15

## 2018-11-07 RX ADMIN — MECLIZINE 12.5 MG: 12.5 TABLET ORAL at 09:15

## 2018-11-07 NOTE — PLAN OF CARE
DISCHARGE PLANNING     Discharge to home or other facility with appropriate resources Progressing        DISCHARGE PLANNING - CARE MANAGEMENT     Discharge to post-acute care or home with appropriate resources Progressing        GENITOURINARY - ADULT     Maintains or returns to baseline urinary function Progressing     Absence of urinary retention Progressing        INFECTION - ADULT     Absence or prevention of progression during hospitalization Progressing     Absence of fever/infection during neutropenic period Progressing        Knowledge Deficit     Patient/family/caregiver demonstrates understanding of disease process, treatment plan, medications, and discharge instructions Progressing        METABOLIC, FLUID AND ELECTROLYTES - ADULT     Electrolytes maintained within normal limits Progressing     Fluid balance maintained Progressing     Glucose maintained within target range Progressing        PAIN - ADULT     Verbalizes/displays adequate comfort level or baseline comfort level Progressing        Potential for Falls     Patient will remain free of falls Progressing        Prexisting or High Potential for Compromised Skin Integrity     Skin integrity is maintained or improved Progressing        RESPIRATORY - ADULT     Achieves optimal ventilation and oxygenation Progressing        SAFETY ADULT     Maintain or return to baseline ADL function Progressing     Maintain or return mobility status to optimal level Progressing        SKIN/TISSUE INTEGRITY - ADULT     Skin integrity remains intact Progressing     Incision(s), wounds(s) or drain site(s) healing without S/S of infection Progressing     Oral mucous membranes remain intact Progressing

## 2018-11-07 NOTE — PLAN OF CARE
Problem: PHYSICAL THERAPY ADULT  Goal: Performs mobility at highest level of function for planned discharge setting  See evaluation for individualized goals  Treatment/Interventions: Functional transfer training, LE strengthening/ROM, Therapeutic exercise, Endurance training, Patient/family training, Equipment eval/education, Bed mobility, Gait training, Spoke to nursing, OT  Equipment Recommended: Loida Gusman       See flowsheet documentation for full assessment, interventions and recommendations  Outcome: Progressing  Prognosis: Good  Problem List: Decreased strength, Decreased range of motion, Decreased endurance, Impaired balance, Decreased mobility, Decreased safety awareness, Orthopedic restrictions, Pain, Decreased skin integrity  Assessment: Pt  seated at EOB upon my arrival, attempting to get dressed for d/c home  Reported to RN and MD difficulty with TLSO brace this AM with fitting  Readjustment mades to TLSO brace, and call made to SY Rowe to come insure proper bracing  While waiting performance of bed mobility with use of log roll technique, being able to complete practicing proper technique  Able to adhere to spinal precautions throughout treatment session  SY Rowe arrived and bracing insured proper fitting  Pt  able to demonstrate proper donning/doffing technique with TLSO  Progressed with a limited amb  trial with use of RW and standbyA of therapist  Returned to seated at EOB as pt's ride arrived for transportation home  At this time PT will continue to recommend d/c home with HHPT and family support as needed when medically stable  Barriers to Discharge: None  Barriers to Discharge Comments: FLORENCIO; wife works 9 hours days  Recommendation: Home PT, Home with family support     PT - OK to Discharge: Yes (if d/c when medically stable )    See flowsheet documentation for full assessment

## 2018-11-07 NOTE — DISCHARGE INSTRUCTIONS
Neurosurgery discharge insrtuctions  TLSO brace to be worn when out of bed of head of bed greater than 45 degrees  May be removed for showering  No heavy lifting  No bending/twisting  No strenuous activities  No Driving  **Please notify MD immediately if you have increased back or leg pain  New numbness, tingling and/or weakness in your legs  **

## 2018-11-07 NOTE — PHYSICAL THERAPY NOTE
Physical Therapy Progress Note     11/07/18 1240   Pain Assessment   Pain Assessment No/denies pain   Pain Score No Pain   Hospital Pain Intervention(s) Ambulation/increased activity;Repositioned   Response to Interventions Tolerated  Restrictions/Precautions   Weight Bearing Precautions Per Order No   Braces or Orthoses TLSO   Other Precautions Fall Risk;Pain;Spinal precautions   General   Chart Reviewed Yes   Response to Previous Treatment Patient with no complaints from previous session  Family/Caregiver Present No   Subjective   Subjective Willing to participate in therapy this PM    Bed Mobility   Supine to Sit 5  Supervision   Additional items Assist x 1;Leg ; Increased time required;Verbal cues;LE management   Sit to Supine 5  Supervision   Additional items Leg ; Increased time required;Verbal cues;LE management   Additional Comments Performance of log roll technique   Transfers   Sit to Stand 5  Supervision   Additional items Assist x 1;Bedrails; Increased time required;Verbal cues   Stand to Sit 5  Supervision   Additional items Assist x 1;Bedrails; Increased time required;Verbal cues   Ambulation/Elevation   Gait pattern Decreased foot clearance; Forward Flexion   Gait Assistance 5  Supervision   Additional items Assist x 1;Verbal cues; Tactile cues   Assistive Device Rolling walker   Distance 10'   Balance   Static Sitting Fair +   Dynamic Sitting Fair   Static Standing Fair   Dynamic Standing Fair   Ambulatory Fair   Endurance Deficit   Endurance Deficit No   Activity Tolerance   Activity Tolerance Patient tolerated treatment well   Nurse Made Aware Yes   Assessment   Prognosis Good   Problem List Decreased strength;Decreased range of motion;Decreased endurance; Impaired balance;Decreased mobility; Decreased safety awareness;Orthopedic restrictions;Pain;Decreased skin integrity   Assessment Pt  seated at EOB upon my arrival, attempting to get dressed for d/c home   Reported to RN and MD difficulty with TLSO brace this AM with fitting  Readjustment mades to TLSO brace, and call made to SY Reddy to come insure proper bracing  While waiting performance of bed mobility with use of log roll technique, being able to complete practicing proper technique  Able to adhere to spinal precautions throughout treatment session  SY Reddy arrived and bracing insured proper fitting  Pt  able to demonstrate proper donning/doffing technique with TLSO  Progressed with a limited amb  trial with use of RW and standbyA of therapist  Returned to seated at EOB as pt's ride arrived for transportation home  At this time PT will continue to recommend d/c home with HHPT and family support as needed when medically stable  Barriers to Discharge None   Goals   Patient Goals To go home today  LTG Expiration Date 11/14/18   Treatment Day 3   Plan   Treatment/Interventions Functional transfer training;LE strengthening/ROM; Endurance training;Bed mobility;Gait training;Spoke to MD;Spoke to nursing;Spoke to case management  (SY Reddy)   Progress Progressing toward goals   PT Frequency Other (Comment)  (4-6x/wk)   Recommendation   Recommendation Home PT; Home with family support   Equipment Recommended Walker  (RW)   PT - OK to Discharge Yes  (if d/c when medically stable )     Jacey Clas, PTA

## 2018-11-07 NOTE — PLAN OF CARE
Problem: PHYSICAL THERAPY ADULT  Goal: Performs mobility at highest level of function for planned discharge setting  See evaluation for individualized goals  Treatment/Interventions: Functional transfer training, LE strengthening/ROM, Therapeutic exercise, Endurance training, Patient/family training, Equipment eval/education, Bed mobility, Gait training, Spoke to nursing, OT  Equipment Recommended: René Palma       See flowsheet documentation for full assessment, interventions and recommendations  Outcome: Progressing  Prognosis: Good  Problem List: Decreased strength, Decreased endurance, Impaired balance, Decreased mobility, Decreased safety awareness, Orthopedic restrictions, Pain  Assessment: PT demonstrates proper logrolling techniques with supine to sit with cues  PT performs sit to stand and stand to sit with supervision assist   PT required total assist to don TLSO in sitting  Pt ambulates with use of rw with supervision assist 200'x1, 250'x1 with verbal cues for safe mobility as pt  has tendency to initially ambulate too fast and cues required to remain within walker bounds at all times and to maintain  of walker at all times  PT progressed to stair climbing with use of b/l rails with min assist and cues for proper and safe stair climbing techniques  PT requires increased time for all aspects of mobility  PT unable to recall any spinal precautions  Reviewed spinal precautions with pt  3x during Pt session  PT instructed to wear TLSO when sitting, standing and ambulating  Pt acknowledges  Recommend continued inpt PT for continued mobility training, pt ed on spinal precautions,donning and doffing of TLSO, stair climbing and le there ex  Recommend home with family support and HHPT     Barriers to Discharge: Inaccessible home environment, Decreased caregiver support  Barriers to Discharge Comments: FLORENCIO; wife works 9 hours days  Recommendation: Home PT, Home with family support     PT - OK to Discharge: Yes (to home when goals achieved  )    See flowsheet documentation for full assessment

## 2018-11-07 NOTE — PLAN OF CARE
DISCHARGE PLANNING     Discharge to home or other facility with appropriate resources Adequate for Discharge        DISCHARGE PLANNING - CARE MANAGEMENT     Discharge to post-acute care or home with appropriate resources Adequate for Discharge        GENITOURINARY - ADULT     Maintains or returns to baseline urinary function Adequate for Discharge     Absence of urinary retention Adequate for Discharge        INFECTION - ADULT     Absence or prevention of progression during hospitalization Adequate for Discharge     Absence of fever/infection during neutropenic period Adequate for Discharge        Knowledge Deficit     Patient/family/caregiver demonstrates understanding of disease process, treatment plan, medications, and discharge instructions Adequate for Discharge        METABOLIC, FLUID AND ELECTROLYTES - ADULT     Electrolytes maintained within normal limits Adequate for Discharge     Fluid balance maintained Adequate for Discharge     Glucose maintained within target range Adequate for Discharge        PAIN - ADULT     Verbalizes/displays adequate comfort level or baseline comfort level Adequate for Discharge        Potential for Falls     Patient will remain free of falls Adequate for Discharge        Prexisting or High Potential for Compromised Skin Integrity     Skin integrity is maintained or improved Adequate for Discharge        RESPIRATORY - ADULT     Achieves optimal ventilation and oxygenation Adequate for Discharge        SAFETY ADULT     Maintain or return to baseline ADL function Adequate for Discharge     Maintain or return mobility status to optimal level Adequate for Discharge        SKIN/TISSUE INTEGRITY - ADULT     Skin integrity remains intact Adequate for Discharge     Incision(s), wounds(s) or drain site(s) healing without S/S of infection Adequate for Discharge     Oral mucous membranes remain intact Adequate for Discharge

## 2018-11-07 NOTE — PHYSICAL THERAPY NOTE
Time in: 1245  Time out: 1300        Orthotic Note            Date: 11/7/2018      Patient Name: Karley Hernandez            Reason for Consult:  Issues and concerns regarding the fit and function of TLSO brace  Evaluating therapist provided education and practice donning and doffing of TLSO appropriately for patient for feel most comfortable with it at home  Donned 2x and doffed 1x  Pt provided with verbal cueing for management of brace and sequencing of donning and doffing  Educated on appropriate placement and fit of brace on person  Pt verbalized education back to PT and verbalizes comfort with brace management and verbalizes that his wife can help him as needed  Pt provided with PT's business card to contact with any concerns  PT also assured pt that the home therapist will assist him with further education and management of brace as needed  Plan is for patient to discharge home  Willie Flores, PTA provided treatment for mobility, strength, and gait      Qian Chung, PT,DPT

## 2018-11-07 NOTE — PHYSICAL THERAPY NOTE
Physical Therapy Treatment Note       11/06/18 1913   Pain Assessment   Pain Assessment 0-10   Pain Score 4   Pain Type Acute pain   Pain Location Back   Pain Orientation Lower   Hospital Pain Intervention(s) Repositioned; Ambulation/increased activity   Response to Interventions tolerated   Restrictions/Precautions   Braces or Orthoses TLSO   Other Precautions Fall Risk;Pain;Spinal precautions   General   Chart Reviewed Yes   Family/Caregiver Present No   Cognition   Overall Cognitive Status WFL   Subjective   Subjective Pt stated," No one from therapy was here so I went for a walk with the nurse twice around "  pt reports not wearing his TLSO when walking with the nurse  PT reports not ahving any pain while walking around without the brace but reported fatigued after and needing to rest in bed  Bed Mobility   Rolling L 5  Supervision   Additional items Bedrails; Increased time required;Verbal cues   Supine to Sit 5  Supervision   Additional items Assist x 1;Bedrails; Increased time required;Verbal cues   Transfers   Sit to Stand 5  Supervision   Additional items Assist x 1   Stand to Sit 5  Supervision   Additional items Assist x 1; Armrests; Increased time required;Verbal cues   Ambulation/Elevation   Gait pattern Foward flexed; Short stride  (step thrhough gait   )   Gait Assistance 5  Supervision   Additional items Assist x 1;Verbal cues   Assistive Device Rolling walker   Distance 200'x1, 250'x1,    Stair Management Assistance 4  Minimal assist   Additional items Assist x 1;Verbal cues; Increased time required   Stair Management Technique Two rails; Foreward;Reciprocal;Nonreciprocal   Number of Stairs 4   Balance   Static Sitting Fair +   Static Standing Fair   Dynamic Standing Fair -   Ambulatory Fair -   Endurance Deficit   Endurance Deficit Description pain   Activity Tolerance   Activity Tolerance Patient limited by fatigue;Patient limited by pain; Patient tolerated treatment well   Nurse Made Aware RN,DAMIAN Equipment Use   Comments Pt ed on logrolling techniques for transferring and out of bed, use and wear of TLSO, donning and doffing of TLSO  Pteducated on spinal precautions 3x during PT session  Assessment   Prognosis Good   Problem List Decreased strength;Decreased endurance; Impaired balance;Decreased mobility; Decreased safety awareness;Orthopedic restrictions;Pain   Assessment PT demonstrates proper logrolling techniques with supine to sit with cues  PT performs sit to stand and stand to sit with supervision assist   PT required total assist to don TLSO in sitting  Pt ambulates with use of rw with supervision assist 200'x1, 250'x1 with verbal cues for safe mobility as pt  has tendency to initially ambulate too fast and cues required to remain within walker bounds at all times and to maintain  of walker at all times  PT progressed to stair climbing with use of b/l rails with min assist and cues for proper and safe stair climbing techniques  PT requires increased time for all aspects of mobility  PT unable to recall any spinal precautions  Reviewed spinal precautions with pt  3x during Pt session  PT instructed to wear TLSO when sitting, standing and ambulating  Pt acknowledges  Recommend continued inpt PT for continued mobility training, pt ed on spinal precautions,donning and doffing of TLSO, stair climbing and le there ex  Recommend home with family support and HHPT  Goals   Patient Goals " to get back to work "     LTG Expiration Date 01/14/18   Treatment Day 2   Plan   Treatment/Interventions Functional transfer training;LE strengthening/ROM; Elevations; Therapeutic exercise; Endurance training;Patient/family training;Equipment eval/education; Bed mobility;Gait training;Spoke to nursing;OT   Progress Progressing toward goals   PT Frequency (4-6x week)   Recommendation   Recommendation Home PT; Home with family support   PT - OK to Discharge Yes  (to home when goals achieved  )        11/06/18 1913   Pain Assessment   Pain Assessment 0-10   Pain Score 4   Pain Type Acute pain   Pain Location Back   Pain Orientation Lower   Hospital Pain Intervention(s) Repositioned; Ambulation/increased activity   Response to Interventions tolerated   Restrictions/Precautions   Braces or Orthoses TLSO   Other Precautions Fall Risk;Pain;Spinal precautions   General   Chart Reviewed Yes   Family/Caregiver Present No   Cognition   Overall Cognitive Status WFL   Subjective   Subjective Pt stated," No one from therapy was here so I went for a walk with the nurse twice around "  pt reports not wearing his TLSO when walking with the nurse  PT reports not ahving any pain while walking around without the brace but reported fatigued after and needing to rest in bed  Bed Mobility   Rolling L 5  Supervision   Additional items Bedrails; Increased time required;Verbal cues   Supine to Sit 5  Supervision   Additional items Assist x 1;Bedrails; Increased time required;Verbal cues   Transfers   Sit to Stand 5  Supervision   Additional items Assist x 1   Stand to Sit 5  Supervision   Additional items Assist x 1; Armrests; Increased time required;Verbal cues   Ambulation/Elevation   Gait pattern Foward flexed; Short stride  (step thrhough gait   )   Gait Assistance 5  Supervision   Additional items Assist x 1;Verbal cues   Assistive Device Rolling walker   Distance 200'x1, 250'x1,    Stair Management Assistance 4  Minimal assist   Additional items Assist x 1;Verbal cues; Increased time required   Stair Management Technique Two rails; Foreward;Reciprocal;Nonreciprocal   Number of Stairs 4   Balance   Static Sitting Fair +   Static Standing Fair   Dynamic Standing Fair -   Ambulatory Fair -   Endurance Deficit   Endurance Deficit Description pain   Activity Tolerance   Activity Tolerance Patient limited by fatigue;Patient limited by pain; Patient tolerated treatment well   Nurse Made Aware RN,DAMIAN   Equipment Use   Comments Pt ed on logrolling techniques for transferring and out of bed, use and wear of TLSO, donning and doffing of TLSO  Pteducated on spinal precautions 3x during PT session  Assessment   Prognosis Good   Problem List Decreased strength;Decreased endurance; Impaired balance;Decreased mobility; Decreased safety awareness;Orthopedic restrictions;Pain   Assessment PT demonstrates proper logrolling techniques with supine to sit with cues  PT performs sit to stand and stand to sit with supervision assist   PT required total assist to don TLSO in sitting  Pt ambulates with use of rw with supervision assist 200'x1, 250'x1 with verbal cues for safe mobility as pt  has tendency to initially ambulate too fast and cues required to remain within walker bounds at all times and to maintain  of walker at all times  PT progressed to stair climbing with use of b/l rails with min assist and cues for proper and safe stair climbing techniques  PT requires increased time for all aspects of mobility  PT unable to recall any spinal precautions  Reviewed spinal precautions with pt  3x during Pt session  PT instructed to wear TLSO when sitting, standing and ambulating  Pt acknowledges  Recommend continued inpt PT for continued mobility training, pt ed on spinal precautions,donning and doffing of TLSO, stair climbing and le there ex  Recommend home with family support and HHPT  Goals   Patient Goals " to get back to work "     LTG Expiration Date 01/14/18   Treatment Day 2   Plan   Treatment/Interventions Functional transfer training;LE strengthening/ROM; Elevations; Therapeutic exercise; Endurance training;Patient/family training;Equipment eval/education; Bed mobility;Gait training;Spoke to nursing;OT   Progress Progressing toward goals   PT Frequency (4-6x week)   Recommendation   Recommendation Home PT; Home with family support   PT - OK to Discharge Yes  (to home when goals achieved  )       Macel Sicks, PTA

## 2018-11-07 NOTE — OCCUPATIONAL THERAPY NOTE
Occupational Therapy Treatment Note:         11/06/18 0916   Restrictions/Precautions   Weight Bearing Precautions Per Order No   Braces or Orthoses TLSO   Other Precautions Fall Risk;Pain;Multiple lines;Spinal precautions   Pain Assessment   Pain Assessment 0-10   Pain Score 4   Pain Type Acute pain   Pain Location Back   Pain Orientation Lower   ADL   Where Assessed Edge of bed   Grooming Assistance 5  Supervision/Setup   Grooming Deficit Setup;Steadying;Verbal cueing;Supervision/safety; Increased time to complete;Wash/dry hands   Grooming Comments No LOB noted with functional  reach  LB Dressing Assistance 5  Supervision/Setup   LB Dressing Deficit Setup;Verbal cueing;Supervision/safety; Requires assistive device for steadying; Increased time to complete; Don/doff L sock; Don/doff R sock; Thread RLE into underwear; Thread LLE into underwear;Pull up over hips   LB Dressing Comments Pt was educated on need to bring BLE into lap to don/doff shoes and underwear and avoid bending  Functional Standing Tolerance   Time 10 mins  Activity dynamic stand balance activity  Comments SBA reqiured with activities due to fluctuating pain levels  Bed Mobility   Supine to Sit 5  Supervision   Additional items Assist x 1;Bedrails; Increased time required;Verbal cues;LE management   Transfers   Sit to Stand 5  Supervision   Additional items Assist x 1   Stand to Sit 5  Supervision   Additional items Assist x 1   Stand pivot 4  Minimal assistance   Additional items Assist x 1   Additional Comments cues for appropriate techs required  Functional Mobility   Functional Mobility 5  Supervision   Additional Comments x1   Additional items Rolling walker   Cognition   Overall Cognitive Status WFL   Arousal/Participation Alert; Cooperative   Attention Within functional limits   Orientation Level Oriented X4   Memory Within functional limits   Following Commands Follows multistep commands with increased time or repetition Additional Activities   Additional Activities Other (Comment)  (reviewed back safety and fall prevention)   Additional Activities Comments Pt reports he will have to take up the throw rugs  Activity Tolerance   Activity Tolerance Patient limited by fatigue;Patient limited by pain   Medical Staff Made Aware Reported all the findings to nursing staff  Assessment   Assessment Pt was seen for skilled OT with focus on completion of self care tasks, functional mobility, review of fall prevention/home safety, back safety and review of current plan of care  Pt reports having no knowledge of need for brace with OOB positioning  Current orders reflect application of TLSO brace with functional mobility and OOB positioning  Reviewed back safety with Pt to carry over with return to home environment  Pt reports having never been told his back precautions  Thorough review of back safety and need to avoid bending twisting and lifting with functional tasks  Pt reports having F understanding  Pt reviewed having oriental rugs at home over wall to wall carpeting  Reviewed need for removal with use of RW to encourage optimal safety with return to home environment  Pt with noted increased pain with functional mobility  Encouraged Pt to slow pace with functional mobility to avoid twisting and prevent falls/LOB  See above levels of A required for all functional tasks  Pt will benefit from home therapies upon discharge to ease transition and promote optimal safety  Pt's wife is active in his care but does work during the day  Pt reports his wife will A with application and removal of TLSO brace  Plan   Treatment Interventions ADL retraining;Functional transfer training; Endurance training;Cognitive reorientation   Goal Expiration Date 11/15/18   Treatment Day 1   OT Frequency 3-5x/wk   Recommendation   OT Discharge Recommendation Home OT   Barthel Index   Feeding 10   Bathing 0   Grooming Score 5   Dressing Score 5   Bladder Score 10   Bowels Score 10   Toilet Use Score 5   Transfers (Bed/Chair) Score 10   Mobility (Level Surface) Score 10   Stairs Score 0   Barthel Index Score 65   Modified Ash[place] Scale   Modified Ash[place] Scale 3   Simona Figueroa [place]

## 2018-11-07 NOTE — DISCHARGE SUMMARY
Transition of Care Discharge Summary - [facility] Internal Medicine    Patient Information: Evonne Holland 68 y o  male MRN: [id number]  Unit/Bed#: [facility] [address] [facility] 210-01 Encounter: [id number]    Discharging Physician / Practitioner: Cheyenne Silva MD  PCP: Brionna Mohr DO  Admission Date: 11/3/2018  Discharge Date: 11/07/18    Disposition:      Other: home    For Discharges to   [address] SNF:   · Not Applicable to this Patient - Not Applicable to this Patient    Reason for Admission: fall    Discharge Diagnoses:     Principal Problem (Resolved):    Sepsis ([address] )  Active Problems:    Myasthenia gravis ([facility] [address] )    Controlled type 2 diabetes mellitus without complication, with long-term current use of insulin ([facility] [address] )    CKD (chronic kidney disease), stage III ([facility] [address] )    Essential hypertension    Complicated UTI (urinary tract infection)    Lactic acidosis      Consultations During Hospital Stay:  · Infectious Disease  · Neurosurgery    Procedures Performed:     · PICC line    Medication Adjustments and Discharge Medications:  · Medication Dosing Tapers - Please refer to Discharge Medication List for details on any medication dosing tapers (if applicable to patient)  · Discharge Medication List: See after visit summary for reconciled discharge medications  Wound Care Recommendations:  When applicable, please see wound care section of After Visit Summary  Diet Recommendations at Discharge:  Diet -        Diet Orders            Start     Ordered    11/04/18 0200  Diet Shukri/CHO Controlled; Consistent Carbohydrate Diet Level 2 (5 carb servings/75 grams CHO/meal)  Diet effective now     Question Answer Comment   Diet Type Shukri/CHO Controlled    Shukri/CHO Controlled Consistent Carbohydrate Diet Level 2 (5 carb servings/75 grams CHO/meal)    RD to adjust diet per protocol? Yes        11/04/18 0159        Fluid Restriction - No Fluid Restriction at Discharge        Significant Findings / Test Results:     · CT head: Negative  · CT cervical spine:  No fracture or traumatic malalignment  CT chest abdomen pelvis:Acute fracture of the superior endplate of the L1 vertebral body with mild depression  2   5 mm nodule within the left upper lobe, stable since 2010  No further follow-up is recommended  3   Cholelithiasis without evidence of cholecystitis  4   New punctate hyperdensities at the bilateral ureterovesicular junctions may reflect calculi, less likely postprocedural calcifications  There are bilateral punctate nonobstructing nephrolithiasis  No hydronephrosis or significant hydroureter  5   Small hiatal hernia    X-ray lumbar spine:Slightly increased moderate compression deformity of L1  No subluxation      Hospital Course:     Carleen Barreto is a 68 y o  male patient who originally presented to the hospital on 11/3/2018 due to mechanical fall  This is a 72-year-old male with history of chronic UTI, kidney stones status post lithotripsy on 11/02/2018 by Urology presenting on 11/03/2018 for evaluation of fall at home found to be in sepsis      1 ) Severe sepsis present on admission secondary to UTI  -evidence of leukocytosis, tachycardia, lactic acidosis  -likely secondary to UTI  -continue with cefepime, urien cx reveals pseudomonas,  -status post PICC line on on 11/06/2018, patient will continue antibiotics through 11/16/2018  -infectious Disease consultation appreciated     2  ) Acute on chronic CKD stage 3  -creatinine improved to 1 21      3 ) Diabetes mellitus  -patient will continue home insulin regimen     4  ) Mechanical fall with acute L1 fracture  -Trauma and Neurosurgery consulted ED  -patient neurologically intact, okay to remain at [facility], no need to transfer  -patient placed on a TLSO brace, discussed with Neurosurgery recommend to continue brace, no bending, no twisting or heavy lifting  -will follow up with Neurosurgery outpatient     5 ) Myasthenia gravis  -continue present 10 mg daily     6  ) Nephrolithiasis status post recent lithotripsy  -CT abdomen pelvis reveals nonobstructing nephrolithiasis    In-depth discussion with case management, Stanley Adam, nurse at bedside, Elvis Hogan, physical therapy Samina Ceja regarding discharge planning  Patient was concerned about wearing his TLSO brace, therapist provided education and practice wearing and walking with the brace  Patient able to walk with a walker  PT recommending home P T  Patient will have home VNA for IV antibiotics and physical therapy  Patient is medically stable for discharge home today with outpatient follow-up  Condition at Discharge: good     Discharge Day Visit / Exam:     Subjective:  Patient seen examined at bedside, denies any chest pain, palpitations, dyspnea  Vitals: Blood Pressure: 138/76 (11/07/18 0802)  Pulse: 79 (11/07/18 0802)  Temperature: (!) 97 2 °F (36 2 °C) (11/07/18 0802)  Temp Source: Temporal (11/07/18 0802)  Respirations: 14 (11/07/18 0802)  Height: 5' 9" (175 3 cm) (11/03/18 2155)  Weight - Scale: 85 7 kg (189 lb) (11/03/18 2155)  SpO2: 95 % (11/07/18 0802)    Physical Exam:    Constitutional: Patient is oriented to person, place and time, no acute distress  HEENT:  Normocephalic, atraumatic, EOMI, PERRLA, no scleral icterus, no pallor, moist oral mucosa  Neck:  Supple, no masses, no thyromegaly, no bruits Normal range of motion  Lymph nodes:  No lymphadenopathy  Cardiovascular: Normal S1S2, RRR, No murmurs/rubs/gallops appreciated  Pulmonary: Clear to auscultation bilaterally, No rhonchi/rales/wheezing appreciated  Abdominal: Soft, Bowel sounds present, Non-tender, Non-distended, No rebound/guarding, no hepatomegaly   Musculoskeletal:  Brace in place  Extremities:  No cyanosis, clubbing or edema  Peripheral pulses palpable and equal bilaterally  Neurological: Cranial nerves II-XII grossly intact, sensation intact, otherwise no focal neurological symptoms  Skin: Skin is warm and dry, no rashes      Discharge instructions/Information to patient and family:   See after visit summary section titled Discharge Instructions for information provided to patient and family  Planned Readmission: no      Discharge Statement:  I spent 30 minutes discharging the patient  This time was spent on the day of discharge  I had direct contact with the patient on the day of discharge  Greater than 50% of the total time was spent examining patient, answering all patient questions, arranging and discussing plan of care with patient as well as directly providing post-discharge instructions  Additional time then spent on discharge activities      ** Please Note: This note has been constructed using a voice recognition system **

## 2018-11-07 NOTE — PROGRESS NOTES
Progress Note - Infectious Disease   Chantale Ontiveros 68 y o  male MRN: [id number]  Unit/Bed#: [facility] 2 -01 Encounter: [id number]      Impression/Recommendations:  1  Severe sepsis, POA   Leukocytosis, tachycardia, lactic acidosis   Likely all secondary to UTI   Patient is clinically much improved on IV cefepime   Fever resolved   WBC decreasing, near normalized   Lactic acidosis resolved   Blood cultures remain negative  Antibiotic plan as in below  Monitor temperature/WBC  Monitor hemodynamics  Follow-up on pending blood cultures      2  Acute complicated  UTI, post lithotripsy   CT abdomen with no evidence of obstruction   Patient is clinically improved on IV cefepime   Urine culture is growing Pseudomonas again  Cristhian Montoya allergy/intolerance to fluoroquinolones, there is no p o  Antibiotic option  Pseudomonas is susceptible cefepime again  Continue IV cefepime  Treat x 14 days total, another 9 days, through 11/16       3  Nephrolithiasis   Status post recent lithotripsy on 11/02/2018   CT abdomen on admission reveals nonobstructing nephrolithiasis   No hydronephrosis or other acute findings      4  MARSHALL, superimposed on CKD 3  This is most likely secondary to sepsis   Creatinine has decreased, back to baseline  Antibiotic at full dose  Monitor creatinine      5  Myasthenia gravis   On chronic prednisone 10 mg daily        Discussed with patient in detail regarding the above plan    Discussed with Dr Judy Thakur from [facility] service  Okay for discharge from ID viewpoint      Antibiotics:  Cefepime # 5     Subjective:  Patient feels well  No abdominal or flank pain   No urinary symptoms  Mental status remains good, at baseline  Temperature stays down   No chills  He is tolerating antibiotic well   No nausea, vomiting or diarrhea      Objective:  Vitals:  Temp:  [97 2 °F (36 2 °C)-97 3 °F (36 3 °C)] 97 2 °F (36 2 °C)  HR:  [79-81] 79  Resp:  [14-18] 14  BP: (126-138)/(73-76) 138/76  SpO2:  [95 %-97 %] 95 %  Temp (24hrs), Av 3 °F (36 3 °C), Min:97 2 °F (36 2 °C), Max:97 3 °F (36 3 °C)  Current: Temperature: (!) 97 2 °F (36 2 °C)    Physical Exam:     General: Awake, alert, cooperative, no distress  Neck:  Supple  No mass  No lymphadenopathy  Lungs: Expansion symmetric, no rales, no wheezing, respirations unlabored  Heart:  Regular rate and rhythm, S1 and S2 normal, no murmur  Abdomen: Soft, nondistended, non-tender, bowel sounds active all four quadrants,        no masses, no organomegaly  Extremities: No edema  No erythema/warmth  No ulcer  Nontender to palpation  Skin:  No rash  Neuro: Moves all extremities  Invasive Devices     Peripherally Inserted Central Catheter Line            PICC Line  Right Basilic 1 day                Labs studies:   I have personally reviewed pertinent labs  Results from last 7 days  Lab Units 18  0618  0502 18  0453  181   POTASSIUM mmol/L 3 3* 3 8 3 7  < > 4 3   CHLORIDE mmol/L 109* 103 102  < > 97*   CO2 mmol/L 27 26 24  < > 22   BUN mg/dL 21 20 16  < > 23   CREATININE mg/dL 1 00 1 21 1 09  < > 1 83*   EGFR ml/min/1 73sq m 74 59 67  < > 36   CALCIUM mg/dL 8 1* 9 3 8 7  < > 9 7   AST U/L  --   --   --   --  41   ALT U/L  --   --   --   --  56   ALK PHOS U/L  --   --   --   --  52   < > = values in this interval not displayed  Results from last 7 days  Lab Units 18  0619 18  0502 18  0453   WBC Thousand/uL 7 99 11 91* 12 97*   HEMOGLOBIN g/dL 14 1 14 9 13 4   PLATELETS Thousands/uL 209 251 221       Results from last 7 days  Lab Units 18  0028 18  2214 18  221   BLOOD CULTURE   --  No Growth at 72 hrs  No Growth at 72 hrs  URINE CULTURE  60,000-69,000 cfu/ml Pseudomonas aeruginosa*  --   --        Imaging Studies:   I have personally reviewed pertinent imaging study reports and images in PACS      EKG, Pathology, and Other Studies:   I have personally reviewed pertinent reports

## 2018-11-07 NOTE — NURSING NOTE
Patient DCd home with VNA  PICC line remained in patient per orders  All AVS instructions reviewed with patient and friend here to transport  All questions answered  Patient has been seen by PT right before discharge to ensure the proper usage of his back brace  Patient is understanding that he needs the brace at all times while OOB  Patient shown how to wear and position brace correctly  All questions answered

## 2018-11-07 NOTE — PLAN OF CARE
Problem: OCCUPATIONAL THERAPY ADULT  Goal: Performs self-care activities at highest level of function for planned discharge setting  See evaluation for individualized goals  Treatment Interventions: ADL retraining, Functional transfer training, UE strengthening/ROM, Endurance training, Patient/family training, Equipment evaluation/education, Compensatory technique education, Energy conservation, Activityengagement          See flowsheet documentation for full assessment, interventions and recommendations  Outcome: Progressing  Limitation: Decreased ADL status, Decreased UE strength, Decreased endurance, Decreased self-care trans, Decreased high-level ADLs  Prognosis: Good  Assessment: Pt was seen for skilled OT with focus on completion of self care tasks, functional mobility, review of fall prevention/home safety, back safety and review of current plan of care  Pt reports having no knowledge of need for brace with OOB positioning  Current orders reflect application of TLSO brace with functional mobility and OOB positioning  Reviewed back safety with Pt to carry over with return to home environment  Pt reports having never been told his back precautions  Thorough review of back safety and need to avoid bending twisting and lifting with functional tasks  Pt reports having F understanding  Pt reviewed having oriental rugs at home over wall to wall carpeting  Reviewed need for removal with use of RW to encourage optimal safety with return to home environment  Pt with noted increased pain with functional mobility  Encouraged Pt to slow pace with functional mobility to avoid twisting and prevent falls/LOB  See above levels of A required for all functional tasks  Pt will benefit from home therapies upon discharge to ease transition and promote optimal safety  Pt's wife is active in his care but does work during the day  Pt reports his wife will A with application and removal of TLSO brace        OT Discharge Recommendation: Home OT  OT - OK to Discharge:  (when medically cleared pending stair training with PT)      Comments: Gustavo Busch, [address]

## 2018-11-08 ENCOUNTER — TRANSITIONAL CARE MANAGEMENT (OUTPATIENT)
Dept: FAMILY MEDICINE CLINIC | Facility: CLINIC | Age: 73
End: 2018-11-08

## 2018-11-08 NOTE — SOCIAL WORK
LATE ENTRY: This unassigned CM, per request of pt, went to meet pt at bedside prior to the end of the day on the 7th  Pt was in my assigned area his last two recent IP admissions, and he requested to see this CM  Met with him and he requested that an email be sent to [facility] care in regards to the potential of volunteering here on the [facility] campus  As per pt's request, send an email to Marco Antonio Duncan, however, he is currently on vacation  Pt was made known of this fact

## 2018-11-09 ENCOUNTER — PATIENT OUTREACH (OUTPATIENT)
Dept: FAMILY MEDICINE CLINIC | Facility: CLINIC | Age: 73
End: 2018-11-09

## 2018-11-09 ENCOUNTER — PATIENT OUTREACH (OUTPATIENT)
Dept: INTERNAL MEDICINE CLINIC | Facility: CLINIC | Age: 73
End: 2018-11-09

## 2018-11-09 ENCOUNTER — TELEPHONE (OUTPATIENT)
Dept: FAMILY MEDICINE CLINIC | Facility: CLINIC | Age: 73
End: 2018-11-09

## 2018-11-09 DIAGNOSIS — N39.0 PSEUDOMONAS URINARY TRACT INFECTION: ICD-10-CM

## 2018-11-09 DIAGNOSIS — E11.9 CONTROLLED TYPE 2 DIABETES MELLITUS WITHOUT COMPLICATION, WITH LONG-TERM CURRENT USE OF INSULIN (HCC): ICD-10-CM

## 2018-11-09 DIAGNOSIS — Z86.19 HX OF SEPSIS: ICD-10-CM

## 2018-11-09 DIAGNOSIS — N18.30 CKD (CHRONIC KIDNEY DISEASE), STAGE III (HCC): ICD-10-CM

## 2018-11-09 DIAGNOSIS — Z98.890 HX OF LITHOTRIPSY: Primary | ICD-10-CM

## 2018-11-09 DIAGNOSIS — N20.0 NEPHROLITHIASIS: ICD-10-CM

## 2018-11-09 DIAGNOSIS — Z45.2 PICC (PERIPHERALLY INSERTED CENTRAL CATHETER) IN PLACE: ICD-10-CM

## 2018-11-09 DIAGNOSIS — B96.5 PSEUDOMONAS URINARY TRACT INFECTION: ICD-10-CM

## 2018-11-09 DIAGNOSIS — N39.0 COMPLICATED UTI (URINARY TRACT INFECTION): ICD-10-CM

## 2018-11-09 DIAGNOSIS — G70.00 MYASTHENIA GRAVIS (HCC): ICD-10-CM

## 2018-11-09 DIAGNOSIS — E87.2 LACTIC ACIDOSIS: ICD-10-CM

## 2018-11-09 DIAGNOSIS — Z79.4 CONTROLLED TYPE 2 DIABETES MELLITUS WITHOUT COMPLICATION, WITH LONG-TERM CURRENT USE OF INSULIN (HCC): ICD-10-CM

## 2018-11-09 LAB
BACTERIA BLD CULT: NORMAL
BACTERIA BLD CULT: NORMAL

## 2018-11-09 NOTE — TELEPHONE ENCOUNTER
Pt called here yesterday and spoke to me for over 20 mins her for his TCM  Pt called this morning and did not remember the phone call  Pt was expressing his concern for not have a BM for 8 days and not being able to eat more then 2 bites  I called Dr Antwan Mcknight on her cell phone, due to her not being in office yet, and she advised me to have the patient go to the ER  I explained to the pateint on what Dr Antwan Mcknight would like him to do and he stated his son was on the phone who is his POA stating he is not going back to [place] [id number]  I contacted the nurse care coordinator Chip Schwab and she was contacting the patient

## 2018-11-09 NOTE — PROGRESS NOTES
Outpatient Care Management Note: Notified pt that [facility] may come out and check on him this morning  He stated that he was on the phone after we discussed his constipation  He spoke with a Pharmacist who advised for him to take Miralax  Pt will do this as well as a glycerin suppository  Discussed that his lack of appetite is probably related to his constipation  Pt has my contact information

## 2018-11-09 NOTE — PROGRESS NOTES
Outpatient Care Management Note:Received a call from [facility] and pt has called the office 3x since yesterday afternoon and this morning  He is complaining of various things and they are concerned that he is having memory problems as well as difficulty being at home and managing  Received notification yesterday from [employer], my coworker, that she had called and had spoken with pt yesterday  When this was discussed with him this morning, he remembered this and her name  Also, pt recalled all of the happenings since his Lithotripsy a week ago  Pt extremely talkative and needs to keep refocused on the fact that we should come up with a plan to move forward instead of complaining about things that occurred while in the hospital  Pt did not have complaints about his care with nursing staff  He had some PT issues and wanting Dr Ragland for his procedure  He had gotten Dr Barnhart, who who states he did not see after his procedure or at the time of discharge  Pt states that he was seen by someone else  Spoke with Kailee Melo for [facility] and requested that a nurse go out to see pt today because it is not known how he is managing at home  He is complaining of weakness and the inability to get out of bed  Pt is wearing a brace for his fracture in his back  Also, if any orders are needed, [facility] [place] [facility] would need to request them from the PCP by 1:30 this afternoon when office closes  Марина Carmichael did state that she would send someone out this morning to see pt   Emiliana James made aware

## 2018-11-09 NOTE — PROGRESS NOTES
Outpatient Care Management Note:Received a call from Judd Genao from [facility]  He was inquiring about a report on pt as to why it was requested to go out to visit pt today  Discussed what had transpired today, etc  He is going out to see pt and will call only if he feels there is something unusual or if pt is having difficulty managing with something particular  Someone from their agency is planned to visit on Monday as well for PICC purposes

## 2018-11-12 ENCOUNTER — TELEPHONE (OUTPATIENT)
Dept: FAMILY MEDICINE CLINIC | Facility: CLINIC | Age: 73
End: 2018-11-12

## 2018-11-12 ENCOUNTER — TELEPHONE (OUTPATIENT)
Dept: UROLOGY | Facility: AMBULATORY SURGERY CENTER | Age: 73
End: 2018-11-12

## 2018-11-12 NOTE — TELEPHONE ENCOUNTER
Alicia File from [facility] care called stating he was with patient at the time, stating the pic line is to be pulled on Saturday  Is there any lab work that needs to be done? When I checked, all lab work for patient is completed  I did state a request was out to a clinical staff member and did not know if any would be ordered in the next day or so

## 2018-11-12 NOTE — TELEPHONE ENCOUNTER
Patient called with multiple questions  He wants a urine testing to see if sudimonis (sp?) is gone  Thank you  Yaniv Briscoe -- he asked for you, stating you can help him with his questions  Please return his call  The visiting nurse will be coming between 6 and 1, in case he does not answer  Thank you

## 2018-11-12 NOTE — TELEPHONE ENCOUNTER
I returned patient call and he was upset because he had gotten discharged from the hospital without a PICC line  She stated that he was so weak when he was discharged from the hospital that he had fallen backwards and hit his head  He now has a neck brace   Basically, he just wanted us to know that he did not want to see Dr Fidela Apley anymore

## 2018-11-12 NOTE — TELEPHONE ENCOUNTER
PATIENT CALLED AND WANTS TO TALK TO [facility] ONLY, HE SAID HE ONLY DEALS WITH DR Sukhjinder Presley AND [facility] ONLY  PATIENT HAS AN APPT WITH [facility] ON 12/13/18 FOR 6MO FU KUB PTV SL ** 11/6 LM FOR PATIENT TO CALL AND CONFIRM NEW APT, LETTER SENT/[facility]ACC ** HE WANTS TO R/S BUT TALK TO [facility] BEFORE DOING ANYTHING  HE SAID HE IS VERY UPSET WITH DR MONREAL

## 2018-11-13 NOTE — TELEPHONE ENCOUNTER
Spoke to pt this morning and also spoke to Care nurse Praful Lynch is going to be calling patient about patient concerns

## 2018-11-13 NOTE — TELEPHONE ENCOUNTER
Patient left message on office voice mail after 4 pm 11/12/18 that his wife is having open heart surgery on Friday and he is home alone

## 2018-11-14 RX ORDER — CEFEPIME HYDROCHLORIDE 2 G/1
INJECTION, POWDER, FOR SOLUTION INTRAVENOUS
COMMUNITY
Start: 2018-11-07 | End: 2019-09-10 | Stop reason: HOSPADM

## 2018-11-15 ENCOUNTER — APPOINTMENT (OUTPATIENT)
Dept: LAB | Facility: CLINIC | Age: 73
End: 2018-11-15
Payer: MEDICARE

## 2018-11-15 ENCOUNTER — PATIENT OUTREACH (OUTPATIENT)
Dept: FAMILY MEDICINE CLINIC | Facility: CLINIC | Age: 73
End: 2018-11-15

## 2018-11-15 ENCOUNTER — OFFICE VISIT (OUTPATIENT)
Dept: FAMILY MEDICINE CLINIC | Facility: CLINIC | Age: 73
End: 2018-11-15
Payer: MEDICARE

## 2018-11-15 VITALS
OXYGEN SATURATION: 96 % | DIASTOLIC BLOOD PRESSURE: 70 MMHG | BODY MASS INDEX: 26.88 KG/M2 | WEIGHT: 182 LBS | HEART RATE: 109 BPM | RESPIRATION RATE: 17 BRPM | SYSTOLIC BLOOD PRESSURE: 150 MMHG | TEMPERATURE: 97.3 F

## 2018-11-15 DIAGNOSIS — S32.010S CLOSED COMPRESSION FRACTURE OF FIRST LUMBAR VERTEBRA, SEQUELA: ICD-10-CM

## 2018-11-15 DIAGNOSIS — Z86.19 HX OF SEPSIS: ICD-10-CM

## 2018-11-15 DIAGNOSIS — K59.00 CONSTIPATION, UNSPECIFIED CONSTIPATION TYPE: ICD-10-CM

## 2018-11-15 DIAGNOSIS — Z91.81 HISTORY OF FALL WITHIN PAST 90 DAYS: ICD-10-CM

## 2018-11-15 DIAGNOSIS — IMO0001 TRANSITION OF CARE PERFORMED WITH SHARING OF CLINICAL SUMMARY: Primary | ICD-10-CM

## 2018-11-15 DIAGNOSIS — Z98.890 HX OF LITHOTRIPSY: ICD-10-CM

## 2018-11-15 DIAGNOSIS — Z79.4 CONTROLLED TYPE 2 DIABETES MELLITUS WITH HYPOGLYCEMIA, WITH LONG-TERM CURRENT USE OF INSULIN (HCC): ICD-10-CM

## 2018-11-15 DIAGNOSIS — E11.649 CONTROLLED TYPE 2 DIABETES MELLITUS WITH HYPOGLYCEMIA, WITH LONG-TERM CURRENT USE OF INSULIN (HCC): ICD-10-CM

## 2018-11-15 DIAGNOSIS — Z45.2 PICC (PERIPHERALLY INSERTED CENTRAL CATHETER) IN PLACE: ICD-10-CM

## 2018-11-15 DIAGNOSIS — N18.30 CKD (CHRONIC KIDNEY DISEASE), STAGE III (HCC): ICD-10-CM

## 2018-11-15 DIAGNOSIS — R11.0 NAUSEA: ICD-10-CM

## 2018-11-15 PROBLEM — S32.010A CLOSED COMPRESSION FRACTURE OF L1 LUMBAR VERTEBRA: Status: ACTIVE | Noted: 2018-11-15

## 2018-11-15 PROBLEM — T36.95XA ANTIBIOTIC-INDUCED YEAST INFECTION: Status: RESOLVED | Noted: 2018-09-24 | Resolved: 2018-11-15

## 2018-11-15 PROBLEM — B37.9 ANTIBIOTIC-INDUCED YEAST INFECTION: Status: RESOLVED | Noted: 2018-09-24 | Resolved: 2018-11-15

## 2018-11-15 LAB
BASOPHILS # BLD AUTO: 0.08 THOUSANDS/ΜL (ref 0–0.1)
BASOPHILS NFR BLD AUTO: 1 % (ref 0–1)
BILIRUB UR QL STRIP: NEGATIVE
CLARITY UR: ABNORMAL
COLOR UR: YELLOW
EOSINOPHIL # BLD AUTO: 0.02 THOUSAND/ΜL (ref 0–0.61)
EOSINOPHIL NFR BLD AUTO: 0 % (ref 0–6)
ERYTHROCYTE [DISTWIDTH] IN BLOOD BY AUTOMATED COUNT: 13.5 % (ref 11.6–15.1)
GLUCOSE UR STRIP-MCNC: NEGATIVE MG/DL
HCT VFR BLD AUTO: 46.8 % (ref 36.5–49.3)
HGB BLD-MCNC: 15.5 G/DL (ref 12–17)
HGB UR QL STRIP.AUTO: NEGATIVE
IMM GRANULOCYTES # BLD AUTO: 0.08 THOUSAND/UL (ref 0–0.2)
IMM GRANULOCYTES NFR BLD AUTO: 1 % (ref 0–2)
KETONES UR STRIP-MCNC: ABNORMAL MG/DL
LEUKOCYTE ESTERASE UR QL STRIP: NEGATIVE
LYMPHOCYTES # BLD AUTO: 1.05 THOUSANDS/ΜL (ref 0.6–4.47)
LYMPHOCYTES NFR BLD AUTO: 8 % (ref 14–44)
MCH RBC QN AUTO: 31.3 PG (ref 26.8–34.3)
MCHC RBC AUTO-ENTMCNC: 33.1 G/DL (ref 31.4–37.4)
MCV RBC AUTO: 94 FL (ref 82–98)
MONOCYTES # BLD AUTO: 0.45 THOUSAND/ΜL (ref 0.17–1.22)
MONOCYTES NFR BLD AUTO: 3 % (ref 4–12)
NEUTROPHILS # BLD AUTO: 11.37 THOUSANDS/ΜL (ref 1.85–7.62)
NEUTS SEG NFR BLD AUTO: 87 % (ref 43–75)
NITRITE UR QL STRIP: NEGATIVE
NRBC BLD AUTO-RTO: 0 /100 WBCS
PH UR STRIP.AUTO: 5.5 [PH] (ref 4.5–8)
PLATELET # BLD AUTO: 393 THOUSANDS/UL (ref 149–390)
PMV BLD AUTO: 10 FL (ref 8.9–12.7)
PROT UR STRIP-MCNC: ABNORMAL MG/DL
RBC # BLD AUTO: 4.96 MILLION/UL (ref 3.88–5.62)
SP GR UR STRIP.AUTO: 1.02 (ref 1–1.03)
UROBILINOGEN UR QL STRIP.AUTO: 1 E.U./DL
WBC # BLD AUTO: 13.05 THOUSAND/UL (ref 4.31–10.16)

## 2018-11-15 PROCEDURE — 81001 URINALYSIS AUTO W/SCOPE: CPT | Performed by: FAMILY MEDICINE

## 2018-11-15 PROCEDURE — 87040 BLOOD CULTURE FOR BACTERIA: CPT

## 2018-11-15 PROCEDURE — 36415 COLL VENOUS BLD VENIPUNCTURE: CPT

## 2018-11-15 PROCEDURE — 85025 COMPLETE CBC W/AUTO DIFF WBC: CPT

## 2018-11-15 PROCEDURE — 80053 COMPREHEN METABOLIC PANEL: CPT

## 2018-11-15 PROCEDURE — 99496 TRANSJ CARE MGMT HIGH F2F 7D: CPT | Performed by: FAMILY MEDICINE

## 2018-11-15 RX ORDER — BLOOD SUGAR DIAGNOSTIC
1 STRIP MISCELLANEOUS 4 TIMES DAILY
Qty: 300 EACH | Refills: 1 | Status: SHIPPED | OUTPATIENT
Start: 2018-11-15 | End: 2019-06-04 | Stop reason: SDUPTHER

## 2018-11-15 RX ORDER — IBUPROFEN 200 MG
TABLET ORAL EVERY 6 HOURS PRN
COMMUNITY
End: 2019-10-29

## 2018-11-15 RX ORDER — DOCUSATE SODIUM 250 MG
250 CAPSULE ORAL DAILY
Qty: 30 CAPSULE | Refills: 0
Start: 2018-11-15 | End: 2020-09-08

## 2018-11-15 RX ORDER — ONDANSETRON 4 MG/1
4 TABLET, FILM COATED ORAL EVERY 8 HOURS PRN
Qty: 30 TABLET | Refills: 1 | Status: SHIPPED | OUTPATIENT
Start: 2018-11-15 | End: 2018-11-23 | Stop reason: SDUPTHER

## 2018-11-15 NOTE — PROGRESS NOTES
Assessment/Plan:          Diagnoses and all orders for this visit:    Transition of care performed with sharing of clinical summary    Hx of sepsis  -     UA w Reflex to Microscopic w Reflex to Culture -Lab Collect  -     Blood culture; Future  -     Comprehensive metabolic panel; Future  -     CBC and differential; Future  -     Urine Microscopic    Hx of lithotripsy  -     UA w Reflex to Microscopic w Reflex to Culture -Lab Collect  -     Comprehensive metabolic panel; Future  -     CBC and differential; Future  -     Urine Microscopic    PICC (peripherally inserted central catheter) in place  -     Comprehensive metabolic panel; Future  -     CBC and differential; Future    Closed compression fracture of first lumbar vertebra, sequela    History of fall within past 90 days    Controlled type 2 diabetes mellitus with hypoglycemia, with long-term current use of insulin (HCC)  -     ACCU-CHEK GUIDE test strip; 1 each by Other route 4 (four) times a day Use as instructed bid scheduled and prn symptoms/signs blood sugar fluctuation  -     Comprehensive metabolic panel; Future    CKD (chronic kidney disease), stage III (HCC)  -     ACCU-CHEK GUIDE test strip; 1 each by Other route 4 (four) times a day Use as instructed bid scheduled and prn symptoms/signs blood sugar fluctuation  -     Comprehensive metabolic panel; Future    Nausea  -     Discontinue: ondansetron (ZOFRAN) 4 mg tablet; Take 1 tablet (4 mg total) by mouth every 8 (eight) hours as needed for nausea or vomiting    Constipation, unspecified constipation type  -     docusate sodium (COLACE) 250 MG capsule; Take 1 capsule (250 mg total) by mouth daily    Other orders  -     cefepime (MAXIPIME) 2 g injection;   -     ibuprofen (MOTRIN) 200 mg tablet; Take by mouth every 6 (six) hours as needed for mild pain     "other orders" are not orders, just reconciling at rooming    Subjective:     Patient ID: Rufino Roberts is a 68 y o  male      Per c/c and SAR[facility]NAN notes reviewed by m  PICC line to be removed in 2 days   went 8 days with no bm, used numerous OTCs', then did 5 enemas in one day (saturday) "was so backed up couldn't eat" did have effect that day, then none again until took several colace laxatives yesterday and "woke up last night with urge and didn't make it to bathroom in time"  Therapist made a brace for verteb fx, but unable to wear because cannot get up out of chair when on, so wearing his wife's lumbar brace   has appt with ortho, dr Divya Hernandes, at [facility] Monday 11/19  Was scheduled to have a colonoscopy, but was hospitalized during that time  Visiting nurse came and will end after return for PICC line removal in 2 days, in-home PT started 2 days ago  Blood sugars have been stable per pt        Review of Systems   Constitutional: Positive for activity change, appetite change, fatigue and unexpected weight change  Negative for chills, diaphoresis and fever  HENT: Negative for congestion, mouth sores, nosebleeds and trouble swallowing  Eyes: Negative  Respiratory: Negative  Cardiovascular: Negative  Gastrointestinal: Positive for nausea  Negative for anal bleeding, blood in stool and vomiting  Per hpi   Endocrine: Negative for cold intolerance, heat intolerance, polydipsia, polyphagia and polyuria  Per hpi   Genitourinary: Negative for decreased urine volume, dysuria, flank pain, frequency and hematuria  Per hpi   Musculoskeletal: Positive for back pain and gait problem  Negative for joint swelling  Skin: Negative for color change, pallor and rash  PICC line in place right upper arm, no problems with site per pt   Neurological: Negative for seizures, syncope, facial asymmetry, speech difficulty and headaches  Hematological: Negative  Psychiatric/Behavioral: Negative for agitation, behavioral problems and confusion  The patient is not nervous/anxious and is not hyperactive            Objective: Physical Exam   Constitutional: He is oriented to person, place, and time  He appears well-developed  He is cooperative  Non-toxic appearance  He does not have a sickly appearance  He does not appear ill  No distress  HENT:   Head: Normocephalic and atraumatic  Mouth/Throat: Uvula is midline, oropharynx is clear and moist and mucous membranes are normal    Eyes: Pupils are equal, round, and reactive to light  Conjunctivae are normal    Neck: Trachea normal  Neck supple  No JVD present  No thyroid mass and no thyromegaly present  Cardiovascular: Normal rate, regular rhythm, normal heart sounds and normal pulses  Pulmonary/Chest: Effort normal and breath sounds normal    Abdominal: Soft  Bowel sounds are normal  He exhibits no distension, no abdominal bruit and no mass  There is no hepatosplenomegaly  There is no tenderness  There is no CVA tenderness  Musculoskeletal:   Lumbar back brace in place  +mild to moderate difficulty with position changes due to pain   Lymphadenopathy:     He has no cervical adenopathy  Right: No supraclavicular adenopathy present  Left: No supraclavicular adenopathy present  Neurological: He is alert and oriented to person, place, and time  He displays no tremor  No cranial nerve deficit  Gait (antalgic due to back pain) abnormal    Skin: Skin is warm and dry  He is not diaphoretic  No cyanosis  No pallor  Psychiatric: He has a normal mood and affect  His speech is normal and behavior is normal  Judgment normal    Nursing note and vitals reviewed  Vitals:    11/15/18 1121   BP: 150/70   Pulse: (!) 109   Resp: 17   Temp: (!) 97 3 °F (36 3 °C)   SpO2: 96%   Weight: 82 6 kg (182 lb)       Transitional Care Management Review:  Ellen De aL Garzapool is a 68 y o  male here for TCM follow up       During the TCM phone call patient stated:    TCM Call (since 10/30/2018)     None      TCM Call (since 10/30/2018)     None              [place] Feeling, DO

## 2018-11-16 LAB
ALBUMIN SERPL BCP-MCNC: 3.8 G/DL (ref 3.5–5)
ALP SERPL-CCNC: 90 U/L (ref 46–116)
ALT SERPL W P-5'-P-CCNC: 55 U/L (ref 12–78)
ANION GAP SERPL CALCULATED.3IONS-SCNC: 6 MMOL/L (ref 4–13)
AST SERPL W P-5'-P-CCNC: 37 U/L (ref 5–45)
BACTERIA UR QL AUTO: ABNORMAL /HPF
BILIRUB SERPL-MCNC: 0.58 MG/DL (ref 0.2–1)
BUN SERPL-MCNC: 23 MG/DL (ref 5–25)
CALCIUM SERPL-MCNC: 9.7 MG/DL (ref 8.3–10.1)
CHLORIDE SERPL-SCNC: 106 MMOL/L (ref 100–108)
CO2 SERPL-SCNC: 27 MMOL/L (ref 21–32)
CREAT SERPL-MCNC: 1.2 MG/DL (ref 0.6–1.3)
GFR SERPL CREATININE-BSD FRML MDRD: 60 ML/MIN/1.73SQ M
GLUCOSE SERPL-MCNC: 129 MG/DL (ref 65–140)
NON-SQ EPI CELLS URNS QL MICRO: ABNORMAL /HPF
POTASSIUM SERPL-SCNC: 4.6 MMOL/L (ref 3.5–5.3)
PROT SERPL-MCNC: 7.7 G/DL (ref 6.4–8.2)
RBC #/AREA URNS AUTO: ABNORMAL /HPF
SODIUM SERPL-SCNC: 139 MMOL/L (ref 136–145)
URATE CRY URNS QL MICRO: ABNORMAL /HPF
WBC #/AREA URNS AUTO: ABNORMAL /HPF

## 2018-11-19 ENCOUNTER — TELEPHONE (OUTPATIENT)
Dept: FAMILY MEDICINE CLINIC | Facility: CLINIC | Age: 73
End: 2018-11-19

## 2018-11-19 NOTE — TELEPHONE ENCOUNTER
Patient called asking for [facility] Part D form for test strips  It's on your desk  Also he stated the pic line is out  He is not picking up Zofran as it would cost him $164  He stated he is doing well without it  He saw a specialist at [place] [address]  and has a full MRI scheduled  Thank you

## 2018-11-20 LAB
BACTERIA BLD CULT: NORMAL
BACTERIA BLD CULT: NORMAL

## 2018-11-20 NOTE — TELEPHONE ENCOUNTER
Patient left message on office voicemail 11/19/18 6:59 pm that his son went to  the test strips but [facility] does not have the form  Patient strongly requests that the form be completed and faxed to [facility] by 9 am today  Thank you

## 2018-11-23 DIAGNOSIS — R11.0 NAUSEA: ICD-10-CM

## 2018-11-23 DIAGNOSIS — R11.2 POSTOPERATIVE NAUSEA AND VOMITING: Primary | ICD-10-CM

## 2018-11-23 DIAGNOSIS — Z98.890 POSTOPERATIVE NAUSEA AND VOMITING: Primary | ICD-10-CM

## 2018-11-23 RX ORDER — ONDANSETRON 4 MG/1
4 TABLET, FILM COATED ORAL EVERY 8 HOURS PRN
Qty: 30 TABLET | Refills: 0 | Status: SHIPPED | OUTPATIENT
Start: 2018-11-23 | End: 2019-09-10 | Stop reason: HOSPADM

## 2018-11-27 DIAGNOSIS — R42 DIZZINESS: Primary | ICD-10-CM

## 2018-11-27 DIAGNOSIS — G70.00 MYASTHENIA GRAVIS (HCC): ICD-10-CM

## 2018-11-27 RX ORDER — MECLIZINE HCL 12.5 MG/1
12.5 TABLET ORAL DAILY
Qty: 30 TABLET | Refills: 0 | Status: SHIPPED | OUTPATIENT
Start: 2018-11-27 | End: 2018-12-10 | Stop reason: SDUPTHER

## 2018-11-27 RX ORDER — PREDNISONE 10 MG/1
10 TABLET ORAL DAILY
Qty: 90 TABLET | Refills: 0 | Status: SHIPPED | OUTPATIENT
Start: 2018-11-27 | End: 2019-02-21 | Stop reason: SDUPTHER

## 2018-11-28 ENCOUNTER — TELEPHONE (OUTPATIENT)
Dept: NEUROLOGY | Facility: CLINIC | Age: 73
End: 2018-11-28

## 2018-11-28 NOTE — TELEPHONE ENCOUNTER
Pt came to [facility] today with paperwork   He admits to sob , and difficulty  breathing   Jamilah Skipper On prednisone 10 mg daily  For MG     Please add him on Friday at 1;30 pm

## 2018-11-30 ENCOUNTER — OFFICE VISIT (OUTPATIENT)
Dept: NEUROLOGY | Facility: CLINIC | Age: 73
End: 2018-11-30
Payer: MEDICARE

## 2018-11-30 VITALS
WEIGHT: 179 LBS | RESPIRATION RATE: 14 BRPM | BODY MASS INDEX: 26.51 KG/M2 | SYSTOLIC BLOOD PRESSURE: 140 MMHG | DIASTOLIC BLOOD PRESSURE: 70 MMHG | HEIGHT: 69 IN

## 2018-11-30 DIAGNOSIS — G62.9 POLYNEUROPATHY: ICD-10-CM

## 2018-11-30 DIAGNOSIS — G60.9 HEREDITARY AND IDIOPATHIC NEUROPATHY: ICD-10-CM

## 2018-11-30 DIAGNOSIS — G70.00 MYASTHENIA GRAVIS (HCC): Primary | ICD-10-CM

## 2018-11-30 DIAGNOSIS — G60.3 IDIOPATHIC PROGRESSIVE NEUROPATHY: ICD-10-CM

## 2018-11-30 PROCEDURE — 99214 OFFICE O/P EST MOD 30 MIN: CPT | Performed by: PSYCHIATRY & NEUROLOGY

## 2018-11-30 RX ORDER — PYRIDOSTIGMINE BROMIDE 60 MG/1
60 TABLET ORAL 3 TIMES DAILY
Qty: 90 TABLET | Refills: 3 | Status: SHIPPED | OUTPATIENT
Start: 2018-11-30 | End: 2019-05-31 | Stop reason: ALTCHOICE

## 2018-11-30 NOTE — PROGRESS NOTES
Patient ID: Magen Bradley is a 68 y o  male  Assessment/Plan:    Myasthenia gravis [facility])  He has a known history of myasthenia gravis and is currently on prednisone 10 mg a day  His EMG-ADLs score was 1/24 suggesting well controlled myasthenia gravis  He had no change in this testing with sustained exertion  He does complain of shortness of breath with exertion  This could be multifactorial including the recent fracture L1 region,  the use of a brace as well as recent hospitalization  At this point I will not be changing the prednisone dose  I have added Mestinon  60 mg t i d  As needed  He does not require the use of any urgent IV medications at this time  We will repeat his Achr antibodies     Polyneuropathy  At the last visit he complained of mild symptoms of suggestive of a polyneuropathy  His EMG failed to reveal any evidence of a polyneuropathy however he has evidence of early diabetes with abnormal he will HB A1c is  Today he reports a three week history of numbness in the pads of his  feet  His examination is actually improved from prior exam six months ago  This could be due to abnormal ambulation  He should continue utilizing orthotics  I have ordered a B12 folate TSH SPEP IPeP  At this point I have not added additional medications for neuropathic pain  since he is not experiencing any the symptoms  We will see him again in several weeks and determine if further EMG testing is required  Diagnoses and all orders for this visit:    Myasthenia gravis ([address] )  -     Acetylcholine receptor, binding; Future  -     Acetylcholine receptor, blocking; Future  -     Acetylcholine receptor, modulating; Future  -     pyridostigmine (MESTINON) 60 mg tablet; Take 1 tablet (60 mg total) by mouth 3 (three) times a day    Polyneuropathy  -     Vitamin B12; Future  -     Folate; Future  -     Protein electrophoresis, serum;  Future  -     Protein electrophoresis, urine  -     TSH, 3rd generation; Future    Idiopathic progressive neuropathy   -     Vitamin B12; Future  -     Folate; Future    Hereditary and idiopathic neuropathy   -     TSH, 3rd generation; Future         He is to follow up in approximately 6 to 8 weeks  Subjective: This is a 69 y/o male with History of Myasthenia Gravis diagnosed in 2009 after presents for a follow up visit  He was last evaluated in June of 2018  At that time he complained of foot pain  Subsequently an EMG study was performed in late June  It demonstrated the presence of tarsal tunnel syndrome on the right ankle but there was no evidence of a neuropathy  It was felt that his myasthenia gravis was relatively stable on 10 mg of prednisone a day  In the interim he has been seen in the emergency room multiple times and was recently admitted for lithotripsy  Following lithotripsy he developed sepsis he is treated with IV antibiotics   A PICC line was placed and he had a any received 10 days of antibiotics while at home  He  had a mechanical fall with acute L1 fracture Mechanical fall with acute L1 fracture  He is now wearing a TL as a brace  He reports a a three week history of numbness in the pads of the feet  He denies any burning dysesthesias tingling  The numbness also involves the toes  but does not radiate  She was diagnosed with a early diabetes several months ago and does hemoglobin A1c is performed on a regular basis  A repeat hemoglobin A1cs her later on this month  he is currently on prednisone 10 mg a day for myasthenia gravis  He previously attempts to try immunosuppression did cause GI side effects  Mestinon high dose did result in diarrhea  Today he reports shortness of breath with exertion  He has no difficulty brushing his teeth, swallowing this speak, his speech had droopiness ptosis or double vision  Shortness of breath only occurs with exertion after 1 hr      Today's mg-ADL score was 1/24           a prior emg  In 2017Borderline findings suggestive of a mild, early, generalized, predominantly sensory peripheral  polyneuropathy without denervation and  A chronic left L5 radiculopathy without denervation  EMG of the lower extremity in June 2018 failed to reveal any evidence of neuropathy  It demonstrated entrapment of the  tarsal tunnel on the right side  He admits to wearing a boot for several weeks during the summer and was seen by Dr Angie Byrnes  He is also followed at away and I Dr Nancy Myers  He did have an MRI performed hip and was treated with a cortisone injection which did help for short period of time  She     Imuran was tried but he developed gi symptoms so he stopped it   mestinon did cause diarrhea     Today he complains of constipation                                The following portions of the patient's history were reviewed and updated as appropriate:   He  has a past medical history of Anxiety; Cervical spinal stenosis; chronic ulcerative colitis; Hyperlipidemia; Myasthenia gravis ([address] ); and Sleep apnea  He  has a past surgical history that includes Lithotripsy; Colonoscopy; Colon surgery; Other surgical history; Salivary gland surgery; COLECTOMY LAPAROSCOPIC; Colectomy; and pr fragment kidney stone/ eswl (Left, 11/2/2018)  His family history includes Arthritis in his mother; Depression in his father; Diabetes in his mother; Hypertension in his mother; Osteoporosis in his mother  He  reports that he has never smoked  He has never used smokeless tobacco  He reports that he does not drink alcohol or use drugs    Current Outpatient Prescriptions   Medication Sig Dispense Refill    ACCU-CHEK FASTCLIX LANCETS MISC TEST twice a day  0    ACCU-CHEK GUIDE test strip 1 each by Other route 4 (four) times a day Use as instructed bid scheduled and prn symptoms/signs blood sugar fluctuation 300 each 1    BD ULTRA-FINE PEN NEEDLES 29G X 12 7MM MISC       cholecalciferol (VITAMIN D3) 1,000 units tablet Take 2 tablets by mouth daily        Cranberry 500 MG CAPS Take 1 capsule by mouth daily      docusate sodium (COLACE) 250 MG capsule Take 1 capsule (250 mg total) by mouth daily 30 capsule 0    famotidine (PEPCID) 20 mg tablet Take 1 tablet (20 mg total) by mouth daily 90 tablet 3    insulin aspart (NovoLOG) 100 units/mL injection Inject 4 Units under the skin 3 (three) times a day before meals      insulin glargine (LANTUS) 100 units/mL subcutaneous injection Inject 12 Units under the skin daily at bedtime      Insulin Pen Needle 29G X 12MM MISC by Does not apply route 4 (four) times a day 360 each 0    Lactobacillus (ACIDOPHILUS) 100 MG CAPS Take 1 capsule by mouth daily      lidocaine (LIDODERM) 5 % Apply 1 patch topically daily Remove & Discard patch within 12 hours or as directed by MD 30 patch 0    lisinopril (ZESTRIL) 2 5 mg tablet Take 2 5 mg by mouth daily        meclizine (ANTIVERT) 12 5 MG tablet Take 1 tablet (12 5 mg total) by mouth daily 30 tablet 0    ondansetron (ZOFRAN) 4 mg tablet Take 1 tablet (4 mg total) by mouth every 8 (eight) hours as needed for nausea or vomiting 30 tablet 0    predniSONE 10 mg tablet Take 1 tablet (10 mg total) by mouth daily 90 tablet 0    pyridoxine (VITAMIN B6) 100 mg tablet Take 100 mg by mouth daily        cefepime (MAXIPIME) 2 g injection       ibuprofen (MOTRIN) 200 mg tablet Take by mouth every 6 (six) hours as needed for mild pain      pyridostigmine (MESTINON) 60 mg tablet Take 1 tablet (60 mg total) by mouth 3 (three) times a day 90 tablet 3     No current facility-administered medications for this visit  He is allergic to levaquin [levofloxacin]; quinolones; antihistamines, chlorpheniramine-type; benzodiazepines; chlorpheniramine; diphenhydramine; erythromycin; iodine; iodine solution [povidone iodine]; loratadine; other; sulfa antibiotics; and tetracyclines & related            Objective:    Blood pressure 140/70, resp   rate 14, height 5' 9" (1 753 m), weight 81 2 kg (179 lb)  Physical Exam   Constitutional: He appears well-developed  HENT:   Head: Normocephalic  Neck: Normal range of motion  Neurological:   Reflex Scores:       Tricep reflexes are Tr on the right side and Tr on the left side  Bicep reflexes are 1+ on the right side and 1+ on the left side  Patellar reflexes are Tr on the right side and Tr on the left side  Achilles reflexes are Tr on the right side and Tr on the left side  Psychiatric: He has a normal mood and affect  His speech is normal        Neurological Exam  Mental Status   Oriented to person, place, time and situation  Speech is normal  Language is fluent with no aphasia  Attention and concentration are normal     Cranial Nerves  CN II: Visual fields full to confrontation  CN III, IV, VI: Extraocular movements intact bilaterally  CN V: Facial sensation is normal   CN VII: Full and symmetric facial movement  CN VIII: Hearing is normal   CN IX, X: Palate elevates symmetrically  CN XI: Shoulder shrug strength is normal   CN XII: Tongue midline without atrophy or fasciculations  He had no weakness of neck flexion and neck extension  There is no evidence of a ptosis and no change with superior gaze       Motor    He had normal strength in the upper extremities and lower extremities  He had an abnormal gait due to the numbness in his toes as well as the fact that he is using a brace for his recent L1 fracture  Neck flexion and neck extension were 5/5  There was no change with sustained  exertion       Sensory  For vibration lasted to 25 sec in the toes  pp to mid ankle  on left, on foot, jps position sense was intact in the upper and lower extremities in the toes ,fingers         Reflexes                                           Right                      Left  Biceps                                 1+                         1+  Triceps                                Tr Tr  Patellar                                Tr                         Tr  Achilles                                Tr                         Tr  Plantar                           Downgoing                Downgoing    Right pathological reflexes: Logan's absent  Left pathological reflexes: Logan's absent  Coordination  Right: Finger-to-nose normal  Heel-to-shin normal   Left: Finger-to-nose normal  Heel-to-shin normal     Gait Unable to rise from chair without using arms  he had an abnormal gait limited by pain           ROS:    Review of Systems   Constitutional: Positive for fatigue  Negative for appetite change and fever  HENT: Negative  Negative for hearing loss, tinnitus, trouble swallowing and voice change  Eyes: Negative  Negative for photophobia and pain  Respiratory: Positive for shortness of breath  Cardiovascular: Negative  Negative for palpitations  Gastrointestinal: Positive for nausea  Negative for vomiting  Endocrine: Negative  Negative for cold intolerance and heat intolerance  Genitourinary: Positive for frequency and urgency  Negative for dysuria  Musculoskeletal: Positive for arthralgias  Negative for myalgias and neck pain  Skin: Negative  Negative for rash  Neurological: Positive for dizziness, weakness, light-headedness and numbness  Negative for tremors, seizures, syncope, facial asymmetry, speech difficulty and headaches  Hematological: Negative  Does not bruise/bleed easily  Psychiatric/Behavioral: Positive for sleep disturbance  Negative for confusion and hallucinations

## 2018-11-30 NOTE — ASSESSMENT & PLAN NOTE
At the last visit he complained of mild symptoms of suggestive of a polyneuropathy  His EMG failed to reveal any evidence of a polyneuropathy however he has evidence of early diabetes with abnormal he will HB A1c is  Today he reports a three week history of numbness in the pads of his  feet  His examination is actually improved from prior exam six months ago  This could be due to abnormal ambulation  He should continue utilizing orthotics  I have ordered a B12 folate TSH SPEP IPeP  At this point I have not added additional medications for neuropathic pain  since he is not experiencing any the symptoms  We will see him again in several weeks and determine if further EMG testing is required

## 2018-11-30 NOTE — ASSESSMENT & PLAN NOTE
He has a known history of myasthenia gravis and is currently on prednisone 10 mg a day  His EMG-ADLs score was 1/24 suggesting well controlled myasthenia gravis  He had no change in this testing with sustained exertion  He does complain of shortness of breath with exertion  This could be multifactorial including the recent fracture L1 region,  the use of a brace as well as recent hospitalization  At this point I will not be changing the prednisone dose  I have added Mestinon  60 mg t i d  As needed  He does not require the use of any urgent IV medications at this time        We will repeat his Achr antibodies

## 2018-12-03 ENCOUNTER — PATIENT OUTREACH (OUTPATIENT)
Dept: OTHER | Facility: HOSPITAL | Age: 73
End: 2018-12-03

## 2018-12-03 DIAGNOSIS — E11.9 CONTROLLED TYPE 2 DIABETES MELLITUS WITHOUT COMPLICATION, WITH LONG-TERM CURRENT USE OF INSULIN (HCC): Primary | ICD-10-CM

## 2018-12-03 DIAGNOSIS — Z79.4 CONTROLLED TYPE 2 DIABETES MELLITUS WITHOUT COMPLICATION, WITH LONG-TERM CURRENT USE OF INSULIN (HCC): Primary | ICD-10-CM

## 2018-12-10 ENCOUNTER — APPOINTMENT (OUTPATIENT)
Dept: LAB | Facility: CLINIC | Age: 73
End: 2018-12-10
Payer: MEDICARE

## 2018-12-10 DIAGNOSIS — R42 DIZZINESS: ICD-10-CM

## 2018-12-10 DIAGNOSIS — G60.3 IDIOPATHIC PROGRESSIVE NEUROPATHY: ICD-10-CM

## 2018-12-10 DIAGNOSIS — G70.00 MYASTHENIA GRAVIS (HCC): ICD-10-CM

## 2018-12-10 DIAGNOSIS — G60.9 HEREDITARY AND IDIOPATHIC NEUROPATHY: ICD-10-CM

## 2018-12-10 DIAGNOSIS — G62.9 POLYNEUROPATHY: ICD-10-CM

## 2018-12-10 LAB
FOLATE SERPL-MCNC: 4.1 NG/ML (ref 3.1–17.5)
TSH SERPL DL<=0.05 MIU/L-ACNC: 3.24 UIU/ML (ref 0.36–3.74)
VIT B12 SERPL-MCNC: 541 PG/ML (ref 100–900)

## 2018-12-10 PROCEDURE — 84443 ASSAY THYROID STIM HORMONE: CPT

## 2018-12-10 PROCEDURE — 84165 PROTEIN E-PHORESIS SERUM: CPT | Performed by: PATHOLOGY

## 2018-12-10 PROCEDURE — 82746 ASSAY OF FOLIC ACID SERUM: CPT

## 2018-12-10 PROCEDURE — 84166 PROTEIN E-PHORESIS/URINE/CSF: CPT | Performed by: PSYCHIATRY & NEUROLOGY

## 2018-12-10 PROCEDURE — 84238 ASSAY NONENDOCRINE RECEPTOR: CPT

## 2018-12-10 PROCEDURE — 82607 VITAMIN B-12: CPT

## 2018-12-10 PROCEDURE — 84165 PROTEIN E-PHORESIS SERUM: CPT

## 2018-12-10 PROCEDURE — 36415 COLL VENOUS BLD VENIPUNCTURE: CPT

## 2018-12-10 PROCEDURE — 84166 PROTEIN E-PHORESIS/URINE/CSF: CPT | Performed by: PATHOLOGY

## 2018-12-10 PROCEDURE — 83519 RIA NONANTIBODY: CPT

## 2018-12-10 RX ORDER — MECLIZINE HCL 12.5 MG/1
12.5 TABLET ORAL 2 TIMES DAILY
Qty: 180 TABLET | Refills: 0 | Status: SHIPPED | OUTPATIENT
Start: 2018-12-10 | End: 2019-04-01 | Stop reason: SDUPTHER

## 2018-12-12 ENCOUNTER — APPOINTMENT (OUTPATIENT)
Dept: LAB | Facility: CLINIC | Age: 73
End: 2018-12-12
Payer: MEDICARE

## 2018-12-12 DIAGNOSIS — Z79.4 CONTROLLED TYPE 2 DIABETES MELLITUS WITHOUT COMPLICATION, WITH LONG-TERM CURRENT USE OF INSULIN (HCC): ICD-10-CM

## 2018-12-12 DIAGNOSIS — E11.9 CONTROLLED TYPE 2 DIABETES MELLITUS WITHOUT COMPLICATION, WITH LONG-TERM CURRENT USE OF INSULIN (HCC): ICD-10-CM

## 2018-12-12 PROBLEM — N39.0 COMPLICATED UTI (URINARY TRACT INFECTION): Status: RESOLVED | Noted: 2018-10-04 | Resolved: 2018-12-12

## 2018-12-12 PROBLEM — N20.0 CALCULUS OF KIDNEY: Status: RESOLVED | Noted: 2018-10-19 | Resolved: 2018-12-12

## 2018-12-12 LAB
ALBUMIN SERPL BCP-MCNC: 3.7 G/DL (ref 3.5–5)
ALBUMIN SERPL ELPH-MCNC: 4.04 G/DL (ref 3.5–5)
ALBUMIN SERPL ELPH-MCNC: 55.4 % (ref 52–65)
ALBUMIN UR ELPH-MCNC: 100 %
ALPHA1 GLOB MFR UR ELPH: 0 %
ALPHA1 GLOB SERPL ELPH-MCNC: 0.31 G/DL (ref 0.1–0.4)
ALPHA1 GLOB SERPL ELPH-MCNC: 4.3 % (ref 2.5–5)
ALPHA2 GLOB MFR UR ELPH: 0 %
ALPHA2 GLOB SERPL ELPH-MCNC: 1.2 G/DL (ref 0.4–1.2)
ALPHA2 GLOB SERPL ELPH-MCNC: 16.4 % (ref 7–13)
ANION GAP SERPL CALCULATED.3IONS-SCNC: 9 MMOL/L (ref 4–13)
B-GLOBULIN MFR UR ELPH: 0 %
BASOPHILS # BLD AUTO: 0.13 THOUSANDS/ΜL (ref 0–0.1)
BASOPHILS NFR BLD AUTO: 1 % (ref 0–1)
BETA GLOB ABNORMAL SERPL ELPH-MCNC: 0.47 G/DL (ref 0.4–0.8)
BETA1 GLOB SERPL ELPH-MCNC: 6.5 % (ref 5–13)
BETA2 GLOB SERPL ELPH-MCNC: 6.6 % (ref 2–8)
BETA2+GAMMA GLOB SERPL ELPH-MCNC: 0.48 G/DL (ref 0.2–0.5)
BUN SERPL-MCNC: 19 MG/DL (ref 5–25)
CALCIUM ALBUM COR SERPL-MCNC: 10.7 MG/DL (ref 8.3–10.1)
CALCIUM SERPL-MCNC: 10.5 MG/DL (ref 8.3–10.1)
CALCIUM SERPL-MCNC: 10.5 MG/DL (ref 8.3–10.1)
CHLORIDE SERPL-SCNC: 103 MMOL/L (ref 100–108)
CO2 SERPL-SCNC: 26 MMOL/L (ref 21–32)
CREAT SERPL-MCNC: 1.36 MG/DL (ref 0.6–1.3)
EOSINOPHIL # BLD AUTO: 0.16 THOUSAND/ΜL (ref 0–0.61)
EOSINOPHIL NFR BLD AUTO: 1 % (ref 0–6)
ERYTHROCYTE [DISTWIDTH] IN BLOOD BY AUTOMATED COUNT: 12.8 % (ref 11.6–15.1)
EST. AVERAGE GLUCOSE BLD GHB EST-MCNC: 126 MG/DL
GAMMA GLOB ABNORMAL SERPL ELPH-MCNC: 0.79 G/DL (ref 0.5–1.6)
GAMMA GLOB MFR UR ELPH: 0 %
GAMMA GLOB SERPL ELPH-MCNC: 10.8 % (ref 12–22)
GFR SERPL CREATININE-BSD FRML MDRD: 51 ML/MIN/1.73SQ M
GLUCOSE P FAST SERPL-MCNC: 60 MG/DL (ref 65–99)
HBA1C MFR BLD: 6 % (ref 4.2–6.3)
HCT VFR BLD AUTO: 48.5 % (ref 36.5–49.3)
HGB BLD-MCNC: 16 G/DL (ref 12–17)
IGG/ALB SER: 1.24 {RATIO} (ref 1.1–1.8)
IMM GRANULOCYTES # BLD AUTO: 0.06 THOUSAND/UL (ref 0–0.2)
IMM GRANULOCYTES NFR BLD AUTO: 1 % (ref 0–2)
LYMPHOCYTES # BLD AUTO: 4.09 THOUSANDS/ΜL (ref 0.6–4.47)
LYMPHOCYTES NFR BLD AUTO: 33 % (ref 14–44)
MCH RBC QN AUTO: 30.9 PG (ref 26.8–34.3)
MCHC RBC AUTO-ENTMCNC: 33 G/DL (ref 31.4–37.4)
MCV RBC AUTO: 94 FL (ref 82–98)
MONOCYTES # BLD AUTO: 0.85 THOUSAND/ΜL (ref 0.17–1.22)
MONOCYTES NFR BLD AUTO: 7 % (ref 4–12)
NEUTROPHILS # BLD AUTO: 7.19 THOUSANDS/ΜL (ref 1.85–7.62)
NEUTS SEG NFR BLD AUTO: 57 % (ref 43–75)
NRBC BLD AUTO-RTO: 0 /100 WBCS
PLATELET # BLD AUTO: 325 THOUSANDS/UL (ref 149–390)
PMV BLD AUTO: 10.6 FL (ref 8.9–12.7)
POTASSIUM SERPL-SCNC: 4 MMOL/L (ref 3.5–5.3)
PROT PATTERN SERPL ELPH-IMP: ABNORMAL
PROT PATTERN UR ELPH-IMP: NORMAL
PROT SERPL-MCNC: 7.3 G/DL (ref 6.4–8.2)
PROT UR-MCNC: 13 MG/DL
RBC # BLD AUTO: 5.17 MILLION/UL (ref 3.88–5.62)
SODIUM SERPL-SCNC: 138 MMOL/L (ref 136–145)
WBC # BLD AUTO: 12.48 THOUSAND/UL (ref 4.31–10.16)

## 2018-12-12 PROCEDURE — 80048 BASIC METABOLIC PNL TOTAL CA: CPT

## 2018-12-12 PROCEDURE — 83036 HEMOGLOBIN GLYCOSYLATED A1C: CPT

## 2018-12-12 PROCEDURE — 82040 ASSAY OF SERUM ALBUMIN: CPT

## 2018-12-12 PROCEDURE — 85025 COMPLETE CBC W/AUTO DIFF WBC: CPT

## 2018-12-12 PROCEDURE — 36415 COLL VENOUS BLD VENIPUNCTURE: CPT

## 2018-12-12 NOTE — PROGRESS NOTES
12/13/2018      Chief Complaint   Patient presents with    Nephrolithiasis     6 mo f/u- [facility] 11/2/18       Assessment and Plan    68 y o  male managed by Dr Radha De Los Santos    1  Uric acid/calcium oxalate nephrolithiasis s/p left ESWL 11/2/18  - CT scan with bilateral puncatate calculi   - stressed proper hydration and provided the patient with the AUA kidney stone handout for diet and lifestyle modifications  - will continue with Nephrology  - due again for imaging in 1 year    2  History of asymptomatic bacteriuria with Pseudomonas colonization  - discussed with the patient that he should notify us with symptoms otherwise, no treatment required at this time    Follow up 1 year with [facility] and US prior      History of Present Illness  Kayla Field is a 68 y o  male with my sending a gravis here for follow up evaluation of nephrolithiasis  The patient underwent left ESWL  CT scan following revealed bilateral punctate calculi  His lower urinary tract symptoms are listed as below in addition to his AUA symptom score  No urinary complaints today  Of note, he does have asymptomatic bacteriuria was Pseudomonas colonization  He is currently asymptomatic for this  Unfortunately, following surgery the patient did become septic likely secondary to UTI  Review of Systems   Constitutional: Negative for activity change, chills and fever  Gastrointestinal: Negative for abdominal distention and abdominal pain  Musculoskeletal: Negative for back pain and gait problem  Psychiatric/Behavioral: Negative for behavioral problems and confusion  Urinary Incontinence Screening      Most Recent Value   Urinary Incontinence   Urinary Incontinence? No   Incomplete emptying? Yes   Urinary frequency? Yes   Urinary urgency? Yes   Urinary hesitancy? Yes   Dysuria (painful difficult urination)? No   Nocturia (waking up to use the bathroom)? Yes [Twice per night]   Straining (having to push to go)?   Yes   Weak stream? Yes   Intermittent stream?  Yes   Post void dribbling? No        AUA SYMPTOM SCORE      Most Recent Value   AUA SYMPTOM SCORE   How often have you had a sensation of not emptying your bladder completely after you finished urinating? 2   How often have you had to urinate again less than two hours after you finished urinating? 3   How often have you found you stopped and started again several times when you urinate? 2   How often have you found it difficult to postpone urination? 4   How often have you had a weak urinary stream?  1   How often have you had to push or strain to begin urination? 1   How many times did you most typically get up to urinate from the time you went to bed at night until the time you got up in the morning? 2   Quality of Life: If you were to spend the rest of your life with your urinary condition just the way it is now, how would you feel about that?  2   AUA SYMPTOM SCORE  15          Past Medical History  Past Medical History:   Diagnosis Date    Anxiety     Cervical spinal stenosis     Hx of chronic ulcerative colitis     Hyperlipidemia     Myasthenia gravis ([facility] [address] )     Sleep apnea        Past Social History  Past Surgical History:   Procedure Laterality Date    COLECTOMY      partial sigmoid    COLECTOMY LAPAROSCOPIC      partial sigmoid colectomy    COLON SURGERY      lap  partial colectomy sigmoid    COLONOSCOPY      LITHOTRIPSY      OTHER SURGICAL HISTORY      parotid bx    MA FRAGMENT KIDNEY STONE/ ESWL Left 11/2/2018    Procedure: Gary Melvin SHOCKWAVE (ESWL);   Surgeon: Crista Davila MD;  Location: [facility];  Service: Urology    SALIVARY GLAND SURGERY      biopsy     History   Smoking Status    Never Smoker   Smokeless Tobacco    Never Used       Past Family History  Family History   Problem Relation Age of Onset    Arthritis Mother     Diabetes Mother     Hypertension Mother     Osteoporosis Mother     Depression Father     Cancer Neg Hx     Heart disease Neg Hx        Past Social history  Social History     Social History    Marital status: /Civil Union     Spouse name: N/A    Number of children: N/A    Years of education: N/A     Occupational History    cleans doctors houses      Social History Main Topics    Smoking status: Never Smoker    Smokeless tobacco: Never Used    Alcohol use No    Drug use: No    Sexual activity: Not Currently     Other Topics Concern    Not on file     Social History Narrative    No caffeine use    Uses seat belts       Current Medications  Current Outpatient Prescriptions   Medication Sig Dispense Refill    ACCU-CHEK FASTCLIX LANCETS MISC TEST twice a day  0    ACCU-CHEK GUIDE test strip 1 each by Other route 4 (four) times a day Use as instructed bid scheduled and prn symptoms/signs blood sugar fluctuation 300 each 1    BD ULTRA-FINE PEN NEEDLES 29G X 12 7MM MISC       cholecalciferol (VITAMIN D3) 1,000 units tablet Take 2 tablets by mouth daily        Cranberry 500 MG CAPS Take 1 capsule by mouth daily      famotidine (PEPCID) 20 mg tablet Take 1 tablet (20 mg total) by mouth daily 90 tablet 3    insulin aspart (NovoLOG) 100 units/mL injection Inject 4 Units under the skin 3 (three) times a day before meals      insulin glargine (LANTUS) 100 units/mL subcutaneous injection Inject 12 Units under the skin daily at bedtime      Insulin Pen Needle 29G X 12MM MISC by Does not apply route 4 (four) times a day 360 each 0    Lactobacillus (ACIDOPHILUS) 100 MG CAPS Take 1 capsule by mouth daily      lisinopril (ZESTRIL) 2 5 mg tablet Take 2 5 mg by mouth daily        meclizine (ANTIVERT) 12 5 MG tablet Take 1 tablet (12 5 mg total) by mouth 2 (two) times a day 180 tablet 0    predniSONE 10 mg tablet Take 1 tablet (10 mg total) by mouth daily 90 tablet 0    pyridostigmine (MESTINON) 60 mg tablet Take 1 tablet (60 mg total) by mouth 3 (three) times a day (Patient taking differently: Take 60 mg by mouth daily  ) 90 tablet 3    pyridoxine (VITAMIN B6) 100 mg tablet Take 100 mg by mouth daily        cefepime (MAXIPIME) 2 g injection       docusate sodium (COLACE) 250 MG capsule Take 1 capsule (250 mg total) by mouth daily (Patient not taking: Reported on 12/13/2018 ) 30 capsule 0    ibuprofen (MOTRIN) 200 mg tablet Take by mouth every 6 (six) hours as needed for mild pain      lidocaine (LIDODERM) 5 % Apply 1 patch topically daily Remove & Discard patch within 12 hours or as directed by MD (Patient not taking: Reported on 12/13/2018 ) 30 patch 0    ondansetron (ZOFRAN) 4 mg tablet Take 1 tablet (4 mg total) by mouth every 8 (eight) hours as needed for nausea or vomiting (Patient not taking: Reported on 12/13/2018 ) 30 tablet 0     No current facility-administered medications for this visit  Allergies  Allergies   Allergen Reactions    Levaquin [Levofloxacin]      Due to MG dx    Quinolones      Due to MG dx    Antihistamines, Chlorpheniramine-Type      Difficulty urinating    Benzodiazepines     Chlorpheniramine     Diphenhydramine     Erythromycin     Iodine     Iodine Solution [Povidone Iodine]      ivp dye    Loratadine     Other      Muscle relaxers d/t myasthenia gravis-they close my airway      Sulfa Antibiotics     Tetracyclines & Related          The following portions of the patient's history were reviewed and updated as appropriate: allergies, current medications, past medical history, past social history, past surgical history and problem list       Vitals  Vitals:    12/13/18 1329   BP: 130/70   BP Location: Left arm   Patient Position: Sitting   Cuff Size: Adult   Pulse: 82   Weight: 80 7 kg (178 lb)   Height: 5' 9" (1 753 m)       Physical Exam  Constitutional   General appearance: Patient is seated and in no acute distress, well appearing and well nourished  Head and Face   Head and face: Normal     Eyes   Conjunctiva and lids: No erythema, swelling or discharge  Ears, Nose, Mouth, and Throat   Hearing: Normal     Pulmonary   Respiratory effort: No increased work of breathing or signs of respiratory distress  Cardiovascular   Examination of extremities for edema and/or varicosities: Normal     Abdomen   Abdomen: Non-tender, no masses  Musculoskeletal   Gait and station: Normal     Skin   Skin and subcutaneous tissue: Warm, dry, and intact  No visible lesions or rashes  Psychiatric   Judgment and insight: Normal  Recent and remote memory:  Normal  Mood and affect: Normal      Results  No results found for this or any previous visit (from the past 1 hour(s)) ]  Lab Results   Component Value Date    PSA 0 4 02/24/2017    PSA 0 6 03/18/2016    PSA 0 7 03/25/2015     Lab Results   Component Value Date    GLUCOSE 124 12/11/2015    CALCIUM 10 5 (H) 12/12/2018     12/11/2015    K 4 0 12/12/2018    CO2 26 12/12/2018     12/12/2018    BUN 19 12/12/2018    CREATININE 1 36 (H) 12/12/2018     Lab Results   Component Value Date    WBC 12 48 (H) 12/12/2018    HGB 16 0 12/12/2018    HCT 48 5 12/12/2018    MCV 94 12/12/2018     12/12/2018       ABDOMEN XRAY   INDICATION:   pre op  History of bilateral renal calculi  Follow-up  COMPARISON:  CT stone study October 5, 2018  VIEWS:  [facility] supine  Images: 2     FINDINGS:     There is a nonobstructive bowel gas pattern      No discernible free air on this supine study  Upright or left lateral decubitus imaging is more sensitive to detect subtle free air in the appropriate setting      Right upper pole renal calculus is not seen      The larger 8 mm calculus in the lower pole of the left kidney is seen  This remains unchanged  Smaller smaller interpolar left renal calculi are not seen      Pelvic phleboliths and prostatic calcifications      Visualized lung bases are clear      Visualized osseous structures are unremarkable for the patient's age      IMPRESSION:  1   Stable 8 mm left lower pole renal calculus    2  Nonvisualization of small or right upper pole and left midpole renal calculi  Orders  Orders Placed This Encounter   Procedures    XR abdomen 1 view [facility]     Standing Status:   Future     Standing Expiration Date:   12/13/2019     Scheduling Instructions:      Bring along any outside films relating to this procedure  Order Specific Question:   Reason for Exam:     Answer:   calculi    US kidney and bladder     Standing Status:   Future     Standing Expiration Date:   12/13/2022     Scheduling Instructions:      "Prep required if being scheduled in conjunction with other studies, refer to those examination's Preps first before scheduling  All patients for US Kidney and Bladder they must drink 24 oz of water 60 minutes before your scheduled appointment time  This test requires you to have a FULL bladder  Please do not urinate before your test             Please bring your physician order, insurance cards, a form of photo ID and a list of your medications with you  Arrive 15 minutes prior to your appointment time in order to register  If you need to have lab work or a urinalysis, please do this AFTER your ultrasound  "            To schedule this appointment, please contact Central Scheduling at [phone]       Order Specific Question:   Reason for Exam:     Answer:   Calculi

## 2018-12-13 ENCOUNTER — OFFICE VISIT (OUTPATIENT)
Dept: UROLOGY | Facility: CLINIC | Age: 73
End: 2018-12-13
Payer: MEDICARE

## 2018-12-13 VITALS
HEART RATE: 82 BPM | SYSTOLIC BLOOD PRESSURE: 130 MMHG | DIASTOLIC BLOOD PRESSURE: 70 MMHG | HEIGHT: 69 IN | WEIGHT: 178 LBS | BODY MASS INDEX: 26.36 KG/M2

## 2018-12-13 DIAGNOSIS — N39.0 PSEUDOMONAS URINARY TRACT INFECTION: ICD-10-CM

## 2018-12-13 DIAGNOSIS — N20.0 NEPHROLITHIASIS: Primary | ICD-10-CM

## 2018-12-13 DIAGNOSIS — B96.5 PSEUDOMONAS URINARY TRACT INFECTION: ICD-10-CM

## 2018-12-13 LAB — ACHR BIND AB SER-SCNC: 0.63 NMOL/L (ref 0–0.24)

## 2018-12-13 PROCEDURE — 99213 OFFICE O/P EST LOW 20 MIN: CPT | Performed by: PHYSICIAN ASSISTANT

## 2018-12-14 ENCOUNTER — OFFICE VISIT (OUTPATIENT)
Dept: FAMILY MEDICINE CLINIC | Facility: CLINIC | Age: 73
End: 2018-12-14
Payer: MEDICARE

## 2018-12-14 VITALS
OXYGEN SATURATION: 93 % | WEIGHT: 176 LBS | SYSTOLIC BLOOD PRESSURE: 150 MMHG | BODY MASS INDEX: 25.99 KG/M2 | RESPIRATION RATE: 17 BRPM | TEMPERATURE: 97.1 F | DIASTOLIC BLOOD PRESSURE: 90 MMHG | HEART RATE: 90 BPM

## 2018-12-14 DIAGNOSIS — E11.9 CONTROLLED TYPE 2 DIABETES MELLITUS WITHOUT COMPLICATION, WITH LONG-TERM CURRENT USE OF INSULIN (HCC): ICD-10-CM

## 2018-12-14 DIAGNOSIS — R82.90 ABNORMAL URINALYSIS: ICD-10-CM

## 2018-12-14 DIAGNOSIS — R19.7 DIARRHEA, UNSPECIFIED TYPE: ICD-10-CM

## 2018-12-14 DIAGNOSIS — K57.90 DIVERTICULOSIS OF INTESTINE WITHOUT BLEEDING, UNSPECIFIED INTESTINAL TRACT LOCATION: ICD-10-CM

## 2018-12-14 DIAGNOSIS — Z79.4 CONTROLLED TYPE 2 DIABETES MELLITUS WITHOUT COMPLICATION, WITH LONG-TERM CURRENT USE OF INSULIN (HCC): ICD-10-CM

## 2018-12-14 DIAGNOSIS — R10.32 COLICKY LLQ ABDOMINAL PAIN: Primary | ICD-10-CM

## 2018-12-14 DIAGNOSIS — Z86.19 HISTORY OF SEPSIS: ICD-10-CM

## 2018-12-14 DIAGNOSIS — Z88.1 ALLERGY TO MULTIPLE ANTIBIOTICS: ICD-10-CM

## 2018-12-14 LAB
ACHR BLOCK AB/ACHR TOTAL SFR SER: 35 % (ref 0–25)
SL AMB  POCT GLUCOSE, UA: ABNORMAL
SL AMB LEUKOCYTE ESTERASE,UA: ABNORMAL
SL AMB POCT BILIRUBIN,UA: ABNORMAL
SL AMB POCT BLOOD,UA: ABNORMAL
SL AMB POCT CLARITY,UA: ABNORMAL
SL AMB POCT COLOR,UA: YELLOW
SL AMB POCT KETONES,UA: ABNORMAL
SL AMB POCT NITRITE,UA: ABNORMAL
SL AMB POCT PH,UA: 5
SL AMB POCT SPECIFIC GRAVITY,UA: 1.03
SL AMB POCT URINE PROTEIN: ABNORMAL
SL AMB POCT UROBILINOGEN: ABNORMAL

## 2018-12-14 PROCEDURE — 87077 CULTURE AEROBIC IDENTIFY: CPT | Performed by: FAMILY MEDICINE

## 2018-12-14 PROCEDURE — 81002 URINALYSIS NONAUTO W/O SCOPE: CPT | Performed by: FAMILY MEDICINE

## 2018-12-14 PROCEDURE — 99214 OFFICE O/P EST MOD 30 MIN: CPT | Performed by: FAMILY MEDICINE

## 2018-12-14 PROCEDURE — 87086 URINE CULTURE/COLONY COUNT: CPT | Performed by: FAMILY MEDICINE

## 2018-12-14 PROCEDURE — 87186 SC STD MICRODIL/AGAR DIL: CPT | Performed by: FAMILY MEDICINE

## 2018-12-14 RX ORDER — NITROFURANTOIN 25; 75 MG/1; MG/1
100 CAPSULE ORAL 2 TIMES DAILY
Qty: 14 CAPSULE | Refills: 0 | Status: SHIPPED | OUTPATIENT
Start: 2018-12-14 | End: 2018-12-19 | Stop reason: ALTCHOICE

## 2018-12-14 NOTE — PROGRESS NOTES
Assessment/Plan:         Diagnoses and all orders for this visit:    Colicky LLQ abdominal pain  Comments:  macrobid rx'dempirically  Orders:  -     POCT urine dip  -     Urine culture; Future  -     Urine culture  -     Discontinue: nitrofurantoin (MACROBID) 100 mg capsule; Take 1 capsule (100 mg total) by mouth 2 (two) times a day for 7 days    Diarrhea, unspecified type  -     Discontinue: nitrofurantoin (MACROBID) 100 mg capsule; Take 1 capsule (100 mg total) by mouth 2 (two) times a day for 7 days    Abnormal urinalysis  Comments:  abnormal urine dip at this visit, macrobid rx'd empirically    Controlled type 2 diabetes mellitus without complication, with long-term current use of insulin ([facility])    Diverticulosis of intestine without bleeding, unspecified intestinal tract location  -     Discontinue: nitrofurantoin (MACROBID) 100 mg capsule; Take 1 capsule (100 mg total) by mouth 2 (two) times a day for 7 days    History of sepsis  -     Discontinue: nitrofurantoin (MACROBID) 100 mg capsule; Take 1 capsule (100 mg total) by mouth 2 (two) times a day for 7 days    Allergy to multiple antibiotics    Other orders  -     Potassium 99 MG TABS; Take by mouth          Subjective:   Chief Complaint   Patient presents with    Urinary Tract Infection    Abdominal Pain     LLQ        Patient ID: Perla Paiz is a 68 y o  male      Same day sick appt  "Never had this kind of pain," lower left abd, started 2 days ago, intermittent, but frequent, "maybe 10x an hour, feels like spasms and gassiness, and diarrhea with it"  Has new back brace which is working much better for his vertebral compression fx discomfort  He does admit had diverticulitis in the past  No fever, chills or sweats  No bloody stools  +fecal urgency  Has comorbid factors including DM2 requiring insulin, myasthenia gravis, allergy to multiple abx, hx recurrent UTI's and renal stones, hx recent stone removal procedure followed by sepsis within 24hours of the procedure  Did see ID during one hospitalization for UTI necessitating IV abx        The following portions of the patient's history were reviewed and updated as appropriate: allergies, current medications, past family history, past medical history, past social history, past surgical history and problem list     Review of Systems   Constitutional: Negative  HENT: Negative for mouth sores and nosebleeds  Respiratory: Negative  Cardiovascular: Negative  Gastrointestinal: Negative for abdominal distention, anal bleeding, blood in stool, constipation, nausea, rectal pain and vomiting  Per hpi   Genitourinary: Negative for decreased urine volume, difficulty urinating, flank pain, frequency and hematuria  Skin: Negative  Hematological: Negative  Objective:      /90   Pulse 90   Temp (!) 97 1 °F (36 2 °C)   Resp 17   Wt 79 8 kg (176 lb)   SpO2 93%   BMI 25 99 kg/m²          Physical Exam   Constitutional: He is oriented to person, place, and time  He appears well-developed  He is cooperative  Non-toxic appearance  He does not have a sickly appearance  He does not appear ill  No distress  Pt his usual jovial self throughout visit   HENT:   Mouth/Throat: Uvula is midline, oropharynx is clear and moist and mucous membranes are normal    Cardiovascular: Normal rate, regular rhythm and normal heart sounds  Pulmonary/Chest: Effort normal and breath sounds normal    Abdominal: Soft  He exhibits distension  He exhibits no abdominal bruit, no ascites and no mass  Bowel sounds are increased  There is no hepatosplenomegaly  There is tenderness (mild) in the left lower quadrant  There is no rigidity, no rebound, no guarding, no CVA tenderness, no tenderness at McBurney's point and negative Oglesby's sign  No hernia  Neurological: He is alert and oriented to person, place, and time  Gait normal    Skin: Skin is warm and dry  He is not diaphoretic  No cyanosis  No pallor  Psychiatric: He has a normal mood and affect  His behavior is normal    Nursing note and vitals reviewed

## 2018-12-16 LAB — BACTERIA UR CULT: ABNORMAL

## 2018-12-17 ENCOUNTER — APPOINTMENT (OUTPATIENT)
Dept: LAB | Facility: HOSPITAL | Age: 73
End: 2018-12-17
Payer: MEDICARE

## 2018-12-17 ENCOUNTER — TELEPHONE (OUTPATIENT)
Dept: FAMILY MEDICINE CLINIC | Facility: CLINIC | Age: 73
End: 2018-12-17

## 2018-12-17 ENCOUNTER — TELEPHONE (OUTPATIENT)
Dept: UROLOGY | Facility: AMBULATORY SURGERY CENTER | Age: 73
End: 2018-12-17

## 2018-12-17 ENCOUNTER — TELEPHONE (OUTPATIENT)
Dept: NEUROLOGY | Facility: CLINIC | Age: 73
End: 2018-12-17

## 2018-12-17 DIAGNOSIS — Z86.19 HISTORY OF SEPSIS: ICD-10-CM

## 2018-12-17 DIAGNOSIS — N39.0 PSEUDOMONAS URINARY TRACT INFECTION: Primary | ICD-10-CM

## 2018-12-17 DIAGNOSIS — N30.00 ACUTE CYSTITIS WITHOUT HEMATURIA: Primary | ICD-10-CM

## 2018-12-17 DIAGNOSIS — B96.5 PSEUDOMONAS URINARY TRACT INFECTION: Primary | ICD-10-CM

## 2018-12-17 DIAGNOSIS — B96.5 PSEUDOMONAS URINARY TRACT INFECTION: ICD-10-CM

## 2018-12-17 DIAGNOSIS — N39.0 PSEUDOMONAS URINARY TRACT INFECTION: ICD-10-CM

## 2018-12-17 PROCEDURE — 36415 COLL VENOUS BLD VENIPUNCTURE: CPT

## 2018-12-17 PROCEDURE — 87040 BLOOD CULTURE FOR BACTERIA: CPT

## 2018-12-17 RX ORDER — CEFIXIME 400 MG/1
400 CAPSULE ORAL DAILY
Qty: 5 CAPSULE | Refills: 0 | Status: SHIPPED | OUTPATIENT
Start: 2018-12-17 | End: 2018-12-22

## 2018-12-17 NOTE — TELEPHONE ENCOUNTER
achr antibodies high c/w MG     spep abnormal / alpha 2 high due to acute phase reactant  ( also use of steriods ) , gamma globin low due to low protein       Add more protein to diet

## 2018-12-17 NOTE — PROGRESS NOTES
Repeat urine culture reveals sensitive Pseudomonas  Patient does have a quinolone allergy  Recommend Suprax 400 milligrams daily for a 5 day course

## 2018-12-17 NOTE — TELEPHONE ENCOUNTER
Patient called in asking about blood work from 12/10  All results finalized for review except Acetylcholine receptor, modulating is still in process  Please advise  Okay to leave detailed message on voicemail, and okay to leave message with Raji Lin, wife, as well

## 2018-12-17 NOTE — TELEPHONE ENCOUNTER
Pt returned call and left message, then I called pt back  He stated that he cannot afford to go into the hospital and have PICC line again, he has bills from the previous and recent hospitalizations and wouldn't be able to eat or pay his heating bills to have heat in his home  He also reported that he called dr rain's office also and they called in suprax for him to start  I advised pt to stop macrobid when he picks up the suprax  He states that his temp has been fine, only urinary sx is frequency right now  Advised that I am ordering blood cultures for him to obtain today and keep appt here tomorrow   PUI

## 2018-12-17 NOTE — TELEPHONE ENCOUNTER
Patient managed by Dr Koko Hussein, seen in [facility] End office  Patient has history of kidney stones and pseudomonas urinary tract infections  Also has history of myasthenia gravis  Patient most recently was septic following ESWL procedure in November, was discharged home with a PICC line for IV antibiotics for pseudomonas  Per patient, he has been in the hospital several time already this year for the same thing  Patient was seen last week for kidney stone follow up on 12/13/18 by Rafi Jones PA-C  At that time patient was asymptomatic  Patient reports on Friday, 12/14/18, he went to his PCP with severe abdominal pain and was prescribed Macrobid and told he had diverticulitis  Patient reports he was told the urine dip on Friday showed "a lot of white blood cells " Per patient, his PCP Dr Tory Glasgow called him late last night with urine culture results, showing pseudomonas aeruginosa  Patient is concerned and does not want to have to go back to the hospital for IV antibiotics  Per patient, he is also frustrated with [facility] and was unhappy that with his last admission, urology was not consulted  Patient denies any urinary symptoms, denies burning, fever, pain or hematuria  Patient does report some issues with incomplete bladder emptying, but nothing has recently changed or worsened  Patient feels that this urine culture results is a "false positive "     Advised patient, will further discuss with Dr Koko Hussein and call him back later today after discussing with Dr Koko Hussein  Patient agreeable to plan

## 2018-12-17 NOTE — TELEPHONE ENCOUNTER
Pt  Called in to advise he is worried and would like a call back before the end of the day   He advised that he has an immune disorder and recent cultures show that he may have pseudomonas and he thinks it is a false positive and doesn't want to drive in to the hospital for nothing and get hooked up to the pick line for no reason

## 2018-12-18 ENCOUNTER — OFFICE VISIT (OUTPATIENT)
Dept: FAMILY MEDICINE CLINIC | Facility: CLINIC | Age: 73
End: 2018-12-18
Payer: MEDICARE

## 2018-12-18 ENCOUNTER — TELEPHONE (OUTPATIENT)
Dept: FAMILY MEDICINE CLINIC | Facility: CLINIC | Age: 73
End: 2018-12-18

## 2018-12-18 ENCOUNTER — PATIENT OUTREACH (OUTPATIENT)
Dept: FAMILY MEDICINE CLINIC | Facility: CLINIC | Age: 73
End: 2018-12-18

## 2018-12-18 VITALS
RESPIRATION RATE: 17 BRPM | DIASTOLIC BLOOD PRESSURE: 86 MMHG | SYSTOLIC BLOOD PRESSURE: 124 MMHG | HEIGHT: 69 IN | TEMPERATURE: 97.4 F | WEIGHT: 176.8 LBS | HEART RATE: 102 BPM | OXYGEN SATURATION: 98 % | BODY MASS INDEX: 26.19 KG/M2

## 2018-12-18 DIAGNOSIS — G70.00 MYASTHENIA GRAVIS (HCC): ICD-10-CM

## 2018-12-18 DIAGNOSIS — E11.9 CONTROLLED TYPE 2 DIABETES MELLITUS WITHOUT COMPLICATION, WITH LONG-TERM CURRENT USE OF INSULIN (HCC): ICD-10-CM

## 2018-12-18 DIAGNOSIS — Z79.4 CONTROLLED TYPE 2 DIABETES MELLITUS WITHOUT COMPLICATION, WITH LONG-TERM CURRENT USE OF INSULIN (HCC): ICD-10-CM

## 2018-12-18 DIAGNOSIS — Z98.890 HX OF LITHOTRIPSY: ICD-10-CM

## 2018-12-18 DIAGNOSIS — Z87.442 HISTORY OF NEPHROLITHIASIS: ICD-10-CM

## 2018-12-18 DIAGNOSIS — Z87.898 HISTORY OF SYNCOPE: ICD-10-CM

## 2018-12-18 DIAGNOSIS — Z86.19 HISTORY OF SEPSIS: ICD-10-CM

## 2018-12-18 DIAGNOSIS — Z87.440 HISTORY OF RECURRENT UTIS: ICD-10-CM

## 2018-12-18 DIAGNOSIS — N39.0 PSEUDOMONAS URINARY TRACT INFECTION: Primary | ICD-10-CM

## 2018-12-18 DIAGNOSIS — Z88.1 ALLERGY TO MULTIPLE ANTIBIOTICS: ICD-10-CM

## 2018-12-18 DIAGNOSIS — B96.5 PSEUDOMONAS URINARY TRACT INFECTION: Primary | ICD-10-CM

## 2018-12-18 PROBLEM — R10.32 COLICKY LLQ ABDOMINAL PAIN: Status: RESOLVED | Noted: 2018-12-14 | Resolved: 2018-12-18

## 2018-12-18 PROBLEM — R19.7 DIARRHEA: Status: RESOLVED | Noted: 2018-12-14 | Resolved: 2018-12-18

## 2018-12-18 PROBLEM — E87.2 LACTIC ACIDOSIS: Status: RESOLVED | Noted: 2018-11-03 | Resolved: 2018-12-18

## 2018-12-18 PROBLEM — Z45.2 PICC (PERIPHERALLY INSERTED CENTRAL CATHETER) IN PLACE: Status: RESOLVED | Noted: 2018-11-09 | Resolved: 2018-12-18

## 2018-12-18 PROBLEM — Z20.1 TUBERCULOSIS EXPOSURE: Status: RESOLVED | Noted: 2018-08-22 | Resolved: 2018-12-18

## 2018-12-18 LAB — ACHR MOD AB/ACHR TOTAL SFR SER: <12 % (ref 0–20)

## 2018-12-18 PROCEDURE — 99214 OFFICE O/P EST MOD 30 MIN: CPT | Performed by: FAMILY MEDICINE

## 2018-12-18 NOTE — PROGRESS NOTES
Assessment/Plan:    Patient clinically stable and appears in his usual health at visit, in good spirits, joking     Diagnoses and all orders for this visit:    Pseudomonas urinary tract infection  Comments:  recurrent despite lithotripsy last month, shall order ID consult and pt will stay on macrobid until suprax arrives at pharmacy, blood cx were rx'd-obtained toda    History of sepsis  -     Echo complete with contrast if indicated; Future    Allergy to multiple antibiotics    Myasthenia gravis ([address] )  -     Echo complete with contrast if indicated; Future    History of syncope  -     Echo complete with contrast if indicated; Future          Subjective:   Chief Complaint   Patient presents with    Follow-up     4 day follow up    Abdominal Pain     Has subsided since Friday    Diarrhea     Woke up this morning with diarrhea        Patient ID: Karley Hernandez is a 68 y o  male  Per c/c reviewed by me, and Please see telephone notes as well- Urine culture again grew pseudomonas, had been started on nitrofurantoin at visit here 4 days ago for abd pain c/o with abnormal urinalysis  Pt has hx numerous UTI's due to pseudomonas that required IV Abx due to his numerous abx allergies as well as some abx being contraindicated with his MG   He had lithotripsy 1 month ago and became septic at home afterward, had syncope at home prior to ambulance being called and suffered vertebral compression fx in the fall, saw ID while in hospital last month as well as during a prior admission for the recurrent UTI  Pt states that urologist had ordered suprax yesterday, but has to be ordered by his pharmacy- per pt they don't know when med will be available, pt had seen urol in f/u last week as well  States had diarrhea episode this morning  "Shaking all over this morning, because I'm all worked up from this pseudomonas"  "Legs are all weak from the neuropathy" otherwise no weakness, no dizziness, syncope or near syncope  No fever or chills  Admits decreased appetite  Has not had any dysuria sx at all and reports that his prior abd pain resolved        The following portions of the patient's history were reviewed and updated as appropriate: allergies, current medications, past family history, past medical history, past social history, past surgical history and problem list     Review of Systems   Constitutional: Positive for appetite change and fatigue  Negative for activity change, chills, diaphoresis, fever and unexpected weight change  HENT: Negative for mouth sores and nosebleeds  Respiratory: Negative  Cardiovascular: Negative  Gastrointestinal: Negative for abdominal distention, abdominal pain, anal bleeding, blood in stool, constipation, nausea, rectal pain and vomiting  Per hpi   Endocrine: Negative  Genitourinary: Negative for decreased urine volume, difficulty urinating, dysuria, enuresis, flank pain, hematuria and urgency  Per hpi   Musculoskeletal: Positive for back pain (due to vert compress fx)  Skin: Negative  Neurological: Negative for dizziness, tremors, seizures, syncope, facial asymmetry, speech difficulty, light-headedness, numbness and headaches  Hematological: Negative  Psychiatric/Behavioral: Negative  Objective:      /86 (BP Location: Left arm, Patient Position: Sitting, Cuff Size: Adult)   Pulse 102   Temp (!) 97 4 °F (36 3 °C) (Tympanic)   Resp 17   Ht 5' 9" (1 753 m)   Wt 80 2 kg (176 lb 12 8 oz)   SpO2 98%   BMI 26 11 kg/m²          Physical Exam   Constitutional: He is oriented to person, place, and time  He appears well-developed and well-nourished  He is cooperative  Non-toxic appearance  He does not appear ill  No distress  Appears to be in his usual health, joking, talkative   HENT:   Mouth/Throat: Uvula is midline, oropharynx is clear and moist and mucous membranes are normal    Eyes: Pupils are equal, round, and reactive to light   Conjunctivae and lids are normal    Cardiovascular: Normal rate, regular rhythm, normal heart sounds and normal pulses  Pulmonary/Chest: Effort normal and breath sounds normal    Abdominal: Soft  He exhibits distension (mildly)  He exhibits no mass  Bowel sounds are increased  There is no hepatosplenomegaly  There is no tenderness  There is no CVA tenderness  Neurological: He is alert and oriented to person, place, and time  He displays no tremor  No cranial nerve deficit  Gait normal    Skin: Skin is warm and dry  He is not diaphoretic  No cyanosis  No pallor  Psychiatric: He has a normal mood and affect  His speech is normal and behavior is normal  Judgment normal    Nursing note and vitals reviewed

## 2018-12-18 NOTE — TELEPHONE ENCOUNTER
Called manuel and she stated I was suppose to call her after he was seen  Lissette Raya will be sending over a fax to be completed and if we do want the patient to be scheduled their as  PNPS, call her back  If pt is going to become a PNPS, we have to send records of most recent lab work, office note and recent hospitalization  Lissette Raya stated once patient becomes established there they will be able to see everything in EP[facility]

## 2018-12-19 ENCOUNTER — TELEPHONE (OUTPATIENT)
Dept: FAMILY MEDICINE CLINIC | Facility: CLINIC | Age: 73
End: 2018-12-19

## 2018-12-19 NOTE — TELEPHONE ENCOUNTER
Patient called stating [address] [place] [address] said he should get a flu and shingles shot when all the pseudomonas is cleared up  He is willing to do a urine culture when he is in on Monday with Hector Gilliland  If that is cleared up, he is willing to get a flu shot after the new year  He was very happy with the good news from lab results  Thank you

## 2018-12-20 NOTE — TELEPHONE ENCOUNTER
MARLINI: Pt made aware of results  He states that he stopped taking the Mestinon 2 days ago due to very bad diarrhea he's had the last couple weeks  He says he feels much better since stopping it 2 days ago  He also wanted to make you aware he is taking Suprax 400mg prescribed by Dr Cain Sensing for another UTI  He will be done with the Suprax Sunday  Pt has no further questions at this time

## 2018-12-21 DIAGNOSIS — B96.5 PSEUDOMONAS URINARY TRACT INFECTION: Primary | ICD-10-CM

## 2018-12-21 DIAGNOSIS — N39.0 PSEUDOMONAS URINARY TRACT INFECTION: Primary | ICD-10-CM

## 2018-12-22 LAB
BACTERIA BLD CULT: NORMAL
BACTERIA BLD CULT: NORMAL

## 2018-12-24 ENCOUNTER — HOSPITAL ENCOUNTER (OUTPATIENT)
Dept: NON INVASIVE DIAGNOSTICS | Facility: CLINIC | Age: 73
Discharge: HOME | End: 2018-12-24
Payer: MEDICARE

## 2018-12-24 ENCOUNTER — OFFICE VISIT (OUTPATIENT)
Dept: FAMILY MEDICINE CLINIC | Facility: CLINIC | Age: 73
End: 2018-12-24
Payer: MEDICARE

## 2018-12-24 VITALS
WEIGHT: 177 LBS | HEART RATE: 98 BPM | SYSTOLIC BLOOD PRESSURE: 130 MMHG | DIASTOLIC BLOOD PRESSURE: 70 MMHG | OXYGEN SATURATION: 96 % | TEMPERATURE: 96.5 F | RESPIRATION RATE: 16 BRPM | BODY MASS INDEX: 26.14 KG/M2

## 2018-12-24 DIAGNOSIS — Z87.440 HISTORY OF RECURRENT UTIS: Primary | ICD-10-CM

## 2018-12-24 DIAGNOSIS — G70.00 MYASTHENIA GRAVIS (HCC): ICD-10-CM

## 2018-12-24 DIAGNOSIS — Z86.19 HISTORY OF SEPSIS: ICD-10-CM

## 2018-12-24 DIAGNOSIS — Z87.898 HISTORY OF SYNCOPE: ICD-10-CM

## 2018-12-24 PROCEDURE — 93306 TTE W/DOPPLER COMPLETE: CPT

## 2018-12-24 PROCEDURE — 93306 TTE W/DOPPLER COMPLETE: CPT | Performed by: INTERNAL MEDICINE

## 2018-12-24 PROCEDURE — 99212 OFFICE O/P EST SF 10 MIN: CPT | Performed by: PHYSICIAN ASSISTANT

## 2018-12-24 NOTE — PROGRESS NOTES
Assessment/Plan:      Diagnoses and all orders for this visit:    History of recurrent UTIs  - patient without s/sx of infection, completed course of suprax without complication  - continue follow up with ID and Urology   - complete urine culture in 1 week         Subjective:     Patient ID: Chantale Ontiveros is a 68 y o  male  Chief Complaint   Patient presents with    Follow-up       HPI   Patient is a 69 yo male who presents for check up due to recent UTI  Please see recent notes for further detail  Given recurrent UTI and contraindications of multiple abx, patient was prescribed suprax and was able to complete course yesterday without complications or reaction  He has no fever, chills, abd pain  He has no burning with urination, still appreciates from hesitancy  He has follow up arranged with ID and urology  Review of Systems   Constitutional: Negative for appetite change, chills and fever  Respiratory: Negative  Cardiovascular: Negative  Gastrointestinal: Negative for abdominal pain, diarrhea, nausea and vomiting  Genitourinary: Negative for dysuria, frequency and urgency  Hesitancy    Skin: Negative  Neurological: Negative for dizziness, syncope, weakness and headaches  Hematological: Negative  Objective:  Vitals:    12/24/18 0818   BP: 130/70   Pulse: 98   Resp: 16   Temp: (!) 96 5 °F (35 8 °C)   SpO2: 96%        Physical Exam   Constitutional: He is oriented to person, place, and time  He appears well-developed and well-nourished  No distress  Cardiovascular: Normal rate, regular rhythm, normal heart sounds and intact distal pulses  No murmur heard  Pulmonary/Chest: Effort normal and breath sounds normal  No respiratory distress  He has no wheezes  He has no rales  Abdominal: Soft  He exhibits no mass  There is no tenderness  There is no guarding  Neurological: He is alert and oriented to person, place, and time  No cranial nerve deficit     Skin: Skin is warm and dry  No rash noted  Psychiatric: He has a normal mood and affect   His behavior is normal

## 2018-12-26 ENCOUNTER — PATIENT OUTREACH (OUTPATIENT)
Dept: FAMILY MEDICINE CLINIC | Facility: CLINIC | Age: 73
End: 2018-12-26

## 2018-12-26 PROBLEM — I05.9 MITRAL VALVE ANNULAR CALCIFICATION: Status: ACTIVE | Noted: 2018-12-26

## 2018-12-26 NOTE — PROGRESS NOTES
Outpatient Care Management Note:discussed upcoming appointments  Pt has [address] [facility] coming into the home for continued therapy regarding his back fracture  He also has an upcoming appt with ID at [facility] in January  He states that he finished his recent antibiotic for recent UTI of pseudomonas again  He had an Echo done on Monday  Has a f/u with Neurology regarding Myasthenia Gravis in January  Yesterday was the first day that he went without his back brace and tolerated activity  [facility]I [facility] bundle ends 1/4

## 2018-12-28 ENCOUNTER — EPISODE CHANGES (OUTPATIENT)
Dept: CASE MANAGEMENT | Facility: HOSPITAL | Age: 73
End: 2018-12-28

## 2018-12-28 ENCOUNTER — TELEPHONE (OUTPATIENT)
Dept: NEUROSURGERY | Facility: CLINIC | Age: 73
End: 2018-12-28

## 2018-12-28 NOTE — TELEPHONE ENCOUNTER
12/28/2018-CALLED PT AND OFFERED TO SCHEDULE APT, BUT HE IS ALREADY FOLLOWING UP WITH [facility] FOR L-SPINE  I ADVISED PT TO CALL OUR OFFICE IF HE WISHES TO SCHEDULE FOR THE FUTURE     ----- Message from Willy Hollis PA-C sent at 12/28/2018  2:03 PM EST -----  Please call patient to follow-up on L1 fracture  Follow-up imaging and visit not completed  Unclear if patient is still using brace and how fracture has healed   TY

## 2018-12-31 ENCOUNTER — APPOINTMENT (OUTPATIENT)
Dept: LAB | Facility: CLINIC | Age: 73
End: 2018-12-31
Payer: MEDICARE

## 2018-12-31 DIAGNOSIS — Z79.4 CONTROLLED TYPE 2 DIABETES MELLITUS WITHOUT COMPLICATION, WITH LONG-TERM CURRENT USE OF INSULIN (HCC): Primary | ICD-10-CM

## 2018-12-31 DIAGNOSIS — E11.9 CONTROLLED TYPE 2 DIABETES MELLITUS WITHOUT COMPLICATION, WITH LONG-TERM CURRENT USE OF INSULIN (HCC): Primary | ICD-10-CM

## 2018-12-31 DIAGNOSIS — N39.0 PSEUDOMONAS URINARY TRACT INFECTION: ICD-10-CM

## 2018-12-31 DIAGNOSIS — B96.5 PSEUDOMONAS URINARY TRACT INFECTION: ICD-10-CM

## 2018-12-31 PROCEDURE — 87077 CULTURE AEROBIC IDENTIFY: CPT

## 2018-12-31 PROCEDURE — 87086 URINE CULTURE/COLONY COUNT: CPT

## 2018-12-31 PROCEDURE — 87186 SC STD MICRODIL/AGAR DIL: CPT

## 2018-12-31 RX ORDER — INSULIN GLARGINE 100 [IU]/ML
12 INJECTION, SOLUTION SUBCUTANEOUS
Qty: 1000 UNITS | Refills: 1 | Status: SHIPPED | OUTPATIENT
Start: 2018-12-31 | End: 2019-10-21 | Stop reason: SDUPTHER

## 2019-01-02 DIAGNOSIS — N39.0 PSEUDOMONAS URINARY TRACT INFECTION: Primary | ICD-10-CM

## 2019-01-02 DIAGNOSIS — Z88.1 ALLERGY TO MULTIPLE ANTIBIOTICS: ICD-10-CM

## 2019-01-02 DIAGNOSIS — Z86.19 HISTORY OF SEPSIS: ICD-10-CM

## 2019-01-02 DIAGNOSIS — Z87.440 HISTORY OF RECURRENT UTIS: ICD-10-CM

## 2019-01-02 DIAGNOSIS — B96.5 PSEUDOMONAS URINARY TRACT INFECTION: Primary | ICD-10-CM

## 2019-01-02 LAB — BACTERIA UR CULT: ABNORMAL

## 2019-01-02 RX ORDER — CEFIXIME 400 MG/1
400 CAPSULE ORAL DAILY
Qty: 7 CAPSULE | Refills: 0 | Status: SHIPPED | OUTPATIENT
Start: 2019-01-02 | End: 2019-01-09

## 2019-01-03 ENCOUNTER — TELEPHONE (OUTPATIENT)
Dept: FAMILY MEDICINE CLINIC | Facility: CLINIC | Age: 74
End: 2019-01-03

## 2019-01-03 DIAGNOSIS — G70.00 MYASTHENIA GRAVIS (HCC): ICD-10-CM

## 2019-01-03 DIAGNOSIS — R53.83 FATIGUE, UNSPECIFIED TYPE: ICD-10-CM

## 2019-01-03 DIAGNOSIS — Z91.81 HISTORY OF FALL WITHIN PAST 90 DAYS: ICD-10-CM

## 2019-01-03 DIAGNOSIS — G62.9 POLYNEUROPATHY: ICD-10-CM

## 2019-01-03 DIAGNOSIS — S32.010S CLOSED COMPRESSION FRACTURE OF FIRST LUMBAR VERTEBRA, SEQUELA: Primary | ICD-10-CM

## 2019-01-03 NOTE — TELEPHONE ENCOUNTER
Pt called back- He is currently getting [address] twice a week until mid Feb   States they set this up at the hospital when he was d/c on 12/7/2018

## 2019-01-03 NOTE — TELEPHONE ENCOUNTER
Patient called to double check that wife's appt had been canceled for tomorrow 1/4/19 which it was canceled yesterday 1/3/2019 with Jeff Avila  Patient also stated that Urology told him to sultana the hospital due to the pseudomonas and having to wear a back brace  Patient also talked about having physical therapy done at his home           Per Salinas Jacob told me to send to you to see due to his myogravious (SP)

## 2019-01-04 ENCOUNTER — PATIENT OUTREACH (OUTPATIENT)
Dept: FAMILY MEDICINE CLINIC | Facility: CLINIC | Age: 74
End: 2019-01-04

## 2019-01-06 NOTE — PROGRESS NOTES
Outpatient Care Management Note:Called pt to check on status  He had called and reported yesterday that his PICC line was oozing blood  He had already flushed it when he called back  Advised pt to call [facility] health nurse to go and check it to be sure it was patent  Pt has antibiotics doses until Saturday when it gets pulled  He did state thatthe nurse came  He has appt today for TCM with PCP  He has been wearing his brace and PT is coming to the home  Pt states that his wife is having valve replacement surgery with Dr Knapp tomorrow  He is having a friend and possibly his siter stay with him and /or check on him over the weekend 
No

## 2019-01-15 ENCOUNTER — TELEPHONE (OUTPATIENT)
Dept: FAMILY MEDICINE CLINIC | Facility: CLINIC | Age: 74
End: 2019-01-15

## 2019-01-15 DIAGNOSIS — M48.02 CERVICAL SPINAL STENOSIS: ICD-10-CM

## 2019-01-15 DIAGNOSIS — M54.12 CERVICAL RADICULOPATHY: ICD-10-CM

## 2019-01-15 DIAGNOSIS — S32.010S CLOSED COMPRESSION FRACTURE OF FIRST LUMBAR VERTEBRA, SEQUELA: Primary | ICD-10-CM

## 2019-01-15 DIAGNOSIS — G70.00 MYASTHENIA GRAVIS (HCC): ICD-10-CM

## 2019-01-15 DIAGNOSIS — G62.9 POLYNEUROPATHY: ICD-10-CM

## 2019-01-15 NOTE — TELEPHONE ENCOUNTER
PT came into window asking for a new referral for [facility] PT in [address]  Pt stated the home pt wants him to do pt at a place so it is more aggressive then home PT   Per patient there will be a nurse going to d/c him from home PT

## 2019-01-17 ENCOUNTER — TELEPHONE (OUTPATIENT)
Dept: FAMILY MEDICINE CLINIC | Facility: CLINIC | Age: 74
End: 2019-01-17

## 2019-01-17 NOTE — TELEPHONE ENCOUNTER
Kyung Koehler Physical therapist from [facility] called he is being D/C today from formerly [facility] he will be starting outpatient rehab

## 2019-01-21 ENCOUNTER — TRANSCRIBE ORDERS (OUTPATIENT)
Dept: LAB | Facility: CLINIC | Age: 74
End: 2019-01-21

## 2019-01-21 ENCOUNTER — APPOINTMENT (OUTPATIENT)
Dept: LAB | Facility: CLINIC | Age: 74
End: 2019-01-21
Payer: MEDICARE

## 2019-01-21 DIAGNOSIS — A41.52 SEPSIS DUE TO PSEUDOMONAS SPECIES (HCC): ICD-10-CM

## 2019-01-21 DIAGNOSIS — Z87.440 HISTORY OF UTI: Primary | ICD-10-CM

## 2019-01-21 DIAGNOSIS — R35.0 URINARY FREQUENCY: ICD-10-CM

## 2019-01-21 DIAGNOSIS — Z87.440 HISTORY OF UTI: ICD-10-CM

## 2019-01-21 DIAGNOSIS — N20.0 RENAL STONE: ICD-10-CM

## 2019-01-21 LAB
ANION GAP SERPL CALCULATED.3IONS-SCNC: 6 MMOL/L (ref 4–13)
BUN SERPL-MCNC: 15 MG/DL (ref 5–25)
CALCIUM SERPL-MCNC: 9.8 MG/DL (ref 8.3–10.1)
CHLORIDE SERPL-SCNC: 102 MMOL/L (ref 100–108)
CO2 SERPL-SCNC: 29 MMOL/L (ref 21–32)
CREAT SERPL-MCNC: 1.28 MG/DL (ref 0.6–1.3)
GFR SERPL CREATININE-BSD FRML MDRD: 55 ML/MIN/1.73SQ M
GLUCOSE SERPL-MCNC: 126 MG/DL (ref 65–140)
POTASSIUM SERPL-SCNC: 4.2 MMOL/L (ref 3.5–5.3)
SODIUM SERPL-SCNC: 137 MMOL/L (ref 136–145)

## 2019-01-21 PROCEDURE — 36415 COLL VENOUS BLD VENIPUNCTURE: CPT

## 2019-01-21 PROCEDURE — 80048 BASIC METABOLIC PNL TOTAL CA: CPT

## 2019-01-23 ENCOUNTER — OFFICE VISIT (OUTPATIENT)
Dept: NEUROLOGY | Facility: CLINIC | Age: 74
End: 2019-01-23
Payer: MEDICARE

## 2019-01-23 VITALS
RESPIRATION RATE: 12 BRPM | BODY MASS INDEX: 26.51 KG/M2 | HEIGHT: 69 IN | WEIGHT: 179 LBS | SYSTOLIC BLOOD PRESSURE: 120 MMHG | HEART RATE: 88 BPM | DIASTOLIC BLOOD PRESSURE: 78 MMHG

## 2019-01-23 DIAGNOSIS — G70.00 MYASTHENIA GRAVIS (HCC): ICD-10-CM

## 2019-01-23 DIAGNOSIS — G62.9 POLYNEUROPATHY: Primary | ICD-10-CM

## 2019-01-23 PROCEDURE — 99214 OFFICE O/P EST MOD 30 MIN: CPT | Performed by: PSYCHIATRY & NEUROLOGY

## 2019-01-23 RX ORDER — GABAPENTIN 300 MG/1
300 CAPSULE ORAL 2 TIMES DAILY
Qty: 60 CAPSULE | Refills: 1 | Status: SHIPPED | OUTPATIENT
Start: 2019-01-23 | End: 2019-02-21 | Stop reason: SINTOL

## 2019-01-23 NOTE — PROGRESS NOTES
Patient ID: Shena Tan is a 68 y o  male  Assessment/Plan:    Myasthenia gravis ([address] )  Stable ,     he does have positive antibodies and this is well controlled on the current dose of prednisone 10 mg a day      Side effects to mestinon and to imuran     Polyneuropathy   he reports increase in paresthesias numbness and tingling and difficulty with his balance  His examination does demonstrate some change in proprioception as well as vibratory sensation  He is for  an MRI of the lumbar spine in two weeks  I have started him on gabapentin  bedtime and increase to twice a day if needed  We discussed the lack of interactions with other medications  This may decrease some of the neuropathic pain  He is to start on outpt PT soon   He does require a repeat emg  Of the LE   Will schedule and compare to the prior study in 2017        Diagnoses and all orders for this visit:    Polyneuropathy  -     EMG 2 limb lower extremity; Future  -     gabapentin (NEURONTIN) 300 mg capsule; Take 1 capsule (300 mg total) by mouth 2 (two) times a day    Myasthenia gravis (HCC)         Subjective: This is a 67 y/o male with History of Myasthenia Gravis diagnosed in 2009 after presents for a follow up visit  He was last evaluated in November of 2018  Mestinon was added but it caused diarrhea and he stopped it   He continues on prednisone 10 mg daily   achr  antiboides were preformed and were negative   Labs testing included a b12, folate, tsh  and spep that  were essentially normal   He is underoging a workup by urology and ID  Today he complained of more problems with his balance and neuropathic pain   He was treated with home  PT and will be started outpt PT for balance training   he denies any falls but reports more pain         he is currently on prednisone 10 mg a day for myasthenia gravis  He previously attempts to try immunosuppression did cause GI side effects  Mestinon high dose did result in diarrhea  Today he reports shortness of breath with exertion  He has no difficulty brushing his teeth, swallowing this speak, his speech had droopiness ptosis or double vision  Shortness of breath only occurs with exertion after 1 hr   Today's mg-ADL score was 1/24            a prior emg  In 2017 Borderline findings suggestive of a mild, early, generalized, predominantly sensory peripheral  polyneuropathy without denervation and  A chronic left L5 radiculopathy without denervation  EMG of the lower extremity in June 2018 failed to reveal any evidence of neuropathy  It demonstrated entrapment of the  tarsal tunnel on the right side  he was treated by podiatry  He is also being followed by orthopedic and is for a mri of the lumabr spine   In two weeks     Imuran was tried but he developed gi symptoms so he stopped it   mestinon did cause diarrhea                                                 The following portions of the patient's history were reviewed and updated as appropriate:   He  has a past medical history of Anxiety; Cervical spinal stenosis; chronic ulcerative colitis; Hyperlipidemia; Myasthenia gravis ([address] ); and Sleep apnea  He  has a past surgical history that includes Lithotripsy; Colonoscopy; Colon surgery; Other surgical history; Salivary gland surgery; COLECTOMY LAPAROSCOPIC; Colectomy; and pr fragment kidney stone/ eswl (Left, 11/2/2018)  His family history includes Arthritis in his mother; Depression in his father; Diabetes in his mother; Hypertension in his mother; Osteoporosis in his mother  He  reports that he has never smoked  He has never used smokeless tobacco  He reports that he does not drink alcohol or use drugs    Current Outpatient Prescriptions   Medication Sig Dispense Refill    ACCU-CHEK FASTCLIX LANCETS MISC TEST twice a day  0    ACCU-CHEK GUIDE test strip 1 each by Other route 4 (four) times a day Use as instructed bid scheduled and prn symptoms/signs blood sugar fluctuation 300 each 1    BD ULTRA-FINE PEN NEEDLES 29G X 12 7MM MISC       cholecalciferol (VITAMIN D3) 1,000 units tablet Take 2 tablets by mouth daily        Cranberry 500 MG CAPS Take 1 capsule by mouth daily      famotidine (PEPCID) 20 mg tablet Take 1 tablet (20 mg total) by mouth daily 90 tablet 3    insulin aspart (NovoLOG) 100 units/mL injection Inject 4 Units under the skin 3 (three) times a day before meals      insulin glargine (LANTUS) 100 units/mL subcutaneous injection Inject 12 Units under the skin daily at bedtime 1000 Units 1    Insulin Pen Needle 29G X 12MM MISC by Does not apply route 4 (four) times a day 360 each 0    Lactobacillus (ACIDOPHILUS) 100 MG CAPS Take 1 capsule by mouth daily      lisinopril (ZESTRIL) 2 5 mg tablet Take 2 5 mg by mouth daily        meclizine (ANTIVERT) 12 5 MG tablet Take 1 tablet (12 5 mg total) by mouth 2 (two) times a day 180 tablet 0    Potassium 99 MG TABS Take by mouth      predniSONE 10 mg tablet Take 1 tablet (10 mg total) by mouth daily 90 tablet 0    pyridoxine (VITAMIN B6) 100 mg tablet Take 100 mg by mouth daily        cefepime (MAXIPIME) 2 g injection       docusate sodium (COLACE) 250 MG capsule Take 1 capsule (250 mg total) by mouth daily (Patient not taking: Reported on 12/13/2018 ) 30 capsule 0    gabapentin (NEURONTIN) 300 mg capsule Take 1 capsule (300 mg total) by mouth 2 (two) times a day 60 capsule 1    ibuprofen (MOTRIN) 200 mg tablet Take by mouth every 6 (six) hours as needed for mild pain      lidocaine (LIDODERM) 5 % Apply 1 patch topically daily Remove & Discard patch within 12 hours or as directed by MD (Patient not taking: Reported on 1/23/2019 ) 30 patch 0    ondansetron (ZOFRAN) 4 mg tablet Take 1 tablet (4 mg total) by mouth every 8 (eight) hours as needed for nausea or vomiting (Patient not taking: Reported on 12/13/2018 ) 30 tablet 0    pyridostigmine (MESTINON) 60 mg tablet Take 1 tablet (60 mg total) by mouth 3 (three) times a day (Patient not taking: Reported on 1/23/2019 ) 90 tablet 3     No current facility-administered medications for this visit  He is allergic to levaquin [levofloxacin]; quinolones; antihistamines, chlorpheniramine-type; benzodiazepines; chlorpheniramine; diphenhydramine; erythromycin; iodine; iodine solution [povidone iodine]; loratadine; other; sulfa antibiotics; and tetracyclines & related            Objective:    Blood pressure 120/78, pulse 88, resp  rate 12, height 5' 9" (1 753 m), weight 81 2 kg (179 lb)  Physical Exam   Eyes: Pupils are equal, round, and reactive to light  Lids are normal    Neurological: He has normal strength  Reflex Scores:       Tricep reflexes are 1+ on the right side and 1+ on the left side  Bicep reflexes are 1+ on the right side and 1+ on the left side  Patellar reflexes are 1+ on the right side and 1+ on the left side  Achilles reflexes are Tr on the right side and Tr on the left side  Psychiatric: His speech is normal        Neurological Exam  Mental Status   Oriented to person, place, time and situation  Speech is normal  Language is fluent with no aphasia  Cranial Nerves  CN II: Visual acuity is normal  Visual fields full to confrontation  CN III, IV, VI: Extraocular movements intact bilaterally  Normal lids and orbits bilaterally  Pupils equal round and reactive to light bilaterally  CN V: Facial sensation is normal   CN VII: Full and symmetric facial movement  CN VIII: Hearing is normal   CN IX, X: Palate elevates symmetrically  Normal gag reflex  CN XI: Shoulder shrug strength is normal   CN XII: Tongue midline without atrophy or fasciculations  Motor   Strength is 5/5 throughout all four extremities  Sensory  Vibration 20 sec in toes , pp to mid ankle , jps intact in ue , jps decreased to 10 sec in toes         Reflexes                                           Right                      Left  Biceps                                 1+ 1+  Triceps                                1+                         1+  Patellar                                1+                         1+  Achilles                                Tr                         Tr  Plantar                           Downgoing                Downgoing    Right pathological reflexes: Logan's absent  Left pathological reflexes: Logan's absent  Coordination  Right: Finger-to-nose normal   Left: Finger-to-nose normal     Gait Romberg is present  Unable to rise from chair without using arms  Wide based gait   ROS per the MA was reviewed personally at  Today' s visit     ROS:    Review of Systems   Constitutional: Negative  Negative for appetite change and fever  HENT: Positive for congestion  Negative for hearing loss, tinnitus, trouble swallowing and voice change  Eyes: Negative  Negative for photophobia and pain  Respiratory: Negative  Negative for shortness of breath  Cardiovascular: Negative  Negative for palpitations  Gastrointestinal: Negative  Negative for nausea and vomiting  Endocrine: Negative  Negative for cold intolerance and heat intolerance  Loss of sexual drive     Genitourinary: Positive for urgency  Negative for dysuria and frequency  Musculoskeletal: Positive for back pain  Negative for myalgias and neck pain  Skin: Negative  Negative for rash  Neurological: Negative  Negative for dizziness, tremors, seizures, syncope, facial asymmetry, speech difficulty, weakness, light-headedness, numbness and headaches  Snoring  Balance problems     Hematological: Negative  Does not bruise/bleed easily  Psychiatric/Behavioral: Positive for sleep disturbance  Negative for confusion and hallucinations

## 2019-01-23 NOTE — ASSESSMENT & PLAN NOTE
he reports increase in paresthesias numbness and tingling and difficulty with his balance  His examination does demonstrate some change in proprioception as well as vibratory sensation  He is for  an MRI of the lumbar spine in two weeks  I have started him on gabapentin  bedtime and increase to twice a day if needed  We discussed the lack of interactions with other medications  This may decrease some of the neuropathic pain  He is to start on outpt PT soon   He does require a repeat emg  Of the LE    Will schedule and compare to the prior study in 2017

## 2019-01-23 NOTE — ASSESSMENT & PLAN NOTE
Stable ,     he does have positive antibodies and this is well controlled on the current dose of prednisone 10 mg a day      Side effects to mestinon and to imuran

## 2019-02-21 ENCOUNTER — TELEPHONE (OUTPATIENT)
Dept: NEUROLOGY | Facility: CLINIC | Age: 74
End: 2019-02-21

## 2019-02-21 ENCOUNTER — HOSPITAL ENCOUNTER (OUTPATIENT)
Dept: NEUROLOGY | Facility: AMBULATORY SURGERY CENTER | Age: 74
Discharge: HOME | End: 2019-02-21
Payer: MEDICARE

## 2019-02-21 DIAGNOSIS — G62.9 POLYNEUROPATHY: ICD-10-CM

## 2019-02-21 DIAGNOSIS — G70.00 MYASTHENIA GRAVIS (HCC): ICD-10-CM

## 2019-02-21 PROCEDURE — 95886 MUSC TEST DONE W/N TEST COMP: CPT | Performed by: PSYCHIATRY & NEUROLOGY

## 2019-02-21 PROCEDURE — 95910 NRV CNDJ TEST 7-8 STUDIES: CPT | Performed by: PSYCHIATRY & NEUROLOGY

## 2019-02-21 RX ORDER — PREDNISONE 10 MG/1
10 TABLET ORAL DAILY
Qty: 90 TABLET | Refills: 1 | Status: SHIPPED | OUTPATIENT
Start: 2019-02-21 | End: 2019-07-19 | Stop reason: SDUPTHER

## 2019-02-21 NOTE — PROGRESS NOTES
emg performed today and reviewed with the pt    It shows a mild axonal sensory polyneuropathy   There is progression when c/w prior study in June of 2018   There is no evidence of  Tarsal tunnel syndrome     He also c/o frequent utis, history of spinal stenosis     He was unable tolerate gabapentin due to drowsiness     Plan :     1  Keep off of gabapentin   2  Continue with exercise and therapy  3  Will order a saliva test for ATTR amyloidosis ( can be assocaited with neuropathy, frequent utis and spinal stenosis   4  Will renew prednisone at current dose   5  F/u as scheduled in may  6   Can use antivert prn for excessive dizziness

## 2019-02-22 ENCOUNTER — TELEPHONE (OUTPATIENT)
Dept: FAMILY MEDICINE CLINIC | Facility: CLINIC | Age: 74
End: 2019-02-22

## 2019-02-22 NOTE — TELEPHONE ENCOUNTER
Patient was seen at Podiatry and they believes he is due for A1C in March  He said if you want to order a full panel on him, that's fine  FYI- Patient is going to get a romina study for myostinio

## 2019-02-26 DIAGNOSIS — E11.9 CONTROLLED TYPE 2 DIABETES MELLITUS WITHOUT COMPLICATION, WITH LONG-TERM CURRENT USE OF INSULIN (HCC): Primary | ICD-10-CM

## 2019-02-26 DIAGNOSIS — N18.30 CKD (CHRONIC KIDNEY DISEASE), STAGE III (HCC): ICD-10-CM

## 2019-02-26 DIAGNOSIS — Z79.4 CONTROLLED TYPE 2 DIABETES MELLITUS WITHOUT COMPLICATION, WITH LONG-TERM CURRENT USE OF INSULIN (HCC): Primary | ICD-10-CM

## 2019-02-26 DIAGNOSIS — I10 ESSENTIAL HYPERTENSION: ICD-10-CM

## 2019-02-26 NOTE — TELEPHONE ENCOUNTER
Patient returned call  He stated he cannot have A1C completed before 3/12/19 (90 days from the last one due to insurance)  We scheduled him for 4/1/19; he will have labs completed a week prior

## 2019-02-28 ENCOUNTER — TELEPHONE (OUTPATIENT)
Dept: NEUROLOGY | Facility: CLINIC | Age: 74
End: 2019-02-28

## 2019-03-04 ENCOUNTER — OFFICE VISIT (OUTPATIENT)
Dept: NEUROLOGY | Facility: CLINIC | Age: 74
End: 2019-03-04

## 2019-03-04 DIAGNOSIS — G70.00 MYASTHENIA GRAVIS (HCC): Primary | ICD-10-CM

## 2019-03-04 DIAGNOSIS — G62.9 POLYNEUROPATHY: ICD-10-CM

## 2019-03-04 NOTE — PROGRESS NOTES
Patient came in to provide saliva sample for TTR testing  Specimen collected and sent out via [address] to [employer]

## 2019-03-12 ENCOUNTER — TELEPHONE (OUTPATIENT)
Dept: SURGERY | Facility: MEDICAL CENTER | Age: 74
End: 2019-03-12

## 2019-03-12 NOTE — TELEPHONE ENCOUNTER
Spoke to patient regarding scheduling a follow up colonoscopy  Patient states he is too busy to schedule at this time, and will call the office when he is ready to schedule the procedure

## 2019-03-14 ENCOUNTER — TELEPHONE (OUTPATIENT)
Dept: FAMILY MEDICINE CLINIC | Facility: CLINIC | Age: 74
End: 2019-03-14

## 2019-03-14 NOTE — TELEPHONE ENCOUNTER
Dr Rigoberto Ontiveros:  JAVI:  Spoke with patient to confirm date and time of his next appointment, to which he replied "If I'm still alive"  He stated on that call that he is no longer taking his insulin because he cannot afford it  He is looking forward to having his A1C drawn  He stated his sugar is below 400 so it's ok  Patient also stated that he has stopped physical therapy because it hurt too much, that he could not walk or get out of bed  He informed us he sees the back doctor next Thursday  Thank you

## 2019-03-15 ENCOUNTER — PATIENT OUTREACH (OUTPATIENT)
Dept: FAMILY MEDICINE CLINIC | Facility: CLINIC | Age: 74
End: 2019-03-15

## 2019-03-15 DIAGNOSIS — Z71.89 COMPLEX CARE COORDINATION: Primary | ICD-10-CM

## 2019-03-15 NOTE — TELEPHONE ENCOUNTER
[facility],   Please could you see if there is any help available for Mr Norman Hughes for his insulin? Maybe the pharmaceutical company itself? Thank you    Dr Susan Adkins

## 2019-03-20 ENCOUNTER — PATIENT OUTREACH (OUTPATIENT)
Dept: FAMILY MEDICINE CLINIC | Facility: CLINIC | Age: 74
End: 2019-03-20

## 2019-03-21 ENCOUNTER — LAB (OUTPATIENT)
Dept: LAB | Facility: CLINIC | Age: 74
End: 2019-03-21
Payer: MEDICARE

## 2019-03-21 DIAGNOSIS — Z79.4 CONTROLLED TYPE 2 DIABETES MELLITUS WITHOUT COMPLICATION, WITH LONG-TERM CURRENT USE OF INSULIN (HCC): ICD-10-CM

## 2019-03-21 DIAGNOSIS — E11.9 CONTROLLED TYPE 2 DIABETES MELLITUS WITHOUT COMPLICATION, WITH LONG-TERM CURRENT USE OF INSULIN (HCC): ICD-10-CM

## 2019-03-21 DIAGNOSIS — I10 ESSENTIAL HYPERTENSION: ICD-10-CM

## 2019-03-21 DIAGNOSIS — N18.30 CKD (CHRONIC KIDNEY DISEASE), STAGE III (HCC): ICD-10-CM

## 2019-03-21 LAB
25(OH)D3 SERPL-MCNC: 55.7 NG/ML (ref 30–100)
ALBUMIN SERPL BCP-MCNC: 3.7 G/DL (ref 3.5–5)
ALP SERPL-CCNC: 41 U/L (ref 46–116)
ALT SERPL W P-5'-P-CCNC: 40 U/L (ref 12–78)
ANION GAP SERPL CALCULATED.3IONS-SCNC: 4 MMOL/L (ref 4–13)
AST SERPL W P-5'-P-CCNC: 25 U/L (ref 5–45)
BASOPHILS # BLD AUTO: 0.14 THOUSANDS/ΜL (ref 0–0.1)
BASOPHILS NFR BLD AUTO: 1 % (ref 0–1)
BILIRUB SERPL-MCNC: 0.45 MG/DL (ref 0.2–1)
BUN SERPL-MCNC: 20 MG/DL (ref 5–25)
CALCIUM SERPL-MCNC: 9.5 MG/DL (ref 8.3–10.1)
CHLORIDE SERPL-SCNC: 107 MMOL/L (ref 100–108)
CHOLEST SERPL-MCNC: 189 MG/DL (ref 50–200)
CO2 SERPL-SCNC: 28 MMOL/L (ref 21–32)
CREAT SERPL-MCNC: 1.24 MG/DL (ref 0.6–1.3)
EOSINOPHIL # BLD AUTO: 0.16 THOUSAND/ΜL (ref 0–0.61)
EOSINOPHIL NFR BLD AUTO: 2 % (ref 0–6)
ERYTHROCYTE [DISTWIDTH] IN BLOOD BY AUTOMATED COUNT: 13.2 % (ref 11.6–15.1)
EST. AVERAGE GLUCOSE BLD GHB EST-MCNC: 126 MG/DL
GFR SERPL CREATININE-BSD FRML MDRD: 57 ML/MIN/1.73SQ M
GLUCOSE P FAST SERPL-MCNC: 110 MG/DL (ref 65–99)
HBA1C MFR BLD: 6 % (ref 4.2–6.3)
HCT VFR BLD AUTO: 46.9 % (ref 36.5–49.3)
HDLC SERPL-MCNC: 48 MG/DL (ref 40–60)
HGB BLD-MCNC: 15.2 G/DL (ref 12–17)
IMM GRANULOCYTES # BLD AUTO: 0.05 THOUSAND/UL (ref 0–0.2)
IMM GRANULOCYTES NFR BLD AUTO: 1 % (ref 0–2)
LDLC SERPL CALC-MCNC: 110 MG/DL (ref 0–100)
LYMPHOCYTES # BLD AUTO: 3.71 THOUSANDS/ΜL (ref 0.6–4.47)
LYMPHOCYTES NFR BLD AUTO: 35 % (ref 14–44)
MCH RBC QN AUTO: 30.6 PG (ref 26.8–34.3)
MCHC RBC AUTO-ENTMCNC: 32.4 G/DL (ref 31.4–37.4)
MCV RBC AUTO: 94 FL (ref 82–98)
MONOCYTES # BLD AUTO: 1.02 THOUSAND/ΜL (ref 0.17–1.22)
MONOCYTES NFR BLD AUTO: 10 % (ref 4–12)
NEUTROPHILS # BLD AUTO: 5.61 THOUSANDS/ΜL (ref 1.85–7.62)
NEUTS SEG NFR BLD AUTO: 51 % (ref 43–75)
NONHDLC SERPL-MCNC: 141 MG/DL
NRBC BLD AUTO-RTO: 0 /100 WBCS
PLATELET # BLD AUTO: 300 THOUSANDS/UL (ref 149–390)
PMV BLD AUTO: 9.8 FL (ref 8.9–12.7)
POTASSIUM SERPL-SCNC: 4.6 MMOL/L (ref 3.5–5.3)
PROT SERPL-MCNC: 7.1 G/DL (ref 6.4–8.2)
RBC # BLD AUTO: 4.97 MILLION/UL (ref 3.88–5.62)
SODIUM SERPL-SCNC: 139 MMOL/L (ref 136–145)
TRIGL SERPL-MCNC: 157 MG/DL
WBC # BLD AUTO: 10.69 THOUSAND/UL (ref 4.31–10.16)

## 2019-03-21 PROCEDURE — 36415 COLL VENOUS BLD VENIPUNCTURE: CPT

## 2019-03-21 PROCEDURE — 80053 COMPREHEN METABOLIC PANEL: CPT

## 2019-03-21 PROCEDURE — 82306 VITAMIN D 25 HYDROXY: CPT

## 2019-03-21 PROCEDURE — 80061 LIPID PANEL: CPT

## 2019-03-21 PROCEDURE — 83036 HEMOGLOBIN GLYCOSYLATED A1C: CPT

## 2019-03-21 PROCEDURE — 85025 COMPLETE CBC W/AUTO DIFF WBC: CPT

## 2019-03-21 NOTE — TELEPHONE ENCOUNTER
Marvin Thacker-       Patient called this morning, he had his labs drawn today, no UA test as it's a 48 hour test     He is going to his back doctor appt today but he called to report that his friend gave him some insulin to use as his friends sugars are good now

## 2019-03-22 ENCOUNTER — PATIENT OUTREACH (OUTPATIENT)
Dept: FAMILY MEDICINE CLINIC | Facility: CLINIC | Age: 74
End: 2019-03-22

## 2019-03-22 NOTE — PROGRESS NOTES
Outpatient Care Management Note:  RE:Called pt and received an update on health and discussed some of his concerns  He is taking his insulin, but only 2x a day according to what he thinks is necessary based on his glucose checks  He states that he is feeling fine in that regard  His biggest concerns are r/t his hip pain and back pain  Myasthenia gravis is controlled, in his opinion, at this time  Labs from 3/21 HA1C was 6 0  He denies any needs at this time  He stated that he can afford his insulin payment of $87/ 2 month supply  Pt has my contact information should he have any other concerns that I could help him with

## 2019-04-01 ENCOUNTER — OFFICE VISIT (OUTPATIENT)
Dept: FAMILY MEDICINE CLINIC | Facility: CLINIC | Age: 74
End: 2019-04-01
Payer: MEDICARE

## 2019-04-01 ENCOUNTER — PATIENT OUTREACH (OUTPATIENT)
Dept: FAMILY MEDICINE CLINIC | Facility: CLINIC | Age: 74
End: 2019-04-01

## 2019-04-01 VITALS
TEMPERATURE: 97.6 F | BODY MASS INDEX: 27.43 KG/M2 | WEIGHT: 181 LBS | SYSTOLIC BLOOD PRESSURE: 130 MMHG | HEART RATE: 85 BPM | RESPIRATION RATE: 17 BRPM | OXYGEN SATURATION: 95 % | DIASTOLIC BLOOD PRESSURE: 60 MMHG | HEIGHT: 68 IN

## 2019-04-01 DIAGNOSIS — N18.30 CKD (CHRONIC KIDNEY DISEASE), STAGE III (HCC): ICD-10-CM

## 2019-04-01 DIAGNOSIS — R42 DIZZINESS: ICD-10-CM

## 2019-04-01 DIAGNOSIS — Z00.00 MEDICARE ANNUAL WELLNESS VISIT, SUBSEQUENT: Primary | ICD-10-CM

## 2019-04-01 DIAGNOSIS — Z79.4 CONTROLLED TYPE 2 DIABETES MELLITUS WITHOUT COMPLICATION, WITH LONG-TERM CURRENT USE OF INSULIN (HCC): ICD-10-CM

## 2019-04-01 DIAGNOSIS — G70.00 MYASTHENIA GRAVIS (HCC): ICD-10-CM

## 2019-04-01 DIAGNOSIS — E11.9 CONTROLLED TYPE 2 DIABETES MELLITUS WITHOUT COMPLICATION, WITH LONG-TERM CURRENT USE OF INSULIN (HCC): ICD-10-CM

## 2019-04-01 PROCEDURE — 99214 OFFICE O/P EST MOD 30 MIN: CPT | Performed by: FAMILY MEDICINE

## 2019-04-01 PROCEDURE — G0439 PPPS, SUBSEQ VISIT: HCPCS | Performed by: FAMILY MEDICINE

## 2019-04-01 RX ORDER — MECLIZINE HCL 12.5 MG/1
12.5 TABLET ORAL 2 TIMES DAILY
Qty: 180 TABLET | Refills: 1 | Status: SHIPPED | OUTPATIENT
Start: 2019-04-01 | End: 2019-09-10 | Stop reason: HOSPADM

## 2019-04-02 ENCOUNTER — TELEPHONE (OUTPATIENT)
Dept: NEUROLOGY | Facility: CLINIC | Age: 74
End: 2019-04-02

## 2019-05-13 ENCOUNTER — TELEPHONE (OUTPATIENT)
Dept: FAMILY MEDICINE CLINIC | Facility: CLINIC | Age: 74
End: 2019-05-13

## 2019-05-20 ENCOUNTER — OFFICE VISIT (OUTPATIENT)
Dept: FAMILY MEDICINE CLINIC | Facility: CLINIC | Age: 74
End: 2019-05-20
Payer: MEDICARE

## 2019-05-20 VITALS
SYSTOLIC BLOOD PRESSURE: 120 MMHG | BODY MASS INDEX: 27.22 KG/M2 | DIASTOLIC BLOOD PRESSURE: 68 MMHG | WEIGHT: 179 LBS | RESPIRATION RATE: 17 BRPM | TEMPERATURE: 97.7 F | OXYGEN SATURATION: 97 % | HEART RATE: 95 BPM

## 2019-05-20 DIAGNOSIS — E11.649 CONTROLLED TYPE 2 DIABETES MELLITUS WITH HYPOGLYCEMIA, WITH LONG-TERM CURRENT USE OF INSULIN (HCC): ICD-10-CM

## 2019-05-20 DIAGNOSIS — R20.0 BILATERAL LEG NUMBNESS: Primary | ICD-10-CM

## 2019-05-20 DIAGNOSIS — R09.81 NASAL CONGESTION: ICD-10-CM

## 2019-05-20 DIAGNOSIS — H93.8X3 PLUGGED FEELING IN EAR, BILATERAL: ICD-10-CM

## 2019-05-20 DIAGNOSIS — R09.81 SINUS CONGESTION: ICD-10-CM
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
## 2019-05-20 DIAGNOSIS — Z79.4 CONTROLLED TYPE 2 DIABETES MELLITUS WITH HYPOGLYCEMIA, WITH LONG-TERM CURRENT USE OF INSULIN (HCC): ICD-10-CM

## 2019-05-20 PROCEDURE — 99214 OFFICE O/P EST MOD 30 MIN: CPT | Performed by: FAMILY MEDICINE

## 2019-05-20 RX ORDER — AMOXICILLIN AND CLAVULANATE POTASSIUM 875; 125 MG/1; MG/1
1 TABLET, FILM COATED ORAL EVERY 12 HOURS SCHEDULED
Qty: 14 TABLET | Refills: 0 | Status: SHIPPED | OUTPATIENT
Start: 2019-05-20 | End: 2019-05-27

## 2019-05-22 ENCOUNTER — TELEPHONE (OUTPATIENT)
Dept: NEUROLOGY | Facility: CLINIC | Age: 74
End: 2019-05-22

## 2019-05-23 ENCOUNTER — OFFICE VISIT (OUTPATIENT)
Dept: FAMILY MEDICINE CLINIC | Facility: CLINIC | Age: 74
End: 2019-05-23
Payer: MEDICARE

## 2019-05-23 VITALS
HEART RATE: 103 BPM | DIASTOLIC BLOOD PRESSURE: 82 MMHG | RESPIRATION RATE: 17 BRPM | WEIGHT: 178 LBS | SYSTOLIC BLOOD PRESSURE: 126 MMHG | TEMPERATURE: 98.8 F | BODY MASS INDEX: 27.06 KG/M2 | OXYGEN SATURATION: 96 %

## 2019-05-23 DIAGNOSIS — H93.8X3 PLUGGED FEELING IN EAR, BILATERAL: ICD-10-CM

## 2019-05-23 DIAGNOSIS — R09.81 SINUS CONGESTION: Primary | ICD-10-CM

## 2019-05-23 DIAGNOSIS — R09.81 NASAL CONGESTION: ICD-10-CM

## 2019-05-23 PROCEDURE — 99214 OFFICE O/P EST MOD 30 MIN: CPT | Performed by: FAMILY MEDICINE

## 2019-05-23 RX ORDER — CEFUROXIME AXETIL 500 MG/1
500 TABLET ORAL EVERY 12 HOURS SCHEDULED
Qty: 14 TABLET | Refills: 0 | Status: SHIPPED | OUTPATIENT
Start: 2019-05-23 | End: 2019-05-30

## 2019-05-28 ENCOUNTER — TELEPHONE (OUTPATIENT)
Dept: FAMILY MEDICINE CLINIC | Facility: CLINIC | Age: 74
End: 2019-05-28

## 2019-05-28 DIAGNOSIS — R09.81 NASAL CONGESTION: ICD-10-CM

## 2019-05-28 DIAGNOSIS — H93.8X3 PLUGGED FEELING IN EAR, BILATERAL: ICD-10-CM

## 2019-05-28 DIAGNOSIS — Z87.898 HISTORY OF SYNCOPE: ICD-10-CM

## 2019-05-28 DIAGNOSIS — R09.81 SINUS CONGESTION: Primary | ICD-10-CM

## 2019-05-30 ENCOUNTER — TELEPHONE (OUTPATIENT)
Dept: FAMILY MEDICINE CLINIC | Facility: CLINIC | Age: 74
End: 2019-05-30

## 2019-05-31 ENCOUNTER — TELEPHONE (OUTPATIENT)
Dept: FAMILY MEDICINE CLINIC | Facility: CLINIC | Age: 74
End: 2019-05-31

## 2019-05-31 ENCOUNTER — OFFICE VISIT (OUTPATIENT)
Dept: NEUROLOGY | Facility: CLINIC | Age: 74
End: 2019-05-31
Payer: MEDICARE

## 2019-05-31 VITALS
DIASTOLIC BLOOD PRESSURE: 90 MMHG | SYSTOLIC BLOOD PRESSURE: 140 MMHG | BODY MASS INDEX: 26.98 KG/M2 | RESPIRATION RATE: 14 BRPM | WEIGHT: 178 LBS | HEIGHT: 68 IN

## 2019-05-31 DIAGNOSIS — G70.00 MYASTHENIA GRAVIS (HCC): ICD-10-CM

## 2019-05-31 DIAGNOSIS — R42 VERTIGO: Primary | ICD-10-CM

## 2019-05-31 PROCEDURE — 99214 OFFICE O/P EST MOD 30 MIN: CPT | Performed by: PSYCHIATRY & NEUROLOGY

## 2019-06-03 ENCOUNTER — PATIENT OUTREACH (OUTPATIENT)
Dept: FAMILY MEDICINE CLINIC | Facility: CLINIC | Age: 74
End: 2019-06-03

## 2019-06-03 DIAGNOSIS — E11.649 CONTROLLED TYPE 2 DIABETES MELLITUS WITH HYPOGLYCEMIA, WITH LONG-TERM CURRENT USE OF INSULIN (HCC): ICD-10-CM

## 2019-06-03 DIAGNOSIS — N18.30 CKD (CHRONIC KIDNEY DISEASE), STAGE III (HCC): ICD-10-CM

## 2019-06-03 DIAGNOSIS — Z79.4 CONTROLLED TYPE 2 DIABETES MELLITUS WITH HYPOGLYCEMIA, WITH LONG-TERM CURRENT USE OF INSULIN (HCC): ICD-10-CM

## 2019-06-03 DIAGNOSIS — E11.8 TYPE 2 DIABETES MELLITUS WITH COMPLICATION, WITH LONG-TERM CURRENT USE OF INSULIN (HCC): ICD-10-CM

## 2019-06-03 DIAGNOSIS — Z79.4 TYPE 2 DIABETES MELLITUS WITH COMPLICATION, WITH LONG-TERM CURRENT USE OF INSULIN (HCC): ICD-10-CM

## 2019-06-04 ENCOUNTER — EVALUATION (OUTPATIENT)
Dept: PHYSICAL THERAPY | Facility: REHABILITATION | Age: 74
End: 2019-06-04
Payer: MEDICARE

## 2019-06-04 DIAGNOSIS — R26.89 BALANCE PROBLEMS: ICD-10-CM

## 2019-06-04 DIAGNOSIS — R26.81 UNSTEADINESS ON FEET: ICD-10-CM

## 2019-06-04 DIAGNOSIS — R42 VERTIGO: Primary | ICD-10-CM

## 2019-06-04 PROCEDURE — 97163 PT EVAL HIGH COMPLEX 45 MIN: CPT

## 2019-06-04 PROCEDURE — 97112 NEUROMUSCULAR REEDUCATION: CPT | Performed by: PHYSICAL THERAPIST

## 2019-06-04 RX ORDER — PEN NEEDLE, DIABETIC 29 G X1/2"
NEEDLE, DISPOSABLE MISCELLANEOUS
Qty: 360 EACH | Refills: 0 | Status: SHIPPED | OUTPATIENT
Start: 2019-06-04 | End: 2019-12-27 | Stop reason: SDUPTHER

## 2019-06-04 RX ORDER — BLOOD SUGAR DIAGNOSTIC
1 STRIP MISCELLANEOUS 4 TIMES DAILY
Qty: 300 EACH | Refills: 0 | Status: SHIPPED | OUTPATIENT
Start: 2019-06-04 | End: 2019-10-31 | Stop reason: SDUPTHER

## 2019-06-04 RX ORDER — LANCETS
EACH MISCELLANEOUS
Qty: 300 EACH | Refills: 0 | Status: SHIPPED | OUTPATIENT
Start: 2019-06-04 | End: 2019-10-31 | Stop reason: SDUPTHER

## 2019-06-07 ENCOUNTER — OFFICE VISIT (OUTPATIENT)
Dept: AUDIOLOGY | Age: 74
End: 2019-06-07
Payer: MEDICARE

## 2019-06-07 DIAGNOSIS — H81.4 CENTRAL VESTIBULAR VERTIGO: Primary | ICD-10-CM

## 2019-06-07 PROCEDURE — 92540 BASIC VESTIBULAR EVALUATION: CPT | Performed by: AUDIOLOGIST

## 2019-06-07 PROCEDURE — 92567 TYMPANOMETRY: CPT | Performed by: AUDIOLOGIST

## 2019-06-07 PROCEDURE — 92537 CALORIC VSTBLR TEST W/REC: CPT | Performed by: AUDIOLOGIST

## 2019-06-10 ENCOUNTER — TELEPHONE (OUTPATIENT)
Dept: FAMILY MEDICINE CLINIC | Facility: CLINIC | Age: 74
End: 2019-06-10

## 2019-06-11 ENCOUNTER — HOSPITAL ENCOUNTER (OUTPATIENT)
Dept: MRI IMAGING | Facility: HOSPITAL | Age: 74
Discharge: HOME | End: 2019-06-11
Payer: MEDICARE

## 2019-06-11 DIAGNOSIS — R42 DIZZINESS: ICD-10-CM

## 2019-06-11 PROCEDURE — 70553 MRI BRAIN STEM W/O & W/DYE: CPT

## 2019-06-11 PROCEDURE — A9585 GADOBUTROL INJECTION: HCPCS | Performed by: SPECIALIST

## 2019-06-11 RX ADMIN — GADOBUTROL 8 ML: 604.72 INJECTION INTRAVENOUS at 10:32

## 2019-06-12 ENCOUNTER — PATIENT OUTREACH (OUTPATIENT)
Dept: FAMILY MEDICINE CLINIC | Facility: CLINIC | Age: 74
End: 2019-06-12

## 2019-06-12 DIAGNOSIS — E11.649 CONTROLLED TYPE 2 DIABETES MELLITUS WITH HYPOGLYCEMIA, WITH LONG-TERM CURRENT USE OF INSULIN (HCC): ICD-10-CM

## 2019-06-12 DIAGNOSIS — H81.4 CENTRAL VESTIBULAR VERTIGO: Primary | ICD-10-CM

## 2019-06-12 DIAGNOSIS — Z79.4 CONTROLLED TYPE 2 DIABETES MELLITUS WITH HYPOGLYCEMIA, WITH LONG-TERM CURRENT USE OF INSULIN (HCC): ICD-10-CM

## 2019-06-12 LAB
LEFT EYE DIABETIC RETINOPATHY: NORMAL
RIGHT EYE DIABETIC RETINOPATHY: NORMAL

## 2019-06-13 ENCOUNTER — HOSPITAL ENCOUNTER (OUTPATIENT)
Dept: CT IMAGING | Facility: HOSPITAL | Age: 74
Discharge: HOME | End: 2019-06-13
Payer: MEDICARE

## 2019-06-13 ENCOUNTER — APPOINTMENT (OUTPATIENT)
Dept: PHYSICAL THERAPY | Facility: REHABILITATION | Age: 74
End: 2019-06-13
Payer: MEDICARE

## 2019-06-13 DIAGNOSIS — R09.81 NASAL CONGESTION: ICD-10-CM

## 2019-06-13 PROCEDURE — 70486 CT MAXILLOFACIAL W/O DYE: CPT

## 2019-06-17 ENCOUNTER — TELEPHONE (OUTPATIENT)
Dept: NEUROLOGY | Facility: CLINIC | Age: 74
End: 2019-06-17

## 2019-06-17 ENCOUNTER — OFFICE VISIT (OUTPATIENT)
Dept: FAMILY MEDICINE CLINIC | Facility: CLINIC | Age: 74
End: 2019-06-17
Payer: MEDICARE

## 2019-06-17 ENCOUNTER — LAB (OUTPATIENT)
Dept: LAB | Facility: CLINIC | Age: 74
End: 2019-06-17
Payer: MEDICARE

## 2019-06-17 VITALS
BODY MASS INDEX: 27.37 KG/M2 | WEIGHT: 180 LBS | TEMPERATURE: 97.2 F | OXYGEN SATURATION: 97 % | DIASTOLIC BLOOD PRESSURE: 84 MMHG | HEART RATE: 85 BPM | RESPIRATION RATE: 17 BRPM | SYSTOLIC BLOOD PRESSURE: 132 MMHG

## 2019-06-17 DIAGNOSIS — R26.89 BALANCE PROBLEMS: Primary | ICD-10-CM

## 2019-06-17 DIAGNOSIS — Z79.4 CONTROLLED TYPE 2 DIABETES MELLITUS WITH HYPOGLYCEMIA, WITH LONG-TERM CURRENT USE OF INSULIN (HCC): ICD-10-CM

## 2019-06-17 DIAGNOSIS — M43.6 NECK STIFFNESS: ICD-10-CM

## 2019-06-17 DIAGNOSIS — E11.649 CONTROLLED TYPE 2 DIABETES MELLITUS WITH HYPOGLYCEMIA, WITH LONG-TERM CURRENT USE OF INSULIN (HCC): ICD-10-CM

## 2019-06-17 DIAGNOSIS — M48.02 CERVICAL SPINAL STENOSIS: ICD-10-CM

## 2019-06-17 DIAGNOSIS — R42 VERTIGO: ICD-10-CM
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
## 2019-06-17 LAB
ANION GAP SERPL CALCULATED.3IONS-SCNC: 5 MMOL/L (ref 4–13)
BASOPHILS # BLD AUTO: 0.13 THOUSANDS/ΜL (ref 0–0.1)
BASOPHILS NFR BLD AUTO: 1 % (ref 0–1)
BUN SERPL-MCNC: 16 MG/DL (ref 5–25)
CALCIUM SERPL-MCNC: 9.9 MG/DL (ref 8.3–10.1)
CHLORIDE SERPL-SCNC: 106 MMOL/L (ref 100–108)
CO2 SERPL-SCNC: 29 MMOL/L (ref 21–32)
CREAT SERPL-MCNC: 1.15 MG/DL (ref 0.6–1.3)
CREAT UR-MCNC: 185 MG/DL
EOSINOPHIL # BLD AUTO: 0.35 THOUSAND/ΜL (ref 0–0.61)
EOSINOPHIL NFR BLD AUTO: 3 % (ref 0–6)
ERYTHROCYTE [DISTWIDTH] IN BLOOD BY AUTOMATED COUNT: 13.3 % (ref 11.6–15.1)
EST. AVERAGE GLUCOSE BLD GHB EST-MCNC: 131 MG/DL
GFR SERPL CREATININE-BSD FRML MDRD: 62 ML/MIN/1.73SQ M
GLUCOSE P FAST SERPL-MCNC: 87 MG/DL (ref 65–99)
HBA1C MFR BLD: 6.2 % (ref 4.2–6.3)
HCT VFR BLD AUTO: 52 % (ref 36.5–49.3)
HGB BLD-MCNC: 17.1 G/DL (ref 12–17)
IMM GRANULOCYTES # BLD AUTO: 0.11 THOUSAND/UL (ref 0–0.2)
IMM GRANULOCYTES NFR BLD AUTO: 1 % (ref 0–2)
LYMPHOCYTES # BLD AUTO: 3.07 THOUSANDS/ΜL (ref 0.6–4.47)
LYMPHOCYTES NFR BLD AUTO: 27 % (ref 14–44)
MCH RBC QN AUTO: 31.1 PG (ref 26.8–34.3)
MCHC RBC AUTO-ENTMCNC: 32.9 G/DL (ref 31.4–37.4)
MCV RBC AUTO: 95 FL (ref 82–98)
MICROALBUMIN UR-MCNC: 21.8 MG/L (ref 0–20)
MICROALBUMIN/CREAT 24H UR: 12 MG/G CREATININE (ref 0–30)
MONOCYTES # BLD AUTO: 1.09 THOUSAND/ΜL (ref 0.17–1.22)
MONOCYTES NFR BLD AUTO: 10 % (ref 4–12)
NEUTROPHILS # BLD AUTO: 6.77 THOUSANDS/ΜL (ref 1.85–7.62)
NEUTS SEG NFR BLD AUTO: 58 % (ref 43–75)
NRBC BLD AUTO-RTO: 0 /100 WBCS
PLATELET # BLD AUTO: 368 THOUSANDS/UL (ref 149–390)
PMV BLD AUTO: 9.8 FL (ref 8.9–12.7)
POTASSIUM SERPL-SCNC: 4.2 MMOL/L (ref 3.5–5.3)
RBC # BLD AUTO: 5.49 MILLION/UL (ref 3.88–5.62)
SODIUM SERPL-SCNC: 140 MMOL/L (ref 136–145)
WBC # BLD AUTO: 11.52 THOUSAND/UL (ref 4.31–10.16)

## 2019-06-17 PROCEDURE — 80048 BASIC METABOLIC PNL TOTAL CA: CPT

## 2019-06-17 PROCEDURE — 83036 HEMOGLOBIN GLYCOSYLATED A1C: CPT

## 2019-06-17 PROCEDURE — 85025 COMPLETE CBC W/AUTO DIFF WBC: CPT

## 2019-06-17 PROCEDURE — 82570 ASSAY OF URINE CREATININE: CPT | Performed by: FAMILY MEDICINE

## 2019-06-17 PROCEDURE — 82043 UR ALBUMIN QUANTITATIVE: CPT | Performed by: FAMILY MEDICINE

## 2019-06-17 PROCEDURE — 36415 COLL VENOUS BLD VENIPUNCTURE: CPT

## 2019-06-17 PROCEDURE — 99214 OFFICE O/P EST MOD 30 MIN: CPT | Performed by: FAMILY MEDICINE

## 2019-06-18 ENCOUNTER — PATIENT OUTREACH (OUTPATIENT)
Dept: FAMILY MEDICINE CLINIC | Facility: CLINIC | Age: 74
End: 2019-06-18

## 2019-06-18 ENCOUNTER — TELEPHONE (OUTPATIENT)
Dept: FAMILY MEDICINE CLINIC | Facility: CLINIC | Age: 74
End: 2019-06-18

## 2019-06-18 PROBLEM — D72.829 LEUKOCYTOSIS: Status: ACTIVE | Noted: 2019-06-18

## 2019-06-18 PROBLEM — D58.2 ELEVATED HEMOGLOBIN (HCC): Status: ACTIVE | Noted: 2019-06-18

## 2019-06-19 ENCOUNTER — OFFICE VISIT (OUTPATIENT)
Dept: NEUROLOGY | Facility: CLINIC | Age: 74
End: 2019-06-19
Payer: MEDICARE

## 2019-06-19 VITALS
HEART RATE: 68 BPM | HEIGHT: 68 IN | WEIGHT: 178.8 LBS | BODY MASS INDEX: 27.1 KG/M2 | DIASTOLIC BLOOD PRESSURE: 64 MMHG | SYSTOLIC BLOOD PRESSURE: 120 MMHG

## 2019-06-19 DIAGNOSIS — R26.89 BALANCE PROBLEMS: Primary | ICD-10-CM

## 2019-06-19 DIAGNOSIS — R42 VERTIGO: Primary | ICD-10-CM

## 2019-06-19 DIAGNOSIS — G62.9 POLYNEUROPATHY: ICD-10-CM

## 2019-06-19 DIAGNOSIS — G70.00 MYASTHENIA GRAVIS (HCC): ICD-10-CM

## 2019-06-19 PROCEDURE — 99214 OFFICE O/P EST MOD 30 MIN: CPT | Performed by: PSYCHIATRY & NEUROLOGY

## 2019-06-19 RX ORDER — DIAZEPAM 2 MG/1
2 TABLET ORAL EVERY 6 HOURS PRN
Qty: 15 TABLET | Refills: 0 | Status: SHIPPED | OUTPATIENT
Start: 2019-06-19 | End: 2019-09-10 | Stop reason: HOSPADM

## 2019-06-19 RX ORDER — HYDROCHLOROTHIAZIDE 12.5 MG/1
12.5 TABLET ORAL DAILY
Qty: 30 TABLET | Refills: 1 | Status: SHIPPED | OUTPATIENT
Start: 2019-06-19 | End: 2019-07-19 | Stop reason: SDUPTHER

## 2019-06-20 ENCOUNTER — APPOINTMENT (OUTPATIENT)
Dept: PHYSICAL THERAPY | Facility: REHABILITATION | Age: 74
End: 2019-06-20
Payer: MEDICARE

## 2019-06-25 ENCOUNTER — OFFICE VISIT (OUTPATIENT)
Dept: FAMILY MEDICINE CLINIC | Facility: CLINIC | Age: 74
End: 2019-06-25
Payer: MEDICARE

## 2019-06-25 VITALS
RESPIRATION RATE: 17 BRPM | HEART RATE: 87 BPM | OXYGEN SATURATION: 97 % | WEIGHT: 178 LBS | DIASTOLIC BLOOD PRESSURE: 70 MMHG | SYSTOLIC BLOOD PRESSURE: 100 MMHG | BODY MASS INDEX: 27.06 KG/M2 | TEMPERATURE: 97.4 F

## 2019-06-25 DIAGNOSIS — D72.829 LEUKOCYTOSIS, UNSPECIFIED TYPE: Primary | ICD-10-CM

## 2019-06-25 DIAGNOSIS — R42 VERTIGO: ICD-10-CM

## 2019-06-25 DIAGNOSIS — D58.2 ELEVATED HEMOGLOBIN (HCC): ICD-10-CM

## 2019-06-25 DIAGNOSIS — R26.89 BALANCE PROBLEMS: ICD-10-CM

## 2019-06-25 PROCEDURE — 99214 OFFICE O/P EST MOD 30 MIN: CPT | Performed by: FAMILY MEDICINE

## 2019-06-25 NOTE — PROGRESS NOTES
Assessment/Plan:         Diagnoses and all orders for this visit:    Leukocytosis, unspecified type  -     Ambulatory referral to Hematology / Oncology; Future    Elevated hemoglobin ([address] )  -     Ambulatory referral to Hematology / Oncology; Future    Vertigo    Balance problems  Comments:  etiol yet TBD, for PT/OT through [facility]          Subjective:   Chief Complaint   Patient presents with    Follow-up     no better        Patient ID: Leeann Nelson is a 76 y o  male  F/u appt  Reports that [facility] still wouldn't start care, so set up with [employer] on 06/21, also saw his neurologist emergently 06/19 and she rescheduled the neck MRI for about 3 weeks from now  Interval also saw his eye doctor, dr Donaldo Don, and will need to see a specialist, Dr Rachael Ramirez, tomorrow   Leukocytosis persists for some time going back on chart review, only 3-4x has it been normal in last 2 years out of at least 15 CBC results  States has not really noticed difference on HCTZ, off of antivert      The following portions of the patient's history were reviewed and updated as appropriate: allergies, current medications, past family history, past medical history, past social history, past surgical history and problem list     Review of Systems   Constitutional: Negative  HENT: Negative for mouth sores, nosebleeds, sore throat, trouble swallowing and voice change  Eyes: Negative  Respiratory: Negative  Cardiovascular: Negative  Gastrointestinal: Negative for abdominal distention, abdominal pain, anal bleeding, blood in stool, constipation, diarrhea, nausea and vomiting  No incontinence   Endocrine: Negative  Genitourinary: Negative for decreased urine volume, difficulty urinating, dysuria, flank pain, frequency and hematuria  No incontinence   Musculoskeletal: Negative for joint swelling  Skin: Negative      Neurological: Negative for seizures, syncope, facial asymmetry and speech difficulty  Per hpi   Hematological: Negative  Psychiatric/Behavioral: Negative for confusion, hallucinations and suicidal ideas  Objective:      /70   Pulse 87   Temp (!) 97 4 °F (36 3 °C)   Resp 17   Wt 80 7 kg (178 lb)   SpO2 97%   BMI 27 06 kg/m²          Physical Exam   Constitutional: He is oriented to person, place, and time  He appears well-developed  He is cooperative  Non-toxic appearance  He does not appear ill  No distress  HENT:   Head: Normocephalic and atraumatic  Right Ear: Tympanic membrane, external ear and ear canal normal    Left Ear: Tympanic membrane, external ear and ear canal normal    Nose: Nose normal    Mouth/Throat: Uvula is midline, oropharynx is clear and moist and mucous membranes are normal    Eyes: Pupils are equal, round, and reactive to light  Conjunctivae and EOM are normal    Neck: Trachea normal and phonation normal  Neck supple  No JVD present  No thyroid mass and no thyromegaly present  Cardiovascular: Normal rate, regular rhythm and normal heart sounds  Pulmonary/Chest: Effort normal and breath sounds normal    Neurological: He is alert and oriented to person, place, and time  He has normal strength  He displays no atrophy  No cranial nerve deficit  He exhibits normal muscle tone  Coordination and gait abnormal    Skin: Skin is warm and dry  Capillary refill takes less than 2 seconds  No ecchymosis and no petechiae noted  He is not diaphoretic  No cyanosis  No pallor  Psychiatric: He has a normal mood and affect  Nursing note and vitals reviewed

## 2019-07-15 ENCOUNTER — HOSPITAL ENCOUNTER (OUTPATIENT)
Dept: MRI IMAGING | Facility: HOSPITAL | Age: 74
Discharge: HOME | End: 2019-07-15
Payer: MEDICARE

## 2019-07-15 DIAGNOSIS — R26.89 BALANCE PROBLEMS: ICD-10-CM

## 2019-07-15 DIAGNOSIS — M43.6 NECK STIFFNESS: ICD-10-CM

## 2019-07-15 DIAGNOSIS — M48.02 CERVICAL SPINAL STENOSIS: ICD-10-CM
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
## 2019-07-15 DIAGNOSIS — R42 VERTIGO: ICD-10-CM

## 2019-07-15 PROCEDURE — 72141 MRI NECK SPINE W/O DYE: CPT

## 2019-07-17 ENCOUNTER — TELEPHONE (OUTPATIENT)
Dept: FAMILY MEDICINE CLINIC | Facility: CLINIC | Age: 74
End: 2019-07-17

## 2019-07-17 NOTE — TELEPHONE ENCOUNTER
Patient returned a missed call  With research it looks like you called him  He will be home all day 7/18/19  Thank you

## 2019-07-18 ENCOUNTER — TELEPHONE (OUTPATIENT)
Dept: FAMILY MEDICINE CLINIC | Facility: CLINIC | Age: 74
End: 2019-07-18

## 2019-07-18 ENCOUNTER — TELEPHONE (OUTPATIENT)
Dept: NEUROLOGY | Facility: CLINIC | Age: 74
End: 2019-07-18

## 2019-07-18 DIAGNOSIS — M48.02 CERVICAL SPINAL STENOSIS: ICD-10-CM

## 2019-07-18 DIAGNOSIS — G95.20 CORD COMPRESSION (HCC): Primary | ICD-10-CM

## 2019-07-18 NOTE — TELEPHONE ENCOUNTER
Pt called for MRI cervical results  Advised pt to contact pcp to review results since Dr Papo Amador is the prescribing physician  Pt states that he tried calling his pcp but pcp doesn't return his call  Pt states that Dr Ra Vasquez referred him to PT and cannot schedule until MRI result is back  Pt wants to know if Dr Ra Vasquez can take a look at the result and review it with him trmw? Pt has f/u appt w/ you tmrw at 2 pm            [phone] can leave detailed message

## 2019-07-18 NOTE — TELEPHONE ENCOUNTER
Spoke to pt and advised I am sending him to neurosurgery to eval and treat worsened C5-6 spinal stenosis and cord compression on MRI compared to 2014 MRI   PUI

## 2019-07-18 NOTE — TELEPHONE ENCOUNTER
JAVI    I reviewed the MRI of the cervical spine with the radiologist who read the films        at this point I recommend that he is not to  proceed with a neurol evaluation yet until I evaluate him tomorrow     we can review the films in detail tomorrow    No need to call him at this time

## 2019-07-18 NOTE — TELEPHONE ENCOUNTER
He wanted to let you know that he will be seeing Dr Gerard Cisneros on 7-25-19 dr Larry Esquivel can't get him in till the end of August    He also asked me to fax the MRI to her [phone]

## 2019-07-18 NOTE — TELEPHONE ENCOUNTER
Pt made aware of the below and verbalized understanding  Pt states that his pcp just called him and discussed the results w/ him  PCP referred him to Dr Luis Antonio Brenner (neurosurgery)  Pt states that he trust Dr Tomy Hodge and would like to discuss his situation w/ you first  Confirmed f/u appt trmw at 2 pm    No other questions or concerns at this time

## 2019-07-19 ENCOUNTER — OFFICE VISIT (OUTPATIENT)
Dept: NEUROLOGY | Facility: CLINIC | Age: 74
End: 2019-07-19
Payer: MEDICARE

## 2019-07-19 VITALS
DIASTOLIC BLOOD PRESSURE: 65 MMHG | HEIGHT: 68 IN | SYSTOLIC BLOOD PRESSURE: 139 MMHG | HEART RATE: 130 BPM | WEIGHT: 182 LBS | BODY MASS INDEX: 27.58 KG/M2

## 2019-07-19 DIAGNOSIS — G70.00 MYASTHENIA GRAVIS (HCC): Primary | ICD-10-CM

## 2019-07-19 DIAGNOSIS — G62.9 POLYNEUROPATHY: ICD-10-CM

## 2019-07-19 DIAGNOSIS — M48.02 CERVICAL SPINAL STENOSIS: ICD-10-CM

## 2019-07-19 DIAGNOSIS — R42 VERTIGO: ICD-10-CM

## 2019-07-19 PROCEDURE — 99214 OFFICE O/P EST MOD 30 MIN: CPT | Performed by: PSYCHIATRY & NEUROLOGY

## 2019-07-19 RX ORDER — PREDNISONE 10 MG/1
10 TABLET ORAL DAILY
Qty: 90 TABLET | Refills: 1 | Status: SHIPPED | OUTPATIENT
Start: 2019-07-19 | End: 2020-02-05 | Stop reason: SDUPTHER

## 2019-07-19 RX ORDER — HYDROCHLOROTHIAZIDE 12.5 MG/1
12.5 TABLET ORAL DAILY
Qty: 90 TABLET | Refills: 1 | Status: SHIPPED | OUTPATIENT
Start: 2019-07-19 | End: 2019-10-29

## 2019-07-19 NOTE — ASSESSMENT & PLAN NOTE
Myasthenia gravis is stable on prednisone 10 mg a day attempts to reduce the dose have resulted in exacerbation of symptoms

## 2019-07-19 NOTE — ASSESSMENT & PLAN NOTE
Overall the symptoms have improved with the use of hydrochlorothiazide and optic kinetic therapy  He will continue on therapy  Also has prisms in his glasses  Has difficulty he has had difficulty in obtaining the correct prisms and is seeking advice from a 2nd opinion in Neuro-Ophthalmology  Subsequently I have referred him to Dr Rusty Leonardo  I did refill his hydrochlorothiazide recent potassium and BUN creatinine were normal

## 2019-07-19 NOTE — ASSESSMENT & PLAN NOTE
He does have a history of prior spinal stenosis  He had a recent MRI of the cervical spine which was read as abnormal   I reviewed personally by the [facility] system and discuss the findings with the radiologist   There is no myelomalacia or changes to the spinal cord  It looks like the disc is abutting the spinal cord  His reflexes have not increased and there is no evidence of a Babinski sign  He does have an appointment with the neurosurgeon next week

## 2019-07-19 NOTE — PROGRESS NOTES
Patient ID: Lety Brewer is a 76 y o  male  Assessment/Plan:    Polyneuropathy  He has very mild abnormalities was slight decrease in vibratory sensation  Vertigo  Overall the symptoms have improved with the use of hydrochlorothiazide and optic kinetic therapy  He will continue on therapy  Also has prisms in his glasses  Has difficulty he has had difficulty in obtaining the correct prisms and is seeking advice from a 2nd opinion in Neuro-Ophthalmology  Subsequently I have referred him to Dr Pamela Childs  I did refill his hydrochlorothiazide recent potassium and BUN creatinine were normal   Myasthenia gravis ([address] )  Myasthenia gravis is stable on prednisone 10 mg a day attempts to reduce the dose have resulted in exacerbation of symptoms  Cervical spinal stenosis  He does have a history of prior spinal stenosis  He had a recent MRI of the cervical spine which was read as abnormal   I reviewed personally by the [facility] system and discuss the findings with the radiologist   There is no myelomalacia or changes to the spinal cord  It looks like the disc is abutting the spinal cord  His reflexes have not increased and there is no evidence of a Babinski sign  He does have an appointment with the neurosurgeon next week  He is to return to our offices in approximately 2 to 3 months   Diagnoses and all orders for this visit:    Myasthenia gravis ([address] )  -     predniSONE 10 mg tablet; Take 1 tablet (10 mg total) by mouth daily    Polyneuropathy    Vertigo  -     hydrochlorothiazide (HYDRODIURIL) 12 5 mg tablet; Take 1 tablet (12 5 mg total) by mouth daily  -     Ambulatory referral to Ophthalmology; Future    Cervical spinal stenosis         Subjective: This is a 75 y/o male with History of Myasthenia Gravis diagnosed in 2009 after presents for a follow up visit  He was last evaluated here few weeks ago    it was felt that his myasthenia gravis was well controlled and his neuropathy was minimal   In the he  had severe vertigo with unsteadiness dizziness  In the interim he was evaluated by ENT  A MRI of the brain was performed and failed reveal any abnormalities in the CP angle, involving the 8th nerve  There is no evidence of ischemia  A CT scan of the sinuses was also normal   In the interim he was evaluated by physical therapy who  felt that they could not provide him treatment for vertigo  He continues to have difficulty walking, was walking to the right and felt that his ears are swimming  He also had a VNG performed which showed abnormal optic kinetic movements and abnormal saccades        In the interim he continues to have progression of his symptoms  He reports unsteadiness difficulty walking  He feels like he is walking towards the right side and feel that is ears are swimming  We moving his head to the right he has increase in symptoms  This is not a severe when sitting  He does sleep on either side  Denies any double vision nausea or vomiting  He also had a VNG performed which showed abnormal optic kinetic movements and abnormal saccades  He is no longer utilizing Antivert  He was placed on hydrochlorothiazide and is  utilizing this 12 5 a day  In the interim he was referred to [facility]  They then referred him to vision therapy at [facility]  He has undergone changes in glasses  with alteration of his prisms ( horizontal  And vertical )  is undergoing optic kinetic vision therapy overall his symptoms have  improved        In the interim due to torticollis and continued symptoms he did undergo a MRI of the cervical spine  It did demonstrate Multilevel cervical spondylosis, as described above      Multifactorial disease results in overall mild canal stenosis and cord compression at C5-C6, overall slightly progressed since the prior examination    However there is no myelomalacia      Cervical spondylosis in the remainder of the spine is otherwise not significantly changed  He denies any weakness in his legs stiffness in his legs  He is now to be evaluated by Neurosurgery next week    He was also evaluated by Hematology for leukocytosis                               The following portions of the patient's history were reviewed and updated as appropriate:   He  has a past medical history of Anxiety, Cervical spinal stenosis, chronic ulcerative colitis, Hyperlipidemia, Myasthenia gravis ([address] ), and Sleep apnea  He  has a past surgical history that includes Lithotripsy; Colonoscopy; Colon surgery; Other surgical history; Salivary gland surgery; COLECTOMY LAPAROSCOPIC; Colectomy; and pr fragment kidney stone/ eswl (Left, 11/2/2018)  His family history includes Arthritis in his mother; Depression in his father; Diabetes in his mother; Hypertension in his mother; Osteoporosis in his mother  He  reports that he has never smoked  He has never used smokeless tobacco  He reports that he does not drink alcohol or use drugs    Current Outpatient Medications   Medication Sig Dispense Refill    ACCU-CHEK FASTCLIX LANCETS MISC Test four times daily 300 each 0    ACCU-CHEK GUIDE test strip 1 each by Other route 4 (four) times a day Use as instructed bid scheduled and prn symptoms/signs blood sugar fluctuation 300 each 0    BD ULTRA-FINE PEN NEEDLES 29G X 12 7MM MISC       BD ULTRA-FINE PEN NEEDLES 29G X 12 7MM MISC USE 4 TIMES A  each 0    cefepime (MAXIPIME) 2 g injection       cholecalciferol (VITAMIN D3) 1,000 units tablet Take 2 tablets by mouth daily        Cranberry 500 MG CAPS Take 4 capsules by mouth daily       diazepam (VALIUM) 2 mg tablet Take 1 tablet (2 mg total) by mouth every 6 (six) hours as needed (vertigo) 15 tablet 0    docusate sodium (COLACE) 250 MG capsule Take 1 capsule (250 mg total) by mouth daily 30 capsule 0    famotidine (PEPCID) 20 mg tablet Take 1 tablet (20 mg total) by mouth daily 90 tablet 3    hydrochlorothiazide (HYDRODIURIL) 12 5 mg tablet Take 1 tablet (12 5 mg total) by mouth daily 90 tablet 1    ibuprofen (MOTRIN) 200 mg tablet Take by mouth every 6 (six) hours as needed for mild pain      insulin aspart (NovoLOG) 100 units/mL injection Inject 4 Units under the skin 3 (three) times a day before meals      insulin glargine (LANTUS) 100 units/mL subcutaneous injection Inject 12 Units under the skin daily at bedtime 1000 Units 1    Lactobacillus (ACIDOPHILUS) 100 MG CAPS Take 1 capsule by mouth daily      lidocaine (LIDODERM) 5 % Apply 1 patch topically daily Remove & Discard patch within 12 hours or as directed by MD 30 patch 0    lisinopril (ZESTRIL) 2 5 mg tablet Take 2 5 mg by mouth daily        meclizine (ANTIVERT) 12 5 MG tablet Take 1 tablet (12 5 mg total) by mouth 2 (two) times a day 180 tablet 1    ondansetron (ZOFRAN) 4 mg tablet Take 1 tablet (4 mg total) by mouth every 8 (eight) hours as needed for nausea or vomiting 30 tablet 0    Potassium 99 MG TABS Take by mouth      predniSONE 10 mg tablet Take 1 tablet (10 mg total) by mouth daily 90 tablet 1    pyridoxine (VITAMIN B6) 100 mg tablet Take 100 mg by mouth daily         No current facility-administered medications for this visit  He is allergic to levaquin [levofloxacin]; quinolones; antihistamines, chlorpheniramine-type; benzodiazepines; chlorpheniramine; diphenhydramine; erythromycin; iodine; iodine solution [povidone iodine]; loratadine; other; sulfa antibiotics; and tetracyclines & related            Objective:    Blood pressure 139/65, pulse (!) 130, height 5' 8" (1 727 m), weight 82 6 kg (182 lb)  Physical Exam   Constitutional: He appears well-developed  HENT:   Head: Normocephalic  Eyes: Pupils are equal, round, and reactive to light  Lids are normal    Cardiovascular: Normal rate  Neurological: He has normal strength  Reflex Scores:       Tricep reflexes are 1+ on the right side and 1+ on the left side         Bicep reflexes are 1+ on the right side and 1+ on the left side  Patellar reflexes are 1+ on the right side and 1+ on the left side  Achilles reflexes are Tr on the right side and Tr on the left side  Vitals reviewed  Neurological Exam    Cranial Nerves  CN II: Visual acuity is normal  Visual fields full to confrontation  CN III, IV, VI: Extraocular movements intact bilaterally  Normal lids and orbits bilaterally  Pupils equal round and reactive to light bilaterally  CN V: Facial sensation is normal   CN VII: Full and symmetric facial movement  CN VIII: Hearing is normal   CN IX, X: Palate elevates symmetrically  Normal gag reflex  CN XI: Shoulder shrug strength is normal   CN XII: Tongue midline without atrophy or fasciculations  Hallpike maneuver did not elicit any symptoms  He did have some saccades and moving his eyes to the right horizontally       Motor  Normal muscle bulk throughout  No fasciculations present  Strength is 5/5 throughout all four extremities  There is no fatigable weakness neck flexion and extension were examined neck flexion is slightly weak at 5-out of five  Sensory  There is slight decrease in vibratory sensation in his toes  Otherwise proprioception was intact in the both in the upper and lower extremity  Reflexes                                           Right                      Left  Biceps                                 1+                         1+  Triceps                                1+                         1+  Patellar                                1+                         1+  Achilles                                Tr                         Tr  Plantar                           Downgoing                Downgoing    Right pathological reflexes: Logan's absent  Left pathological reflexes: Logan's absent  Coordination  Right: Finger-to-nose normal   Left: Finger-to-nose normal     Gait  He was unable to tandem  He did have a positive Romberg sign           ROS:    Review of Systems   HENT: Positive for tinnitus  Eyes: Negative  Respiratory: Negative  Cardiovascular: Negative  Gastrointestinal: Negative  Endocrine: Negative  Genitourinary: Negative  Musculoskeletal: Positive for arthralgias and gait problem  Allergic/Immunologic: Negative  Neurological: Positive for light-headedness  Hematological: Bruises/bleeds easily

## 2019-07-25 NOTE — ED PROVIDER NOTES
"Subjective:      Patient ID: Ankit Contreras is a 37 y.o. male.    Chief Complaint: Sore Throat and Hoarse    Patient is known to me, here today for sore throat    Sore Throat    This is a new problem. The current episode started 1 to 4 weeks ago (1wk). The problem has been unchanged. Neither side of throat is experiencing more pain than the other. There has been no fever. The pain is at a severity of 2/10. The patient is experiencing no pain. Associated symptoms include a hoarse voice. Pertinent negatives include no abdominal pain, congestion, coughing, diarrhea, drooling, ear discharge, ear pain, headaches, plugged ear sensation, neck pain, shortness of breath, stridor, swollen glands, trouble swallowing or vomiting. He has had no exposure to strep or mono. He has tried NSAIDs, cool liquids and gargles for the symptoms. The treatment provided no relief.     Review of Systems   Constitutional: Negative for chills, diaphoresis and fever.   HENT: Positive for hoarse voice, postnasal drip, sore throat and voice change (hoarse). Negative for congestion, drooling, ear discharge, ear pain, rhinorrhea and trouble swallowing.    Respiratory: Negative for cough, shortness of breath, wheezing and stridor.    Gastrointestinal: Negative for abdominal pain, constipation, diarrhea, nausea, rectal pain and vomiting.   Musculoskeletal: Negative for neck pain.   Skin: Negative for color change, rash and wound.   Neurological: Negative for dizziness, light-headedness and headaches.       Objective:   BP (!) 124/90 (BP Location: Left arm, Patient Position: Sitting)   Pulse 101   Ht 6' 1" (1.854 m)   Wt (!) 198.3 kg (437 lb 2.8 oz)   SpO2 98%   BMI 57.68 kg/m²   Physical Exam   Constitutional: He appears well-developed and well-nourished. He does not appear ill. No distress.   HENT:   Head: Normocephalic and atraumatic.   Right Ear: Tympanic membrane and ear canal normal. Tympanic membrane is not erythematous. No middle ear " History  Chief Complaint   Patient presents with    Fever - 9 weeks to 74 years     Pt reports having lithotripsy on Friday and since then has not been able to urinate appropriately  Pt states he was feeling warm all day and when he got up to urinate after a nap he got dizzy and fell backwards 10 feet and his his dresser  Pt reports back pain due to fall, but complains of no neckpain   Dizziness     Pt states he is also naseous  Patient presents for evaluation after a fall tonight  He states that he recently was discharged from [facility] for lithotripsy  He has been home for a day  He states that he has vertigo and has chronic dizziness but states that tonight was different  He has been feeling weak, tired and dizzy since he came home  He states that tonight he was laying in bed, got up and fell backwards about 8-10 feet and hit his dresser  States that he hit his head and his low back  He was unable to get up  Patient denies any loss of consciousness  He is not currently on any blood thinners  States that he has not been eating or drinking well  He has been extremely fatigued with chills  He states that he feels warm but is not sure if he has an actual fever  States that he has also not urinating appropriately since his procedure  He states that no stones were passed yet he feels that he is blocked          History provided by:  Patient and spouse   used: No    Fall   Mechanism of injury: fall    Injury location:  Head/neck and torso  Head/neck injury location:  Head  Torso injury location:  Back  Incident location:  Home  Arrived directly from scene: yes    Fall:     Fall occurred:  Standing    Impact surface:  [employer] of impact:  Unable to specify    Entrapped after fall: no    Protective equipment: none    Suspicion of alcohol use: no    Suspicion of drug use: no    Prior to arrival data:     Bystander interventions:  None    Patient ambulatory at scene: no      Blood loss:  None    Responsiveness at scene:  Alert    Orientation at scene:  Person, place, situation and time    Loss of consciousness: no      Amnesic to event: no      Airway interventions:  None    Breathing interventions:  None    Fluids administered:  None    Cardiac interventions:  None    Immobilization:  None  Associated symptoms: back pain, difficulty breathing and nausea    Associated symptoms: no abdominal pain, no chest pain, no headaches, no loss of consciousness, no neck pain and no vomiting        Prior to Admission Medications   Prescriptions Last Dose Informant Patient Reported? Taking?    ACCU-CHEK FASTCLIX LANCETS MISC  Self Yes Yes   Sig: TEST twice a day   ACCU-CHEK GUIDE test strip  Self No Yes   Si each by Other route 4 (four) times a day Use as instructed bid scheduled and prn symptoms/signs blood sugar fluctuation   BD ULTRA-FINE PEN NEEDLES 29G X 12 7MM MISC  Self Yes Yes   Cranberry 500 MG CAPS  Self Yes Yes   Sig: Take 1 capsule by mouth daily   HYDROcodone-acetaminophen (NORCO) 5-325 mg per tablet Not Taking at Unknown time  No No   Sig: Take 1 tablet by mouth every 4 (four) hours as needed for pain for up to 2 days Max Daily Amount: 6 tablets   Patient not taking: Reported on 11/3/2018    Insulin Pen Needle 29G X 12MM MISC  Self No Yes   Sig: by Does not apply route 4 (four) times a day   Lactobacillus (ACIDOPHILUS) 100 MG CAPS  Self Yes Yes   Sig: Take 1 capsule by mouth daily   cholecalciferol (VITAMIN D3) 1,000 units tablet  Self Yes Yes   Sig: Take 2 tablets by mouth daily     famotidine (PEPCID) 20 mg tablet  Self No Yes   Sig: Take 1 tablet (20 mg total) by mouth daily   insulin aspart (NovoLOG) 100 units/mL injection  Self Yes Yes   Sig: Inject 4 Units under the skin 3 (three) times a day before meals   insulin glargine (LANTUS) 100 units/mL subcutaneous injection  Self Yes Yes   Sig: Inject 12 Units under the skin daily at bedtime   lisinopril (ZESTRIL) effusion.   Left Ear: Tympanic membrane and ear canal normal. Tympanic membrane is not erythematous.  No middle ear effusion.   Nose: No mucosal edema. Right sinus exhibits no maxillary sinus tenderness and no frontal sinus tenderness. Left sinus exhibits no maxillary sinus tenderness and no frontal sinus tenderness.   Mouth/Throat: Uvula is midline and oropharynx is clear and moist. No posterior oropharyngeal erythema.   Signs of PND   Cardiovascular: Normal rate, regular rhythm and normal heart sounds.   No murmur heard.  Pulmonary/Chest: Effort normal and breath sounds normal. No respiratory distress. He has no decreased breath sounds. He has no wheezes. He has no rhonchi. He has no rales.   Lymphadenopathy:     He has no cervical adenopathy.   Skin: Skin is warm and dry. No rash noted. He is not diaphoretic.   Psychiatric: He has a normal mood and affect. His speech is normal and behavior is normal. Thought content normal. Cognition and memory are normal.     Assessment:      1. Sore throat    2. Essential hypertension    3. Diabetes mellitus without complication    4. Post-nasal drip       Plan:   Sore throat  -     POCT rapid strep A  -     Strep A culture, throat  -     (Magic mouthwash) 1:1:1 Benadryl 12.5mg/5ml liq, aluminum & magnesium hydroxide-simehticone (Maalox), lidocaine viscous 2%; Swish and spit 15 mLs every 4 (four) hours as needed (throat pain).  Dispense: 240 mL; Refill: 0    Essential hypertension    Diabetes mellitus without complication    Post-nasal drip  -     fluticasone propionate (FLONASE) 50 mcg/actuation nasal spray; 2 sprays (100 mcg total) by Each Nare route once daily.  Dispense: 1 Bottle; Refill: 0    Discussed anti-histamine + flonase for PND   Monitor BP at home  Voice rest, hydration, humidity for hoarseness     Discussed worsening signs/symptoms and when to return to clinic or go to ED.   Patient expresses understanding and agrees with treatment plan.      2 5 mg tablet Past Week at Unknown time Self Yes Yes   meclizine (ANTIVERT) 12 5 MG tablet  Self Yes Yes   Sig: Take 12 5 mg by mouth daily     predniSONE 10 mg tablet  Self No Yes   Sig: Take 1 tablet (10 mg total) by mouth daily   pyridoxine (VITAMIN B6) 100 mg tablet  Self Yes Yes   Sig: Take 100 mg by mouth daily        Facility-Administered Medications: None       Past Medical History:   Diagnosis Date    Anxiety     Cervical spinal stenosis     Hx of chronic ulcerative colitis     Hyperlipidemia     [facility]henia gravis ([facility] [address] )     Sleep apnea        Past Surgical History:   Procedure Laterality Date    COLECTOMY      partial sigmoid    COLECTOMY LAPAROSCOPIC      partial sigmoid colectomy    COLON SURGERY      lap  partial colectomy sigmoid    COLONOSCOPY      LITHOTRIPSY      OTHER SURGICAL HISTORY      parotid bx    SALIVARY GLAND SURGERY      biopsy       Family History   Problem Relation Age of Onset    Arthritis Mother     Diabetes Mother     Hypertension Mother     Osteoporosis Mother     Depression Father     Cancer Neg Hx     Heart disease Neg Hx      I have reviewed and agree with the history as documented  Social History   Substance Use Topics    Smoking status: Never Smoker    Smokeless tobacco: Never Used    Alcohol use No        Review of Systems   Constitutional: Positive for activity change, appetite change, chills, diaphoresis and fatigue  Eyes: Negative for visual disturbance  Respiratory: Positive for shortness of breath  Negative for cough  Cardiovascular: Negative for chest pain and leg swelling  Gastrointestinal: Positive for nausea  Negative for abdominal pain and vomiting  Genitourinary: Positive for difficulty urinating  Musculoskeletal: Positive for back pain  Negative for neck pain  Skin: Negative for color change and wound  Neurological: Positive for dizziness, weakness and light-headedness  Negative for loss of consciousness and headaches  All other systems reviewed and are negative  Physical Exam  Physical Exam   Constitutional: He is oriented to person, place, and time  He appears well-developed and well-nourished  HENT:   Head: Normocephalic and atraumatic  Mouth/Throat: Mucous membranes are dry  Eyes: Pupils are equal, round, and reactive to light  Conjunctivae and EOM are normal  No scleral icterus  Neck: Normal range of motion  Neck supple  Cardiovascular: Regular rhythm, normal heart sounds and intact distal pulses  Tachycardia present  Exam reveals no friction rub  No murmur heard  Pulmonary/Chest: Effort normal and breath sounds normal  No stridor  Tachypnea noted  No respiratory distress  He has no wheezes  He has no rales  Abdominal: Soft  He exhibits no distension  There is no tenderness  There is no rebound and no guarding  Musculoskeletal: Normal range of motion  He exhibits tenderness  He exhibits no edema or deformity  Lumbar back: He exhibits tenderness, bony tenderness and pain  He exhibits normal range of motion, no swelling, no edema, no deformity, no laceration and normal pulse  Neurological: He is alert and oriented to person, place, and time  He has normal strength  He displays no tremor  No sensory deficit  He exhibits normal muscle tone  He displays no seizure activity  Skin: Skin is warm and dry  Psychiatric: He has a normal mood and affect  Nursing note and vitals reviewed        Vital Signs  ED Triage Vitals   Temperature Pulse Respirations Blood Pressure SpO2   11/03/18 2155 11/03/18 2155 11/03/18 2155 11/03/18 2155 11/03/18 2155   99 4 °F (37 4 °C) (!) 135 (!) 25 146/84 94 %      Temp src Heart Rate Source Patient Position - Orthostatic VS BP Location FiO2 (%)   -- 11/03/18 2321 11/03/18 2155 11/03/18 2155 --    Monitor Lying Right arm       Pain Score       11/03/18 2155       5           Vitals:    11/03/18 2155 11/03/18 2321   BP: 146/84 128/58   Pulse: (!) 135 (!) 124   Patient Position - Orthostatic VS: Lying Lying       Visual Acuity      ED Medications  Medications   insulin lispro (HumaLOG) 100 units/mL subcutaneous injection 12 Units (not administered)   insulin lispro (HumaLOG) 100 units/mL subcutaneous injection 2-12 Units (not administered)   insulin lispro (HumaLOG) 100 units/mL subcutaneous injection 1-5 Units (not administered)   predniSONE tablet 10 mg (not administered)   sodium chloride 0 9 % bolus 1,000 mL (0 mL Intravenous Stopped 11/4/18 0025)   ondansetron (ZOFRAN) injection 4 mg (4 mg Intravenous Given 11/3/18 2245)   ceFAZolin (ANCEF) IVPB (premix) 2,000 mg (0 mg Intravenous Stopped 11/4/18 0009)   sodium chloride 0 9 % bolus 1,000 mL (1,000 mL Intravenous New Bag 11/3/18 2320)   sodium chloride 0 9 % infusion (0 mL/hr Intravenous Stopped 11/4/18 0025)       Diagnostic Studies  Results Reviewed     Procedure Component Value Units Date/Time    Urine Microscopic [[id number]]  (Abnormal) Collected:  11/04/18 0028    Lab Status:  Final result Specimen:  Urine from Urine, Other Updated:  11/04/18 0102     RBC, UA 30-50 (A) /hpf      WBC, UA 20-30 (A) /hpf      Epithelial Cells Occasional /hpf      Bacteria, UA Occasional /hpf     Urine culture [[id number]] Collected:  11/04/18 0028    Lab Status:   In process Specimen:  Urine from Urine, Other Updated:  11/04/18 0101    UA w Reflex to Microscopic w Reflex to Culture [[id number]]  (Abnormal) Collected:  11/04/18 0028    Lab Status:  Final result Specimen:  Urine from Urine, Other Updated:  11/04/18 0043     Color, UA Yellow     Clarity, UA Clear     Specific Gravity, UA 1 025     pH, UA 5 5     Leukocytes, UA Small (A)     Nitrite, UA Negative     Protein, UA 30 (1+) (A) mg/dl      Glucose, UA Negative mg/dl      Ketones, UA 15 (1+) (A) mg/dl      Urobilinogen, UA 1 0 E U /dl      Bilirubin, UA Negative     Blood, UA Large (A)    Lactic Acid x2 [[id number]]  (Normal) Collected:  11/04/18 0009    Lab Status:  Final result Specimen: Blood from Arm, Left Updated:  11/04/18 0043     LACTIC ACID 1 6 mmol/L     Narrative:         Result may be elevated if tourniquet was used during collection  Procalcitonin [[id number]]     Lab Status:  No result Specimen:  Blood     Lactic Acid x2 [[id number]]  (Abnormal) Collected:  11/03/18 2211    Lab Status:  Final result Specimen:  Blood from Arm, Left Updated:  11/03/18 2247     LACTIC ACID 3 6 (HH) mmol/L     Narrative:         Result may be elevated if tourniquet was used during collection  Comprehensive metabolic panel [[id number]]  (Abnormal) Collected:  11/03/18 2211    Lab Status:  Final result Specimen:  Blood from Arm, Left Updated:  11/03/18 2239     Sodium 132 (L) mmol/L      Potassium 4 3 mmol/L      Chloride 97 (L) mmol/L      CO2 22 mmol/L      ANION GAP 13 mmol/L      BUN 23 mg/dL      Creatinine 1 83 (H) mg/dL      Glucose 185 (H) mg/dL      Calcium 9 7 mg/dL      AST 41 U/L      ALT 56 U/L      Alkaline Phosphatase 52 U/L      Total Protein 7 9 g/dL      Albumin 3 8 g/dL      Total Bilirubin 0 98 mg/dL      eGFR 36 ml/min/1 73sq m     Narrative:         National Kidney Disease Education Program recommendations are as follows:  GFR calculation is accurate only with a steady state creatinine  Chronic Kidney disease less than 60 ml/min/1 73 sq  meters  Kidney failure less than 15 ml/min/1 73 sq  meters      APTT [[id number]]  (Normal) Collected:  11/03/18 2211    Lab Status:  Final result Specimen:  Blood from Arm, Left Updated:  11/03/18 2237     PTT 28 seconds     Protime-INR [[id number]]  (Abnormal) Collected:  11/03/18 2211    Lab Status:  Final result Specimen:  Blood from Arm, Left Updated:  11/03/18 2237     Protime 14 3 (H) seconds      INR 1 10    CBC and differential [[id number]]  (Abnormal) Collected:  11/03/18 2211    Lab Status:  Final result Specimen:  Blood from Arm, Left Updated:  11/03/18 2221     WBC 25 01 (H) Thousand/uL      RBC 5 18 Million/uL      Hemoglobin 16 1 g/dL Hematocrit 47 1 %      MCV 91 fL      MCH 31 1 pg      MCHC 34 2 g/dL      RDW 12 8 %      MPV 9 6 fL      Platelets 130 Thousands/uL      nRBC 0 /100 WBCs      Neutrophils Relative 83 (H) %      Immat GRANS % 1 %      Lymphocytes Relative 6 (L) %      Monocytes Relative 10 %      Eosinophils Relative 0 %      Basophils Relative 0 %      Neutrophils Absolute 20 48 (H) Thousands/µL      Immature Grans Absolute 0 33 (H) Thousand/uL      Lymphocytes Absolute 1 51 Thousands/µL      Monocytes Absolute 2 57 (H) Thousand/µL      Eosinophils Absolute 0 03 Thousand/µL      Basophils Absolute 0 09 Thousands/µL     Blood culture #1 [[id number]] Collected:  11/03/18 2214    Lab Status: In process Specimen:  Blood from Arm, Right Updated:  11/03/18 2219    Blood culture #2 [[id number]] Collected:  11/03/18 2211    Lab Status: In process Specimen:  Blood from Arm, Left Updated:  11/03/18 2218    Fingerstick Glucose (POCT) [[id number]]  (Normal) Collected:  11/03/18 2158    Lab Status:  Final result Updated:  11/03/18 2159     POC Glucose 81 mg/dl                  CT chest abdomen pelvis wo contrast   Final Result by Ezequiel Price MD (11/04 0013)      1  Acute fracture of the superior endplate of the L1 vertebral body with mild depression  2   5 mm nodule within the left upper lobe, stable since 2010  No further follow-up is recommended  3   Cholelithiasis without evidence of cholecystitis  4   New punctate hyperdensities at the bilateral ureterovesicular junctions may reflect calculi, less likely postprocedural calcifications  There are bilateral punctate nonobstructing nephrolithiasis  No hydronephrosis or significant hydroureter  5   Small hiatal hernia  The study was marked in [facility] for immediate notification  Workstation performed: [id number]         CT head without contrast   Final Result by Ezequiel Price MD (11/04 0000)      No intracranial hemorrhage or calvarial fracture                    Workstation performed: [id number]         CT cervical spine without contrast   Final Result by John Ozuna MD (11/04 0000)      No cervical spine fracture or traumatic malalignment  Workstation performed: [id number]                    Procedures  CriticalCare Time  Performed by: Lasha Etienne  Authorized by: Lasha Etienne     Critical care provider statement:     Critical care time (minutes):  45    Critical care time was exclusive of:  Separately billable procedures and treating other patients and teaching time    Critical care was necessary to treat or prevent imminent or life-threatening deterioration of the following conditions:  Sepsis    Critical care was time spent personally by me on the following activities:  Blood draw for specimens, obtaining history from patient or surrogate, development of treatment plan with patient or surrogate, discussions with consultants, evaluation of patient's response to treatment, examination of patient, interpretation of cardiac output measurements, ordering and performing treatments and interventions, ordering and review of laboratory studies, ordering and review of radiographic studies, review of old charts and re-evaluation of patient's condition    I assumed direction of critical care for this patient from another provider in my specialty: no             Phone Contacts  ED Phone Contact    ED Course                         Initial Sepsis Screening     [address] Name 11/03/18 0005                Is the patient's history suggestive of a new or worsening infection? (!)  Yes (Proceed)  -GS        Suspected source of infection urinary tract infection  -GS        Are two or more of the following signs & symptoms of infection both present and new to the patient?  (!)  Yes (Proceed)  -GS        Indicate SIRS criteria Tachycardia > 90 bpm;Tachypnea > 20 resp per min;Leukocytosis (WBC > 37489 IJL)  -GS        If the answer is yes to both questions, suspicion of sepsis is present          If severe sepsis is present AND tissue hypoperfusion perists in the hour after fluid resuscitation or lactate > 4, the patient meets criteria for SEPTIC SHOCK          Are any of the following organ dysfunction criteria present within 6 hours of suspected infection and SIRS criteria that are NOT considered to be chronic conditions? (!)  Yes  -GS        Organ dysfunction Creatinine > 0 5 mg/dl ABOVE BASELINE;Lactate > 2 0 mmol/L  -GS        Date of presentation of severe sepsis          Time of presentation of severe sepsis          Tissue hypoperfusion persists in the hour after crystalloid fluid administration, evidenced, by either:          Was hypotension present within one hour of the conclusion of crystalloid fluid administration? No  -GS        Date of presentation of septic shock          Time of presentation of septic shock            User Key  (r) = Recorded By, (t) = Taken By, (c) = Cosigned By    Initials Name Provider Type    CESAR Llamas DO Physician                  MDM  Number of Diagnoses or Management Options  Acute kidney injury [facility]): new and requires workup  Closed head injury, initial encounter: new and requires workup  Closed L1 vertebral fracture [facility]): new and requires workup  Dizziness: new and requires workup  Fall, initial encounter: new and requires workup  Leukocytosis: new and requires workup  Sinus tachycardia: new and requires workup  Weakness: new and requires workup  Diagnosis management comments: Patient presents for weakness, dizziness since being discharged home from the hospital for lithotripsy  Patient is tachycardic, tachypneic and fell tonight  Will start with a septic and trauma workup  Patient is high risk for PE given that he was just recently in the hospital   Could have an underlying urinary tract infection  10:49 PM  Labs noted  Unable to do a scan with IV contrast   Will sepsis alert now  12:30 AM  CT noted  L1 superior endplate fracture  Pt is neurologically intact  Case d/w trauma at [facility]  1:03 AM  Case discussed with trauma on-call who discussed the case with Neurosurgery on-call  Patient is neurologically intact without any deficits  He has full range of motion of his legs and lumbar spine  He does have tenderness to the midline of the lumbar spine but no skin changes or step-offs  Patient is safe for admission here at [facility]  Advised a brace and follow up with Neurosurgery  Patient will be admitted to Internal Medicine  Amount and/or Complexity of Data Reviewed  Clinical lab tests: ordered and reviewed  Tests in the radiology section of CPT®: ordered and reviewed  Tests in the medicine section of CPT®: reviewed and ordered  Review and summarize past medical records: yes    Patient Progress  Patient progress: stable    CritCare Time    Disposition  Final diagnoses:   Weakness   Dizziness   Fall, initial encounter   Closed head injury, initial encounter   Sinus tachycardia   Leukocytosis   Acute kidney injury ([address] )   Closed L1 vertebral fracture ([address] )     Time reflects when diagnosis was documented in both MDM as applicable and the Disposition within this note     Time User Action Codes Description Comment    11/3/2018 11:58 PM Asia Card Add [R53 1] Weakness     11/3/2018 11:58 PM Asia Card Add [R42] Dizziness     11/3/2018 11:58 PM Asia Card Add [G95  QXAP] Fall, initial encounter     11/3/2018 11:58 PM Taya Rodriguez Add [S09 90XA] Closed head injury, initial encounter     11/3/2018 11:58 PM Taya Rodriguez Add [R00 0] Sinus tachycardia     11/3/2018 11:58 PM Asia Card Add [D72 829] Leukocytosis     11/3/2018 11:58 PM Taya Rodriguez Add [N17 9] Acute kidney injury ([address] )     11/4/2018 12:02 AM Shirlie Colon Add [F48 6] Complicated UTI (urinary tract infection)     11/4/2018 12:02 AM Shirlie Colon Modify [S94 0] Complicated UTI (urinary tract infection)     11/4/2018 12:18 AM Helen Rodriguez Core Add [S36 849A] Closed L1 vertebral fracture [facility])       ED Disposition     ED Disposition Condition Comment    Admit  Case was discussed with PING and the patient's admission status was agreed to be Admission Status: inpatient status to the service of Dr Sharan Quiñones   Follow-up Information    None         Patient's Medications   Discharge Prescriptions    No medications on file     No discharge procedures on file      ED Provider  Electronically Signed by           Jessenia Beltrán DO  11/04/18 0106

## 2019-07-26 ENCOUNTER — TELEPHONE (OUTPATIENT)
Dept: FAMILY MEDICINE CLINIC | Facility: CLINIC | Age: 74
End: 2019-07-26

## 2019-07-26 DIAGNOSIS — M54.12 CERVICAL RADICULOPATHY: ICD-10-CM

## 2019-07-26 DIAGNOSIS — G95.20 CORD COMPRESSION (HCC): ICD-10-CM

## 2019-07-26 DIAGNOSIS — M43.6 NECK STIFFNESS: ICD-10-CM

## 2019-07-26 DIAGNOSIS — G70.00 MYASTHENIA GRAVIS (HCC): ICD-10-CM

## 2019-07-26 DIAGNOSIS — Z71.89 COMPLEX CARE COORDINATION: ICD-10-CM

## 2019-07-26 DIAGNOSIS — R42 DIZZINESS: ICD-10-CM
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
## 2019-07-26 DIAGNOSIS — M48.02 CERVICAL SPINAL STENOSIS: Primary | ICD-10-CM
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
## 2019-08-05 ENCOUNTER — TELEPHONE (OUTPATIENT)
Dept: FAMILY MEDICINE CLINIC | Facility: CLINIC | Age: 74
End: 2019-08-05

## 2019-08-05 DIAGNOSIS — B96.5 PSEUDOMONAS URINARY TRACT INFECTION: ICD-10-CM

## 2019-08-05 DIAGNOSIS — N39.0 PSEUDOMONAS URINARY TRACT INFECTION: ICD-10-CM

## 2019-08-05 DIAGNOSIS — R35.0 URINARY FREQUENCY: Primary | ICD-10-CM

## 2019-08-05 NOTE — TELEPHONE ENCOUNTER
He stopped in wants to know if you will put in an order to have his urine checked , he said he is going to the bathroom about every 1-2 hours

## 2019-08-06 ENCOUNTER — LAB (OUTPATIENT)
Dept: LAB | Facility: CLINIC | Age: 74
End: 2019-08-06
Payer: MEDICARE

## 2019-08-06 LAB
BACTERIA UR QL AUTO: NORMAL /HPF
BILIRUB UR QL STRIP: NEGATIVE
CLARITY UR: ABNORMAL
COLOR UR: YELLOW
GLUCOSE UR STRIP-MCNC: NEGATIVE MG/DL
HGB UR QL STRIP.AUTO: NEGATIVE
KETONES UR STRIP-MCNC: NEGATIVE MG/DL
LEUKOCYTE ESTERASE UR QL STRIP: ABNORMAL
NITRITE UR QL STRIP: NEGATIVE
NON-SQ EPI CELLS URNS QL MICRO: NORMAL /HPF
PH UR STRIP.AUTO: 6 [PH]
PROT UR STRIP-MCNC: NEGATIVE MG/DL
RBC #/AREA URNS AUTO: NORMAL /HPF
SP GR UR STRIP.AUTO: 1.02 (ref 1–1.03)
UROBILINOGEN UR QL STRIP.AUTO: 1 E.U./DL
WBC #/AREA URNS AUTO: NORMAL /HPF

## 2019-08-06 PROCEDURE — 81001 URINALYSIS AUTO W/SCOPE: CPT | Performed by: PHYSICIAN ASSISTANT

## 2019-08-08 ENCOUNTER — OFFICE VISIT (OUTPATIENT)
Dept: FAMILY MEDICINE CLINIC | Facility: CLINIC | Age: 74
End: 2019-08-08
Payer: MEDICARE

## 2019-08-08 VITALS
HEART RATE: 121 BPM | BODY MASS INDEX: 27.06 KG/M2 | SYSTOLIC BLOOD PRESSURE: 118 MMHG | OXYGEN SATURATION: 94 % | DIASTOLIC BLOOD PRESSURE: 74 MMHG | RESPIRATION RATE: 17 BRPM | WEIGHT: 178 LBS | TEMPERATURE: 98.3 F

## 2019-08-08 DIAGNOSIS — H92.01 RIGHT EAR PAIN: ICD-10-CM

## 2019-08-08 DIAGNOSIS — H91.93 BILATERAL CHANGE IN HEARING: ICD-10-CM

## 2019-08-08 DIAGNOSIS — H69.81 DYSFUNCTION OF RIGHT EUSTACHIAN TUBE: ICD-10-CM

## 2019-08-08 DIAGNOSIS — R42 VERTIGO: Primary | ICD-10-CM

## 2019-08-08 DIAGNOSIS — R09.81 SINUS CONGESTION: ICD-10-CM

## 2019-08-08 PROCEDURE — 99213 OFFICE O/P EST LOW 20 MIN: CPT | Performed by: PHYSICIAN ASSISTANT

## 2019-08-08 NOTE — PROGRESS NOTES
Assessment/Plan:      Diagnoses and all orders for this visit:    Vertigo  -     Ambulatory Referral to Otolaryngology; Future    Bilateral change in hearing  -     Ambulatory Referral to Otolaryngology; Future    Sinus congestion; Right ear pain; Dysfunction of right eustachian tube  - no sign of infection and no fever, hold abx  - start flonase daily, taken prn motrin or tylenol  - cannot tolerate prednisone due to DM and myasthenia gravis        Subjective:     Patient ID: Se Nice is a 76 y o  male  Chief Complaint   Patient presents with   Woodroe Spurr     right      HPI   Patient is a 77 yo male who presents with R earache  He has no fever or chills  His ears have felt blocked for 10-12 weeks  He cleans his ears once a week  He has congestion with bad allergies  His hearing had not been as good for the past 3 months  He saw an ENT but was not happy and wishes to see Dr Ladonna Lima  He sees neurology Chris Tariq - diagnosed with Meniere's in the last 2 months  He does have flonase but has not been using  Review of Systems   Constitutional: Negative for appetite change, chills and fever  HENT: Positive for congestion, ear pain (and feeling of them being blocked) and hearing loss (hearing change)  Negative for ear discharge, facial swelling, sore throat and trouble swallowing  Eyes: Negative  Respiratory: Negative  Cardiovascular: Negative  Allergic/Immunologic: Positive for environmental allergies  Hematological: Negative  Objective:  Vitals:    08/08/19 1301   BP: 118/74   Pulse: (!) 121   Resp: 17   Temp: 98 3 °F (36 8 °C)   SpO2: 94%      Physical Exam   Constitutional: He appears well-developed and well-nourished  No distress  HENT:   Head: Normocephalic and atraumatic  Right Ear: External ear and ear canal normal    Left Ear: External ear and ear canal normal    Nose: Mucosal edema present     Mouth/Throat: Uvula is midline and oropharynx is clear and moist    Slight clear effusion behind b/l TMs  No cerumen impactions   Eyes: Pupils are equal, round, and reactive to light  Conjunctivae are normal    Neck: Normal range of motion  Neck supple  Cardiovascular: Normal rate and regular rhythm  Pulmonary/Chest: Effort normal and breath sounds normal    Lymphadenopathy:     He has no cervical adenopathy  Skin: Skin is warm and dry  No rash noted  No pallor

## 2019-08-28 ENCOUNTER — OFFICE VISIT (OUTPATIENT)
Dept: NEUROLOGY | Facility: CLINIC | Age: 74
End: 2019-08-28
Payer: MEDICARE

## 2019-08-28 VITALS
BODY MASS INDEX: 26.66 KG/M2 | RESPIRATION RATE: 18 BRPM | HEIGHT: 69 IN | WEIGHT: 180 LBS | DIASTOLIC BLOOD PRESSURE: 82 MMHG | HEART RATE: 98 BPM | SYSTOLIC BLOOD PRESSURE: 120 MMHG

## 2019-08-28 DIAGNOSIS — R42 PERSISTENT POSTURAL-PERCEPTUAL DIZZINESS: Primary | ICD-10-CM

## 2019-08-28 DIAGNOSIS — R26.89 BALANCE PROBLEMS: ICD-10-CM

## 2019-08-28 DIAGNOSIS — G70.00 MYASTHENIA GRAVIS (HCC): ICD-10-CM

## 2019-08-28 DIAGNOSIS — G62.9 POLYNEUROPATHY: ICD-10-CM

## 2019-08-28 PROCEDURE — 99214 OFFICE O/P EST MOD 30 MIN: CPT | Performed by: PSYCHIATRY & NEUROLOGY

## 2019-08-28 RX ORDER — ESCITALOPRAM OXALATE 10 MG/1
10 TABLET ORAL DAILY
Qty: 90 TABLET | Refills: 1 | Status: SHIPPED | OUTPATIENT
Start: 2019-08-28 | End: 2019-09-10 | Stop reason: HOSPADM

## 2019-08-28 NOTE — PROGRESS NOTES
Patient ID: Freda Randhawa is a 76 y o  male  Assessment/Plan:    Persistent postural-perceptual dizziness   76year-old patient who has several week history of chronic vertigo  Initially was severe for vertigo however now he characterizes persistent non spinning for vertigo and perceived unsteadiness  The symptoms are exacerbated by moving visual stimulus and have lasted for hours to days  He is now undergoing [facility] [place] vision therapy with benefit  He also had evidence of fullness in his ears this is significantly improved with the use of 12 5 of hctz daily   He still continues to have some mild end gaze nystagmus      This can be related to periods of anxiety therefore started him on a low dose of Lexapro 10 mg a day  Balance problems  His current examination fails to reveal any evidence of ataxia  There is no evidence of dysmetria on finger-to-nose testing truncal ataxia and or sensory ataxia  He does have some end gaze nystagmus which is due to abnormalities in the vestibular apparatus and vision therapy has been helpful  His EMG study did demonstrate a mild axonal neuropathy  which does not correlate with the extent and severity of his symptoms  Myasthenia gravis ([address] )  This is stable  He should continue on 10 mg of prednisone daily  Polyneuropathy  He has a mild axonal polyneuropathy  We could consider a referral to Sleep Medicine to evaluate for sleep apnea in the future  Diagnoses and all orders for this visit:    Persistent postural-perceptual dizziness  -     escitalopram (LEXAPRO) 10 mg tablet; Take 1 tablet (10 mg total) by mouth daily    Myasthenia gravis ([address] )    Polyneuropathy    Balance problems     He is to return to our offices in two months  Subjective: This is a 75 y/o male with History of Myasthenia Gravis diagnosed in 2009 after presents for a follow up visit  He was last evaluated here few weeks ago    it was felt that his myasthenia gravis was well controlled and his neuropathy was minimal        he  had severe vertigo with unsteadiness dizziness  This occurred acutely when he was in a doctor's office and he stood up quickly  In the interim he was evaluated by ENT  A MRI of the brain was performed and failed reveal any abnormalities in the CP angle, involving the 8th nerve  There is no evidence of ischemia  A CT scan of the sinuses was also normal   In the interim he was evaluated by physical therapy who  felt that they could not provide him treatment for vertigo  He continues to have difficulty walking, was walking to the right and felt that his ears are swimming  He also had a VNG performed which showed abnormal optic kinetic movements and abnormal saccades  He is now followed at [facility] rehab and is undergoing up optokinetic vision therapy he is on hydrochlorothiazide 12 5 a day and no longer utilizing Antivert  The addition of hydrochlorothiazide has significantly improved his symptoms  Vision therapy is also helping his symptoms  He does however continue to describe a fullness in the right side  He does now have several days when he has no symptoms but will have an exacerbation if he has position changes of his head too quickly      n the interim due to torticollis and continued symptoms he did undergo a MRI of the cervical spine  It did demonstrate Multilevel cervical spondylosis, as described above      Multifactorial disease results in overall mild canal stenosis and cord compression at C5-C6, overall slightly progressed since the prior examination  However there is no myelomalacia      Cervical spondylosis in the remainder of the spine is otherwise not significantly changed    He was evaluated by Neurosurgery who felt that he was not a neurosurgical candidate    He denies any difficulty reaching for items    Overall he has had some improvement but he does have symptoms when he is moving his head too quickly, so when he visualize is items too quickly  He was recently evaluated as a 2nd opinion for an ENT in the potential diagnosis of ataxia was considered  He has previously had any EMGs of the lower extremities in February of 2019 which showed a mild axonal sensory neuropathy            He was also evaluated by Hematology for leukocytosis  Other symptoms include  snoring                                                          The following portions of the patient's history were reviewed and updated as appropriate: He  has a past medical history of Anxiety, Cervical spinal stenosis, chronic ulcerative colitis, Hyperlipidemia, Myasthenia gravis ([address] ), and Sleep apnea  He  has a past surgical history that includes Lithotripsy; Colonoscopy; Colon surgery; Other surgical history; Salivary gland surgery; COLECTOMY LAPAROSCOPIC; Colectomy; and pr fragment kidney stone/ eswl (Left, 11/2/2018)  His family history includes Arthritis in his mother; Depression in his father; Diabetes in his mother; Hypertension in his mother; Osteoporosis in his mother  He  reports that he has never smoked  He has never used smokeless tobacco  He reports that he does not drink alcohol or use drugs    Current Outpatient Medications   Medication Sig Dispense Refill    ACCU-CHEK FASTCLIX LANCETS MISC Test four times daily 300 each 0    ACCU-CHEK GUIDE test strip 1 each by Other route 4 (four) times a day Use as instructed bid scheduled and prn symptoms/signs blood sugar fluctuation 300 each 0    BD ULTRA-FINE PEN NEEDLES 29G X 12 7MM MISC       BD ULTRA-FINE PEN NEEDLES 29G X 12 7MM MISC USE 4 TIMES A  each 0    cefepime (MAXIPIME) 2 g injection       cholecalciferol (VITAMIN D3) 1,000 units tablet Take 2 tablets by mouth daily        Cranberry 500 MG CAPS Take 4 capsules by mouth daily       hydrochlorothiazide (HYDRODIURIL) 12 5 mg tablet Take 1 tablet (12 5 mg total) by mouth daily 90 tablet 1    ibuprofen (MOTRIN) 200 mg tablet Take by mouth every 6 (six) hours as needed for mild pain      insulin aspart (NovoLOG) 100 units/mL injection Inject 4 Units under the skin 3 (three) times a day before meals      insulin glargine (LANTUS) 100 units/mL subcutaneous injection Inject 12 Units under the skin daily at bedtime 1000 Units 1    Lactobacillus (ACIDOPHILUS) 100 MG CAPS Take 1 capsule by mouth daily      lidocaine (LIDODERM) 5 % Apply 1 patch topically daily Remove & Discard patch within 12 hours or as directed by MD 30 patch 0    lisinopril (ZESTRIL) 2 5 mg tablet Take 2 5 mg by mouth daily        Potassium 99 MG TABS Take by mouth      predniSONE 10 mg tablet Take 1 tablet (10 mg total) by mouth daily 90 tablet 1    pyridoxine (VITAMIN B6) 100 mg tablet Take 100 mg by mouth daily        diazepam (VALIUM) 2 mg tablet Take 1 tablet (2 mg total) by mouth every 6 (six) hours as needed (vertigo) (Patient not taking: Reported on 8/20/2019) 15 tablet 0    docusate sodium (COLACE) 250 MG capsule Take 1 capsule (250 mg total) by mouth daily (Patient not taking: Reported on 8/20/2019) 30 capsule 0    escitalopram (LEXAPRO) 10 mg tablet Take 1 tablet (10 mg total) by mouth daily 90 tablet 1    famotidine (PEPCID) 20 mg tablet Take 1 tablet (20 mg total) by mouth daily (Patient not taking: Reported on 8/20/2019) 90 tablet 3    meclizine (ANTIVERT) 12 5 MG tablet Take 1 tablet (12 5 mg total) by mouth 2 (two) times a day (Patient not taking: Reported on 8/20/2019) 180 tablet 1    ondansetron (ZOFRAN) 4 mg tablet Take 1 tablet (4 mg total) by mouth every 8 (eight) hours as needed for nausea or vomiting (Patient not taking: Reported on 8/20/2019) 30 tablet 0     No current facility-administered medications for this visit        He is allergic to levaquin [levofloxacin]; quinolones; antihistamines, chlorpheniramine-type; benzodiazepines; chlorpheniramine; diphenhydramine; erythromycin; iodine; iodine solution [povidone iodine]; loratadine; other; sulfa antibiotics; and tetracyclines & related            Objective:    Blood pressure 120/82, pulse 98, resp  rate 18, height 5' 9" (1 753 m), weight 81 6 kg (180 lb)  Physical Exam   Constitutional: He appears well-developed  HENT:   Head: Normocephalic  Eyes: Pupils are equal, round, and reactive to light  Neurological: He has normal strength  Reflex Scores:       Bicep reflexes are 2+ on the right side and 2+ on the left side  Patellar reflexes are 2+ on the right side and 2+ on the left side  Achilles reflexes are 1+ on the right side and 1+ on the left side  Vitals reviewed  Neurological Exam  Mental Status   Oriented to person, place, time and situation  Language is fluent with no aphasia  Cranial Nerves  CN III, IV, VI: Pupils equal round and reactive to light bilaterally  He had end gaze horizontal nystagmus  right was greater the left  Motor  Normal muscle bulk throughout  No fasciculations present  Strength is 5/5 throughout all four extremities  Sensory  15 seconds of vibratory sensation was noted in the lower extremities 12 seconds in the upper extremities  proprioception was normal   Pinprick was normal     Reflexes                                           Right                      Left  Biceps                                 2+                         2+  Patellar                                2+                         2+  Achilles                                1+                         1+  Plantar                           Downgoing                Downgoing    Right pathological reflexes: Logan's absent  Left pathological reflexes: Logan's absent  Coordination  Right: Finger-to-nose normal  Heel-to-shin normal   Left: Finger-to-nose normal  Heel-to-shin normal     Gait Able to rise from chair without using arms  There is no evidence of truncal ataxia or cerebellar ataxia  He did not have a Romberg sign    He had however difficulty with tandem gait       Review of systems obtained by the medical assistant as below was reviewed with the patient at today's appointment    ROS:    Review of Systems   Constitutional: Negative  Negative for appetite change and fever  HENT: Positive for congestion  Negative for hearing loss, tinnitus, trouble swallowing and voice change  Sinus problems     Eyes: Negative  Negative for photophobia and pain  Respiratory: Negative  Negative for shortness of breath  Cardiovascular: Negative  Negative for palpitations  Gastrointestinal: Negative  Negative for nausea and vomiting  Endocrine: Negative  Negative for cold intolerance and heat intolerance  Genitourinary: Positive for urgency  Negative for dysuria and frequency  Musculoskeletal: Positive for gait problem and neck pain  Negative for myalgias  Skin: Negative  Negative for rash  Neurological: Positive for dizziness  Negative for tremors, seizures, syncope, facial asymmetry, speech difficulty, weakness, light-headedness, numbness and headaches  Snoring     Hematological: Negative  Does not bruise/bleed easily  Psychiatric/Behavioral: Positive for sleep disturbance  Negative for confusion and hallucinations

## 2019-08-28 NOTE — ASSESSMENT & PLAN NOTE
66-year-old patient who has several week history of chronic vertigo  Initially was severe for vertigo however now he characterizes persistent non spinning for vertigo and perceived unsteadiness  The symptoms are exacerbated by moving visual stimulus and have lasted for hours to days  He is now undergoing [facility] [place] vision therapy with benefit  He also had evidence of fullness in his ears this is significantly improved with the use of 12 5 of hctz daily   He still continues to have some mild end gaze nystagmus      This can be related to periods of anxiety therefore started him on a low dose of Lexapro 10 mg a day

## 2019-08-28 NOTE — ASSESSMENT & PLAN NOTE
His current examination fails to reveal any evidence of ataxia  There is no evidence of dysmetria on finger-to-nose testing truncal ataxia and or sensory ataxia  He does have some end gaze nystagmus which is due to abnormalities in the vestibular apparatus and vision therapy has been helpful  His EMG study did demonstrate a mild axonal neuropathy  which does not correlate with the extent and severity of his symptoms

## 2019-09-05 ENCOUNTER — TELEPHONE (OUTPATIENT)
Dept: FAMILY MEDICINE CLINIC | Facility: CLINIC | Age: 74
End: 2019-09-05

## 2019-09-05 NOTE — TELEPHONE ENCOUNTER
Patient called asking for lab orders for Urine, A1C, and PSA-has not been done in over a year  He is having other labs for Dr Gallo Terrell and wants to get everything done all at once on 9/18/19, at the [facility]) lab [place]  He states he is being overly cautious as he is at the hospital every day with his aunt and around "all those sick people"  He is not sure he can make the 9/13/19 appointment at this time due to his aunt and his being her POA  If he cannot keep the appointment he will call to reschedule  Thank you

## 2019-09-06 ENCOUNTER — OFFICE VISIT (OUTPATIENT)
Dept: FAMILY MEDICINE CLINIC | Facility: CLINIC | Age: 74
End: 2019-09-06
Payer: MEDICARE

## 2019-09-06 VITALS
DIASTOLIC BLOOD PRESSURE: 78 MMHG | RESPIRATION RATE: 18 BRPM | WEIGHT: 179 LBS | TEMPERATURE: 96.9 F | BODY MASS INDEX: 26.43 KG/M2 | HEART RATE: 104 BPM | OXYGEN SATURATION: 94 % | SYSTOLIC BLOOD PRESSURE: 120 MMHG

## 2019-09-06 DIAGNOSIS — N39.0 RECURRENT UTI: ICD-10-CM

## 2019-09-06 DIAGNOSIS — R39.198 ABNORMAL URINATION: ICD-10-CM

## 2019-09-06 DIAGNOSIS — R35.0 FREQUENCY OF URINATION: Primary | ICD-10-CM

## 2019-09-06 LAB
SL AMB  POCT GLUCOSE, UA: ABNORMAL
SL AMB LEUKOCYTE ESTERASE,UA: ABNORMAL
SL AMB POCT BILIRUBIN,UA: ABNORMAL
SL AMB POCT BLOOD,UA: ABNORMAL
SL AMB POCT CLARITY,UA: ABNORMAL
SL AMB POCT COLOR,UA: ABNORMAL
SL AMB POCT KETONES,UA: 1.5
SL AMB POCT NITRITE,UA: ABNORMAL
SL AMB POCT PH,UA: 6.5
SL AMB POCT SPECIFIC GRAVITY,UA: 1.01
SL AMB POCT URINE PROTEIN: ABNORMAL
SL AMB POCT UROBILINOGEN: ABNORMAL

## 2019-09-06 PROCEDURE — 87077 CULTURE AEROBIC IDENTIFY: CPT | Performed by: PHYSICIAN ASSISTANT

## 2019-09-06 PROCEDURE — 99213 OFFICE O/P EST LOW 20 MIN: CPT | Performed by: PHYSICIAN ASSISTANT

## 2019-09-06 PROCEDURE — 87181 SC STD AGAR DILUTION PER AGT: CPT | Performed by: PHYSICIAN ASSISTANT

## 2019-09-06 PROCEDURE — 87186 SC STD MICRODIL/AGAR DIL: CPT | Performed by: PHYSICIAN ASSISTANT

## 2019-09-06 PROCEDURE — 81002 URINALYSIS NONAUTO W/O SCOPE: CPT | Performed by: PHYSICIAN ASSISTANT

## 2019-09-06 PROCEDURE — 1124F ACP DISCUSS-NO DSCNMKR DOCD: CPT | Performed by: PHYSICIAN ASSISTANT

## 2019-09-06 PROCEDURE — 87086 URINE CULTURE/COLONY COUNT: CPT | Performed by: PHYSICIAN ASSISTANT

## 2019-09-06 RX ORDER — NITROFURANTOIN 25; 75 MG/1; MG/1
100 CAPSULE ORAL 2 TIMES DAILY
Qty: 14 CAPSULE | Refills: 0 | Status: SHIPPED | OUTPATIENT
Start: 2019-09-06 | End: 2019-09-10 | Stop reason: HOSPADM

## 2019-09-06 NOTE — PROGRESS NOTES
Assessment/Plan:      Diagnoses and all orders for this visit:    Frequency of urination  -     Urine culture; Future  -     POCT urine dip  -     Urine culture  -     nitrofurantoin (MACROBID) 100 mg capsule; Take 1 capsule (100 mg total) by mouth 2 (two) times a day for 7 days    Abnormal urination  -     nitrofurantoin (MACROBID) 100 mg capsule; Take 1 capsule (100 mg total) by mouth 2 (two) times a day for 7 days    Recurrent UTI  -     nitrofurantoin (MACROBID) 100 mg capsule; Take 1 capsule (100 mg total) by mouth 2 (two) times a day for 7 days      Discussed case with Dr Jethro Denver  Given recurrent UTIs with mutliple antibiotic interactions and intolerances, will start with PO nitrofurantoin and await cultures  If grows pseudomonas, he may need to go back for IV abx and PICC  Vitals stable and only symptoms is frequency  However, his urine dip was + for leukocytes, blood  He understands to go to ER over the weekend if fevers, chills, dizziness, confusion develops  Unfortunately, he is in between urologist currently as does not want to return to [facility] for personal reasons  Subjective:     Patient ID: Meryl Woodruff is a 76 y o  male  Chief Complaint   Patient presents with    Urinary Tract Infection     HPI   Patient is a 75 yo male with PMH of recurrent UTI, recurrent pseudomonas UTI who presents with increased urination frequency x 1 day  He is not really noticing any burning with urination, no blood to visible eye  No fever, chills, flank pain, dizziness, sycnope, change in mental status  He has been hospitalized for antibiotics through IV and sent home with PICC in the past due to resistent UTI complicated by not be able to take several oral antibiotics  He has followed with urology and infectious disease in the past  He was going to [facility] but shares a story of a personal strained relationship and saw this person in the office and does not feel comfortable going back there   He wishes to go back to Dr Karly Marx, but has not seen [facility] in nearly a year  Review of Systems   Constitutional: Negative for appetite change, chills and fever  Respiratory: Negative  Cardiovascular: Negative  Gastrointestinal: Negative for nausea and vomiting  Genitourinary: Positive for frequency  Negative for decreased urine volume, difficulty urinating, dysuria and flank pain  Skin: Negative  Neurological: Negative for dizziness, syncope and weakness  Objective:  Vitals:    09/06/19 1254   BP: 120/78   Pulse: 104   Resp: 18   Temp: (!) 96 9 °F (36 1 °C)   SpO2: 94%      Physical Exam   Constitutional: He is oriented to person, place, and time  He appears well-developed and well-nourished  No distress  Cardiovascular: Normal rate, regular rhythm and normal heart sounds  No murmur heard  Pulmonary/Chest: Effort normal and breath sounds normal  No respiratory distress  He has no wheezes  Abdominal:   No CVA tenderness     Musculoskeletal: He exhibits no edema  Neurological: He is alert and oriented to person, place, and time  Skin: Skin is warm and dry  No rash noted  Psychiatric: He has a normal mood and affect   His behavior is normal

## 2019-09-09 ENCOUNTER — HOSPITAL ENCOUNTER (INPATIENT)
Facility: HOSPITAL | Age: 74
LOS: 1 days | Discharge: HOME | DRG: 684 | End: 2019-09-10
Attending: INTERNAL MEDICINE | Admitting: HOSPITALIST
Payer: MEDICARE

## 2019-09-09 ENCOUNTER — TELEPHONE (OUTPATIENT)
Dept: FAMILY MEDICINE CLINIC | Facility: CLINIC | Age: 74
End: 2019-09-09

## 2019-09-09 ENCOUNTER — APPOINTMENT (INPATIENT)
Dept: CT IMAGING | Facility: HOSPITAL | Age: 74
DRG: 684 | End: 2019-09-09
Payer: MEDICARE

## 2019-09-09 DIAGNOSIS — N39.0 PSEUDOMONAS URINARY TRACT INFECTION: Primary | ICD-10-CM

## 2019-09-09 DIAGNOSIS — G70.00 MYASTHENIA GRAVIS (HCC): ICD-10-CM

## 2019-09-09 DIAGNOSIS — N39.0 RECURRENT UTI: ICD-10-CM

## 2019-09-09 DIAGNOSIS — B96.5 PSEUDOMONAS URINARY TRACT INFECTION: Primary | ICD-10-CM

## 2019-09-09 DIAGNOSIS — N39.0 PSEUDOMONAS URINARY TRACT INFECTION: ICD-10-CM

## 2019-09-09 DIAGNOSIS — B96.5 PSEUDOMONAS URINARY TRACT INFECTION: ICD-10-CM

## 2019-09-09 DIAGNOSIS — R35.0 FREQUENCY OF URINATION: Primary | ICD-10-CM

## 2019-09-09 LAB
ANION GAP SERPL CALCULATED.3IONS-SCNC: 13 MMOL/L (ref 4–13)
ANION GAP SERPL CALCULATED.3IONS-SCNC: 15 MMOL/L (ref 4–13)
BACTERIA UR QL AUTO: ABNORMAL /HPF
BASOPHILS # BLD AUTO: 0.07 THOUSANDS/ΜL (ref 0–0.1)
BASOPHILS NFR BLD AUTO: 0 % (ref 0–1)
BILIRUB UR QL STRIP: NEGATIVE
BUN SERPL-MCNC: 22 MG/DL (ref 5–25)
BUN SERPL-MCNC: 25 MG/DL (ref 5–25)
CALCIUM SERPL-MCNC: 10.1 MG/DL (ref 8.3–10.1)
CALCIUM SERPL-MCNC: 10.2 MG/DL (ref 8.3–10.1)
CHLORIDE SERPL-SCNC: 100 MMOL/L (ref 100–108)
CHLORIDE SERPL-SCNC: 99 MMOL/L (ref 100–108)
CLARITY UR: CLEAR
CO2 SERPL-SCNC: 23 MMOL/L (ref 21–32)
CO2 SERPL-SCNC: 25 MMOL/L (ref 21–32)
COLOR UR: YELLOW
CREAT SERPL-MCNC: 1.42 MG/DL (ref 0.6–1.3)
CREAT SERPL-MCNC: 1.71 MG/DL (ref 0.6–1.3)
EOSINOPHIL # BLD AUTO: 0.01 THOUSAND/ΜL (ref 0–0.61)
EOSINOPHIL NFR BLD AUTO: 0 % (ref 0–6)
ERYTHROCYTE [DISTWIDTH] IN BLOOD BY AUTOMATED COUNT: 13 % (ref 11.6–15.1)
EST. AVERAGE GLUCOSE BLD GHB EST-MCNC: 126 MG/DL
GFR SERPL CREATININE-BSD FRML MDRD: 39 ML/MIN/1.73SQ M
GFR SERPL CREATININE-BSD FRML MDRD: 48 ML/MIN/1.73SQ M
GLUCOSE SERPL-MCNC: 105 MG/DL (ref 65–140)
GLUCOSE SERPL-MCNC: 150 MG/DL (ref 65–140)
GLUCOSE SERPL-MCNC: 156 MG/DL (ref 65–140)
GLUCOSE SERPL-MCNC: 163 MG/DL (ref 65–140)
GLUCOSE SERPL-MCNC: 171 MG/DL (ref 65–140)
GLUCOSE UR STRIP-MCNC: NEGATIVE MG/DL
HBA1C MFR BLD: 6 % (ref 4.2–6.3)
HCT VFR BLD AUTO: 50.4 % (ref 36.5–49.3)
HGB BLD-MCNC: 16.8 G/DL (ref 12–17)
HGB UR QL STRIP.AUTO: NEGATIVE
IMM GRANULOCYTES # BLD AUTO: 0.13 THOUSAND/UL (ref 0–0.2)
IMM GRANULOCYTES NFR BLD AUTO: 1 % (ref 0–2)
KETONES UR STRIP-MCNC: ABNORMAL MG/DL
LEUKOCYTE ESTERASE UR QL STRIP: ABNORMAL
LYMPHOCYTES # BLD AUTO: 1.4 THOUSANDS/ΜL (ref 0.6–4.47)
LYMPHOCYTES NFR BLD AUTO: 8 % (ref 14–44)
MCH RBC QN AUTO: 31.6 PG (ref 26.8–34.3)
MCHC RBC AUTO-ENTMCNC: 33.3 G/DL (ref 31.4–37.4)
MCV RBC AUTO: 95 FL (ref 82–98)
MONOCYTES # BLD AUTO: 0.55 THOUSAND/ΜL (ref 0.17–1.22)
MONOCYTES NFR BLD AUTO: 3 % (ref 4–12)
NEUTROPHILS # BLD AUTO: 14.47 THOUSANDS/ΜL (ref 1.85–7.62)
NEUTS SEG NFR BLD AUTO: 88 % (ref 43–75)
NITRITE UR QL STRIP: NEGATIVE
NON-SQ EPI CELLS URNS QL MICRO: ABNORMAL /HPF
NRBC BLD AUTO-RTO: 0 /100 WBCS
PH UR STRIP.AUTO: 5.5 [PH]
PLATELET # BLD AUTO: 408 THOUSANDS/UL (ref 149–390)
PMV BLD AUTO: 9.7 FL (ref 8.9–12.7)
POTASSIUM SERPL-SCNC: 4.5 MMOL/L (ref 3.5–5.3)
POTASSIUM SERPL-SCNC: 4.5 MMOL/L (ref 3.5–5.3)
PROCALCITONIN SERPL-MCNC: 0.09 NG/ML
PROT UR STRIP-MCNC: NEGATIVE MG/DL
RBC # BLD AUTO: 5.32 MILLION/UL (ref 3.88–5.62)
RBC #/AREA URNS AUTO: ABNORMAL /HPF
SODIUM SERPL-SCNC: 137 MMOL/L (ref 136–145)
SODIUM SERPL-SCNC: 138 MMOL/L (ref 136–145)
SP GR UR STRIP.AUTO: 1.02 (ref 1–1.03)
UROBILINOGEN UR QL STRIP.AUTO: 0.2 E.U./DL
WBC # BLD AUTO: 16.63 THOUSAND/UL (ref 4.31–10.16)
WBC #/AREA URNS AUTO: ABNORMAL /HPF
WBC CLUMPS # UR AUTO: PRESENT /UL

## 2019-09-09 PROCEDURE — 83036 HEMOGLOBIN GLYCOSYLATED A1C: CPT | Performed by: INTERNAL MEDICINE

## 2019-09-09 PROCEDURE — 80048 BASIC METABOLIC PNL TOTAL CA: CPT | Performed by: INTERNAL MEDICINE

## 2019-09-09 PROCEDURE — 84145 PROCALCITONIN (PCT): CPT | Performed by: STUDENT IN AN ORGANIZED HEALTH CARE EDUCATION/TRAINING PROGRAM

## 2019-09-09 PROCEDURE — 99223 1ST HOSP IP/OBS HIGH 75: CPT | Performed by: STUDENT IN AN ORGANIZED HEALTH CARE EDUCATION/TRAINING PROGRAM

## 2019-09-09 PROCEDURE — 80048 BASIC METABOLIC PNL TOTAL CA: CPT | Performed by: STUDENT IN AN ORGANIZED HEALTH CARE EDUCATION/TRAINING PROGRAM

## 2019-09-09 PROCEDURE — 82948 REAGENT STRIP/BLOOD GLUCOSE: CPT

## 2019-09-09 PROCEDURE — 81001 URINALYSIS AUTO W/SCOPE: CPT | Performed by: INTERNAL MEDICINE

## 2019-09-09 PROCEDURE — 74176 CT ABD & PELVIS W/O CONTRAST: CPT

## 2019-09-09 PROCEDURE — 85025 COMPLETE CBC W/AUTO DIFF WBC: CPT | Performed by: STUDENT IN AN ORGANIZED HEALTH CARE EDUCATION/TRAINING PROGRAM

## 2019-09-09 PROCEDURE — 99223 1ST HOSP IP/OBS HIGH 75: CPT | Performed by: INTERNAL MEDICINE

## 2019-09-09 RX ORDER — INSULIN GLARGINE 100 [IU]/ML
12 INJECTION, SOLUTION SUBCUTANEOUS
Status: DISCONTINUED | OUTPATIENT
Start: 2019-09-09 | End: 2019-09-10 | Stop reason: HOSPADM

## 2019-09-09 RX ORDER — SACCHAROMYCES BOULARDII 250 MG
250 CAPSULE ORAL 2 TIMES DAILY
Status: DISCONTINUED | OUTPATIENT
Start: 2019-09-09 | End: 2019-09-10 | Stop reason: HOSPADM

## 2019-09-09 RX ORDER — LISINOPRIL 2.5 MG/1
2.5 TABLET ORAL DAILY
Status: DISCONTINUED | OUTPATIENT
Start: 2019-09-10 | End: 2019-09-09

## 2019-09-09 RX ORDER — HYDROCHLOROTHIAZIDE 12.5 MG/1
12.5 TABLET ORAL DAILY
Status: DISCONTINUED | OUTPATIENT
Start: 2019-09-10 | End: 2019-09-10 | Stop reason: HOSPADM

## 2019-09-09 RX ORDER — PYRIDOXINE HCL (VITAMIN B6) 50 MG
100 TABLET ORAL DAILY
Status: DISCONTINUED | OUTPATIENT
Start: 2019-09-09 | End: 2019-09-10 | Stop reason: HOSPADM

## 2019-09-09 RX ORDER — PREDNISONE 10 MG/1
10 TABLET ORAL DAILY
Status: DISCONTINUED | OUTPATIENT
Start: 2019-09-10 | End: 2019-09-10 | Stop reason: HOSPADM

## 2019-09-09 RX ORDER — MELATONIN
2000 DAILY
Status: DISCONTINUED | OUTPATIENT
Start: 2019-09-09 | End: 2019-09-10 | Stop reason: HOSPADM

## 2019-09-09 RX ORDER — PYRIDOXINE HCL (VITAMIN B6) 100 MG
4 TABLET ORAL DAILY
Status: DISCONTINUED | OUTPATIENT
Start: 2019-09-09 | End: 2019-09-09 | Stop reason: CLARIF

## 2019-09-09 RX ADMIN — CEFEPIME HYDROCHLORIDE 2000 MG: 2 INJECTION, POWDER, FOR SOLUTION INTRAVENOUS at 11:00

## 2019-09-09 RX ADMIN — INSULIN LISPRO 4 UNITS: 100 INJECTION, SOLUTION INTRAVENOUS; SUBCUTANEOUS at 17:28

## 2019-09-09 RX ADMIN — INSULIN LISPRO 1 UNITS: 100 INJECTION, SOLUTION INTRAVENOUS; SUBCUTANEOUS at 17:28

## 2019-09-09 RX ADMIN — Medication 250 MG: at 17:28

## 2019-09-09 RX ADMIN — INSULIN GLARGINE 12 UNITS: 100 INJECTION, SOLUTION SUBCUTANEOUS at 21:55

## 2019-09-09 RX ADMIN — INSULIN LISPRO 4 UNITS: 100 INJECTION, SOLUTION INTRAVENOUS; SUBCUTANEOUS at 12:43

## 2019-09-09 RX ADMIN — INSULIN LISPRO 1 UNITS: 100 INJECTION, SOLUTION INTRAVENOUS; SUBCUTANEOUS at 11:04

## 2019-09-09 RX ADMIN — ENOXAPARIN SODIUM 40 MG: 40 INJECTION SUBCUTANEOUS at 12:42

## 2019-09-09 NOTE — ASSESSMENT & PLAN NOTE
history of CKD stage 3  Baseline creatinine 1 1 - 1 28 range  Noted with slightly elevated creatinine 1 42  Gentle IV hydration and monitor renal function

## 2019-09-09 NOTE — ASSESSMENT & PLAN NOTE
Reported history of nephrolithiasis  Suspected nephrolithiasis as cause of recurrent pseudomonal growth in urine culture  Will follow up with CT stone study  Plan as stated above

## 2019-09-09 NOTE — ASSESSMENT & PLAN NOTE
70-year-old male past medical history of nephrolithiasis, reported history of sepsis due to Pseudomonas UTI post urological procedure for nephrolithiasis,  recurrent UTI with Pseudomonas was sent directly from primary care's office for direct admit for IV antibiotics due to urine culture from 09/06/2019  growing Pseudomonas  Currently patient is awake, alert, oriented x3  Had completed 2-3 days of Macrobid p o   Dark colored urine  Currently denies chest pain, fever, chills, nausea, vomiting, frequency, dysuria, nausea, vomiting, diarrhea  Currently getting cefepime since allergic to Levaquin and quinolones  Afebrile, hemodynamically stable  CBC pending  UA pending :  Patient had already received 1 dose of cefepime therefore not sure about the yield now  Recurrent pseudomonal growth suspected due to nephrolithiasis  Since patient is currently hemodynamically stable and without any objective evidence of infection will monitor off of antibiotics for now    Follow-up procalcitonin level  Monitor CBC with differential  Will follow up with CT stone study  Id consult

## 2019-09-09 NOTE — ASSESSMENT & PLAN NOTE
History of hypertension    Home medication includes hydrochlorothiazide 12 5 mg daily, lisinopril 2 5 mg daily  Controlled

## 2019-09-09 NOTE — CONSULTS
Consultation - Infectious Disease   Vishal Dove 76 y o  male MRN: [id number]  Unit/Bed#: [place] [address] -01 Encounter: [id number]    IMPRESSION & RECOMMENDATIONS:   1  Asymptomatic bacteriuria  Patient had outpatient urine culture on 09/06/2019 which was positive for Pseudomonas  He noticed dark urine on Friday, 09/06/2019, but has otherwise had no urinary symptoms  Patient has a history of stones requiring lithotripsy  Consider he may have stone again, will check a CT renal stone study  Also consider patient may have enlarged prostate contributing to inadequate urinary drainage  He admits to urinating at least 4 times a night  Fortunately the patient is afebrile  His WBC count is increased but he has chronic leukocytosis as below  He was started on IV cefepime  Given there is no indication of an active infection I recommend stopping cefepime at this time  Will monitor patient off antibiotics while we await additional data   -stop IV cefepime  -monitor patient off of antibiotics  -check CBC and BMP tomorrow  -check repeat urinalysis  -check CT renal stone study  -monitor vitals  -monitor urine output    2  Leukocytosis  Upon review of patient's past medical records it appears his leukocytosis is chronic  I suspect this is likely secondary to his daily prednisone use  Also consider Pseudomonas positive urine culture, although he is asymptomatic and afebrile  No clear indication for antibiotic treatment at this time  Recommend monitoring patient's WBC count for now  Will redraw tomorrow am   -monitor patient off antibiotics  -check CBC tomorrow  -follow up repeat urinalysis    3  History of Pseudomonas urinary tract infections  Patient was 12 previous urine cultures which were positive for Pseudomonas  His been treated with IV cefepime in the past as he has previously been very symptomatic  Patient now without symptoms but his urine is once again positive for Pseudomonas    He previously had stones which were likely colonized with the bacteria  Concerned he may have stones again with his urine was dark last week  Recommend checking a CT renal stone study now   -monitor patient off antibiotics as above  -check urinalysis  -check CT renal stone study  -recommend inpatient Urology consult if CT has abnormal findings, if normal I recommend maintaining routine outpatient urology follow-up    4  MARSHALL  On CKD stage 3  I reviewed patient's past medical records and it appears his baseline creatinine is generally 1-1 2  Creatinine was elevated at 1 42 upon arrival and has increased further to 1 71  Possibly related to dehydration as patient admits to poor liquid intake pre-admission  Also consider possible stone  He will have a CT stone study later today  -recheck BMP tomorrow  -check CT renal stone study  -avoid nephrotoxins  -consider Nephrology consult if creatinin continues to increase    5  Type 2 diabetes mellitus  Patient's last hemoglobin A1c was 6 2% on 06/17/2019   -blood glucose management per primary service    6  Myasthenia gravis  Patient lastly diagnosed in 2009  He reports his condition was stress induced  He is stable at this time and is maintained on daily prednisone  He follows closely with outpatient neurologist, Dr Cheri Cruz   -continue outpatient neurology follow-up    7  Multiple antibiotic allergies  Patient reports allergies to Levaquin, quinolones, tetracyclines, sulfa drugs, erythromycin, and Bactrim  Per patient these are medications which will worsen his myasthenia gravis  Patient has received cefepime and tolerated without difficulty  Antibiotics were stopped as above  If they are re-initiated we will consider his myasthenia gravis in medical decision making   -monitor patient for side effects to antibiotics    Thank you for the consult  We will continue to follow along  I personally performed an in-depth review of patient's available past medical records    Above plan was discussed in detail with patient at the bedside  Above plan was discussed in detail with [facility] attending, Dr Ovidio Du  HISTORY OF PRESENT ILLNESS:  Reason for Consult:  Pseudomonas UTI  HPI: Scot Moyer is a 76y o  year old male presented to the [facility] as a direct admission from PCPs who was concerned about Pseudomonas UTI  Patient reports that on Friday, 09/06/2019, he noticed his urine was brown and that he was urinating in large amounts  Reports he has urinary frequency but this is chronic  The patient has a history of Pseudomonas urinary tract infection so he went to see his PCP  PCP sent a urine culture and placed him on Macrobid  Patient received phone call from his PCP earlier this morning and was told that he once again has Pseudomonas in his urine  He was told to go to the hospital for admission and treatment  Upon arrival to the hospital the patient's temperature was 97 9° with a heart rate of 91  His WBC count was 16 63  Patient's creatinine was 1 42 with a GFR 48  The patient was started on IV cefepime  Fortunately he has been clinically stable and nontoxic  Today he has no acute urinary symptoms  We have been asked for formal consult for Pseudomonas UTI  Patient has a past medical and surgical history significant for cervical spinal stenosis, anxiety, hyperlipidemia, ulcerative colitis, myasthenia gravis, sleep apnea, partial sigmoidectomy, lithotripsy, biopsy of parotid gland, lithotripsy, and salivary gland biopsy  He reports allergies to Levaquin, fluoroquinolones, benzodiazepines, Benadryl, tetracyclines, sulfa drugs, muscle relaxants, loratadine, iodine, erythromycin, and chlorpheniramine  REVIEW OF SYSTEMS:  Patient reports that he is feeling well today  Denies dysuria  Reports he has urinary frequency every night but states this is chronic  States that his urine looks normal today but appeared brown last Friday    A complete 12 point system-based review of systems is otherwise negative  PAST MEDICAL HISTORY:  Past Medical History:   Diagnosis Date    Anxiety     Cervical spinal stenosis     Hx of chronic ulcerative colitis     Hyperlipidemia     Myasthenia gravis ([address] )     Sleep apnea      Past Surgical History:   Procedure Laterality Date    COLECTOMY      partial sigmoid    COLECTOMY LAPAROSCOPIC      partial sigmoid colectomy    COLON SURGERY      lap  partial colectomy sigmoid    COLONOSCOPY      LITHOTRIPSY      OTHER SURGICAL HISTORY      parotid bx    HI FRAGMENT KIDNEY STONE/ ESWL Left 2018    Procedure: LITHROTRIPSY EXTRACORPORAL SHOCKWAVE (ESWL); Surgeon: Jessica Humphreys MD;  [facility];  Service: Urology    SALIVARY GLAND SURGERY      biopsy     FAMILY HISTORY:  Non-contributory    SOCIAL HISTORY:  Social History   Social History     Substance and Sexual Activity   Alcohol Use No     Social History     Substance and Sexual Activity   Drug Use No     Social History     Tobacco Use   Smoking Status Never Smoker   Smokeless Tobacco Never Used     ALLERGIES:  Allergies   Allergen Reactions    Levaquin [Levofloxacin]      Due to MG dx    Quinolones      Due to MG dx    Antihistamines, Chlorpheniramine-Type      Difficulty urinating    Benzodiazepines     Chlorpheniramine     Diphenhydramine     Erythromycin     Iodine     Iodine Solution [Povidone Iodine]      ivp dye    Loratadine     Other      Muscle relaxers d/t myasthenia gravis-they close my airway      Sulfa Antibiotics     Tetracyclines & Related      MEDICATIONS:  All current active medications have been reviewed      ANTIBIOTICS:  Cefepime - stop  Antibiotics 1    PHYSICAL EXAM:  Temp:  [97 9 °F (36 6 °C)] 97 9 °F (36 6 °C)  HR:  [91] 91  Resp:  [20] 20  BP: (117)/(89) 117/89  SpO2:  [94 %] 94 %  Temp (24hrs), Av 9 °F (36 6 °C), Min:97 9 °F (36 6 °C), Max:97 9 °F (36 6 °C)  Current: Temperature: 97 9 °F (36 6 °C)  No intake or output data in the 24 hours ending 09/09/19 1322    General Appearance:  Appearing well, nontoxic, and in no distress   Head:  Normocephalic, without obvious abnormality, atraumatic   Eyes:  Conjunctiva pink and sclera anicteric, both eyes; is wearing his glasses   Nose: Nares normal, mucosa normal, no drainage   Throat: Oropharynx moist without lesions   Neck: Supple, symmetrical, no adenopathy, no tenderness/mass/nodules   Back:   Symmetric, ROM normal, no CVA tenderness   Lungs:   Clear to auscultation bilaterally, respirations unlabored   Chest Wall:  No tenderness or deformity   Heart:  RRR; no murmur, rub or gallop   Abdomen:   Soft, obese, non-tender, non-distended, positive bowel sounds throughout   Extremities: No cyanosis or clubbing, no edema   Skin: No rashes, lesions, or draining wounds noted on patient's exposed skin  Lymph nodes: Cervical, supraclavicular nodes normal   Neurologic: Alert and oriented times 3, follows commands, equal hand grasps, symmetric facial movement     LABS, IMAGING, & OTHER STUDIES:  Lab Results:  I have personally reviewed pertinent labs  Results from last 7 days   Lab Units 09/09/19  1048   POTASSIUM mmol/L 4 5   CHLORIDE mmol/L 100   CO2 mmol/L 23   BUN mg/dL 22   CREATININE mg/dL 1 42*   EGFR ml/min/1 73sq m 48   CALCIUM mg/dL 10 1     Results from last 7 days   Lab Units 09/06/19  1250   URINE CULTURE  40,000-49,000 cfu/ml Pseudomonas aeruginosa*     Imaging Studies:   No imaging to review so far during this hospitalization

## 2019-09-09 NOTE — ASSESSMENT & PLAN NOTE
Lab Results   Component Value Date    HGBA1C 6 2 06/17/2019       Recent Labs     09/09/19  1059   POCGLU 156*       Blood Sugar Average: Last 72 hrs:  (P) 156     History of diabetes type 2  Most recent A1c noted 6 2    Home dose includes long-acting insulin 12 units q h s , as part 4 units with meals  Resume home regimen  Diabetic diet  Sliding scale coverage  Monitor Accu-Cheks

## 2019-09-09 NOTE — PLAN OF CARE
Problem: Potential for Falls  Goal: Patient will remain free of falls  Description  INTERVENTIONS:  - Assess patient frequently for physical needs  -  Identify cognitive and physical deficits and behaviors that affect risk of falls    -  [place] fall precautions as indicated by assessment   - Educate patient/family on patient safety including physical limitations  - Instruct patient to call for assistance with activity based on assessment  - Modify environment to reduce risk of injury  - Consider OT/PT consult to assist with strengthening/mobility  Outcome: Progressing

## 2019-09-09 NOTE — ASSESSMENT & PLAN NOTE
History of anxiety  Currently stable  Home medication includes diazepam 2 mg q 6 hours p r n ,  Patient also reports that he was supposed to be on escitalopram 10 mg daily but not taking it

## 2019-09-09 NOTE — H&P
H&P- Belen Neal 1945, 76 y o  male MRN: [id number]    Unit/Bed#: [place] [address] -01 Encounter: [id number]    Primary Care Provider: Silvia Parker DO   Date and time admitted to hospital: 9/9/2019  9:58 AM        * Pseudomonas urinary tract infection  Assessment & Plan  79-year-old male past medical history of nephrolithiasis, reported history of sepsis due to Pseudomonas UTI post urological procedure for nephrolithiasis,  recurrent UTI with Pseudomonas was sent directly from primary care's office for direct admit for IV antibiotics due to urine culture from 09/06/2019  growing Pseudomonas  Currently patient is awake, alert, oriented x3  Had completed 2-3 days of Macrobid p o   Dark colored urine  Currently denies chest pain, fever, chills, nausea, vomiting, frequency, dysuria, nausea, vomiting, diarrhea  Currently getting cefepime since allergic to Levaquin and quinolones  Afebrile, hemodynamically stable  CBC pending  UA pending :  Patient had already received 1 dose of cefepime therefore not sure about the yield now  Recurrent pseudomonal growth suspected due to nephrolithiasis  Since patient is currently hemodynamically stable and without any objective evidence of infection will monitor off of antibiotics for now  Follow-up procalcitonin level  Monitor CBC with differential  Will follow up with CT stone study  Id consult        Anxiety  Assessment & Plan  History of anxiety  Currently stable  Home medication includes diazepam 2 mg q 6 hours p r n ,  Patient also reports that he was supposed to be on escitalopram 10 mg daily but not taking it  Essential hypertension  Assessment & Plan  History of hypertension  Home medication includes hydrochlorothiazide 12 5 mg daily, lisinopril 2 5 mg daily  Controlled      Vertigo  Assessment & Plan  History of vertigo  Currently stable  Patient reports that he was supposed to be on meclizine but not taking it      CKD (chronic kidney disease), stage III ([facility] [address] )  Assessment & Plan   history of CKD stage 3  Baseline creatinine 1 1 - 1 28 range  Noted with slightly elevated creatinine 1 42  Gentle IV hydration and monitor renal function  Controlled type 2 diabetes mellitus with hypoglycemia, with long-term current use of insulin [facility])  Assessment & Plan  Lab Results   Component Value Date    HGBA1C 6 2 06/17/2019       Recent Labs     09/09/19  1059   POCGLU 156*       Blood Sugar Average: Last 72 hrs:  (P) 156     History of diabetes type 2  Most recent A1c noted 6 2  Home dose includes long-acting insulin 12 units q h s , as part 4 units with meals  Resume home regimen  Diabetic diet  Sliding scale coverage  Monitor Accu-Cheks    Nephrolithiasis  Assessment & Plan  Reported history of nephrolithiasis  Suspected nephrolithiasis as cause of recurrent pseudomonal growth in urine culture  Will follow up with CT stone study  Plan as stated above  Myasthenia gravis [facility])  Assessment & Plan  Patient has history of myasthenia gravis  Home dose prednisone 10 mg daily  VTE Prophylaxis: Enoxaparin (Lovenox)  Code Status:  Level 1 full code  POLST: POLST form is not discussed and not completed at this time  Discussion with family:  Not present at bedside    Anticipated Length of Stay:  Patient will be admitted on an Inpatient basis with an anticipated length of stay of  2 midnights  Justification for Hospital Stay:  Recurrent pseudomonal UTI    Total Time for Visit, including Counseling / Coordination of Care: 1 hour  Greater than 50% of this total time spent on direct patient counseling and coordination of care      Chief Complaint:   Recurrent pseudomonal UTI    History of Present Illness:    Fatemeh Esparza is a 76 y o  male past medical history of myasthenia gravis, CKD stage 3, vertigo, hypertension, cervical radiculopathy, polyneuropathy, diabetes mellitus, nephrolithiasis, recurrent UTI with Pseudomonas, anxiety, right bundle-branch block, mitral valve calcification, BPH who presents with recurrent pseudomonal UTI  Patient was seen by primary care provider on 09/06 for increased urinary frequency for 1 day  He was sent home on Macrobid  Urine cultures grew Pseudomonas therefore he was sent directly to hospital for direct admit for IV antibiotics  Patient does have history of allergic reaction to Levaquin and quinolones due to myasthenia gravis  Patient had received 1 dose of cefepime prior to my encounter  On my encounter patient is awake, alert, oriented x3  Complaining of dark-colored urine  Denies fever, chills, nausea, vomiting, chest pain, dyspnea, abdominal pain, flank pain, dysuria, diarrhea  Lives with wife  Ambulates independently  Never smoker  Level 1 full code  No other events reported  Review of Systems:    Review of Systems   Constitutional: Negative for chills, diaphoresis, fatigue and fever  HENT: Negative for congestion  Eyes: Negative for photophobia and visual disturbance  Respiratory: Negative for cough, choking and shortness of breath  Cardiovascular: Negative for chest pain and palpitations  Gastrointestinal: Negative for abdominal distention and abdominal pain  Endocrine: Negative for polyuria  Genitourinary: Negative for difficulty urinating and dysuria  Skin: Negative for rash  Neurological: Negative for syncope  Psychiatric/Behavioral: Negative for agitation         Past Medical and Surgical History:     Past Medical History:   Diagnosis Date    Anxiety     Cervical spinal stenosis     Hx of chronic ulcerative colitis     Hyperlipidemia     Myasthenia gravis ([facility] [address] )     Sleep apnea        Past Surgical History:   Procedure Laterality Date    COLECTOMY      partial sigmoid    COLECTOMY LAPAROSCOPIC      partial sigmoid colectomy    COLON SURGERY      lap  partial colectomy sigmoid    COLONOSCOPY      LITHOTRIPSY      OTHER SURGICAL HISTORY      parotid bx    DC FRAGMENT KIDNEY STONE/ ESWL Left 11/2/2018    Procedure: Marquis Gutierrezmeenu SHOCKWAVE (ESWL); Surgeon: Adam Floyd MD;  [facility];  Service: Urology    SALIVARY GLAND SURGERY      biopsy       Meds/Allergies:    Prior to Admission medications    Medication Sig Start Date End Date Taking?  Authorizing Provider   ACCU-CHEK FASTCLIX LANCETS MISC Test four times daily 6/4/19  Yes Jl Thorpe DO   ACCU-CHEK GUIDE test strip 1 each by Other route 4 (four) times a day Use as instructed bid scheduled and prn symptoms/signs blood sugar fluctuation 6/4/19  Yes Mary Kay Nieto,    BD ULTRA-FINE PEN NEEDLES 29G X 12 7MM MISC  9/14/18  Yes Historical Provider, MD   BD ULTRA-FINE PEN NEEDLES 29G X 12 7MM MISC USE 4 TIMES A DAY 6/4/19  Yes Jl Thorpe DO   cefepime (MAXIPIME) 2 g injection  11/7/18  Yes Historical Provider, MD   cholecalciferol (VITAMIN D3) 1,000 units tablet Take 2 tablets by mouth daily     Yes Historical Provider, MD   Cranberry 500 MG CAPS Take 4 capsules by mouth daily    Yes Historical Provider, MD   hydrochlorothiazide (HYDRODIURIL) 12 5 mg tablet Take 1 tablet (12 5 mg total) by mouth daily 7/19/19  Yes Maranda Salinas DO   insulin aspart (NovoLOG) 100 units/mL injection Inject 4 Units under the skin 3 (three) times a day before meals   Yes Historical Provider, MD   insulin glargine (LANTUS) 100 units/mL subcutaneous injection Inject 12 Units under the skin daily at bedtime 12/31/18  Yes Jl Thorpe DO   Lactobacillus (ACIDOPHILUS) 100 MG CAPS Take 1 capsule by mouth daily   Yes Historical Provider, MD   lisinopril (ZESTRIL) 2 5 mg tablet Take 2 5 mg by mouth daily   9/24/18  Yes Historical Provider, MD   nitrofurantoin (MACROBID) 100 mg capsule Take 1 capsule (100 mg total) by mouth 2 (two) times a day for 7 days 9/6/19 9/13/19 Yes Patricia Cornell PAKiannaC   Potassium 99 MG TABS Take by mouth 1/1/18  Yes Historical Provider, MD   predniSONE 10 mg tablet Take 1 tablet (10 mg total) by mouth daily 7/19/19  Yes Maranda Salinas DO   pyridoxine (VITAMIN B6) 100 mg tablet Take 100 mg by mouth daily     Yes Historical Provider, MD   diazepam (VALIUM) 2 mg tablet Take 1 tablet (2 mg total) by mouth every 6 (six) hours as needed (vertigo)  Patient not taking: Reported on 9/9/2019 6/19/19   Maranda Salinas DO   docusate sodium (COLACE) 250 MG capsule Take 1 capsule (250 mg total) by mouth daily  Patient not taking: Reported on 9/9/2019 11/15/18   Karen Palomo DO   escitalopram (LEXAPRO) 10 mg tablet Take 1 tablet (10 mg total) by mouth daily  Patient not taking: Reported on 9/9/2019 8/28/19   Maranda Salinas DO   famotidine (PEPCID) 20 mg tablet Take 1 tablet (20 mg total) by mouth daily  Patient not taking: Reported on 8/20/2019 4/5/18   Siddharth Guardado PA-C   ibuprofen (MOTRIN) 200 mg tablet Take by mouth every 6 (six) hours as needed for mild pain    Historical Provider, MD   lidocaine (LIDODERM) 5 % Apply 1 patch topically daily Remove & Discard patch within 12 hours or as directed by MD  Patient not taking: Reported on 9/9/2019 11/8/18   Yaya Rai MD   meclizine (ANTIVERT) 12 5 MG tablet Take 1 tablet (12 5 mg total) by mouth 2 (two) times a day  Patient not taking: Reported on 8/20/2019 4/1/19   Karen Palomo DO   ondansetron (ZOFRAN) 4 mg tablet Take 1 tablet (4 mg total) by mouth every 8 (eight) hours as needed for nausea or vomiting  Patient not taking: Reported on 8/20/2019 11/23/18   Karen Palomo DO     I have reviewed home medications with patient personally  Allergies:    Allergies   Allergen Reactions    Levaquin [Levofloxacin]      Due to MG dx    Quinolones      Due to MG dx    Antihistamines, Chlorpheniramine-Type      Difficulty urinating    Benzodiazepines     Chlorpheniramine     Diphenhydramine     Erythromycin     Iodine     Iodine Solution [Povidone Iodine]      ivp dye    Loratadine     Other      Muscle relaxers d/t myasthenia gravis-they close my airway      Sulfa Antibiotics     Tetracyclines & Related        Social History:     Marital Status: /Civil Union   Patient Pre-hospital Living Situation:  With wife  Patient Pre-hospital Level of Mobility:  Independent  Patient Pre-hospital Diet Restrictions:  None reported  Substance Use History:   Social History     Substance and Sexual Activity   Alcohol Use No     Social History     Tobacco Use   Smoking Status Never Smoker   Smokeless Tobacco Never Used     Social History     Substance and Sexual Activity   Drug Use No       Family History:    non-contributory    Physical Exam:     Vitals:   Blood Pressure: 117/89 (09/09/19 0900)  Pulse: 91 (09/09/19 0900)  Temperature: 97 9 °F (36 6 °C) (09/09/19 0900)  Temp Source: Temporal (09/09/19 0900)  Respirations: 20 (09/09/19 0900)  SpO2: 94 % (09/09/19 0900)    Physical Exam   Constitutional: He is oriented to person, place, and time  He appears well-developed  No distress  Very pleasant and talkative  HENT:   Head: Normocephalic and atraumatic  Eyes: Pupils are equal, round, and reactive to light  EOM are normal    Neck: Normal range of motion  Neck supple  Cardiovascular: Normal rate and regular rhythm  Pulmonary/Chest: Effort normal and breath sounds normal  No stridor  No respiratory distress  He has no wheezes  Abdominal: Soft  Bowel sounds are normal  He exhibits no distension  There is no tenderness  Musculoskeletal: Normal range of motion  He exhibits no edema  Neurological: He is alert and oriented to person, place, and time  Skin: He is not diaphoretic  Bilateral lower extremity noted with chronic skin discoloration secondary to polyneuropathy  Psychiatric:   Anxious       Additional Data:     Lab Results: I have personally reviewed pertinent reports            Results from last 7 days   Lab Units 09/09/19  1048   SODIUM mmol/L 138   POTASSIUM mmol/L 4 5   CHLORIDE mmol/L 100   CO2 mmol/L 23 BUN mg/dL 22   CREATININE mg/dL 1 42*   ANION GAP mmol/L 15*   CALCIUM mg/dL 10 1   GLUCOSE RANDOM mg/dL 150*         Results from last 7 days   Lab Units 09/09/19  1059   POC GLUCOSE mg/dl 156*               Imaging: I have personally reviewed pertinent reports  CT renal stone study abdomen pelvis wo contrast    (Results Pending)       EKG, Pathology, and Other Studies Reviewed on Admission:   · EKG:  Not available if done    Allscripts / Epic Records Reviewed: Yes     ** Please Note: This note has been constructed using a voice recognition system   **

## 2019-09-09 NOTE — ASSESSMENT & PLAN NOTE
History of vertigo  Currently stable  Patient reports that he was supposed to be on meclizine but not taking it

## 2019-09-10 VITALS
RESPIRATION RATE: 18 BRPM | SYSTOLIC BLOOD PRESSURE: 108 MMHG | TEMPERATURE: 99.3 F | DIASTOLIC BLOOD PRESSURE: 60 MMHG | HEART RATE: 57 BPM | OXYGEN SATURATION: 94 %

## 2019-09-10 LAB
ANION GAP SERPL CALCULATED.3IONS-SCNC: 12 MMOL/L (ref 4–13)
BASOPHILS # BLD AUTO: 0.11 THOUSANDS/ΜL (ref 0–0.1)
BASOPHILS NFR BLD AUTO: 1 % (ref 0–1)
BUN SERPL-MCNC: 33 MG/DL (ref 5–25)
CALCIUM SERPL-MCNC: 9.5 MG/DL (ref 8.3–10.1)
CHLORIDE SERPL-SCNC: 102 MMOL/L (ref 100–108)
CO2 SERPL-SCNC: 26 MMOL/L (ref 21–32)
CREAT SERPL-MCNC: 1.61 MG/DL (ref 0.6–1.3)
EOSINOPHIL # BLD AUTO: 0.06 THOUSAND/ΜL (ref 0–0.61)
EOSINOPHIL NFR BLD AUTO: 1 % (ref 0–6)
ERYTHROCYTE [DISTWIDTH] IN BLOOD BY AUTOMATED COUNT: 13.2 % (ref 11.6–15.1)
GFR SERPL CREATININE-BSD FRML MDRD: 42 ML/MIN/1.73SQ M
GLUCOSE SERPL-MCNC: 102 MG/DL (ref 65–140)
GLUCOSE SERPL-MCNC: 103 MG/DL (ref 65–140)
GLUCOSE SERPL-MCNC: 104 MG/DL (ref 65–140)
HCT VFR BLD AUTO: 45.6 % (ref 36.5–49.3)
HGB BLD-MCNC: 15.2 G/DL (ref 12–17)
IMM GRANULOCYTES # BLD AUTO: 0.07 THOUSAND/UL (ref 0–0.2)
IMM GRANULOCYTES NFR BLD AUTO: 1 % (ref 0–2)
LYMPHOCYTES # BLD AUTO: 3.52 THOUSANDS/ΜL (ref 0.6–4.47)
LYMPHOCYTES NFR BLD AUTO: 28 % (ref 14–44)
MCH RBC QN AUTO: 31.7 PG (ref 26.8–34.3)
MCHC RBC AUTO-ENTMCNC: 33.3 G/DL (ref 31.4–37.4)
MCV RBC AUTO: 95 FL (ref 82–98)
MONOCYTES # BLD AUTO: 1.33 THOUSAND/ΜL (ref 0.17–1.22)
MONOCYTES NFR BLD AUTO: 11 % (ref 4–12)
NEUTROPHILS # BLD AUTO: 7.33 THOUSANDS/ΜL (ref 1.85–7.62)
NEUTS SEG NFR BLD AUTO: 58 % (ref 43–75)
NRBC BLD AUTO-RTO: 0 /100 WBCS
PLATELET # BLD AUTO: 329 THOUSANDS/UL (ref 149–390)
PMV BLD AUTO: 10 FL (ref 8.9–12.7)
POTASSIUM SERPL-SCNC: 3.9 MMOL/L (ref 3.5–5.3)
RBC # BLD AUTO: 4.79 MILLION/UL (ref 3.88–5.62)
SODIUM SERPL-SCNC: 140 MMOL/L (ref 136–145)
WBC # BLD AUTO: 12.42 THOUSAND/UL (ref 4.31–10.16)

## 2019-09-10 PROCEDURE — 80048 BASIC METABOLIC PNL TOTAL CA: CPT | Performed by: STUDENT IN AN ORGANIZED HEALTH CARE EDUCATION/TRAINING PROGRAM

## 2019-09-10 PROCEDURE — 99239 HOSP IP/OBS DSCHRG MGMT >30: CPT | Performed by: HOSPITALIST

## 2019-09-10 PROCEDURE — 82948 REAGENT STRIP/BLOOD GLUCOSE: CPT

## 2019-09-10 PROCEDURE — 99232 SBSQ HOSP IP/OBS MODERATE 35: CPT | Performed by: INTERNAL MEDICINE

## 2019-09-10 PROCEDURE — 85025 COMPLETE CBC W/AUTO DIFF WBC: CPT | Performed by: STUDENT IN AN ORGANIZED HEALTH CARE EDUCATION/TRAINING PROGRAM

## 2019-09-10 RX ADMIN — Medication 250 MG: at 09:31

## 2019-09-10 RX ADMIN — INSULIN LISPRO 4 UNITS: 100 INJECTION, SOLUTION INTRAVENOUS; SUBCUTANEOUS at 12:35

## 2019-09-10 RX ADMIN — Medication 100 MG: at 09:31

## 2019-09-10 RX ADMIN — INSULIN LISPRO 4 UNITS: 100 INJECTION, SOLUTION INTRAVENOUS; SUBCUTANEOUS at 09:25

## 2019-09-10 RX ADMIN — ENOXAPARIN SODIUM 40 MG: 40 INJECTION SUBCUTANEOUS at 09:32

## 2019-09-10 RX ADMIN — VITAMIN D, TAB 1000IU (100/BT) 2000 UNITS: 25 TAB at 09:31

## 2019-09-10 RX ADMIN — PREDNISONE 10 MG: 10 TABLET ORAL at 09:31

## 2019-09-10 NOTE — PROGRESS NOTES
Progress Note - Infectious Disease   Scot Moyer 76 y o  male MRN: [id number]  Unit/Bed#: [place] [address] -01 Encounter: [id number]      Impression/Plan:  1  Asymptomatic bacteriuria  Patient noticed dark urine on Friday, 09/06/2019, but has otherwise had no urinary symptoms  He had an outpatient urine culture on 09/06/2019 which was positive for Pseudomonas  This is patient's 12th urine culture that is positive for Pseudomonas  CT renal stone study showed stable bilateral nonobstructing calculi  Stones may be colonizing his urine with Pseudomonas  Also consider patient may have enlarged prostate contributing to inadequate urinary drainage  He admits to urinating at least 4 times a night  Patient should follow-up with Urology as an outpatient for management  Fortunately the patient is afebrile  His WBC count was mildly increased but he has chronic leukocytosis as below  He received one dose of cefepime but antibiotics were then discontinued  He remains clinically stable  Given there is no clear indication of an active infection I do not recommend ongoing antibiotic treatment at this time   -monitor patient off of antibiotics  -monitor CBC and BMP  -monitor vitals  -monitor urine output     2  Leukocytosis  Upon review of patient's past medical records it appears his leukocytosis is chronic  I suspect this is likely secondary to his daily prednisone use  No clear indication for antibiotic treatment at this time    -monitor patient off antibiotics  -monitor CBC     3  History of Pseudomonas urinary tract infections  Patient with 11 previous urine cultures which were positive for Pseudomonas  He's been treated with IV cefepime in the past as he has previously been very symptomatic  Patient now without symptoms but his urine is once again positive for Pseudomonas  He previously had stones which were likely colonized with the bacteria    I personally reviewed patient's new CT renal stone study which did reveal stones but they are stable and nonobstructive  The stones could be colonizing his urine with Pseudomonas  Recommend patient meet with Urology as an outpatient for follow-up   -monitor patient off antibiotics as above  -recommend outpatient urology follow-up     4  MARSHALL  On CKD stage 3  I reviewed patient's past medical records and it appears his baseline creatinine is generally 1-1 2  Creatinine was elevated at 1 42 upon arrival but increased further to 1 71  Possibly related to dehydration as patient admits to poor liquid intake pre-admission  creatinine has decreased to 1 61 today   -monitor BMP  -avoid nephrotoxins  -consider Nephrology consult if creatinine increases again     5  Type 2 diabetes mellitus  Patient's last hemoglobin A1c was 6 2% on 06/17/2019   -blood glucose management per primary service     6  Myasthenia gravis  Patient lastly diagnosed in 2009  He reports his condition was stress induced  He is stable at this time and is maintained on daily prednisone  He follows closely with outpatient neurologist, Dr Amadou Prescott   -continue outpatient neurology follow-up     7  Multiple antibiotic allergies  Patient reports allergies to Levaquin, quinolones, tetracyclines, sulfa drugs, erythromycin, and Bactrim  Per patient these are medications which will worsen his myasthenia gravis  Patient has received cefepime and tolerated without difficulty  Antibiotics were stopped as above  If they are re-initiated we will consider his myasthenia gravis in medical decision making   -monitor patient for side effects to antibiotics    Above plan was discussed in detail with patient and his friend at the bedside  Patient is stable for discharge from ID standpoint  Above plan was discussed in detail with [facility] attending, Dr Jomar Carmichael      Antibiotics:  No current antibiotic use    Subjective:  Patient reports he is feeling well today    He tells me he saved his urine in the capping the toilet and is pleased that it has returned to its normal [place] yellow color  Denies dysuria  He has no fever, chills, sweats, shakes; no nausea, vomiting, abdominal pain, diarrhea; no cough, shortness of breath, or chest pain  No new symptoms  Objective:  Vitals:  Temp:  [97 7 °F (36 5 °C)-99 5 °F (37 5 °C)] 99 3 °F (37 4 °C)  HR:  [57-99] 57  Resp:  [18-20] 18  BP: ()/(57-89) 95/57  SpO2:  [94 %-96 %] 94 %  Temp (24hrs), Av 6 °F (37 °C), Min:97 7 °F (36 5 °C), Max:99 5 °F (37 5 °C)  Current: Temperature: 99 3 °F (37 4 °C)    Physical Exam:   General Appearance:  Alert, interactive, nontoxic, no acute distress  Patient is wearing his glasses  Throat: Oropharynx moist without lesions  Lungs:   Clear to auscultation bilaterally; no wheezes, rhonchi or rales; respirations unlabored   Heart:  RRR; no murmur, rub or gallop   Abdomen:   Soft, obese, non-tender, non-distended, positive bowel sounds  Extremities: No clubbing or cyanosis, no edema   Skin: No new rashes, lesions, or draining wounds noted on exposed skin       Labs, Imaging, & Other studies:   All pertinent labs and imaging studies were personally reviewed  Results from last 7 days   Lab Units 09/10/19  0531 19  1326   WBC Thousand/uL 12 42* 16 63*   HEMOGLOBIN g/dL 15 2 16 8   PLATELETS Thousands/uL 329 408*     Results from last 7 days   Lab Units 09/10/19  0531   POTASSIUM mmol/L 3 9   CHLORIDE mmol/L 102   CO2 mmol/L 26   BUN mg/dL 33*   CREATININE mg/dL 1 61*   EGFR ml/min/1 73sq m 42   CALCIUM mg/dL 9 5     Results from last 7 days   Lab Units 19  1250   URINE CULTURE  40,000-49,000 cfu/ml Pseudomonas aeruginosa*

## 2019-09-10 NOTE — PLAN OF CARE
Problem: Potential for Falls  Goal: Patient will remain free of falls  Description  INTERVENTIONS:  - Assess patient frequently for physical needs  -  Identify cognitive and physical deficits and behaviors that affect risk of falls    -  [place] fall precautions as indicated by assessment   - Educate patient/family on patient safety including physical limitations  - Instruct patient to call for assistance with activity based on assessment  - Modify environment to reduce risk of injury  - Consider OT/PT consult to assist with strengthening/mobility  9/10/2019 0116 by Noman Kilgore RN  Outcome: Progressing  9/10/2019 0116 by Noman Kilgore RN  Outcome: Progressing     Problem: PAIN - ADULT  Goal: Verbalizes/displays adequate comfort level or baseline comfort level  Description  Interventions:  - Encourage patient to monitor pain and request assistance  - Assess pain using appropriate pain scale  - Administer analgesics based on type and severity of pain and evaluate response  - Implement non-pharmacological measures as appropriate and evaluate response  - Consider cultural and social influences on pain and pain management  - Notify physician/advanced practitioner if interventions unsuccessful or patient reports new pain  Outcome: Progressing     Problem: INFECTION - ADULT  Goal: Absence or prevention of progression during hospitalization  Description  INTERVENTIONS:  - Assess and monitor for signs and symptoms of infection  - Monitor lab/diagnostic results  - Monitor all insertion sites, i e  indwelling lines, tubes, and drains  - Monitor endotracheal if appropriate and nasal secretions for changes in amount and color  - [place] appropriate cooling/warming therapies per order  - Administer medications as ordered  - Instruct and encourage patient and family to use good hand hygiene technique  - Identify and instruct in appropriate isolation precautions for identified infection/condition  Outcome: Progressing  Goal: Absence of fever/infection during neutropenic period  Description  INTERVENTIONS:  - Monitor WBC    Outcome: Progressing     Problem: SAFETY ADULT  Goal: Maintain or return to baseline ADL function  Description  INTERVENTIONS:  -  Assess patient's ability to carry out ADLs; assess patient's baseline for ADL function and identify physical deficits which impact ability to perform ADLs (bathing, care of mouth/teeth, toileting, grooming, dressing, etc )  - Assess/evaluate cause of self-care deficits   - Assess range of motion  - Assess patient's mobility; develop plan if impaired  - Assess patient's need for assistive devices and provide as appropriate  - Encourage maximum independence but intervene and supervise when necessary  - Involve family in performance of ADLs  - Assess for home care needs following discharge   - Consider OT consult to assist with ADL evaluation and planning for discharge  - Provide patient education as appropriate  Outcome: Progressing  Goal: Maintain or return mobility status to optimal level  Description  INTERVENTIONS:  - Assess patient's baseline mobility status (ambulation, transfers, stairs, etc )    - Identify cognitive and physical deficits and behaviors that affect mobility  - Identify mobility aids required to assist with transfers and/or ambulation (gait belt, sit-to-stand, lift, walker, cane, etc )  - [place] fall precautions as indicated by assessment  - Record patient progress and toleration of activity level on Mobility SBAR; progress patient to next Phase/Stage  - Instruct patient to call for assistance with activity based on assessment  - Consider rehabilitation consult to assist with strengthening/weightbearing, etc   Outcome: Progressing     Problem: DISCHARGE PLANNING  Goal: Discharge to home or other facility with appropriate resources  Description  INTERVENTIONS:  - Identify barriers to discharge w/patient and caregiver  - Arrange for needed discharge resources and transportation as appropriate  - Identify discharge learning needs (meds, wound care, etc )  - Arrange for interpretive services to assist at discharge as needed  - Refer to Case Management Department for coordinating discharge planning if the patient needs post-hospital services based on physician/advanced practitioner order or complex needs related to functional status, cognitive ability, or social support system  Outcome: Progressing     Problem: Knowledge Deficit  Goal: Patient/family/caregiver demonstrates understanding of disease process, treatment plan, medications, and discharge instructions  Description  Complete learning assessment and assess knowledge base    Interventions:  - Provide teaching at level of understanding  - Provide teaching via preferred learning methods  Outcome: Progressing

## 2019-09-10 NOTE — DISCHARGE SUMMARY
Discharge- Scot Moyer 1945, 76 y o  male MRN: [id number]    Unit/Bed#: [place] [address] -01 Encounter: [id number]    Primary Care Provider: Jazmine Obrien DO   Date and time admitted to hospital: 9/9/2019  9:58 AM      Clarification    Acute kidney injury   Was present on admission        * Pseudomonas aeruginosa colonization  Assessment & Plan  Spoke with infectious disease  They do not think that this is a true infection  They feel that this is colonization and does NOT need antibiotics          Discharging Physician / Practitioner: Jade Cedeño DO  PCP: Jazmine Obrien DO  Admission Date:   Admission Orders (From admission, onward)     Ordered        09/09/19 1017  Inpatient Admission  Once                   Discharge Date: 09/10/19    Resolved Problems  Date Reviewed: 9/10/2019    None            Consultations During Hospital Stay:  · Infectious Disease      Procedures Performed:     · CT abdomen and pelvis      Reason for Admission:  Positive urine culture as an outpatient      Hospital Course:     Scot Moyer is a 76 y o  male patient who originally presented to the hospital on 9/9/2019 due to positive urine culture as an outpatient  He had some blood noted in his urine  So his PCP send urinalysis  A came back positive for Pseudomonas so he was told to come to the emergency room  He is having no dysuria  No fever  No signs of sepsis  No abdominal pain  He was seen by infectious disease  They felt that this was likely colonization and not true infection  They recommended no antibiotics and thought he was safe to go home  They did do a CT abdomen and there is no acute abnormality  He does have some kidney stones but they are not causing any obstruction or infection  The patient follow should follow up with his urologist as an outpatient  Please see above list of diagnoses and related plan for additional information         Condition at Discharge: good Discharge Day Visit / Exam:     Subjective:  Feels well  No abdominal pain  No fever or chills  Vitals: Blood Pressure: 108/60 (09/10/19 0937)  Pulse: 57 (09/10/19 0739)  Temperature: 99 3 °F (37 4 °C) (09/10/19 0739)  Temp Source: Temporal (09/10/19 0739)  Respirations: 18 (09/10/19 0739)  SpO2: 94 % (09/10/19 0739)     Exam:     Physical Exam   HENT:   Head: Normocephalic and atraumatic  Eyes: Pupils are equal, round, and reactive to light  EOM are normal    Cardiovascular: Normal rate and regular rhythm  Exam reveals no gallop and no friction rub  No murmur heard  Pulmonary/Chest: Effort normal and breath sounds normal  He has no wheezes  He has no rales  Abdominal: Soft  Bowel sounds are normal  There is no tenderness  Musculoskeletal: He exhibits no edema  Nursing note and vitals reviewed            Discharge instructions/Information to patient and family:   See after visit summary for information provided to patient and family  Provisions for Follow-Up Care:  See after visit summary for information related to follow-up care and any pertinent home health orders  Disposition:     Home       Discharge Statement:  I spent 41 minutes discharging the patient  This time was spent on the day of discharge  I had direct contact with the patient on the day of discharge  Greater than 50% of the total time was spent examining patient, answering all patient questions, arranging and discussing plan of care with patient as well as directly providing post-discharge instructions  Additional time then spent on discharge activities  Discharge Medications:  See after visit summary for reconciled discharge medications provided to patient and family        ** Please Note: This note has been constructed using a voice recognition system **

## 2019-09-10 NOTE — ASSESSMENT & PLAN NOTE
Spoke with infectious disease  They do not think that this is a true infection  They feel that this is colonization and does NOT need antibiotics

## 2019-09-10 NOTE — UTILIZATION REVIEW
Initial Clinical Review    Admission: Date/Time/Statement: Inpatient Admission Orders (From admission, onward)     Ordered        09/09/19 1017  Inpatient Admission  Once                   Orders Placed This Encounter   Procedures    Inpatient Admission     Standing Status:   Standing     Number of Occurrences:   1     Order Specific Question:   Admitting Physician     Answer:   Lissy Bosch     Order Specific Question:   Level of Care     Answer:   Med Surg [16]     Order Specific Question:   Estimated length of stay     Answer:   More than 2 Midnights     Order Specific Question:   Certification     Answer:   I certify that inpatient services are medically necessary for this patient for a duration of greater than two midnights  See H&P and MD Progress Notes for additional information about the patient's course of treatment  Assessment/Plan: * Pseudomonas urinary tract infection  Assessment & Plan  20-year-old male past medical history of nephrolithiasis, reported history of sepsis due to Pseudomonas UTI post urological procedure for nephrolithiasis,  recurrent UTI with Pseudomonas was sent directly from primary care's office for direct admit for IV antibiotics due to urine culture from 09/06/2019  growing Pseudomonas  Currently patient is awake, alert, oriented x3  Had completed 2-3 days of Macrobid p o   Dark colored urine  Currently denies chest pain, fever, chills, nausea, vomiting, frequency, dysuria, nausea, vomiting, diarrhea  Currently getting cefepime since allergic to Levaquin and quinolones      Afebrile, hemodynamically stable  CBC pending  UA pending :  Patient had already received 1 dose of cefepime therefore not sure about the yield now      Recurrent pseudomonal growth suspected due to nephrolithiasis  Since patient is currently hemodynamically stable and without any objective evidence of infection will monitor off of antibiotics for now    Follow-up procalcitonin level  Monitor CBC with differential  Will follow up with CT stone study  Id consult  Anxiety  Assessment & Plan  History of anxiety  Currently stable  Home medication includes diazepam 2 mg q 6 hours p r n ,  Patient also reports that he was supposed to be on escitalopram 10 mg daily but not taking it      Essential hypertension  Assessment & Plan  History of hypertension  Home medication includes hydrochlorothiazide 12 5 mg daily, lisinopril 2 5 mg daily  Controlled        Vertigo  Assessment & Plan  History of vertigo  Currently stable  Patient reports that he was supposed to be on meclizine but not taking it      CKD (chronic kidney disease), stage III (HCC)  Assessment & Plan   history of CKD stage 3  Baseline creatinine 1 1 - 1 28 range  Noted with slightly elevated creatinine 1 42  Gentle IV hydration and monitor renal function      Controlled type 2 diabetes mellitus with hypoglycemia, with long-term current use of insulin    History of diabetes type 2  Most recent A1c noted 6 2  Home dose includes long-acting insulin 12 units q h s , as part 4 units with meals  Resume home regimen  Diabetic diet  Sliding scale coverage  Monitor Accu-Cheks     Nephrolithiasis  Assessment & Plan  Reported history of nephrolithiasis  Suspected nephrolithiasis as cause of recurrent pseudomonal growth in urine culture  Will follow up with CT stone study  Plan as stated above      Myasthenia gravis [facility])  Assessment & Plan  Patient has history of myasthenia gravis  Home dose prednisone 10 mg daily            VTE Prophylaxis: Enoxaparin (Lovenox)  Code Status:  Level 1 full code  POLST: POLST form is not discussed and not completed at this time  Discussion with family:  Not present at bedside     Anticipated Length of Stay:  Patient will be admitted on an Inpatient basis with an anticipated length of stay of  2 midnights     Justification for Hospital Stay:  Recurrent pseudomonal UTI       Florentino Cosby is a 76 y o  male past medical history of myasthenia gravis, CKD stage 3, vertigo, hypertension, cervical radiculopathy, polyneuropathy, diabetes mellitus, nephrolithiasis, recurrent UTI with Pseudomonas, anxiety, right bundle-branch block, mitral valve calcification, BPH who presents with recurrent pseudomonal UTI  Patient was seen by primary care provider on 09/06 for increased urinary frequency for 1 day  He was sent home on Macrobid  Urine cultures grew Pseudomonas therefore he was sent directly to hospital for direct admit for IV antibiotics  Patient does have history of allergic reaction to Levaquin and quinolones due to myasthenia gravis  Patient had received 1 dose of cefepime prior to my encounter  On my encounter patient is awake, alert, oriented x3  Complaining of dark-colored urine  Denies fever, chills, nausea, vomiting, chest pain, dyspnea, abdominal pain, flank pain, dysuria, diarrhea  Lives with wife  Ambulates independently  Never smoker  Level 1 full code    No other events reported            ED Triage Vitals   Temperature Pulse Respirations Blood Pressure SpO2   09/09/19 0900 09/09/19 0900 09/09/19 0900 09/09/19 0900 09/09/19 0900   97 9 °F (36 6 °C) 91 20 117/89 94 %      Temp Source Heart Rate Source Patient Position - Orthostatic VS BP Location FiO2 (%)   09/09/19 0900 09/09/19 1557 09/09/19 0900 09/09/19 0900 --   Temporal Monitor Sitting Left arm       Pain Score       09/09/19 2309       No Pain        Wt Readings from Last 1 Encounters:   09/06/19 81 2 kg (179 lb)     Additional Vital Signs:   09/10/19 0937  --  --  --  108/60  --  --  Sitting   09/10/19 0739  99 3 °F (37 4 °C)  57  18  95/57  94 %  None (Room air)  Lying   09/09/19 2309  99 5 °F (37 5 °C)  98  18  103/62  96 %  None (Room air)  Lying   09/09/19 1557  97 7 °F (36 5 °C)  99  19  105/80  96 %  None (Room air)  Sitting   09/09/19 0900  97 9 °F (36 6 °C)  91  20  117/89  94 %  None (Room air)           Pertinent Labs/Diagnostic Test Results:   Results from last 7 days   Lab Units 09/10/19  0531 09/09/19  1326   WBC Thousand/uL 12 42* 16 63*   HEMOGLOBIN g/dL 15 2 16 8   HEMATOCRIT % 45 6 50 4*   PLATELETS Thousands/uL 329 408*   NEUTROS ABS Thousands/µL 7 33 14 47*         Results from last 7 days   Lab Units 09/10/19  0531 09/09/19  1326 09/09/19  1048   SODIUM mmol/L 140 137 138   POTASSIUM mmol/L 3 9 4 5 4 5   CHLORIDE mmol/L 102 99* 100   CO2 mmol/L 26 25 23   ANION GAP mmol/L 12 13 15*   BUN mg/dL 33* 25 22   CREATININE mg/dL 1 61* 1 71* 1 42*   EGFR ml/min/1 73sq m 42 39 48   CALCIUM mg/dL 9 5 10 2* 10 1         Results from last 7 days   Lab Units 09/10/19  1101 09/10/19  0743 09/09/19  2057 09/09/19  1647 09/09/19  1059   POC GLUCOSE mg/dl 103 104 105 163* 156*     Results from last 7 days   Lab Units 09/10/19  0531 09/09/19  1326 09/09/19  1048   GLUCOSE RANDOM mg/dL 102 171* 150*         Results from last 7 days   Lab Units 09/09/19  1048   HEMOGLOBIN A1C % 6 0   EAG mg/dl 126         Results from last 7 days   Lab Units 09/09/19  1326   PROCALCITONIN ng/ml 0 09           Results from last 7 days   Lab Units 09/09/19  1447 09/06/19  1248   CLARITY UA  Clear cloudy   COLOR UA  Yellow jasvir   SPEC GRAV UA  1 025  --    PH UA  5 5  --    GLUCOSE UA mg/dl Negative neg   KETONES UA mg/dl Trace* 1 5   BLOOD UA  Negative 3+++   PROTEIN UA mg/dl Negative neg   NITRITE UA  Negative neg   BILIRUBIN UA  Negative  --    BILIRUBIN UA POC   --  neg   UROBILINOGEN UA E U /dl 0 2 2(35)   LEUKOCYTES UA  Small* 500+++   WBC UA /hpf 4-10*  --    RBC UA /hpf None Seen  --    BACTERIA UA /hpf None Seen  --    EPITHELIAL CELLS WET PREP /hpf Occasional  --            Results from last 7 days   Lab Units 09/06/19  1250   URINE CULTURE  40,000-49,000 cfu/ml Pseudomonas aeruginosa*       Ct    Abd/pelvis    ( 9/9)      Stable bilateral punctate nonobstructing renal calculi  2   Cholelithiasis without evidence of cholecystitis    3   Chronic L1 compression fracture   Interval increase in loss of height, now 50%  Past Medical History:   Diagnosis Date    Anxiety     Cervical spinal stenosis     Hx of chronic ulcerative colitis     Hyperlipidemia     [facility]ia [facility]is ([facility] [address] )     Sleep apnea      Present on Admission:   [facility]henia gravis ([facility] [address] [facility]   CKD (chronic kidney disease), stage III (HCC)   Vertigo   Essential hypertension   Anxiety   Nephrolithiasis      Admitting Diagnosis: Frequency of urination [R35 0]  Age/Sex: 76 y o  male  Admission Orders:    Current Facility-Administered Medications:  cholecalciferol 2,000 Units Oral Daily Henrry MOSES MD   enoxaparin 40 mg Subcutaneous Daily Henrry MOSES MD   hydrochlorothiazide 12 5 mg Oral Daily Henrry MOSES MD   insulin glargine 12 Units Subcutaneous HS Henrry MOSES MD   insulin lispro 1-5 Units Subcutaneous TID AC Lucinda Ambron, DO   insulin lispro 4 Units Subcutaneous TID With Meals Henrry MOSES MD   predniSONE 10 mg Oral Daily Henrry MOSES MD   pyridoxine 100 mg Oral Daily Henrry MOSES MD   saccharomyces boulardii 250 mg Oral BID Henrry MOSES MD       IP CONSULT TO INFECTIOUS DISEASES     Per  ID  Consult:      Asymptomatic bacteriuria  Patient noticed dark urine on Friday, 09/06/2019, but has otherwise had no urinary symptoms  He had an outpatient urine culture on 09/06/2019 which was positive for Pseudomonas  This is patient's 12th urine culture that is positive for Pseudomonas  CT renal stone study showed stable bilateral nonobstructing calculi  Stones may be colonizing his urine with Pseudomonas  Also consider patient may have enlarged prostate contributing to inadequate urinary drainage   He admits to urinating at least 4 times a night  Patient should follow-up with Urology as an outpatient for management  Fortunately the patient is afebrile   His WBC count was mildly increased but he has chronic leukocytosis as below    He received one dose of cefepime but antibiotics were then discontinued  He remains clinically stable  Given there is no clear indication of an active infection I do not recommend ongoing antibiotic treatment at this time   -monitor patient off of antibiotics  -monitor CBC and BMP  -monitor vitals  -monitor urine output     2  Leukocytosis   Upon review of patient's past medical records it appears his leukocytosis is chronic   I suspect this is likely secondary to his daily prednisone use  No clear indication for antibiotic treatment at this time    -monitor patient off antibiotics  -monitor CBC    Network Utilization Review Department  Phone: [phone]; Fax [phone]  [email or web address]  ATTENTION: Please call with any questions or concerns to [phone]  and carefully listen to the prompts so that you are directed to the right person  Send all requests for admission clinical reviews, approved or denied determinations and any other requests to fax [phone]   All voicemails are confidential

## 2019-09-10 NOTE — PLAN OF CARE
Problem: Potential for Falls  Goal: Patient will remain free of falls  Description  INTERVENTIONS:  - Assess patient frequently for physical needs  -  Identify cognitive and physical deficits and behaviors that affect risk of falls    -  Stem fall precautions as indicated by assessment   - Educate patient/family on patient safety including physical limitations  - Instruct patient to call for assistance with activity based on assessment  - Modify environment to reduce risk of injury  - Consider OT/PT consult to assist with strengthening/mobility  Outcome: Adequate for Discharge     Problem: PAIN - ADULT  Goal: Verbalizes/displays adequate comfort level or baseline comfort level  Description  Interventions:  - Encourage patient to monitor pain and request assistance  - Assess pain using appropriate pain scale  - Administer analgesics based on type and severity of pain and evaluate response  - Implement non-pharmacological measures as appropriate and evaluate response  - Consider cultural and social influences on pain and pain management  - Notify physician/advanced practitioner if interventions unsuccessful or patient reports new pain  Outcome: Adequate for Discharge     Problem: INFECTION - ADULT  Goal: Absence or prevention of progression during hospitalization  Description  INTERVENTIONS:  - Assess and monitor for signs and symptoms of infection  - Monitor lab/diagnostic results  - Monitor all insertion sites, i e  indwelling lines, tubes, and drains  - Monitor endotracheal if appropriate and nasal secretions for changes in amount and color  - Stem appropriate cooling/warming therapies per order  - Administer medications as ordered  - Instruct and encourage patient and family to use good hand hygiene technique  - Identify and instruct in appropriate isolation precautions for identified infection/condition  Outcome: Adequate for Discharge  Goal: Absence of fever/infection during neutropenic period  Description  INTERVENTIONS:  - Monitor WBC    Outcome: Adequate for Discharge     Problem: SAFETY ADULT  Goal: Maintain or return to baseline ADL function  Description  INTERVENTIONS:  -  Assess patient's ability to carry out ADLs; assess patient's baseline for ADL function and identify physical deficits which impact ability to perform ADLs (bathing, care of mouth/teeth, toileting, grooming, dressing, etc )  - Assess/evaluate cause of self-care deficits   - Assess range of motion  - Assess patient's mobility; develop plan if impaired  - Assess patient's need for assistive devices and provide as appropriate  - Encourage maximum independence but intervene and supervise when necessary  - Involve family in performance of ADLs  - Assess for home care needs following discharge   - Consider OT consult to assist with ADL evaluation and planning for discharge  - Provide patient education as appropriate  Outcome: Adequate for Discharge  Goal: Maintain or return mobility status to optimal level  Description  INTERVENTIONS:  - Assess patient's baseline mobility status (ambulation, transfers, stairs, etc )    - Identify cognitive and physical deficits and behaviors that affect mobility  - Identify mobility aids required to assist with transfers and/or ambulation (gait belt, sit-to-stand, lift, walker, cane, etc )  - [place] fall precautions as indicated by assessment  - Record patient progress and toleration of activity level on Mobility SBAR; progress patient to next Phase/Stage  - Instruct patient to call for assistance with activity based on assessment  - Consider rehabilitation consult to assist with strengthening/weightbearing, etc   Outcome: Adequate for Discharge     Problem: DISCHARGE PLANNING  Goal: Discharge to home or other facility with appropriate resources  Description  INTERVENTIONS:  - Identify barriers to discharge w/patient and caregiver  - Arrange for needed discharge resources and transportation as appropriate  - Identify discharge learning needs (meds, wound care, etc )  - Arrange for interpretive services to assist at discharge as needed  - Refer to Case Management Department for coordinating discharge planning if the patient needs post-hospital services based on physician/advanced practitioner order or complex needs related to functional status, cognitive ability, or social support system  Outcome: Adequate for Discharge     Problem: Knowledge Deficit  Goal: Patient/family/caregiver demonstrates understanding of disease process, treatment plan, medications, and discharge instructions  Description  Complete learning assessment and assess knowledge base    Interventions:  - Provide teaching at level of understanding  - Provide teaching via preferred learning methods  Outcome: Adequate for Discharge

## 2019-09-11 ENCOUNTER — TELEPHONE (OUTPATIENT)
Dept: UROLOGY | Facility: MEDICAL CENTER | Age: 74
End: 2019-09-11

## 2019-09-11 ENCOUNTER — TRANSITIONAL CARE MANAGEMENT (OUTPATIENT)
Dept: FAMILY MEDICINE CLINIC | Facility: CLINIC | Age: 74
End: 2019-09-11

## 2019-09-11 DIAGNOSIS — N40.1 BENIGN PROSTATIC HYPERPLASIA WITH LOWER URINARY TRACT SYMPTOMS, SYMPTOM DETAILS UNSPECIFIED: Primary | ICD-10-CM

## 2019-09-11 LAB — BACTERIA UR CULT: ABNORMAL

## 2019-09-11 RX ORDER — ALFUZOSIN HYDROCHLORIDE 10 MG/1
10 TABLET, EXTENDED RELEASE ORAL DAILY
Qty: 30 TABLET | Refills: 11 | Status: SHIPPED | OUTPATIENT
Start: 2019-09-11 | End: 2020-05-29 | Stop reason: SINTOL

## 2019-09-11 NOTE — TELEPHONE ENCOUNTER
Patient returned call, patient states that infectious disease stated that he has an enlarged prostate and that could be what could be causing the pseudomonas, they advised patient that he should be taking flomax  Patient feels like he needs to be seen sooner than December because he does not want to go septic again  Please advise

## 2019-09-11 NOTE — TELEPHONE ENCOUNTER
The patient was found to have Pseudomonas colonization as well as stable bilateral punctate calculi  The patient has a visit with me 12/20/19, can change this to MD  No need for urgent visit

## 2019-09-11 NOTE — TELEPHONE ENCOUNTER
Patient of Dr Antione House seen in the [place] end office  Patient advised that he needs a follow up appointment from his hospital visit on 9/9/19  Patient advised that he only wants to see Dr Antione House no one else  Patient advised that if he is not home please leave a message  Please advise

## 2019-09-11 NOTE — TELEPHONE ENCOUNTER
Please send script for flomax or finasteride to patients pharmacy   Going to reschedule patient to 9/18/19 with Dr Jose Maria Bejarano per patient request

## 2019-09-13 ENCOUNTER — APPOINTMENT (OUTPATIENT)
Dept: LAB | Facility: CLINIC | Age: 74
End: 2019-09-13
Payer: MEDICARE

## 2019-09-13 ENCOUNTER — TRANSCRIBE ORDERS (OUTPATIENT)
Dept: LAB | Facility: CLINIC | Age: 74
End: 2019-09-13

## 2019-09-13 DIAGNOSIS — D72.829 LEUKOCYTOSIS, UNSPECIFIED TYPE: ICD-10-CM

## 2019-09-13 DIAGNOSIS — Z12.5 PROSTATE CANCER SCREENING: ICD-10-CM

## 2019-09-13 DIAGNOSIS — D72.829 LEUKOCYTOSIS, UNSPECIFIED TYPE: Primary | ICD-10-CM

## 2019-09-13 DIAGNOSIS — Z12.5 PROSTATE CANCER SCREENING: Primary | ICD-10-CM

## 2019-09-13 DIAGNOSIS — Z11.59 NEED FOR HEPATITIS C SCREENING TEST: ICD-10-CM

## 2019-09-13 LAB
ALBUMIN SERPL BCP-MCNC: 3.8 G/DL (ref 3.5–5)
ALP SERPL-CCNC: 44 U/L (ref 46–116)
ALT SERPL W P-5'-P-CCNC: 38 U/L (ref 12–78)
ANION GAP SERPL CALCULATED.3IONS-SCNC: 5 MMOL/L (ref 4–13)
AST SERPL W P-5'-P-CCNC: 26 U/L (ref 5–45)
BASOPHILS # BLD AUTO: 0.09 THOUSANDS/ΜL (ref 0–0.1)
BASOPHILS # BLD MANUAL: 0.23 THOUSAND/UL (ref 0–0.1)
BASOPHILS NFR BLD AUTO: 1 % (ref 0–1)
BASOPHILS NFR MAR MANUAL: 2 % (ref 0–1)
BILIRUB SERPL-MCNC: 0.6 MG/DL (ref 0.2–1)
BUN SERPL-MCNC: 28 MG/DL (ref 5–25)
CALCIUM SERPL-MCNC: 9.9 MG/DL (ref 8.3–10.1)
CHLORIDE SERPL-SCNC: 102 MMOL/L (ref 100–108)
CO2 SERPL-SCNC: 29 MMOL/L (ref 21–32)
CREAT SERPL-MCNC: 1.76 MG/DL (ref 0.6–1.3)
EOSINOPHIL # BLD AUTO: 0.04 THOUSAND/ΜL (ref 0–0.61)
EOSINOPHIL # BLD MANUAL: 0.11 THOUSAND/UL (ref 0–0.4)
EOSINOPHIL NFR BLD AUTO: 0 % (ref 0–6)
EOSINOPHIL NFR BLD MANUAL: 1 % (ref 0–6)
ERYTHROCYTE [DISTWIDTH] IN BLOOD BY AUTOMATED COUNT: 13.2 % (ref 11.6–15.1)
ERYTHROCYTE [SEDIMENTATION RATE] IN BLOOD: 23 MM/HOUR (ref 0–10)
GFR SERPL CREATININE-BSD FRML MDRD: 37 ML/MIN/1.73SQ M
GLUCOSE SERPL-MCNC: 128 MG/DL (ref 65–140)
HCT VFR BLD AUTO: 47.1 % (ref 36.5–49.3)
HGB BLD-MCNC: 15.6 G/DL (ref 12–17)
IGA SERPL-MCNC: 275 MG/DL (ref 70–400)
IGG SERPL-MCNC: 786 MG/DL (ref 700–1600)
IGM SERPL-MCNC: 77 MG/DL (ref 40–230)
IMM GRANULOCYTES # BLD AUTO: 0.06 THOUSAND/UL (ref 0–0.2)
IMM GRANULOCYTES NFR BLD AUTO: 1 % (ref 0–2)
LYMPHOCYTES # BLD AUTO: 1.58 THOUSAND/UL (ref 0.6–4.47)
LYMPHOCYTES # BLD AUTO: 1.59 THOUSANDS/ΜL (ref 0.6–4.47)
LYMPHOCYTES # BLD AUTO: 14 % (ref 14–44)
LYMPHOCYTES NFR BLD AUTO: 14 % (ref 14–44)
MCH RBC QN AUTO: 31.8 PG (ref 26.8–34.3)
MCHC RBC AUTO-ENTMCNC: 33.1 G/DL (ref 31.4–37.4)
MCV RBC AUTO: 96 FL (ref 82–98)
MONOCYTES # BLD AUTO: 0.58 THOUSAND/ΜL (ref 0.17–1.22)
MONOCYTES # BLD AUTO: 0.9 THOUSAND/UL (ref 0–1.22)
MONOCYTES NFR BLD AUTO: 5 % (ref 4–12)
MONOCYTES NFR BLD: 8 % (ref 4–12)
NEUTROPHILS # BLD AUTO: 8.95 THOUSANDS/ΜL (ref 1.85–7.62)
NEUTROPHILS # BLD MANUAL: 8.48 THOUSAND/UL (ref 1.85–7.62)
NEUTS SEG NFR BLD AUTO: 75 % (ref 43–75)
NEUTS SEG NFR BLD AUTO: 79 % (ref 43–75)
NRBC BLD AUTO-RTO: 0 /100 WBCS
PLATELET # BLD AUTO: 378 THOUSANDS/UL (ref 149–390)
PLATELET BLD QL SMEAR: ADEQUATE
PMV BLD AUTO: 10.5 FL (ref 8.9–12.7)
POTASSIUM SERPL-SCNC: 4.6 MMOL/L (ref 3.5–5.3)
PROT SERPL-MCNC: 7.7 G/DL (ref 6.4–8.2)
PSA SERPL-MCNC: 0.8 NG/ML (ref 0–4)
RBC # BLD AUTO: 4.91 MILLION/UL (ref 3.88–5.62)
SODIUM SERPL-SCNC: 136 MMOL/L (ref 136–145)
WBC # BLD AUTO: 11.31 THOUSAND/UL (ref 4.31–10.16)

## 2019-09-13 PROCEDURE — 36415 COLL VENOUS BLD VENIPUNCTURE: CPT

## 2019-09-13 PROCEDURE — 85025 COMPLETE CBC W/AUTO DIFF WBC: CPT

## 2019-09-13 PROCEDURE — 84165 PROTEIN E-PHORESIS SERUM: CPT

## 2019-09-13 PROCEDURE — G0103 PSA SCREENING: HCPCS

## 2019-09-13 PROCEDURE — 85007 BL SMEAR W/DIFF WBC COUNT: CPT

## 2019-09-13 PROCEDURE — 84165 PROTEIN E-PHORESIS SERUM: CPT | Performed by: PATHOLOGY

## 2019-09-13 PROCEDURE — 80053 COMPREHEN METABOLIC PANEL: CPT

## 2019-09-13 PROCEDURE — 85027 COMPLETE CBC AUTOMATED: CPT

## 2019-09-13 PROCEDURE — 82784 ASSAY IGA/IGD/IGG/IGM EACH: CPT

## 2019-09-13 PROCEDURE — 85652 RBC SED RATE AUTOMATED: CPT

## 2019-09-13 NOTE — TELEPHONE ENCOUNTER
Please let pt know that I reviewed notes from his recent visit with Ana Zuluaga here 09/09 and the hospital notes  When he comes in on 09/18, I'll have him go to the lab for a repeat urinalysis to follow up; if PSA can't be added to the blood in lab from today, then he can get a PSA drawn then   This will be his last PSA, as no more needed after he turns 75  (also looks like due for hep c screen, but I'll research that to make sure HM flag is accurate)

## 2019-09-16 LAB
ALBUMIN SERPL ELPH-MCNC: 4.23 G/DL (ref 3.5–5)
ALBUMIN SERPL ELPH-MCNC: 57.9 % (ref 52–65)
ALPHA1 GLOB SERPL ELPH-MCNC: 0.29 G/DL (ref 0.1–0.4)
ALPHA1 GLOB SERPL ELPH-MCNC: 4 % (ref 2.5–5)
ALPHA2 GLOB SERPL ELPH-MCNC: 1.13 G/DL (ref 0.4–1.2)
ALPHA2 GLOB SERPL ELPH-MCNC: 15.5 % (ref 7–13)
BETA GLOB ABNORMAL SERPL ELPH-MCNC: 0.46 G/DL (ref 0.4–0.8)
BETA1 GLOB SERPL ELPH-MCNC: 6.3 % (ref 5–13)
BETA2 GLOB SERPL ELPH-MCNC: 6.2 % (ref 2–8)
BETA2+GAMMA GLOB SERPL ELPH-MCNC: 0.45 G/DL (ref 0.2–0.5)
GAMMA GLOB ABNORMAL SERPL ELPH-MCNC: 0.74 G/DL (ref 0.5–1.6)
GAMMA GLOB SERPL ELPH-MCNC: 10.1 % (ref 12–22)
IGG/ALB SER: 1.38 {RATIO} (ref 1.1–1.8)
PROT PATTERN SERPL ELPH-IMP: ABNORMAL
PROT SERPL-MCNC: 7.3 G/DL (ref 6.4–8.2)

## 2019-09-16 NOTE — TELEPHONE ENCOUNTER
Patient returned call-  He is very upset  He said after Wednesday- he's done  He doesn't get any better with medicine and the answers he gets, they aren't answers- no answers to his solutions  He said he's not trying to be nasty, he declined being depressed, he's just aggravated, tired and done  No more medicine and no more doctors  He's spending money that he doesn't have on medication  He said he just doesn't feel good- he doesn't want to function like this- he said he's at a dead end road  He said he had PSA and everything last Friday, 9/13

## 2019-09-18 ENCOUNTER — OFFICE VISIT (OUTPATIENT)
Dept: FAMILY MEDICINE CLINIC | Facility: CLINIC | Age: 74
End: 2019-09-18
Payer: MEDICARE

## 2019-09-18 VITALS
TEMPERATURE: 97.8 F | SYSTOLIC BLOOD PRESSURE: 140 MMHG | RESPIRATION RATE: 17 BRPM | HEART RATE: 103 BPM | OXYGEN SATURATION: 96 % | BODY MASS INDEX: 26.88 KG/M2 | WEIGHT: 182 LBS | DIASTOLIC BLOOD PRESSURE: 70 MMHG

## 2019-09-18 DIAGNOSIS — N18.30 CKD (CHRONIC KIDNEY DISEASE), STAGE III (HCC): ICD-10-CM

## 2019-09-18 DIAGNOSIS — I10 ESSENTIAL HYPERTENSION: ICD-10-CM

## 2019-09-18 DIAGNOSIS — N18.30 CONTROLLED TYPE 2 DIABETES MELLITUS WITH STAGE 3 CHRONIC KIDNEY DISEASE, WITH LONG-TERM CURRENT USE OF INSULIN (HCC): ICD-10-CM

## 2019-09-18 DIAGNOSIS — N20.0 NEPHROLITHIASIS: ICD-10-CM

## 2019-09-18 DIAGNOSIS — IMO0001 TRANSITION OF CARE PERFORMED WITH SHARING OF CLINICAL SUMMARY: Primary | ICD-10-CM

## 2019-09-18 DIAGNOSIS — Z22.39 PSEUDOMONAS AERUGINOSA COLONIZATION: ICD-10-CM

## 2019-09-18 DIAGNOSIS — Z79.4 CONTROLLED TYPE 2 DIABETES MELLITUS WITH STAGE 3 CHRONIC KIDNEY DISEASE, WITH LONG-TERM CURRENT USE OF INSULIN (HCC): ICD-10-CM

## 2019-09-18 DIAGNOSIS — G70.00 MYASTHENIA GRAVIS (HCC): ICD-10-CM

## 2019-09-18 DIAGNOSIS — R42 VERTIGO: ICD-10-CM

## 2019-09-18 DIAGNOSIS — E11.22 CONTROLLED TYPE 2 DIABETES MELLITUS WITH STAGE 3 CHRONIC KIDNEY DISEASE, WITH LONG-TERM CURRENT USE OF INSULIN (HCC): ICD-10-CM

## 2019-09-18 PROCEDURE — 99495 TRANSJ CARE MGMT MOD F2F 14D: CPT | Performed by: FAMILY MEDICINE

## 2019-09-18 RX ORDER — LISINOPRIL 2.5 MG/1
2.5 TABLET ORAL DAILY
Qty: 90 TABLET | Refills: 0 | Status: SHIPPED | OUTPATIENT
Start: 2019-09-18 | End: 2020-05-29 | Stop reason: SINTOL

## 2019-09-18 NOTE — PROGRESS NOTES
Assessment/Plan:          Diagnoses and all orders for this visit:    Transition of care performed with sharing of clinical summary    Essential hypertension  -     lisinopril (ZESTRIL) 2 5 mg tablet; Take 1 tablet (2 5 mg total) by mouth daily  -     Ambulatory referral to Nephrology; Future    CKD (chronic kidney disease), stage III ([facility] )  Comments:  creatinine up again to 1 76, for eval by nephrol  Orders:  -     Ambulatory referral to Nephrology; Future    Controlled type 2 diabetes mellitus with stage 3 chronic kidney disease, with long-term current use of insulin ([email or web address] )  -     Ambulatory referral to Nephrology; Future    Nephrolithiasis    Pseudomonas aeruginosa colonization    Vertigo  Comments:  chronic problem,per hpi, needs to have re-eval before getting any neck injection therapies    Myasthenia gravis ([email or web address] )  Comments:  stable, sees neurologist routinely         Subjective:  Chief Complaint   Patient presents with    Transition of Care Management        Patient ID: Tiffany Fletcher is a 76 y o  male      Transition of care and hospital notes reviewed by me  States doing well since d/c  Reports re: new med since in hospital "Got the one filled, but didn't start it yet, see dr Eduardo Parisi on dec 20th"  "Went to vertigo clinic, supposed to get injections in my neck, but now that had been in hospital, have to go back for re-evaluation before getting injections"  He spends a great deal of visit discussing his aunt, 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, stressful for him  In hospital had elevated creatinine to 1 7, then down to 1 6, but repeat labs done few days ago showed up again to 1 76     9/9/2019 - 9/10/2019 (28 hours)  Lexy Fraser [address] [place] [address]   Last attending  Treatment team  Pseudomonas aeruginosa colonization   Principal problem   * Pseudomonas aeruginosa colonization  Hospital Course:   Tiffany Fletcher is a 76 y o  male patient who originally presented to the hospital on 9/9/2019 due to positive urine culture as an outpatient  He had some blood noted in his urine  So his PCP send urinalysis  A came back positive for Pseudomonas so he was told to come to the emergency room  He is having no dysuria  No fever  No signs of sepsis  No abdominal pain  He was seen by infectious disease  They felt that this was likely colonization and not true infection  They recommended no antibiotics and thought he was safe to go home  They did do a CT abdomen and there is no acute abnormality  He does have some kidney stones but they are not causing any obstruction or infection  The patient follow should follow up with his urologist as an outpatient  Assessment & Plan  Spoke with infectious disease  They do not think that this is a true infection  They feel that this is colonization and does NOT need antibiotics      Consults   Attestation signed by Mike Cherry MD at 9/9/2019   I saw/examined the patient  I agree with the Advanced Practitioner's note with the following additions/exceptions:   Briefly the patient is a 26-year-old gentleman with complicated past medical history which includes myasthenia gravis  He has had multiple admissions over the last year because of nephrolithiasis and lithotripsy along with pseudomonal UTI  The patient reports that while visiting his anti here at the hospital he had an episode of large volume urination along with discoloration in his urine  He reported this to his primary care physician who ordered urinalysis along with urine culture on 09/06  He was sent out on Macrobid and urine culture eventually isolated Pseudomonas  Given the positive culture patient was recommended to come to the emergency department for further evaluation  Over the course the last weekend he denied having any fevers, nausea, vomiting, chest pain or shortness of breath  He denies having any dysuria    He reports at baseline that he has frequent urination where he is going every 2-3 hours and also wakes up 4 times the middle the night to go to the bathroom  The patient does have a documented history of BPH however he is not on any medications for it  He has not seen Urology for a year and has not had any repeat imaging to evaluate for his stones  He has no stents in place  He has not had any recent manipulation  On chart review the patient is afebrile  His white blood cell count is elevated at 16 6  No new culture data  Have ordered fosfomycin susceptibility on patient's culture  UA and urine culture ordered now  Patient's other vitals are stable  Patient's white blood cell count seems to be chronically elevated and he reports that he was recently seen by Hematology for this ongoing elevation  Differential unremarkable  Patient also noted with elevated creatinine to 1 7  Chemistries otherwise stable  Previous culture data reviewed in previous Infectious Diseases evaluations reviewed  On exam patient is alert oriented x3  He is very talkative on exam   Lungs are clear to auscultation bilaterally  Cardiac exam with regular rate and rhythm without any murmur  Abdomen is soft nontender to palpation  There is no suprapubic tenderness to palpation on exam   There is no CVA tenderness on exam   There is no spinal or paraspinal muscle tenderness to palpation  Lower extremities without any significant edema  No skin rashes appreciated on exposed skin  Impression/plan:  1  Asymptomatic bacteriuria  2  Persistent leukocytosis  3  History of pseudomonal UTI and nephrolithiasis  4  Acute kidney injury  5  Type 2 diabetes  6  Myasthenia gravis--limiting antibiotic options  7  Multiple drug allergies  Based on patient's overall stability and lack of urinary symptoms I suspect that his urine culture represents asymptomatic bacteriuria  Repeat UA along with urine culture have been ordered  Will stop further antibiotics at this time    Ray blood cell count likely chronically elevated  Patient is likely chronically colonized due to previous history of stones along with reported BPH  Would obtain CT of the abdomen given elevated creatinine to rule out ongoing obstruction/stones  Repeat CBC and chemistry tomorrow  Fluid hydration as per primary service  Will continue to trend fever curve/vitals  Consider Urology evaluation pending CT scan  Patient may benefit from medications to relieve prostate obstruction along with D-mannose  in the setting of chronic colonization  Additional supportive care as per primary  Patient will require chronic follow-up with his primary care physician as well as Urology as an outpatient  States neurologist had rx'd trial of lexapro, but pt read that it can cause vertigo, so didn't take it, he has f/u with neuro next month      Review of Systems   Constitutional: Negative for appetite change, chills, diaphoresis, fever and unexpected weight change  HENT: Negative for nosebleeds, sore throat and trouble swallowing  Eyes: Negative  Respiratory: Negative  Cardiovascular: Negative  Gastrointestinal: Negative  Endocrine: Negative  Genitourinary: Negative for difficulty urinating, dysuria, flank pain, frequency and hematuria  Skin: Negative  Neurological: Negative for tremors, seizures, syncope, facial asymmetry and weakness  Hematological: Negative  Objective:     Physical Exam   Constitutional: He is oriented to person, place, and time  He appears well-developed  He is cooperative  Non-toxic appearance  He does not appear ill  No distress  HENT:   Head: Normocephalic and atraumatic  Mouth/Throat: Uvula is midline, oropharynx is clear and moist and mucous membranes are normal    Eyes: Pupils are equal, round, and reactive to light  Conjunctivae and lids are normal    Neck: Trachea normal  Neck supple  No JVD present  No thyroid mass and no thyromegaly present  Cardiovascular: Normal rate, regular rhythm and normal heart sounds  No edema   Pulmonary/Chest: Effort normal and breath sounds normal    Abdominal: Soft  Bowel sounds are normal  He exhibits no distension, no abdominal bruit and no mass  There is no hepatosplenomegaly  There is no tenderness  There is no CVA tenderness  Hernia confirmed negative in the ventral area  Lymphadenopathy:     He has no cervical adenopathy  Right: No supraclavicular adenopathy present  Left: No supraclavicular adenopathy present  Neurological: He is alert and oriented to person, place, and time  He has normal strength and normal reflexes  Gait normal    Skin: Skin is warm and dry  Capillary refill takes less than 2 seconds  He is not diaphoretic  No cyanosis  No pallor  Psychiatric: He has a normal mood and affect  His behavior is normal  Judgment and thought content normal  Cognition and memory are normal    Nursing note and vitals reviewed  Vitals:    09/18/19 1403   BP: 140/70   Pulse: 103   Resp: 17   Temp: 97 8 °F (36 6 °C)   SpO2: 96%   Weight: 82 6 kg (182 lb)       Transitional Care Management Review:  Ekaterina Nicole is a 76 y o  male here for TCM follow up  During the TCM phone call patient stated:    TCM Call (since 8/26/2019)     Date and time call was made  9/11/2019  9:21 AM    Hospital care reviewed  Records reviewed    Patient was hospitialized at  [facility]    Date of Admission  09/09/19    Date of discharge  09/10/19    Diagnosis  Pseudomonas aeruginosa colonization    Disposition  Home    Current Symptoms  -- <img src='C:FILES (X86)      TCM Call (since 8/26/2019)     Post hospital issues  None    Scheduled for follow up?   Yes    Patients specialists  Urologist    I have advised the patient to call PCP with any new or worsening symptoms  Melony Turner MA     Living Arrangements  Spouse or Significiant other    Are you recieving any outpatient services  No    Are you recieving home care services  No    Are you using any community resources  No    Counseling  Patient          Brady Denver,       BMI Counseling: Body mass index is 26 88 kg/m²  The BMI is above normal  No BMI follow-up plan is appropriate  Patient is 72 years old and weight reduction/weight gain would further complicate their underlying illness

## 2019-09-18 NOTE — PROGRESS NOTES
Diabetic Foot Exam    Patient's shoes and socks removed  Right Foot/Ankle   Right Foot Inspection  Skin Exam: skin normal, skin intact and dry skin no warmth, no callus, no erythema, no maceration, no abnormal color, no pre-ulcer, no ulcer and no callus                          Toe Exam: ROM and strength within normal limits  Sensory       Monofilament testing: intact  Vascular  Capillary refills: < 3 seconds  The right DP pulse is 2+  The right PT pulse is 2+  Left Foot/Ankle  Left Foot Inspection  Skin Exam: skin normal, skin intact, dry skin and callusno warmth, no erythema, no maceration, normal color, no pre-ulcer and no ulcer                         Toe Exam: ROM and strength within normal limits                   Sensory       Monofilament: intact  Vascular  Capillary refills: < 3 seconds  The left DP pulse is 2+  The left PT pulse is 2+  Assign Risk Category:  No deformity present; No loss of protective sensation;  No weak pulses       Risk: 0

## 2019-09-24 ENCOUNTER — OFFICE VISIT (OUTPATIENT)
Dept: FAMILY MEDICINE CLINIC | Facility: CLINIC | Age: 74
End: 2019-09-24
Payer: MEDICARE

## 2019-09-24 VITALS
RESPIRATION RATE: 17 BRPM | TEMPERATURE: 98 F | HEART RATE: 114 BPM | BODY MASS INDEX: 26.14 KG/M2 | OXYGEN SATURATION: 96 % | WEIGHT: 177 LBS | DIASTOLIC BLOOD PRESSURE: 60 MMHG | SYSTOLIC BLOOD PRESSURE: 100 MMHG

## 2019-09-24 DIAGNOSIS — Z79.4 CONTROLLED TYPE 2 DIABETES MELLITUS WITHOUT COMPLICATION, WITH LONG-TERM CURRENT USE OF INSULIN (HCC): ICD-10-CM

## 2019-09-24 DIAGNOSIS — E11.9 CONTROLLED TYPE 2 DIABETES MELLITUS WITHOUT COMPLICATION, WITH LONG-TERM CURRENT USE OF INSULIN (HCC): ICD-10-CM

## 2019-09-24 DIAGNOSIS — R35.0 URINE FREQUENCY: Primary | ICD-10-CM

## 2019-09-24 DIAGNOSIS — R00.0 TACHYCARDIA: ICD-10-CM

## 2019-09-24 DIAGNOSIS — N40.1 BENIGN PROSTATIC HYPERPLASIA WITH LOWER URINARY TRACT SYMPTOMS, SYMPTOM DETAILS UNSPECIFIED: ICD-10-CM

## 2019-09-24 DIAGNOSIS — R42 DIZZINESS: ICD-10-CM

## 2019-09-24 DIAGNOSIS — R82.90 URINE ABNORMALITY: ICD-10-CM

## 2019-09-24 LAB
SL AMB  POCT GLUCOSE, UA: ABNORMAL
SL AMB LEUKOCYTE ESTERASE,UA: ABNORMAL
SL AMB POCT BILIRUBIN,UA: ABNORMAL
SL AMB POCT BLOOD,UA: ABNORMAL
SL AMB POCT CLARITY,UA: ABNORMAL
SL AMB POCT COLOR,UA: ABNORMAL
SL AMB POCT KETONES,UA: ABNORMAL
SL AMB POCT NITRITE,UA: ABNORMAL
SL AMB POCT PH,UA: 6
SL AMB POCT SPECIFIC GRAVITY,UA: 1.03
SL AMB POCT URINE PROTEIN: ABNORMAL
SL AMB POCT UROBILINOGEN: ABNORMAL

## 2019-09-24 PROCEDURE — 99213 OFFICE O/P EST LOW 20 MIN: CPT | Performed by: FAMILY MEDICINE

## 2019-09-24 PROCEDURE — 81002 URINALYSIS NONAUTO W/O SCOPE: CPT | Performed by: FAMILY MEDICINE

## 2019-09-24 NOTE — PROGRESS NOTES
Progress Note - Dia Holguin 68 y o  male MRN: [id number]    Unit/Bed#: Se 68 2 -01 Encounter: [id number]      Subjective: The patient is feeling well  He denies abdominal pain, nausea, or vomiting  His dysuria has resolved  He has no chest pain or shortness of breath  Physical Exam:   Temp:  [97 °F (36 1 °C)-97 9 °F (36 6 °C)] 97 9 °F (36 6 °C)  HR:  [61-76] 72  Resp:  [18-19] 18  BP: (107-145)/(72-85) 107/72    Gen:  Well-developed, well-nourished, in no distress  Neck:  Supple  No lymphadenopathy, goiter, or bruit  Heart:  Regular rhythm  No murmur, gallop, or rub  Lungs:  Clear to auscultation and percussion  No wheezing, rales, or rhonchi    Abd:  Soft with active bowel sounds  No mass, tenderness, or organomegaly  Extremities:  No clubbing, cyanosis, or edema  No calf tenderness  Neuro:  Alert and oriented  No focal sign  Skin:  Warm and dry      LABS:  No new labs        Patient Active Problem List   Diagnosis    Pseudomonas urinary tract infection    [facility]    Nephrolithiasis    MARSHALL (acute kidney injury) ([facility] )    Controlled type 2 diabetes mellitus without complication, with long-term current use of insulin ([facility]    CKD (chronic kidney disease), stage III    Hypercalcemia    Vertigo    Antibiotic-induced yeast infection    Essential hypertension    Actinic keratosis    Anxiety as acute reaction to gross stress    Benign non-nodular prostatic hyperplasia with lower urinary tract symptoms    Cervical radiculopathy    Cervical spinal stenosis    Diverticulosis    Elevated bilirubin    Hyperlipidemia    Kidney atrophy    Left anterior hemiblock    Microscopic hematuria    Polyneuropathy    RBBB (right bundle branch block)    Sleep apnea    Tarsal tunnel syndrome of right side    Tuberculosis exposure    Psychosocial stressors    Dysuria       Assessment/Plan:  1  Pseudomonas urinary tract infection  2  Myasthenia gravis  3    Type 2 diabetes, [No] : In the past 12 months have you used drugs other than those required for medical reasons? No [No falls in past year] : Patient reported no falls in the past year stable on diet  4  Nephrolithiasis  5  Hypertension  6  BPH    The patient is doing well  He will require 7 days of IV cefepime  No urologic intervention is planned during this hospitalization  PICC catheter will be arranged  The situation will be discussed with case management      VTE Pharmacologic Prophylaxis: Heparin  VTE Mechanical Prophylaxis: sequential compression device [0] : 2) Feeling down, depressed, or hopeless: Not at all (0) [] : No

## 2019-09-24 NOTE — PROGRESS NOTES
Assessment/Plan:       Diagnoses and all orders for this visit:    Urine frequency  -     POCT urine dip  -     UA w Reflex to Microscopic w Reflex to Culture - Clinic Collect    Dizziness  -     UA w Reflex to Microscopic w Reflex to Culture - Clinic Collect    Urine abnormality  -     UA w Reflex to Microscopic w Reflex to Culture - Clinic Collect    Tachycardia  Comments:  likely related to mild dehydration- advised increase fluids    Benign prostatic hyperplasia with lower urinary tract symptoms, symptom details unspecified    Controlled type 2 diabetes mellitus without complication, with long-term current use of insulin (HCC)          Subjective:   Chief Complaint   Patient presents with    Dizziness     feel in the bank yesterday    Abdominal Pain     saturday thought he was passing a stone         Patient ID: Xuan De Anda is a 76 y o  male  Here for acute problem  C/o "Friday getting muscle spasms, couldn't pee, Saturday decided to take one of the pills for the BPH, and could pee normally, slept all night, then felt ok all day Sunday, then took one pill Sunday, yesterday got up- extreme dizziness, fell against the counter in the bank, then read the side effects, so didn't take any more"  (had been given rx flomax from urologist)  Still having urinary frequency, reports dizziness better, but not gone       Jessica Ruiz     9/11/19 12:02 PM   Note   Patient returned call, patient states that infectious disease stated that he has an enlarged prostate and that could be what could be causing the pseudomonas, they advised patient that he should be taking flomax  Patient feels like he needs to be seen sooner than December because he does not want to go septic again  Please advise  Jessica Ruiz     9/11/19 1:10 PM   Note   Please send script for flomax or finasteride to patients pharmacy   Going to reschedule patient to 9/18/19 with Dr Nona Madden per patient request    Gary Quick PA-C     9/11/19 2:01 PM   Note   Alfuzosin sent as patient allergic to sulfa                The following portions of the patient's history were reviewed and updated as appropriate: allergies, current medications, past family history, past medical history, past social history, past surgical history and problem list     Review of Systems   Constitutional: Positive for activity change and fatigue  Negative for chills, diaphoresis, fever and unexpected weight change  Respiratory: Negative  Cardiovascular: Negative  Gastrointestinal: Negative  Genitourinary: Negative for decreased urine volume, flank pain, frequency and hematuria  Skin: Negative  Neurological: Positive for dizziness and weakness (generalized)  Negative for tremors, seizures, syncope, facial asymmetry, speech difficulty, numbness and headaches  Hematological: Negative  Objective:      /60   Pulse (!) 114   Temp 98 °F (36 7 °C)   Resp 17   Wt 80 3 kg (177 lb)   SpO2 96%   BMI 26 14 kg/m²          Physical Exam   Constitutional: He appears well-developed  He is cooperative  Non-toxic appearance  He does not appear ill  No distress  HENT:   Right Ear: Tympanic membrane and ear canal normal    Left Ear: Tympanic membrane and ear canal normal    Mouth/Throat: Uvula is midline  Mucous membranes are not pale, dry and not cyanotic  No oropharyngeal exudate, posterior oropharyngeal edema, posterior oropharyngeal erythema or tonsillar abscesses  Tonsils are 0 on the right  Tonsils are 0 on the left  Eyes: Pupils are equal, round, and reactive to light  Conjunctivae, EOM and lids are normal    Cardiovascular: Regular rhythm, normal heart sounds and normal pulses  Tachycardia present  Pulmonary/Chest: Effort normal and breath sounds normal    Abdominal: Soft  Bowel sounds are normal  He exhibits no distension and no mass  There is no hepatosplenomegaly  There is no tenderness  There is no CVA tenderness     Neurological: He is alert    Skin: Skin is warm and dry  Capillary refill takes less than 2 seconds  He is not diaphoretic  No pallor  Slightly decreased turgor (patient reports has not drank much water today)   Nursing note and vitals reviewed

## 2019-10-18 DIAGNOSIS — E11.649 CONTROLLED TYPE 2 DIABETES MELLITUS WITH HYPOGLYCEMIA, WITH LONG-TERM CURRENT USE OF INSULIN (HCC): Primary | ICD-10-CM

## 2019-10-18 DIAGNOSIS — Z79.4 CONTROLLED TYPE 2 DIABETES MELLITUS WITH HYPOGLYCEMIA, WITH LONG-TERM CURRENT USE OF INSULIN (HCC): Primary | ICD-10-CM

## 2019-10-19 RX ORDER — INSULIN ASPART 100 [IU]/ML
INJECTION, SOLUTION INTRAVENOUS; SUBCUTANEOUS
Qty: 15 ML | Refills: 0 | Status: SHIPPED | OUTPATIENT
Start: 2019-10-19 | End: 2020-03-27 | Stop reason: SDUPTHER

## 2019-10-21 DIAGNOSIS — Z79.4 CONTROLLED TYPE 2 DIABETES MELLITUS WITHOUT COMPLICATION, WITH LONG-TERM CURRENT USE OF INSULIN (HCC): ICD-10-CM

## 2019-10-21 DIAGNOSIS — E11.9 CONTROLLED TYPE 2 DIABETES MELLITUS WITHOUT COMPLICATION, WITH LONG-TERM CURRENT USE OF INSULIN (HCC): ICD-10-CM

## 2019-10-22 ENCOUNTER — APPOINTMENT (OUTPATIENT)
Dept: RADIOLOGY | Facility: HOSPITAL | Age: 74
End: 2019-10-22
Payer: MEDICARE

## 2019-10-22 ENCOUNTER — TELEPHONE (OUTPATIENT)
Dept: FAMILY MEDICINE CLINIC | Facility: CLINIC | Age: 74
End: 2019-10-22

## 2019-10-22 ENCOUNTER — PATIENT OUTREACH (OUTPATIENT)
Dept: FAMILY MEDICINE CLINIC | Facility: CLINIC | Age: 74
End: 2019-10-22

## 2019-10-22 DIAGNOSIS — R10.9 ABDOMINAL DISCOMFORT: ICD-10-CM

## 2019-10-22 DIAGNOSIS — K59.00 CONSTIPATION, UNSPECIFIED CONSTIPATION TYPE: ICD-10-CM

## 2019-10-22 DIAGNOSIS — K59.00 CONSTIPATION, UNSPECIFIED CONSTIPATION TYPE: Primary | ICD-10-CM

## 2019-10-22 PROCEDURE — 74022 RADEX COMPL AQT ABD SERIES: CPT

## 2019-10-22 RX ORDER — INSULIN GLARGINE 100 [IU]/ML
12 INJECTION, SOLUTION SUBCUTANEOUS
Qty: 1000 UNITS | Refills: 1 | Status: SHIPPED | OUTPATIENT
Start: 2019-10-22 | End: 2020-03-24 | Stop reason: SDUPTHER

## 2019-10-22 NOTE — TELEPHONE ENCOUNTER
Please have pt get abd xray done - I put in order- and start regimen of milk of magnesia mixed with prune juice 4 ounces once a day   Also try to get him in here for appt

## 2019-10-22 NOTE — TELEPHONE ENCOUNTER
Per Dr Taylor Ho its his dose of milk of magnesia and 4oz of prune juice daily  If not cleared up within 24hrs then do an enema  Pt is going now for xray, will call when finished so we can call the reading room to get it read ASAP

## 2019-10-22 NOTE — TELEPHONE ENCOUNTER
Pt called to let  Me know he went for his xray and that he will not be taking the milk of magnesium and prune juice till after he gets the results

## 2019-10-22 NOTE — PROGRESS NOTES
Outpatient Care Management Note:  RE:Received a call from pt stating that he has not been able to have a bowel movement since Saturday and this has been bothering him  He feels nauseated but denies abdominal fullness or discomfort  He feels like he has been irregular for weeks now  Pt used laxatives on Saturday he states and he states that he 'went a lot' but has also eaten regularly since then  He had an abdominal xray today because of this but has not gotten results back as of yet  Once he does, he states that he will take the recommended mixture of things as recommended by [facility]

## 2019-10-22 NOTE — TELEPHONE ENCOUNTER
Pt called in with c/o nausea, constipated and adominal pain for weeks  Pt stated his last good BM was Saturday after Jenifer Coronel gave him a laxative and natural tea  Pt stated it was between solid and diarrhea  Pt stated he had a little movement yesterday but not enough to say he actually went  I explained to pt that he should og to the ER due to needing testing ASAP  PT would like your opinion and wants to know if you will call ahead or put in a referral for him to go  Pt will be home waiting for a call

## 2019-10-29 ENCOUNTER — TELEPHONE (OUTPATIENT)
Dept: NEUROLOGY | Facility: CLINIC | Age: 74
End: 2019-10-29

## 2019-10-29 ENCOUNTER — TELEPHONE (OUTPATIENT)
Dept: FAMILY MEDICINE CLINIC | Facility: CLINIC | Age: 74
End: 2019-10-29

## 2019-10-29 ENCOUNTER — OFFICE VISIT (OUTPATIENT)
Dept: NEPHROLOGY | Facility: CLINIC | Age: 74
End: 2019-10-29
Payer: MEDICARE

## 2019-10-29 VITALS
WEIGHT: 172 LBS | SYSTOLIC BLOOD PRESSURE: 100 MMHG | HEIGHT: 69 IN | BODY MASS INDEX: 25.48 KG/M2 | DIASTOLIC BLOOD PRESSURE: 68 MMHG

## 2019-10-29 DIAGNOSIS — N20.0 NEPHROLITHIASIS: ICD-10-CM

## 2019-10-29 DIAGNOSIS — I10 ESSENTIAL HYPERTENSION: ICD-10-CM

## 2019-10-29 DIAGNOSIS — E11.649 CONTROLLED TYPE 2 DIABETES MELLITUS WITH HYPOGLYCEMIA, WITH LONG-TERM CURRENT USE OF INSULIN (HCC): ICD-10-CM

## 2019-10-29 DIAGNOSIS — N18.30 CKD (CHRONIC KIDNEY DISEASE), STAGE III (HCC): Primary | ICD-10-CM

## 2019-10-29 DIAGNOSIS — Z79.4 CONTROLLED TYPE 2 DIABETES MELLITUS WITH STAGE 3 CHRONIC KIDNEY DISEASE, WITH LONG-TERM CURRENT USE OF INSULIN (HCC): ICD-10-CM

## 2019-10-29 DIAGNOSIS — N18.30 CONTROLLED TYPE 2 DIABETES MELLITUS WITH STAGE 3 CHRONIC KIDNEY DISEASE, WITH LONG-TERM CURRENT USE OF INSULIN (HCC): ICD-10-CM

## 2019-10-29 DIAGNOSIS — E11.22 CONTROLLED TYPE 2 DIABETES MELLITUS WITH STAGE 3 CHRONIC KIDNEY DISEASE, WITH LONG-TERM CURRENT USE OF INSULIN (HCC): ICD-10-CM

## 2019-10-29 DIAGNOSIS — Z79.4 CONTROLLED TYPE 2 DIABETES MELLITUS WITH HYPOGLYCEMIA, WITH LONG-TERM CURRENT USE OF INSULIN (HCC): ICD-10-CM

## 2019-10-29 PROCEDURE — 99214 OFFICE O/P EST MOD 30 MIN: CPT | Performed by: INTERNAL MEDICINE

## 2019-10-29 NOTE — PATIENT INSTRUCTIONS
- check blood pressure routinely at home if the top number is less than 110 then hold lisinopril  - get 24 hr urine test done  - stop vit D  - call me 2 weeks after sedning your urine test so I can get the results    - Please call me in 10 days after having your blood work done to review the results if you do not hear back from me or my office, as I may have not received the results  - please remember to perform blood work prior to the next visit  - Please call if the blood pressure top number is greater than 150 or less than 110 consistently  - Please call if you are gaining more than 2lbs in 2 days for adjustment of water pills   ~ Please AVOID the following pain medications  LIST OF NSAIDS (NONSTEROIDAL ANTI-INFLAMMATORY DRUGS) AND MCINTOSH-2 INHIBITORS    DIFLUNISAL (DOLOBID)  IBUPROFEN (MOTRIN, ADVIL)  FLURBIPROFEN (ANSAID)  KETOPROFEN (ORUDIS, ORUVAIL)  FENOPROFEN (NALFON)  NABUMETONE (RELAFEN)  PIROXICAM (FELDENE)  NAPROXEN (ALEVE, NAPROSYN, NAPRELAN, ANAPROX)  DICLOFENAC (VOLTAREN, CATAFLAM)  INDOMETHACIN (INDOCIN)  SULINDAC (CLINORIL)  TOLMETIN (TOLETIN)  ETODOLAC (LODINE)  MELOXICAM (MOBIC)  KETOROLAC (TORADOL)  OXAPROZIN (DAYPRO)  CELECOXIB (CELEBREX)    Phosphorus diet  Follow a low phosphorus diet      Avoid these higher phosphorus foods: Choose these lower phosphorus foods:   Milk, pudding or yogurt (from animals and from many soy varieties) Rice milk (unfortified), nondairy creamer (if it doesn't have terms in the ingredients list that contain the letters "phos")   Hard cheeses, ricotta or cottage cheese, fat-free cream cheese Regular and low-fat cream cheese   Ice cream or frozen yogurt Sherbet or frozen fruit pops   Soups made with higher phosphorus ingredients (milk, dried peas, beans, lentils) Soups made with lower phosphorus ingredients (broth- or water-based with other lower phosphorus ingredients)   Whole grains, including whole-grain breads, crackers, cereal, rice and pasta Refined grains, including white bread, crackers, cereals, rice and pasta   Quick breads, biscuits, cornbread, muffins, pancakes or waffles Homemade refined (white) dinner rolls, bagels or English muffins   Dried peas (split, black-eyed), beans (black, garbanzo, lima, kidney, navy, hobson) or lentils Green peas (canned, frozen), green beans or wax beans   Organ meats, walleye, pollock or sardines Lean beef, pork, lamb, poultry or other fish   Nuts and seeds Popcorn   Peanut butter and other nut butters Jam, jelly or honey   Chocolate, including chocolate drinks Carob (chocolate-flavored) candy, hard candy or gumdrops   Chu and pepper-type sodas, flavored hanley, bottled teas (if a term in the ingredients list contains the letters "phos") Lemon-lime soda, ginger ale or root beer, plain water     Follow a moderate potassium diet  Kidney Stone Prevention    Fluid Intake    A high fluid intake is one of the most important cornerstones of kidney stone dietary prevention  A sufficiently dilute urine will prevent the individual chemical components of stones from becoming concentrated enough to precipitate out of solution, keeping them instead in their dissolved state  A high urine output also may reduce stone from forming through "flushing" out of stone components and prevention of urine stagnation  In addition to stone benefits, increased water intake has been shown to have a multitude of other benefits, including improved alertness, better skin appearance, enhanced physical performance, reduced constipation and enhanced weight loss  The average daily urine output for normal healthy adults is 1 2 liters a day, ranging from 1 to 2 liters in most individuals and varying with body weight and gender  In stone formers, however, a higher daily urine output is required for stone prevention and achieving a daily volume of at least 2 0 to 2 5 liters a day can significantly reduce the recurrence of future stones   In a randomized study of stone formers who were given specific instructions to increase their fluid intake compared to stone formers told to not change their diet, those given specific fluid instructions achieved a high urine output of 2 6 liters a day versus 1 0 liters a day in those not given dietary instructions  Over a period of five years, the high fluid intake group was half as likely to form new stones as compared to the normal fluid intake group (Jeffry et al, J Urol 1996)  We recommend that most stone formers increase their daily fluid intake by one liter (an additional two 16 oz water bottles or two tall glasses a day) in order to achieve a urine output of 2 5 liters a day  (One liter = 4 2 8-oz glasses or 34 oz)  Alternatively, a 24 hour urine collection can be performed to guide fluid intake to achieve 2 5 liters of urine output in a specific patient  Type of Fluid Intake    The type of fluid intake is generally less important than the total intake  While drinking water is our preferred recommendation because it is inexpensive and contains no calories, for stone patients who do not enjoy drinking water, any beverage will be beneficial in reducing stone risk  Contrary to popular belief, studies have found that an increased intake of tea, coffee, and alcoholic beverage actually reduces the risk of stones, possibly because of an associated increase in urine output (John et al, Am J of Epidemiology, 1996)  While tea contains high levels of oxalate, this does not appear to result in increased stone formation for the reasons discussed below in discussion on oxalate  Soda intake (including derek) and milk intake also do not appear to increase the risk of stones  Citrus Fruit Juices    Citrus juices, including lemon juice and orange juice, contain citrate, which acts as a stone inhibitor for calcium based stones   Citrate seems to do this by binding calcium, making it unavailable to combine with oxalate or phosphate: a necessary first step in the formation of stones  Citrate also seems to make it more difficult for stones to grow once they've formed  Drinking citrus juice in the form of concentrated lemon juice mixed with water has been shown to effectively increase urinary citrate levels and reduce urinary calcium levels, both of which will reduce stone risk  Orange juice will similarly increase urinary citrate levels  However, orange juice appears to also increase urinary oxalate levels ( a stone promoter)  Other sources of citrate, including grapefruit juice, have had less research completed confirming their beneficial effects on urinary citrate levels  Therefore, lemon juice is typically favored over the other citrus juices as a natural method to increase urinary citrate levels  Many patients find drinking citrus juices to be an attractive alternative to pharmaceutical treatment with potassium citrate  We recommend that stone formers consider supplementing their daily fluid intake with a mixture of 60 ml of concentrated lemon juice in one liter of water to increase their urinary citrate levels  Salt    A high sodium intake increases the risk of stone formation by increasing calcium levels and decreasing citrate (a stone inhibitor) levels in urine  Additionally, high sodium intake will impair the ability of medications such as hydrochlorothiazide to effectively reduce calcium levels in urine  A study of stone formers who were kept on a strict diet with a maximum daily sodium intake of 50 mmol (1200 mg) in addition to a reduced protein diet demonstrated that the low sodium diet was effective in reducing stone recurrence by 50% as compared to the low calcium diet      We recommend that stone formers aim to follow the FDA's guideline of limiting salt intake to 2300 mg of sodium a day in the general population and 1500 mg of sodium a day in those with hypertension,  Americans, or middle aged and older adults  2300 mg is equivalent to about 1 teaspoon of table salt  The best way to determine the salt content of your food is to read the nutrition label  Processed foods tend to contain higher amounts of salt  Choose low sodium options whenever possible  · 1 cup of canned chicken noodle soup contains 870 mg of sodium  · A fried chicken drumstick contains 310 mg of sodium  · A serving of shrimp contains 240 mg of sodium  · 2 slices of white bread contains 200 mg of sodium  · 15 potato chips contain 180 mg of sodium  · 1 container of strawberry yogurt contains 85 mg of sodium  · 1 tomato contains 20 mg of sodium  · 1 apple contains 0 mg of sodium  In addition to lowering the risk of stones, a low sodium intake helps to control or prevent high blood pressure, which can lead to heart disease, stroke, heart failure, and kidney disease  Animal Protein    Animal protein in meat products increases the risk of stone by increasing calcium, oxalate, and uric acid levels in urine  All three of these changes increase the risk of stones  In studies comparing high meat eaters versus low meat eaters, high meat eaters were found to be at increased risk of forming stones  A randomized study of stone formers restricted to a low meat intake of 52 grams a day (equivalent to 8 oz of beef) in combination with sodium restriction found that the combination reduced stone recurrence by 50% compared to calcium restriction alone (Jeffry et al, [facility] 2002)  We recommend that most stone formers try to reduce their meat intake to 6 oz a day  This includes all types of meat: beef, pork, poultry, and seafood  The USDA has recommended a daily allowance of 5-6 oz of protein intake among adults  They also recommend choosing non-meat protein foods such as nuts and beans instead of meat sources  Protein from non-meat sources does not appear to increase the risk of stones  · A small steak contains about 3-4 oz of protein    · A quarter pound hamburger with cheese contains 4 oz of protein  · A chicken breast contains about 5 oz of protein, a chicken thigh about 2 5 oz, a chicken drumstick about 1 5 oz  · One 5 oz can of tuna contains 5 oz of protein  · 1 medium egg contains 1 oz of protein  Lowering your animal protein intake and eating more fruits and vegetables also benefits your overall health by limiting the amount of saturated fats and cholesterol in your diet  This helps to reduce your risk of cardiovascular disease  Oxalate    While oxalate plays an important role in the development of calcium oxalate stones, dietary restriction does not appear to be effective in reducing the risk of stones in the majority of patients  About 40% of urinary oxalate comes from dietary sources while the remainder is naturally made within the liver  Therefore, reducing oxalate dietary intake does not always have a significant impact on total urinary oxalate levels  Oxalate is found in many vegetable and fruits, including many healthy dietary choices often making it difficult to achieve a low oxalate diet  Because of these issues, oxalate avoidance is beneficial primarily in those individuals with specific abnormalities that lead to high oxalate urinary levels  We recommend that most stone formers should maintain a normal oxalate intake without the need for oxalate restriction  High oxalate intake should be avoided in individuals found to have high urinary oxalate levels on metabolic evaluation  Oxalate restriction may be beneficial in certain individuals with high urinary oxalate levels  Oxalate Rich Foods    Tea (black)  Spinach  Mustard Greens  Chard  Beets  Rhubarb  Okra  Berries  Chocolate  Nuts  Sweet Potatoes        Calcium    Kidney Stone formers often question whether to stop or reduce their calcium intake   Despite the fact that calcium is a major component of 75% of stones, excessive calcium intake is vary rarely the cause of stone formation  In fact, several studies have shown that restricting calcium intake in most stone formers actually increases the number of stones they develop  This appears to happen because when less calcium is ingested, it becomes easier for oxalate (which normally binds with calcium in the gut) to be absorbed  Higher levels of oxalate in the urine then lead to an increase in stone risk  Calcium obtained from dietary sources appears to be better than calcium from supplements in regards to lowering stone risk because supplements can actually increase your risk of stones slightly (by 17%) while dietary calcium intake is instead associated with lower stone risk  If you need to take supplements, taking them during meals appear to be better in terms of stone risk  We recommend that stone formers maintain a normal calcium intake, preferably from dietary sources  Female stone formers taking calcium supplementation to prevent osteoporosis experience a slightly increased risk of stones (17%) which needs to be balanced against their risk of osteoporosis

## 2019-10-29 NOTE — TELEPHONE ENCOUNTER
Called [facility] in [facility] and spoke with the pharmacist and she stated it is the insurance stopping him at 3 pens for 75 days- 12U daily  I explained to the pharmacists that the patient wants a full 90 day supply so he does not need to pay more money out of pocket for extra pens  The pharmacist stated pt has a script ready to be picked up for a 75 day supply  I asked the pharmacist if she can please try to run a new claim and get the patient a 90 day supply  The pharmacist ran a new claim and they are going to give the pt 5 pens with instructions of 12U at bedtime, which is 1 box full, which is 125 days worth, which is going to cost him $105  I told the pharmacist I will call the patient to inform him  I called pt to inform him of this information and he was very pleased with the outcome  Pt stated that the 5 pens is what he was getting, but then they got new pharmacists and everything got messed up  I explained to pt to please make sure he is getting 5 pens(1box full) of insulin before he pays for anything  Pt agreed with me and thanked me and hung up

## 2019-10-29 NOTE — PROGRESS NOTES
Nephrology Follow up Consultation  Eileen Ray 76 y o  male MRN: [id number]            BACKGROUND:  Eileen Ray is a 76 y o male who was referred by Rhiannon Hodge DO for evaluation of Follow-up; Hypertension; and Chronic Kidney Disease    ASSESSMENT / PLAN:   76 y o   male with pmh of multiple co-morbidities including hypertension, diabetes, hyperlipidemia, myasthenia gravis, nephrolithiasis and CKD stage 3 presents to the office for routine follow-up  1  CKD stage III:  - Patient has a baseline creatinine of 1 3-1 7mg/dL  Most recent labs show a Creatinine of 1 76 mg/dL  Renal function remains stable  - likely has underlying CKD secondary to hypertensive nephrosclerosis plus diabetic glomerular nodular sclerosis plus age-related nephron loss plus prior episode of acute kidney injury non dialysis requiring plus NSAID nephropathy   - SPEP from September 13, 2019 within normal limits  - CT abdomen from September 9, 2019 showed stable bilateral nonobstructing renal calculi  Exophytic 2 4 cm cyst from the upper pole of the left kidney  - Proteinuria - most recent microalbumin to creatinine ratio of 12 mg/dL in June 2019  Will check UA, spot urine protein and creatinine    - Acid base and lytes stable  - Clinically the patient appears to be euvolemic  - Recommend to avoid use of NSAIDs, nephrotoxins  Caution advised with regards to exposure to IV contrast dye    - Discussed with the patient in depth male renal status, including the possible etiologies for CKD  - Advised the patient that when his GFR is close to 20mL/min then will start discussing about RRT(renal replacement therapy) options such as renal transplant, peritoneal dialysis and hemodialysis  - Informed the patient about the various options for Renal Replacement therapy    - Discussed with the patient how we need to work together to delay the progression of CKD with optimal BP control based on their age and co-morbidities, optimal BS control with HbA1c of <7% and trying to reduce proteinuria by the use of anti-proteinuric agents  - advised patient to stop his Motrin    2  Hypertension:  - Patient is on potassium 99 mg p o  bid, lisinopril 2 5 mg p o  Q day,  - advised patient of proper means of checking blood pressures at home and if SBP is consistently less than 110 then to stop his lisinopril  As it may be contributing to his lightheadedness/dizziness  - Goal BP of < 140/90 based on age and comorbidities  - Instructed to follow low sodium (2gm)diet   - Advised to hold ACEI/ARBs if patient suffers from dehydration due to gastrointestinal losses due to risk of MARSHALL secondary to failure to autoregulate  3  Hemoglobin:  - Goal Hb of 10-12 g/dL  - Most recent labs suggestive of 15 6 grams/deciliter  - no role for IV iron at this time  - being followed by Heme-Onc for erythrocytosis and leukocytosis, last seen September 5, 2019  4 CKD-MBD(Mineral Bone Disease): - Based on patients CKD stage following is the goal of therapy  - Maintain calcium phosphorus product of < 55   - Stage 3 CKD - Goal Ca 8 5-10 mg/dL , goal Phos 2 7-4 6 mg/dL  , goal iPTH 30-70 pg/mL  - Patient is currently at goal   - patient on vitamin-D 2000 units p o  Q day, advised patient to stop vitamin-D since his calcium level is slightly elevated  - check intact PTH and vitamin-D prior to next visit  - Patients' most recent vitamin D level is 55 7 in March 2019    5  Lipids:  - goal LDL less than 70  - management as per PCP    6  DM:  - management as per Primary team  - on insulin    7  Nephrolithiasis:  - Management as per primary team  - multiple bilateral nonobstructing calculi on recent imaging in September 2019   - stone analysis from February 2019 shows 50% calcium oxalate 50% uric acid  - history of for stone in 1972    - ordered for 24 hr litho link stone analysis, advised patient to call me 2 weeks after it is done to review the results    - give handouts for stone diet recommendations to follow  - patient in search of a new urologist    8  Nutrition:  - Encouraged patient to follow a renal diet comprising of moderate potassium, low phosphorus and protein restriction to 0 8gm/kg  - Will check serum albumin with next blood work  9  Followup:  - Patient is to follow-up in 3 months, with lab work to be performed a few days prior to the next visit  Advised patient to call me in 10 days to review the results if they do not hear back from me, as I may have not received the results  Christos Baxter MD, FAST[place]ANIA, 10/29/2019, 1:18 PM             SUBJECTIVE: 76 y o  male with past medical history of hypertension diabetes, hyperlipidemia, nephrolithiasis and CKD stage 3 presents to the office for routine follow-up  Patient was last seen in the office in March 2017  Status post episode of urosepsis in September 2019  Patient has been following up with Urology as an outpatient for his kidney stone management  Is trying to find another urologist  Had stopped HCTZ about 5 weeks ago  Remains on prednisone for myasthenia gravis chronically  Not checking BP at home  Has not done the 24 hr litholink test yet  Happy with all the care information that he has gotten today has been having issues with lightheadedness and dizziness for which he is being seeing Urology multiple other physicians  Review of Systems   Constitutional: Negative for chills, fatigue and fever  HENT: Negative for congestion and sore throat  Respiratory: Negative for cough, shortness of breath and wheezing  Cardiovascular: Negative for chest pain, palpitations and leg swelling  Gastrointestinal: Positive for constipation  Negative for abdominal pain, diarrhea, nausea and vomiting  Genitourinary: Negative for difficulty urinating and hematuria  Musculoskeletal: Negative for back pain  Skin: Negative for rash and wound  Neurological: Negative for dizziness and headaches  Psychiatric/Behavioral: Negative for confusion  All other systems reviewed and are negative        PAST MEDICAL HISTORY:  Past Medical History:   Diagnosis Date    MARSHALL (acute kidney injury) ([facility] )     Anxiety     Cervical spinal stenosis     CKD (chronic kidney disease)     HTN (hypertension)     Hx of chronic ulcerative colitis     Hypercalcemia     Hyperlipidemia     Kidney atrophy     Microscopic hematuria     Myasthenia gravis ([facility] )     Nephrolithiasis     Sleep apnea        PROBLEM LIST    Patient Active Problem List   Diagnosis    Pseudomonas aeruginosa colonization    Myasthenia gravis ([place] [address] )    Nephrolithiasis    Controlled type 2 diabetes mellitus with hypoglycemia, with long-term current use of insulin (HCC)    CKD (chronic kidney disease), stage III ([facility])    Hypercalcemia    Vertigo    Essential hypertension    Actinic keratosis    Benign non-nodular prostatic hyperplasia with lower urinary tract symptoms    Cervical radiculopathy    Cervical spinal stenosis    Diverticulosis    Hyperlipidemia    Kidney atrophy    Left anterior hemiblock    Microscopic hematuria    Polyneuropathy    RBBB (right bundle branch block)    Sleep apnea    Tarsal tunnel syndrome of right side    Transition of care performed with sharing of clinical summary    Allergy to multiple antibiotics    Anxiety    History of recurrent UTIs    Fatigue    Hx of lithotripsy    History of sepsis    Closed compression fracture of L1 lumbar vertebra    History of fall within past 90 days    Abnormal urinalysis    History of nephrolithiasis    Mitral valve annular calcification    Medicare annual wellness visit, subsequent    Bilateral leg numbness    Fall    Nasal congestion    Plugged feeling in ear, bilateral    Balance problems    Neck stiffness    Elevated hemoglobin ([facility])    Leukocytosis    Cord compression ([facility])    Persistent postural-perceptual dizziness       PAST SURGICAL HISTORY:  Past Surgical History:   Procedure Laterality Date    COLECTOMY      partial sigmoid    COLECTOMY LAPAROSCOPIC      partial sigmoid colectomy    COLON SURGERY      lap  partial colectomy sigmoid    COLONOSCOPY      LITHOTRIPSY      OTHER SURGICAL HISTORY      parotid bx    LA FRAGMENT KIDNEY STONE/ [facility] Left 11/2/2018    Procedure: [facility] ([facility]); Surgeon: Fermín Allen MD;  Location: [facility];  Service: Urology    SALIVARY GLAND SURGERY      biopsy       SOCIAL HISTORY :   reports that he has never smoked  He has never used smokeless tobacco  He reports that he drank alcohol  He reports that he does not use drugs  FAMILY HISTORY:  Family History   Problem Relation Age of Onset    Arthritis Mother     Diabetes Mother     Hypertension Mother     Osteoporosis Mother     Depression Father     Cancer Neg Hx     Heart disease Neg Hx        ALLERGIES:  Allergies   Allergen Reactions    Levaquin [Levofloxacin]      Due to MG dx    Quinolones      Due to MG dx    Antihistamines, Chlorpheniramine-Type      Difficulty urinating    Benzodiazepines     Chlorpheniramine     Diphenhydramine     Erythromycin     Iodine     Iodine Solution [Povidone Iodine]      ivp dye    Loratadine     Other      Muscle relaxers d/t myasthenia gravis-they close my airway      Sulfa Antibiotics     Tetracyclines & Related            PHYSICAL EXAM:  Vitals:    10/29/19 1228   BP: 100/68   Weight: 78 kg (172 lb)   Height: 5' 9" (1 753 m)     Body mass index is 25 4 kg/m²  Physical Exam   Constitutional: He is oriented to person, place, and time  He appears well-developed and well-nourished  No distress  HENT:   Head: Normocephalic and atraumatic  Mouth/Throat: Oropharynx is clear and moist  No oropharyngeal exudate  Eyes: No scleral icterus  Neck: Neck supple  No JVD present  Cardiovascular: Normal heart sounds  Exam reveals no friction rub     Pulmonary/Chest: Effort normal  He has no wheezes  He has no rales  Abdominal: Soft  He exhibits no mass  There is no tenderness  Musculoskeletal: He exhibits no edema  Neurological: He is alert and oriented to person, place, and time  Skin: Skin is warm  He is not diaphoretic  No pallor  Psychiatric: He has a normal mood and affect  His behavior is normal    Vitals reviewed        LABORATORY DATA:     Results from last 6 Months   Lab Units 09/13/19  0946 09/10/19  0531 09/09/19  1326   WBC Thousand/uL 11 31* 12 42* 16 63*   HEMOGLOBIN g/dL 15 6 15 2 16 8   HEMATOCRIT % 47 1 45 6 50 4*   PLATELETS Thousands/uL 378 329 408*   POTASSIUM mmol/L 4 6 3 9 4 5   CHLORIDE mmol/L 102 102 99*   CO2 mmol/L 29 26 25   BUN mg/dL 28* 33* 25   CREATININE mg/dL 1 76* 1 61* 1 71*   CALCIUM mg/dL 9 9 9 5 10 2*        rest all reviewed    RADIOLOGY:  No orders to display     Rest all reviewed        MEDICATIONS:    Current Outpatient Medications:     ACCU-CHEK FASTCLIX LANCETS MISC, Test four times daily, Disp: 300 each, Rfl: 0    ACCU-CHEK GUIDE test strip, 1 each by Other route 4 (four) times a day Use as instructed bid scheduled and prn symptoms/signs blood sugar fluctuation, Disp: 300 each, Rfl: 0    BD ULTRA-FINE PEN NEEDLES 29G X 12 7MM MISC, , Disp: , Rfl:     BD ULTRA-FINE PEN NEEDLES 29G X 12 7MM MISC, USE 4 TIMES A DAY, Disp: 360 each, Rfl: 0    insulin aspart (NovoLOG) 100 units/mL injection, Inject 4 Units under the skin 3 (three) times a day before meals, Disp: , Rfl:     insulin glargine (LANTUS) 100 units/mL subcutaneous injection, Inject 12 Units under the skin daily at bedtime, Disp: 1000 Units, Rfl: 1    lisinopril (ZESTRIL) 2 5 mg tablet, Take 1 tablet (2 5 mg total) by mouth daily, Disp: 90 tablet, Rfl: 0    Potassium 99 MG TABS, Take 1 tablet by mouth 2 (two) times a day , Disp: , Rfl:     predniSONE 10 mg tablet, Take 1 tablet (10 mg total) by mouth daily, Disp: 90 tablet, Rfl: 1    alfuzosin (UROXATRAL) 10 mg 24 hr tablet, Take 1 tablet (10 mg total) by mouth daily (Patient not taking: Reported on 10/29/2019), Disp: 30 tablet, Rfl: 11    Cranberry 500 MG CAPS, Take 4 capsules by mouth daily , Disp: , Rfl:     docusate sodium (COLACE) 250 MG capsule, Take 1 capsule (250 mg total) by mouth daily (Patient not taking: Reported on 10/29/2019), Disp: 30 capsule, Rfl: 0    Lactobacillus (ACIDOPHILUS) 100 MG CAPS, Take 1 capsule by mouth daily, Disp: , Rfl:     lidocaine (LIDODERM) 5 %, Apply 1 patch topically daily Remove & Discard patch within 12 hours or as directed by MD (Patient not taking: Reported on 10/29/2019), Disp: 30 patch, Rfl: 0    NOVOLOG FLEXPEN 100 units/mL injection pen, inject 3 units subcutaneously twice a day BEFORE MEALS (Patient not taking: Reported on 10/29/2019), Disp: 15 mL, Rfl: 0    pyridoxine (VITAMIN B6) 100 mg tablet, Take 100 mg by mouth daily  , Disp: , Rfl:           Portions of the record may have been created with voice recognition software  Occasional wrong word or "sound a like" substitutions may have occurred due to the inherent limitations of voice recognition software  Read the chart carefully and recognize, using context, where substitutions have occurred  If you have any questions, please contact the dictating provider

## 2019-10-29 NOTE — TELEPHONE ENCOUNTER
Pt called in all hyper and worked up that he is getting shorted insulin  I calmed the patient down and got this information out of him:    For his lantus he was getting 5 pen @ 18U at bedtime for 90 days worth  For his lantus now he is getting 3 pens @ 16U for 60 something days    I explained to pt that his chart says 12 units a day since 10/22/19  PT stated if he gets 3 pens on 12U that is 75 days  I explained to pt that I will call the pharm and see what is going on  Pt stated he is only allowed to get a 90 day supply , but he is not receiving that currently

## 2019-10-30 ENCOUNTER — OFFICE VISIT (OUTPATIENT)
Dept: NEUROLOGY | Facility: CLINIC | Age: 74
End: 2019-10-30
Payer: MEDICARE

## 2019-10-30 VITALS
HEIGHT: 69 IN | HEART RATE: 88 BPM | DIASTOLIC BLOOD PRESSURE: 80 MMHG | RESPIRATION RATE: 16 BRPM | WEIGHT: 176 LBS | SYSTOLIC BLOOD PRESSURE: 130 MMHG | BODY MASS INDEX: 26.07 KG/M2

## 2019-10-30 DIAGNOSIS — G70.00 MYASTHENIA GRAVIS (HCC): Primary | ICD-10-CM

## 2019-10-30 DIAGNOSIS — R42 VERTIGO: ICD-10-CM

## 2019-10-30 PROCEDURE — 99214 OFFICE O/P EST MOD 30 MIN: CPT | Performed by: PSYCHIATRY & NEUROLOGY

## 2019-10-30 NOTE — ASSESSMENT & PLAN NOTE
Tye Rousseau is stable regarding the myasthenia gravis  He is to continue on prednisone 10 mg a day  Prior attempts to utilize other medications were ineffective and caused side effects

## 2019-10-30 NOTE — ASSESSMENT & PLAN NOTE
Marquis symptoms have all resolved  This has resolved since he has discontinued multiple medications    He no longer requires Antivert or hydrochlorothiazide

## 2019-10-30 NOTE — PROGRESS NOTES
Patient ID: Lucille Calixto is a 76 y o  male  Assessment/Plan:    Myasthenia gravis ([facility] )  Fabio Phan is stable regarding the myasthenia gravis  He is to continue on prednisone 10 mg a day  Prior attempts to utilize other medications were ineffective and caused side effects  Vertigo  Marquis to return uses symptoms have all resolved  This has resolved since he has discontinued multiple medications  He is no longer hide on hydrochlorothiazide Antivert and many of the other medications  He was recently evaluated by Nephrology  He is to return to our offices in several months  However if he is having new questions or problems in the interim he is to  call     Diagnoses and all orders for this visit:    Myasthenia gravis ([facility] )    Vertigo         Subjective: This is a 75 y/o male with History of Myasthenia Gravis diagnosed in 2009 after presents for a follow up visit  He was last evaluated here few weeks ago  it was felt that his myasthenia gravis was well controlled and his neuropathy was minimal   He did have issues with severe vertigo  He has undergone an extensive workup including MRI of the brain CT scan of the sinuses  He was also tried on Antivert which is ineffective and did undergo optic kinetic vision therapy and the use of  hydrochlorothiazide could be helpful  It did help his symptoms somewhat  In the interim he has been was hospitalized in September for a urinary tract infection  Eber Guise Approximately a few weeks ago he was placed on a medication for prostatic hypertrophy  This cause severe lightheadedness  Therefore in the interim he has stopped all of his medications except for the prednisone and  potassium  He was seen by Nephrology and she had a discussion with him regarding the need for lisinopril  Today he is doing much better  He has no further complaints of lightheadedness    He denies any neck pain or   Cognitive symptoms       He was also utilizing shoes that were wider and his neuropathy symptoms have improved            he did undergo a MRI of the cervical spine  It did demonstrate Multilevel cervical spondylosis, as described above      Multifactorial disease results in overall mild canal stenosis and cord compression at C5-C6, overall slightly progressed since the prior examination  However there is no myelomalacia      Cervical spondylosis in the remainder of the spine is otherwise not significantly changed  He was evaluated by Neurosurgery who felt that he was not a neurosurgical candidate           He has previously had  EMGs of the lower extremities in February of 2019 which showed a mild axonal sensory neuropathy                                                                 The following portions of the patient's history were reviewed and updated as appropriate: He  has a past medical history of MARSHALL (acute kidney injury) ([facility] ), Anxiety, Cervical spinal stenosis, CKD (chronic kidney disease), HTN (hypertension), chronic ulcerative colitis, Hypercalcemia, Hyperlipidemia, Kidney atrophy, Microscopic hematuria, Myasthenia gravis ([facility] ), Nephrolithiasis, and Sleep apnea  He  has a past surgical history that includes Lithotripsy; Colonoscopy; Colon surgery; Other surgical history; Salivary gland surgery; COLECTOMY LAPAROSCOPIC; Colectomy; and pr fragment kidney stone/ eswl (Left, 11/2/2018)  His family history includes Arthritis in his mother; Depression in his father; Diabetes in his mother; Hypertension in his mother; Osteoporosis in his mother  He  reports that he has never smoked  He has never used smokeless tobacco  He reports that he drank alcohol  He reports that he does not use drugs    Current Outpatient Medications   Medication Sig Dispense Refill    ACCU-CHEK FASTCLIX LANCETS MISC Test four times daily 300 each 0    ACCU-CHEK GUIDE test strip 1 each by Other route 4 (four) times a day Use as instructed bid scheduled and prn symptoms/signs blood sugar fluctuation 300 each 0    BD ULTRA-FINE PEN NEEDLES 29G X 12 7MM MISC       BD ULTRA-FINE PEN NEEDLES 29G X 12 7MM MISC USE 4 TIMES A  each 0    insulin aspart (NovoLOG) 100 units/mL injection Inject 4 Units under the skin 3 (three) times a day before meals      insulin glargine (LANTUS) 100 units/mL subcutaneous injection Inject 12 Units under the skin daily at bedtime 1000 Units 1    NOVOLOG FLEXPEN 100 units/mL injection pen inject 3 units subcutaneously twice a day BEFORE MEALS 15 mL 0    Potassium 99 MG TABS Take 1 tablet by mouth 2 (two) times a day       predniSONE 10 mg tablet Take 1 tablet (10 mg total) by mouth daily 90 tablet 1    alfuzosin (UROXATRAL) 10 mg 24 hr tablet Take 1 tablet (10 mg total) by mouth daily (Patient not taking: Reported on 10/29/2019) 30 tablet 11    Cranberry 500 MG CAPS Take 4 capsules by mouth daily       docusate sodium (COLACE) 250 MG capsule Take 1 capsule (250 mg total) by mouth daily (Patient not taking: Reported on 10/29/2019) 30 capsule 0    Lactobacillus (ACIDOPHILUS) 100 MG CAPS Take 1 capsule by mouth daily      lidocaine (LIDODERM) 5 % Apply 1 patch topically daily Remove & Discard patch within 12 hours or as directed by MD (Patient not taking: Reported on 10/29/2019) 30 patch 0    lisinopril (ZESTRIL) 2 5 mg tablet Take 1 tablet (2 5 mg total) by mouth daily (Patient not taking: Reported on 10/30/2019) 90 tablet 0    pyridoxine (VITAMIN B6) 100 mg tablet Take 100 mg by mouth daily         No current facility-administered medications for this visit  He is allergic to levaquin [levofloxacin]; quinolones; antihistamines, chlorpheniramine-type; benzodiazepines; chlorpheniramine; diphenhydramine; erythromycin; iodine; iodine solution [povidone iodine]; loratadine; other; sulfa antibiotics; and tetracyclines & related            Objective:    Blood pressure 130/80, pulse 88, resp  rate 16, height 5' 9" (1 753 m), weight 79 8 kg (176 lb)      Physical Exam   Constitutional: He appears well-developed  HENT:   Head: Normocephalic  Eyes: Pupils are equal, round, and reactive to light  Lids are normal  Nystagmus present  Neurological: He has normal strength  Reflex Scores:       Tricep reflexes are 2+ on the right side and 2+ on the left side  Bicep reflexes are 2+ on the right side and 2+ on the left side  Patellar reflexes are 2+ on the right side and 2+ on the left side  Achilles reflexes are 1+ on the right side and 1+ on the left side  Psychiatric: His speech is normal    Vitals reviewed  Neurological Exam  Mental Status   Oriented to person, place, time and situation  Speech is normal  Language is fluent with no aphasia  Cranial Nerves  CN II: Visual acuity is normal  Visual fields full to confrontation  CN III, IV, VI: Nystagmus present: Normal lids and orbits bilaterally  Pupils equal round and reactive to light bilaterally  CN V: Facial sensation is normal   CN VII: Full and symmetric facial movement  CN VIII: Hearing is normal   CN IX, X: Palate elevates symmetrically  Normal gag reflex  CN XI: Shoulder shrug strength is normal   CN XII: Tongue midline without atrophy or fasciculations  Motor   Strength is 5/5 throughout all four extremities  Neck flexion and neck extension were a 5/5  Nigel Israel Sensory  25  seconds of vibratory sensation was noted in the lower extremities  ,jps intact   Reflexes                                           Right                      Left  Biceps                                 2+                         2+  Triceps                                2+                         2+  Patellar                                2+                         2+  Achilles                                1+                         1+  Plantar                           Downgoing                Downgoing    Right pathological reflexes: Logan's absent    Left pathological reflexes: Logan's absent  Coordination  Right: Finger-to-nose normal  Heel-to-shin normal   Left: Finger-to-nose normal  Heel-to-shin normal     Gait  Normal casual, toe, heel and tandem gait  He had no evidence of a Romberg sign and no difficulty with tandem gait  Review of systems obtained by the medical assistant as below was reviewed with the patient at today's visit  ROS:    Review of Systems   Constitutional: Negative  Negative for appetite change and fever  HENT: Negative  Negative for hearing loss, tinnitus, trouble swallowing and voice change  Eyes: Negative  Negative for photophobia and pain  Respiratory: Negative  Negative for shortness of breath  Cardiovascular: Negative  Negative for palpitations  Gastrointestinal: Negative  Negative for nausea and vomiting  Endocrine: Negative  Negative for cold intolerance and heat intolerance  Genitourinary: Negative  Negative for dysuria, frequency and urgency  Musculoskeletal: Negative  Negative for myalgias and neck pain  Skin: Negative  Negative for rash  Neurological: Negative  Negative for dizziness, tremors, seizures, syncope, facial asymmetry, speech difficulty, weakness, light-headedness, numbness and headaches  Hematological: Negative  Does not bruise/bleed easily  Psychiatric/Behavioral: Negative  Negative for confusion, hallucinations and sleep disturbance

## 2019-10-31 DIAGNOSIS — Z79.4 CONTROLLED TYPE 2 DIABETES MELLITUS WITH HYPOGLYCEMIA, WITH LONG-TERM CURRENT USE OF INSULIN (HCC): ICD-10-CM

## 2019-10-31 DIAGNOSIS — E11.649 CONTROLLED TYPE 2 DIABETES MELLITUS WITH HYPOGLYCEMIA, WITH LONG-TERM CURRENT USE OF INSULIN (HCC): ICD-10-CM

## 2019-10-31 DIAGNOSIS — N18.30 CKD (CHRONIC KIDNEY DISEASE), STAGE III (HCC): ICD-10-CM

## 2019-11-03 RX ORDER — LANCETS
EACH MISCELLANEOUS
Qty: 360 EACH | Refills: 0 | Status: SHIPPED | OUTPATIENT
Start: 2019-11-03 | End: 2020-03-24 | Stop reason: SDUPTHER

## 2019-11-03 RX ORDER — BLOOD SUGAR DIAGNOSTIC
1 STRIP MISCELLANEOUS 4 TIMES DAILY
Qty: 360 EACH | Refills: 0 | Status: SHIPPED | OUTPATIENT
Start: 2019-11-03 | End: 2020-03-24 | Stop reason: SDUPTHER

## 2019-12-13 ENCOUNTER — HOSPITAL ENCOUNTER (OUTPATIENT)
Dept: ULTRASOUND IMAGING | Facility: HOSPITAL | Age: 74
Discharge: HOME | End: 2019-12-13
Payer: MEDICARE

## 2019-12-13 DIAGNOSIS — N20.0 NEPHROLITHIASIS: ICD-10-CM

## 2019-12-13 PROCEDURE — 76770 US EXAM ABDO BACK WALL COMP: CPT

## 2019-12-18 ENCOUNTER — OFFICE VISIT (OUTPATIENT)
Dept: FAMILY MEDICINE CLINIC | Facility: CLINIC | Age: 74
End: 2019-12-18
Payer: MEDICARE

## 2019-12-18 VITALS
RESPIRATION RATE: 17 BRPM | BODY MASS INDEX: 26.58 KG/M2 | HEART RATE: 110 BPM | SYSTOLIC BLOOD PRESSURE: 130 MMHG | TEMPERATURE: 98.7 F | WEIGHT: 180 LBS | OXYGEN SATURATION: 96 % | DIASTOLIC BLOOD PRESSURE: 80 MMHG

## 2019-12-18 DIAGNOSIS — Z79.4 CONTROLLED TYPE 2 DIABETES MELLITUS WITH HYPOGLYCEMIA, WITH LONG-TERM CURRENT USE OF INSULIN (HCC): Primary | ICD-10-CM

## 2019-12-18 DIAGNOSIS — E78.5 HYPERLIPIDEMIA, UNSPECIFIED HYPERLIPIDEMIA TYPE: ICD-10-CM

## 2019-12-18 DIAGNOSIS — E11.649 CONTROLLED TYPE 2 DIABETES MELLITUS WITH HYPOGLYCEMIA, WITH LONG-TERM CURRENT USE OF INSULIN (HCC): Primary | ICD-10-CM

## 2019-12-18 DIAGNOSIS — R00.0 TACHYCARDIA: ICD-10-CM

## 2019-12-18 PROCEDURE — 99214 OFFICE O/P EST MOD 30 MIN: CPT | Performed by: FAMILY MEDICINE

## 2019-12-18 RX ORDER — ATORVASTATIN CALCIUM 10 MG/1
10 TABLET, FILM COATED ORAL DAILY
Qty: 30 TABLET | Refills: 1 | Status: SHIPPED | OUTPATIENT
Start: 2019-12-18 | End: 2020-09-08 | Stop reason: SINTOL

## 2019-12-18 NOTE — PROGRESS NOTES
Assessment/Plan:         Diagnoses and all orders for this visit:    Controlled type 2 diabetes mellitus with hypoglycemia, with long-term current use of insulin (HCC)  -     atorvastatin (LIPITOR) 10 mg tablet; Take 1 tablet (10 mg total) by mouth daily  -     Lipid panel; Future  -     Comprehensive metabolic panel; Future  -     HEMOGLOBIN A1C W/ EAG ESTIMATION; Future    Hyperlipidemia, unspecified hyperlipidemia type  -     atorvastatin (LIPITOR) 10 mg tablet; Take 1 tablet (10 mg total) by mouth daily  -     Lipid panel; Future  -     Comprehensive metabolic panel; Future    Tachycardia  Comments:  asymptomatic- monitor          Subjective:   Chief Complaint   Patient presents with    Follow-up     saw neph- and was taken off lisinopril         Patient ID: Sridevi Parisi is a 76 y o  male  Routine f/u appt  Saw his neurologist in f/u and had initial eval by nephrologist  No interval acute illnesses or problems      The following portions of the patient's history were reviewed and updated as appropriate: allergies, current medications, past family history, past medical history, past social history, past surgical history and problem list     Review of Systems   Constitutional: Negative  HENT: Negative for mouth sores, nosebleeds and trouble swallowing  Eyes: Negative  Respiratory: Negative  Cardiovascular: Negative  Gastrointestinal: Negative  Endocrine: Negative  Genitourinary: Negative for decreased urine volume, dysuria, flank pain, frequency and hematuria  Skin: Negative  Hematological: Negative  Objective:      /80   Pulse (!) 110   Temp 98 7 °F (37 1 °C)   Resp 17   Wt 81 6 kg (180 lb)   SpO2 96%   BMI 26 58 kg/m²          Physical Exam   Constitutional: He is oriented to person, place, and time  He appears well-developed  He is cooperative  Non-toxic appearance  He does not have a sickly appearance  He does not appear ill  No distress     HENT: Head: Normocephalic and atraumatic  Mouth/Throat: Uvula is midline, oropharynx is clear and moist and mucous membranes are normal    Eyes: Pupils are equal, round, and reactive to light  Conjunctivae and lids are normal    Neck: Trachea normal  Neck supple  No JVD present  No thyroid mass and no thyromegaly present  Cardiovascular: Regular rhythm, S1 normal, S2 normal and normal pulses  Tachycardia present  Exam reveals no gallop and no friction rub  Pulmonary/Chest: Effort normal and breath sounds normal    Abdominal: Soft  Bowel sounds are normal  He exhibits no distension, no abdominal bruit and no mass  There is no hepatosplenomegaly  There is no tenderness  Lymphadenopathy:     He has no cervical adenopathy  Right: No supraclavicular adenopathy present  Left: No supraclavicular adenopathy present  Neurological: He is alert and oriented to person, place, and time  He has normal strength and normal reflexes  Gait normal    Skin: Skin is warm and dry  Capillary refill takes less than 2 seconds  He is not diaphoretic  No cyanosis  No pallor  Psychiatric: He has a normal mood and affect  His behavior is normal    Nursing note and vitals reviewed

## 2019-12-23 ENCOUNTER — TELEPHONE (OUTPATIENT)
Dept: UROLOGY | Facility: MEDICAL CENTER | Age: 74
End: 2019-12-23

## 2019-12-23 NOTE — TELEPHONE ENCOUNTER
Patient managed by Dr Luigi Salvador, seen in the [address] [address] office  Patient last seen 12/13/18, history of nephrolithiasis and sepsis due to UTI  At time of office visit, with asymptomatic bacteriuria with Pseudomonas colonization  Patient was instructed to follow up in 1 year with [facility] and U/S prior  Patient with pending appointment with Dr Luigi Salvador on 2/17/20     Recent U/S done on 12/13/19 showed IMPRESSION:        1  Nonobstructing left renal collecting system calculi as described  2   Left upper pole renal cyst without appreciable change  Left detailed message, per communication consent, advising of U/S results   Provided office number for patient to return call with any questions,

## 2019-12-27 DIAGNOSIS — E11.8 TYPE 2 DIABETES MELLITUS WITH COMPLICATION, WITH LONG-TERM CURRENT USE OF INSULIN (HCC): ICD-10-CM

## 2019-12-27 DIAGNOSIS — Z79.4 TYPE 2 DIABETES MELLITUS WITH COMPLICATION, WITH LONG-TERM CURRENT USE OF INSULIN (HCC): ICD-10-CM

## 2019-12-30 ENCOUNTER — TELEPHONE (OUTPATIENT)
Dept: NEPHROLOGY | Facility: CLINIC | Age: 74
End: 2019-12-30

## 2020-01-06 ENCOUNTER — TELEPHONE (OUTPATIENT)
Dept: FAMILY MEDICINE CLINIC | Facility: CLINIC | Age: 75
End: 2020-01-06

## 2020-01-06 NOTE — TELEPHONE ENCOUNTER
Patient is calling to see if PCP would be willing to fill out a parking placard card  States that it would help with him getting is aunt to and from appointments  Please advise if form can be completed   If so form will be started for completion

## 2020-01-07 ENCOUNTER — APPOINTMENT (OUTPATIENT)
Dept: LAB | Facility: CLINIC | Age: 75
End: 2020-01-07
Payer: MEDICARE

## 2020-01-07 DIAGNOSIS — Z79.4 CONTROLLED TYPE 2 DIABETES MELLITUS WITH HYPOGLYCEMIA, WITH LONG-TERM CURRENT USE OF INSULIN (HCC): ICD-10-CM

## 2020-01-07 DIAGNOSIS — N20.0 NEPHROLITHIASIS: ICD-10-CM

## 2020-01-07 DIAGNOSIS — E11.649 CONTROLLED TYPE 2 DIABETES MELLITUS WITH HYPOGLYCEMIA, WITH LONG-TERM CURRENT USE OF INSULIN (HCC): ICD-10-CM

## 2020-01-07 DIAGNOSIS — N18.30 CKD (CHRONIC KIDNEY DISEASE), STAGE III (HCC): ICD-10-CM

## 2020-01-07 DIAGNOSIS — E78.5 HYPERLIPIDEMIA, UNSPECIFIED HYPERLIPIDEMIA TYPE: ICD-10-CM

## 2020-01-07 LAB
25(OH)D3 SERPL-MCNC: 28.6 NG/ML (ref 30–100)
ALBUMIN SERPL BCP-MCNC: 3.6 G/DL (ref 3.5–5)
ALP SERPL-CCNC: 42 U/L (ref 46–116)
ALT SERPL W P-5'-P-CCNC: 29 U/L (ref 12–78)
ANION GAP SERPL CALCULATED.3IONS-SCNC: 5 MMOL/L (ref 4–13)
AST SERPL W P-5'-P-CCNC: 16 U/L (ref 5–45)
BILIRUB SERPL-MCNC: 0.53 MG/DL (ref 0.2–1)
BUN SERPL-MCNC: 18 MG/DL (ref 5–25)
CALCIUM SERPL-MCNC: 9.6 MG/DL (ref 8.3–10.1)
CHLORIDE SERPL-SCNC: 110 MMOL/L (ref 100–108)
CHOLEST SERPL-MCNC: 235 MG/DL (ref 50–200)
CO2 SERPL-SCNC: 27 MMOL/L (ref 21–32)
CREAT SERPL-MCNC: 1.3 MG/DL (ref 0.6–1.3)
CREAT UR-MCNC: 131 MG/DL
EST. AVERAGE GLUCOSE BLD GHB EST-MCNC: 140 MG/DL
GFR SERPL CREATININE-BSD FRML MDRD: 54 ML/MIN/1.73SQ M
GLUCOSE P FAST SERPL-MCNC: 85 MG/DL (ref 65–99)
HBA1C MFR BLD: 6.5 % (ref 4.2–6.3)
HDLC SERPL-MCNC: 53 MG/DL
LDLC SERPL CALC-MCNC: 157 MG/DL (ref 0–100)
MAGNESIUM SERPL-MCNC: 2.5 MG/DL (ref 1.6–2.6)
MICROALBUMIN UR-MCNC: 8.1 MG/L (ref 0–20)
MICROALBUMIN/CREAT 24H UR: 6 MG/G CREATININE (ref 0–30)
NONHDLC SERPL-MCNC: 182 MG/DL
PHOSPHATE SERPL-MCNC: 2.5 MG/DL (ref 2.3–4.1)
POTASSIUM SERPL-SCNC: 4.2 MMOL/L (ref 3.5–5.3)
PROT SERPL-MCNC: 7.2 G/DL (ref 6.4–8.2)
PTH-INTACT SERPL-MCNC: 53.9 PG/ML (ref 18.4–80.1)
SODIUM SERPL-SCNC: 142 MMOL/L (ref 136–145)
TRIGL SERPL-MCNC: 123 MG/DL

## 2020-01-07 PROCEDURE — 83735 ASSAY OF MAGNESIUM: CPT

## 2020-01-07 PROCEDURE — 84100 ASSAY OF PHOSPHORUS: CPT

## 2020-01-07 PROCEDURE — 82043 UR ALBUMIN QUANTITATIVE: CPT

## 2020-01-07 PROCEDURE — 82306 VITAMIN D 25 HYDROXY: CPT

## 2020-01-07 PROCEDURE — 80061 LIPID PANEL: CPT

## 2020-01-07 PROCEDURE — 80053 COMPREHEN METABOLIC PANEL: CPT

## 2020-01-07 PROCEDURE — 83970 ASSAY OF PARATHORMONE: CPT

## 2020-01-07 PROCEDURE — 82570 ASSAY OF URINE CREATININE: CPT

## 2020-01-07 PROCEDURE — 36415 COLL VENOUS BLD VENIPUNCTURE: CPT

## 2020-01-07 PROCEDURE — 83036 HEMOGLOBIN GLYCOSYLATED A1C: CPT

## 2020-01-09 ENCOUNTER — TELEPHONE (OUTPATIENT)
Dept: FAMILY MEDICINE CLINIC | Facility: CLINIC | Age: 75
End: 2020-01-09

## 2020-01-09 NOTE — TELEPHONE ENCOUNTER
Patient called asking for results of A1C  He stated the other lab work was ordered by nephrology  Patient stated he had aprizm (sp?) on his eye glasses and it had bubbles under it so he took it off and can see much better now  Thank you

## 2020-01-10 NOTE — TELEPHONE ENCOUNTER
Patient called for A1C Results  He states he has stopped taking insulin - too expensive but If over 300 he will take it  He feels better with less medicine  Thank you

## 2020-01-16 ENCOUNTER — OFFICE VISIT (OUTPATIENT)
Dept: URGENT CARE | Age: 75
End: 2020-01-16
Payer: MEDICARE

## 2020-01-16 ENCOUNTER — TELEPHONE (OUTPATIENT)
Dept: FAMILY MEDICINE CLINIC | Facility: CLINIC | Age: 75
End: 2020-01-16

## 2020-01-16 VITALS
SYSTOLIC BLOOD PRESSURE: 153 MMHG | DIASTOLIC BLOOD PRESSURE: 90 MMHG | HEIGHT: 69 IN | OXYGEN SATURATION: 95 % | BODY MASS INDEX: 27.16 KG/M2 | RESPIRATION RATE: 20 BRPM | WEIGHT: 183.4 LBS | HEART RATE: 111 BPM | TEMPERATURE: 98.2 F

## 2020-01-16 DIAGNOSIS — L03.011 PARONYCHIA OF RIGHT INDEX FINGER: Primary | ICD-10-CM

## 2020-01-16 PROCEDURE — G0463 HOSPITAL OUTPT CLINIC VISIT: HCPCS | Performed by: PHYSICIAN ASSISTANT

## 2020-01-16 PROCEDURE — 99213 OFFICE O/P EST LOW 20 MIN: CPT | Performed by: PHYSICIAN ASSISTANT

## 2020-01-16 RX ORDER — CEPHALEXIN 500 MG/1
500 CAPSULE ORAL EVERY 8 HOURS SCHEDULED
Qty: 21 CAPSULE | Refills: 0 | Status: SHIPPED | COMMUNITY
Start: 2020-01-16 | End: 2020-01-23

## 2020-01-16 RX ORDER — CEPHALEXIN 500 MG/1
500 CAPSULE ORAL EVERY 8 HOURS SCHEDULED
Qty: 21 CAPSULE | Refills: 0 | Status: SHIPPED | OUTPATIENT
Start: 2020-01-16 | End: 2020-01-16

## 2020-01-16 NOTE — TELEPHONE ENCOUNTER
Patient calling to see if PCP can possibly sindy his finger or if he should go to urgent care  Patient states that his index finger is red and swollen under fingernail  States that it has nothing to do with his fingernail  Area is painful but not "throbbing"  Symptoms started yesterday afternoon  Wondering if he should come here than go to urgent care  Patient is currently by an urgent care and will go there to be seen

## 2020-01-16 NOTE — PATIENT INSTRUCTIONS
Continue to monitor symptoms  If new or worsening symptoms occur such as worsening redness, discharge, redness spreading up your extremity, fevers chills, nausea vomiting, or any concerning symptoms occur go immediately to the ER  Follow up with family doctor this week  Paronychia   WHAT YOU NEED TO KNOW:   Paronychia is an infection of your nail fold caused by bacteria or a fungus  The nail fold is the skin around your nail  Paronychia may happen suddenly and last for 6 weeks or longer  You may have paronychia on more than 1 finger or toe  DISCHARGE INSTRUCTIONS:   Medicines:   · Td vaccine  is a booster shot used to help prevent tetanus and diphtheria  The Td booster may be given to adolescents and adults every 10 years or for certain wounds and injuries  · Antibiotics: This medicine will help fight or prevent an infection  It may be given as a pill, cream, or ointment  · Steroids: This medicine will help decrease inflammation  It may be given as a pill, cream, or ointment  · Antifungal medicine: This medicine helps kill fungus that may be causing your infection  It may be given as a cream or ointment  · NSAIDs:  These medicines decrease pain and swelling  NSAIDs are available without a doctor's order  Ask your healthcare provider which medicine is right for you  Ask how much to take and when to take it  Take as directed  NSAIDs can cause stomach bleeding and kidney problems if not taken correctly  · Take your medicine as directed  Contact your healthcare provider if you think your medicine is not helping or if you have side effects  Tell him of her if you are allergic to any medicine  Keep a list of the medicines, vitamins, and herbs you take  Include the amounts, and when and why you take them  Bring the list or the pill bottles to follow-up visits  Carry your medicine list with you in case of an emergency    Follow up with your healthcare provider as directed:  Write down your questions so you remember to ask them during your visits  Self-care:   · Soak your nail:  Soak your nail in a mixture of equal parts vinegar and water 3 or 4 times each day  This will help decrease inflammation  · Apply a warm compress:  Soak a washcloth in warm water and place it on your nail  This will help decrease inflammation  · Elevate:  Raise your nail above the level of your heart as often as you can  This will help decrease swelling and pain  Prop your nail on pillows or blankets to keep it elevated comfortably  · Use lotion:  Apply lotion after you wash your hands  This will prevent your skin from becoming too dry  Prevent paronychia:   · Avoid chemicals and allergens that may harm your skin and nails  This includes soaps, laundry detergents, and nail products  · Keep your nails clean and dry  Avoid soaking your nails in water  Use cotton-lined rubber gloves or wear 2 rubber gloves if you work with food or water  The gloves will help protect your nail folds  · Keep your nails short  Do not bite your nails, pick at your hangnails, suck your fingers, or wear fake nails  Bring your own nail tools when you go to the nail salon  Contact your healthcare provider if:   · Your nail becomes loose, deformed, or falls off  · You have a large abscess on your nail  · You have questions or concerns about your condition or care  Return to the emergency department if:   · You have severe nail pain  · The inflammation spreads to your hand or arm  © 2017 [employer] Information is for End User's use only and may not be sold, redistributed or otherwise used for commercial purposes  All illustrations and images included in CareNotes® are the copyrighted property of [employer]  or Emmett Gutierrez  The above information is an  only  It is not intended as medical advice for individual conditions or treatments   Talk to your doctor, nurse or pharmacist before following any medical regimen to see if it is safe and effective for you  Cephalexin (By mouth)   Cephalexin ([id number])  Treats infections  This medicine is a cephalosporin antibiotic  Brand Name(s): Daxbia, Keflex   There may be other brand names for this medicine  When This Medicine Should Not Be Used: This medicine is not right for everyone  Do not use this medicine if you had an allergic reaction to cephalexin or another cephalosporin medicine  How to Use This Medicine:   Capsule, Tablet, Tablet for Suspension, Liquid  · Your doctor will tell you how much medicine to use  Do not use more than directed  · Read and follow the patient instructions that come with this medicine  Talk to your doctor or pharmacist if you have any questions  · You may take your medicine with food or milk to avoid stomach upset  · Oral liquid: Shake well just before use  Measure the oral liquid medicine with a marked measuring spoon, oral syringe, or medicine cup  · Take all of the medicine in your prescription to clear up your infection, even if you feel better after the first few doses  · Missed dose: Take a dose as soon as you remember  If it is almost time for your next dose, wait until then and take a regular dose  Do not take extra medicine to make up for a missed dose  · Capsule or tablet: Store at room temperature away from heat, moisture, and direct light  · Oral liquid: Store in the refrigerator for 14 days  After 14 days, throw away any unused medicine  Do not freeze  Drugs and Foods to Avoid:   Ask your doctor or pharmacist before using any other medicine, including over-the-counter medicines, vitamins, and herbal products  · Some medicines and foods can affect how cephalexin works   Tell your doctor if you are also using  ¨ Metformin  ¨ Probenecid  Warnings While Using This Medicine:   · Tell your doctor if you are pregnant or breastfeeding, or if you have kidney disease, liver disease, or a history of digestive problems, such as colitis  Tell your doctor if you are allergic to penicillin  · This medicine can cause diarrhea  Call your doctor if the diarrhea becomes severe, does not stop, or is bloody  Do not take any medicine to stop diarrhea until you have talked to your doctor  Diarrhea can occur 2 months or more after you stop taking this medicine  · Tell any doctor or dentist who treats you that you are using this medicine  This medicine may affect certain medical test results  · Call your doctor if your symptoms do not improve or if they get worse  · Keep all medicine out of the reach of children  Never share your medicine with anyone  Possible Side Effects While Using This Medicine:   Call your doctor right away if you notice any of these side effects:  · Allergic reaction: Itching or hives, swelling in your face or hands, swelling or tingling in your mouth or throat, chest tightness, trouble breathing  · Blistering, peeling, red skin rash  · Severe diarrhea, especially if bloody or ongoing  · Severe stomach pain, vomiting  If you notice these less serious side effects, talk with your doctor:   · Mild diarrhea or nausea  If you notice other side effects that you think are caused by this medicine, tell your doctor  Call your doctor for medical advice about side effects  You may report side effects to FDA at 8-487-FDA-8710  © 2017 [employer] Information is for End User's use only and may not be sold, redistributed or otherwise used for commercial purposes  The above information is an  only  It is not intended as medical advice for individual conditions or treatments  Talk to your doctor, nurse or pharmacist before following any medical regimen to see if it is safe and effective for you

## 2020-01-16 NOTE — PROGRESS NOTES
[facility]        NAME: Rakesh Reveles is a 76 y o  male  : 1945    MRN: [id number]  DATE: 2020  TIME: 11:55 AM    Assessment and Plan   Paronychia of right index finger [L03 011]  1  Paronychia of right index finger  cephalexin (KEFLEX) 500 mg capsule    DISCONTINUED: cephalexin (KEFLEX) 500 mg capsule         Patient Instructions       Continue to monitor symptoms  If new or worsening symptoms occur such as worsening redness, discharge, redness spreading up your extremity, fevers chills, nausea vomiting, or any concerning symptoms occur go immediately to the ER  Follow up with family doctor this week  Chief Complaint     Chief Complaint   Patient presents with    Finger Swelling     Pt reports swelling and pain in his right index finger that began yesterday  Denies fall, numbness or tingling  History of Present Illness       Arm Pain    Incident onset: yesterday  The incident occurred at home  There was no injury mechanism  Pain location: Atraumatic mild pain, erythema, swelling to R 2nd digit around fingernail  The quality of the pain is described as aching  The pain is mild  The pain has been constant since the incident  Pertinent negatives include no chest pain, numbness or tingling  The symptoms are aggravated by palpation (No pain with ROM of R 2nd digit)  He has tried nothing for the symptoms  The treatment provided no relief  Pt called his PCP and she told him to come here just in case it needs to be lanced  Review of Systems   Review of Systems   Constitutional: Negative  Negative for chills and fever  HENT: Negative  Eyes: Negative  Respiratory: Negative  Negative for chest tightness, shortness of breath and wheezing  Cardiovascular: Negative  Negative for chest pain and palpitations  Gastrointestinal: Negative  Endocrine: Negative  Genitourinary: Negative  Musculoskeletal: Negative for back pain and neck pain     Skin: Positive for rash  Negative for color change and wound  Neurological: Negative for dizziness, tingling, weakness, light-headedness, numbness and headaches  Hematological: Negative  Psychiatric/Behavioral: Negative            Current Medications       Current Outpatient Medications:     insulin glargine (LANTUS) 100 units/mL subcutaneous injection, Inject 12 Units under the skin daily at bedtime, Disp: 1000 Units, Rfl: 1    NOVOLOG FLEXPEN 100 units/mL injection pen, inject 3 units subcutaneously twice a day BEFORE MEALS, Disp: 15 mL, Rfl: 0    predniSONE 10 mg tablet, Take 1 tablet (10 mg total) by mouth daily, Disp: 90 tablet, Rfl: 1    ACCU-CHEK FASTCLIX LANCETS MISC, Test four times daily (Patient not taking: Reported on 1/16/2020), Disp: 360 each, Rfl: 0    ACCU-CHEK GUIDE test strip, 1 each by Other route 4 (four) times a day Use as instructed bid scheduled and prn symptoms/signs blood sugar fluctuation (Patient not taking: Reported on 1/16/2020), Disp: 360 each, Rfl: 0    alfuzosin (UROXATRAL) 10 mg 24 hr tablet, Take 1 tablet (10 mg total) by mouth daily (Patient not taking: Reported on 10/29/2019), Disp: 30 tablet, Rfl: 11    atorvastatin (LIPITOR) 10 mg tablet, Take 1 tablet (10 mg total) by mouth daily (Patient not taking: Reported on 1/16/2020), Disp: 30 tablet, Rfl: 1    BD ULTRA-FINE PEN NEEDLES 29G X 12 7MM MISC, , Disp: , Rfl:     cephalexin (KEFLEX) 500 mg capsule, Take 1 capsule (500 mg total) by mouth every 8 (eight) hours for 7 days, Disp: 21 capsule, Rfl: 0    Cranberry 500 MG CAPS, Take 4 capsules by mouth daily , Disp: , Rfl:     docusate sodium (COLACE) 250 MG capsule, Take 1 capsule (250 mg total) by mouth daily (Patient not taking: Reported on 10/29/2019), Disp: 30 capsule, Rfl: 0    insulin aspart (NovoLOG) 100 units/mL injection, Inject 4 Units under the skin 3 (three) times a day before meals, Disp: , Rfl:     Insulin Pen Needle (BD ULTRA-FINE PEN NEEDLES) 29G X 12 7MM MISC, by Does not apply route 4 (four) times a day (Patient not taking: Reported on 1/16/2020), Disp: 360 each, Rfl: 3    Lactobacillus (ACIDOPHILUS) 100 MG CAPS, Take 1 capsule by mouth daily, Disp: , Rfl:     lidocaine (LIDODERM) 5 %, Apply 1 patch topically daily Remove & Discard patch within 12 hours or as directed by MD (Patient not taking: Reported on 10/29/2019), Disp: 30 patch, Rfl: 0    lisinopril (ZESTRIL) 2 5 mg tablet, Take 1 tablet (2 5 mg total) by mouth daily (Patient not taking: Reported on 10/30/2019), Disp: 90 tablet, Rfl: 0    Potassium 99 MG TABS, Take 1 tablet by mouth 2 (two) times a day , Disp: , Rfl:     pyridoxine (VITAMIN B6) 100 mg tablet, Take 100 mg by mouth daily  , Disp: , Rfl:     Current Allergies     Allergies as of 01/16/2020 - Reviewed 01/16/2020   Allergen Reaction Noted    Levaquin [levofloxacin]  12/28/2017    Quinolones  02/23/2018    Antihistamines, chlorpheniramine-type  02/29/2016    Benzodiazepines  01/02/2018    Chlorpheniramine      Diphenhydramine      Erythromycin  02/29/2016    Iodine  02/29/2016    Iodine solution [povidone iodine]  06/07/2018    Loratadine      Other  03/06/2017    Sulfa antibiotics  02/29/2016    Tetracyclines & related  02/29/2016            The following portions of the patient's history were reviewed and updated as appropriate: allergies, current medications, past family history, past medical history, past social history, past surgical history and problem list      Past Medical History:   Diagnosis Date    MARSHALL (acute kidney injury) ([address] )     Anxiety     Cervical spinal stenosis     CKD (chronic kidney disease)     HTN (hypertension)     Hx of chronic ulcerative colitis     Hypercalcemia     Hyperlipidemia     Kidney atrophy     Microscopic hematuria     Myasthenia gravis ([address] )     Nephrolithiasis     Sleep apnea        Past Surgical History:   Procedure Laterality Date    COLECTOMY      partial sigmoid    COLECTOMY LAPAROSCOPIC      partial sigmoid colectomy    COLON SURGERY      lap  partial colectomy sigmoid    COLONOSCOPY      LITHOTRIPSY      OTHER SURGICAL HISTORY      parotid bx    TN FRAGMENT KIDNEY STONE/ [facility] Left 11/2/2018    Procedure: [facility] ([facility]); Surgeon: Yessica Lomas MD;  Location: [facility];  Service: Urology    SALIVARY GLAND SURGERY      biopsy       Family History   Problem Relation Age of Onset    Arthritis Mother     Diabetes Mother     Hypertension Mother     Osteoporosis Mother     Depression Father     Cancer Neg Hx     Heart disease Neg Hx          Medications have been verified  Objective   /90   Pulse (!) 111   Temp 98 2 °F (36 8 °C)   Resp 20   Ht 5' 9" (1 753 m)   Wt 83 2 kg (183 lb 6 4 oz)   SpO2 95%   BMI 27 08 kg/m²        Physical Exam     Physical Exam   Constitutional: He appears well-developed and well-nourished  No distress  HENT:   Head: Normocephalic and atraumatic  Cardiovascular: Normal rate, regular rhythm, normal heart sounds and intact distal pulses  Pulmonary/Chest: Effort normal and breath sounds normal  No respiratory distress  He has no wheezes  He has no rales  Musculoskeletal:        Right hand: He exhibits tenderness and swelling  He exhibits normal range of motion, no bony tenderness and no deformity  Normal sensation noted  Normal strength noted  Hands:  Skin: Skin is warm  Capillary refill takes less than 2 seconds  No rash noted  He is not diaphoretic  No pallor  Nursing note and vitals reviewed

## 2020-01-17 ENCOUNTER — PATIENT OUTREACH (OUTPATIENT)
Dept: FAMILY MEDICINE CLINIC | Facility: CLINIC | Age: 75
End: 2020-01-17

## 2020-01-17 NOTE — PROGRESS NOTES
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

## 2020-02-03 ENCOUNTER — TELEPHONE (OUTPATIENT)
Dept: NEUROLOGY | Facility: CLINIC | Age: 75
End: 2020-02-03

## 2020-02-05 ENCOUNTER — OFFICE VISIT (OUTPATIENT)
Dept: NEUROLOGY | Facility: CLINIC | Age: 75
End: 2020-02-05
Payer: MEDICARE

## 2020-02-05 VITALS
BODY MASS INDEX: 26.66 KG/M2 | DIASTOLIC BLOOD PRESSURE: 80 MMHG | HEIGHT: 69 IN | HEART RATE: 94 BPM | WEIGHT: 180 LBS | RESPIRATION RATE: 20 BRPM | SYSTOLIC BLOOD PRESSURE: 138 MMHG

## 2020-02-05 DIAGNOSIS — G70.00 MYASTHENIA GRAVIS (HCC): ICD-10-CM

## 2020-02-05 PROCEDURE — 99214 OFFICE O/P EST MOD 30 MIN: CPT | Performed by: PSYCHIATRY & NEUROLOGY

## 2020-02-05 PROCEDURE — 2022F DILAT RTA XM EVC RTNOPTHY: CPT | Performed by: PSYCHIATRY & NEUROLOGY

## 2020-02-05 PROCEDURE — 3079F DIAST BP 80-89 MM HG: CPT | Performed by: PSYCHIATRY & NEUROLOGY

## 2020-02-05 PROCEDURE — 1036F TOBACCO NON-USER: CPT | Performed by: PSYCHIATRY & NEUROLOGY

## 2020-02-05 PROCEDURE — 3075F SYST BP GE 130 - 139MM HG: CPT | Performed by: PSYCHIATRY & NEUROLOGY

## 2020-02-05 PROCEDURE — 3044F HG A1C LEVEL LT 7.0%: CPT | Performed by: PSYCHIATRY & NEUROLOGY

## 2020-02-05 PROCEDURE — 3066F NEPHROPATHY DOC TX: CPT | Performed by: PSYCHIATRY & NEUROLOGY

## 2020-02-05 PROCEDURE — 3008F BODY MASS INDEX DOCD: CPT | Performed by: PSYCHIATRY & NEUROLOGY

## 2020-02-05 PROCEDURE — 1160F RVW MEDS BY RX/DR IN RCRD: CPT | Performed by: PSYCHIATRY & NEUROLOGY

## 2020-02-05 RX ORDER — PREDNISONE 10 MG/1
10 TABLET ORAL DAILY
Qty: 90 TABLET | Refills: 1 | Status: SHIPPED | OUTPATIENT
Start: 2020-02-05 | End: 2020-06-05 | Stop reason: SDUPTHER

## 2020-02-05 NOTE — PROGRESS NOTES
Patient ID: Blanca Hickey is a 76 y o  male  Assessment/Plan:    Myasthenia gravis ([facility] [address] )  Myasthenia Gravis  is well controlled  Therefore we will attempt to reduce his prednisone  He should be decreased from 10 mg alternating by five for two months of the prednisone if he is doing well then she should reduced to 5 mg a day  His symptoms would potentially include more shortness of breath more fatigable muscle weakness  Polyneuropathy  This is mild and not bothersome to the patient  Vertigo  Vertigo has since resolved  Diagnoses and all orders for this visit:    Myasthenia gravis ([facility] [address] )  -     predniSONE 10 mg tablet; Take 1 tablet (10 mg total) by mouth daily        he can return to our offices in several months    Subjective: This is a 77 y/o male with History of Myasthenia Gravis diagnosed in 2009 after presents for a follow up visit  it was felt that his myasthenia gravis was well controlled and his neuropathy was minimal   He did have issues with severe vertigo  This has resolved with the discontinuation of lisinopril  He had a previous extensive workup  He has undergone an extensive workup including MRI of the brain CT scan of the sinuses  He was also tried on Antivert which is ineffective and did undergo optic kinetic vision therapy and the use of  hydrochlorothiazide could be helpful  It did help his symptoms somewhat  In the interim he has been was hospitalized in September for a urinary tract infection  Vivek Juan Pablo Approximately a few weeks ago he was placed on a medication for prostatic hypertrophy  This cause severe lightheadedness  Therefore in the interim he has stopped all of his medications except for the prednisone and  potassium  He was seen by Nephrology and she had a discussion with him regarding the need for lisinopril      Today he is doing much better  He has no further complaints of lightheadedness    He denies any neck pain or   Cognitive symptoms         He was also utilizing shoes that were wider and his neuropathy symptoms have improved  Today denies any problems speech no problems swallowing no double vision no weakness no difficulty ambulating              he did undergo a MRI of the cervical spine  It did demonstrate Multilevel cervical spondylosis, as described above      Multifactorial disease results in overall mild canal stenosis and cord compression at C5-C6, overall slightly progressed since the prior examination  However there is no myelomalacia      Cervical spondylosis in the remainder of the spine is otherwise not significantly changed  He was evaluated by Neurosurgery who felt that he was not a neurosurgical candidate           He has previously had  EMGs of the lower extremities in February of 2019 which showed a mild axonal sensory neuropathy  He is now on a lower dose of insulin  He did have a CMP performed  His alkaline phosphatase was 42, cl was 110 the other laboratory studies were normal                                                                  The following portions of the patient's history were reviewed and updated as appropriate:   He  has a past medical history of MARSHALL (acute kidney injury) ([address] ), Anxiety, Cervical spinal stenosis, CKD (chronic kidney disease), HTN (hypertension), chronic ulcerative colitis, Hypercalcemia, Hyperlipidemia, Kidney atrophy, Microscopic hematuria, Myasthenia gravis ([address] ), Nephrolithiasis, and Sleep apnea  He  has a past surgical history that includes Lithotripsy; Colonoscopy; Colon surgery; Other surgical history; Salivary gland surgery; COLECTOMY LAPAROSCOPIC; Colectomy; and pr fragment kidney stone/ eswl (Left, 11/2/2018)  His family history includes Arthritis in his mother; Depression in his father; Diabetes in his mother; Hypertension in his mother; Osteoporosis in his mother  He  reports that he has never smoked  He has never used smokeless tobacco  He reports that he drank alcohol   He reports that he does not use drugs    Current Outpatient Medications   Medication Sig Dispense Refill    BD ULTRA-FINE PEN NEEDLES 29G X 12 7MM MISC       Cranberry 500 MG CAPS Take 4 capsules by mouth daily       insulin aspart (NovoLOG) 100 units/mL injection Inject 4 Units under the skin 3 (three) times a day before meals      insulin glargine (LANTUS) 100 units/mL subcutaneous injection Inject 12 Units under the skin daily at bedtime 1000 Units 1    Lactobacillus (ACIDOPHILUS) 100 MG CAPS Take 1 capsule by mouth daily      NOVOLOG FLEXPEN 100 units/mL injection pen inject 3 units subcutaneously twice a day BEFORE MEALS 15 mL 0    Potassium 99 MG TABS Take 1 tablet by mouth 2 (two) times a day       predniSONE 10 mg tablet Take 1 tablet (10 mg total) by mouth daily 90 tablet 1    pyridoxine (VITAMIN B6) 100 mg tablet Take 100 mg by mouth daily        ACCU-CHEK FASTCLIX LANCETS MISC Test four times daily (Patient not taking: Reported on 1/16/2020) 360 each 0    ACCU-CHEK GUIDE test strip 1 each by Other route 4 (four) times a day Use as instructed bid scheduled and prn symptoms/signs blood sugar fluctuation (Patient not taking: Reported on 1/16/2020) 360 each 0    alfuzosin (UROXATRAL) 10 mg 24 hr tablet Take 1 tablet (10 mg total) by mouth daily (Patient not taking: Reported on 10/29/2019) 30 tablet 11    atorvastatin (LIPITOR) 10 mg tablet Take 1 tablet (10 mg total) by mouth daily (Patient not taking: Reported on 1/16/2020) 30 tablet 1    docusate sodium (COLACE) 250 MG capsule Take 1 capsule (250 mg total) by mouth daily (Patient not taking: Reported on 10/29/2019) 30 capsule 0    Insulin Pen Needle (BD ULTRA-FINE PEN NEEDLES) 29G X 12 7MM MISC by Does not apply route 4 (four) times a day (Patient not taking: Reported on 1/16/2020) 360 each 3    lidocaine (LIDODERM) 5 % Apply 1 patch topically daily Remove & Discard patch within 12 hours or as directed by MD (Patient not taking: Reported on 10/29/2019) 30 patch 0    lisinopril (ZESTRIL) 2 5 mg tablet Take 1 tablet (2 5 mg total) by mouth daily (Patient not taking: Reported on 10/30/2019) 90 tablet 0     No current facility-administered medications for this visit  He is allergic to levaquin [levofloxacin]; quinolones; antihistamines, chlorpheniramine-type; benzodiazepines; chlorpheniramine; diphenhydramine; erythromycin; iodine; iodine solution [povidone iodine]; loratadine; other; sulfa antibiotics; and tetracyclines & related            Objective:    Blood pressure 138/80, pulse 94, resp  rate 20, height 5' 9" (1 753 m), weight 81 6 kg (180 lb)  Physical Exam   Constitutional: He appears well-developed  HENT:   Head: Normocephalic  Eyes: Pupils are equal, round, and reactive to light  Lids are normal    Neurological:   Reflex Scores:       Tricep reflexes are 2+ on the right side and 2+ on the left side  Bicep reflexes are 2+ on the right side and 2+ on the left side  Patellar reflexes are 2+ on the right side and 2+ on the left side  Achilles reflexes are 1+ on the right side and 1+ on the left side  Psychiatric: His speech is normal    Vitals reviewed  Neurological Exam  Mental Status   Oriented to person, place, time and situation  Speech is normal  Language is fluent with no aphasia  Cranial Nerves  CN II: Visual acuity is normal  Visual fields full to confrontation  CN III, IV, VI: Extraocular movements intact bilaterally  Normal lids and orbits bilaterally  Pupils equal round and reactive to light bilaterally  CN V: Facial sensation is normal   CN VII: Full and symmetric facial movement  CN VIII: Hearing is normal   CN IX, X: Palate elevates symmetrically  Normal gag reflex  CN XI: Shoulder shrug strength is normal   CN XII: Tongue midline without atrophy or fasciculations  Motor   Strength is 5/5 in all four extremities except as noted  Neck flexion and neck extension were 5/5    There is no fatigable weakness  Sensory  Light touch is normal in upper and lower extremities  Temperature is normal in upper and lower extremities  Reflexes                                           Right                      Left  Biceps                                 2+                         2+  Triceps                                2+                         2+  Patellar                                2+                         2+  Achilles                                1+                         1+  Plantar                           Downgoing                Downgoing    Right pathological reflexes: Logan's absent  Left pathological reflexes: Logan's absent  Coordination  Right: Finger-to-nose normal   Left: Finger-to-nose normal     Gait Unable to rise from chair without using arms  Is a slightly wide-based gait  He was unable to tandem gait       Review of systems obtained from the medical assistant as below was reviewed with the patient at today's visit    ROS:    Review of Systems   Constitutional: Negative  Negative for appetite change and fever  HENT: Negative  Negative for hearing loss, tinnitus, trouble swallowing and voice change  Eyes: Negative  Negative for photophobia and pain  Respiratory: Negative  Negative for shortness of breath  Cardiovascular: Negative  Negative for palpitations  Gastrointestinal: Negative  Negative for nausea and vomiting  Endocrine: Negative  Negative for cold intolerance and heat intolerance  Genitourinary: Positive for urgency  Negative for dysuria and frequency  Musculoskeletal: Negative  Negative for myalgias and neck pain  Skin: Negative  Negative for rash  Neurological: Negative  Negative for dizziness, tremors, seizures, syncope, facial asymmetry, speech difficulty, weakness, light-headedness, numbness and headaches  Snoring     Hematological: Negative  Does not bruise/bleed easily     Psychiatric/Behavioral: Positive for sleep disturbance  Negative for confusion and hallucinations

## 2020-02-05 NOTE — ASSESSMENT & PLAN NOTE
Myasthenia Gravis  is well controlled  Therefore we will attempt to reduce his prednisone  He should be decreased from 10 mg alternating by five for two months of the prednisone if he is doing well then she should reduced to 5 mg a day  His symptoms would potentially include more shortness of breath more fatigable muscle weakness

## 2020-02-28 ENCOUNTER — TELEPHONE (OUTPATIENT)
Dept: FAMILY MEDICINE CLINIC | Facility: CLINIC | Age: 75
End: 2020-02-28

## 2020-02-28 NOTE — TELEPHONE ENCOUNTER
Patient called that he is looking for a test that he had in the beginning of last year (2019) that starts with a 3 word test, he does not know what kind or the name  He said it was [facility] [address] at a [facility]         He needs it by June 2020 for his [facility] appt

## 2020-03-10 ENCOUNTER — OFFICE VISIT (OUTPATIENT)
Dept: NEPHROLOGY | Facility: CLINIC | Age: 75
End: 2020-03-10
Payer: MEDICARE

## 2020-03-10 ENCOUNTER — TELEPHONE (OUTPATIENT)
Dept: FAMILY MEDICINE CLINIC | Facility: CLINIC | Age: 75
End: 2020-03-10

## 2020-03-10 VITALS
BODY MASS INDEX: 26.96 KG/M2 | RESPIRATION RATE: 20 BRPM | WEIGHT: 182 LBS | SYSTOLIC BLOOD PRESSURE: 120 MMHG | HEIGHT: 69 IN | HEART RATE: 74 BPM | DIASTOLIC BLOOD PRESSURE: 62 MMHG

## 2020-03-10 DIAGNOSIS — N40.1 BENIGN NON-NODULAR PROSTATIC HYPERPLASIA WITH LOWER URINARY TRACT SYMPTOMS: ICD-10-CM

## 2020-03-10 DIAGNOSIS — I10 ESSENTIAL HYPERTENSION: ICD-10-CM

## 2020-03-10 DIAGNOSIS — N18.30 CKD (CHRONIC KIDNEY DISEASE), STAGE III (HCC): Primary | ICD-10-CM

## 2020-03-10 DIAGNOSIS — Z79.4 CONTROLLED TYPE 2 DIABETES MELLITUS WITH HYPOGLYCEMIA, WITH LONG-TERM CURRENT USE OF INSULIN (HCC): ICD-10-CM

## 2020-03-10 DIAGNOSIS — E11.649 CONTROLLED TYPE 2 DIABETES MELLITUS WITH HYPOGLYCEMIA, WITH LONG-TERM CURRENT USE OF INSULIN (HCC): ICD-10-CM

## 2020-03-10 DIAGNOSIS — E55.9 VITAMIN D DEFICIENCY: ICD-10-CM

## 2020-03-10 DIAGNOSIS — G70.00 MYASTHENIA GRAVIS (HCC): ICD-10-CM

## 2020-03-10 DIAGNOSIS — N20.0 NEPHROLITHIASIS: ICD-10-CM

## 2020-03-10 DIAGNOSIS — E83.52 HYPERCALCEMIA: ICD-10-CM

## 2020-03-10 PROCEDURE — 3044F HG A1C LEVEL LT 7.0%: CPT | Performed by: NURSE PRACTITIONER

## 2020-03-10 PROCEDURE — 3008F BODY MASS INDEX DOCD: CPT | Performed by: NURSE PRACTITIONER

## 2020-03-10 PROCEDURE — 99214 OFFICE O/P EST MOD 30 MIN: CPT | Performed by: NURSE PRACTITIONER

## 2020-03-10 PROCEDURE — 1160F RVW MEDS BY RX/DR IN RCRD: CPT | Performed by: NURSE PRACTITIONER

## 2020-03-10 PROCEDURE — 1036F TOBACCO NON-USER: CPT | Performed by: NURSE PRACTITIONER

## 2020-03-10 PROCEDURE — 2022F DILAT RTA XM EVC RTNOPTHY: CPT | Performed by: NURSE PRACTITIONER

## 2020-03-10 PROCEDURE — 3078F DIAST BP <80 MM HG: CPT | Performed by: NURSE PRACTITIONER

## 2020-03-10 PROCEDURE — 3066F NEPHROPATHY DOC TX: CPT | Performed by: NURSE PRACTITIONER

## 2020-03-10 PROCEDURE — 3074F SYST BP LT 130 MM HG: CPT | Performed by: NURSE PRACTITIONER

## 2020-03-10 RX ORDER — MELATONIN
1000 DAILY
Qty: 90 TABLET | Refills: 0 | Status: SHIPPED | OUTPATIENT
Start: 2020-03-10 | End: 2020-09-08

## 2020-03-10 NOTE — PROGRESS NOTES
OFFICE FOLLOW UP - Nephrology   [facility] 76 y o  male MRN: [id number]       ASSESSMENT and PLAN:  Diagnoses and all orders for this visit:    CKD (chronic kidney disease), stage III ([facility] [address] )    Controlled type 2 diabetes mellitus with hypoglycemia, with long-term current use of insulin ([facility] [address] )    Essential hypertension    Myasthenia gravis ([facility] [address] )    Nephrolithiasis    Benign non-nodular prostatic hyperplasia with lower urinary tract symptoms    Hypercalcemia      CKD III:  baseline creatinine 1 3-1 7  Chronic disease likely secondary to hypertensive nephrosclerosis, diabetic nephropathy, as well as component of age-related nephron loss  Follows with Liam Isaelanahy  Had previous acute kidney injury secondary to NSAID induced nephropathy  Previous SPEP was within normal limits as well as IG G, IgA, and IgM  Previous microalbumin to creatinine ratio was 12 in June 2019  Most recent microalbumin to creatinine ratio was improved to 6 0  The patient had CT scan which showed stable bilateral renal calculi in September 2019  Repeat CT scan on October 2019 showed stable left renal calculi, reduced in size  Renal ultrasound obtained December 2019 showed nonobstructive left renal stone as well as left upper pole renal cyst   Most recent creatinine was stable at 1 3   -continue to encourage oral hydration    -instructed to avoid NSAIDs    -check RFP, UA, UPCR prior to next appointment  HTN:  Lisinopril discontinued  Blood pressure stable today    -instructed to continue to obtain blood pressure at home     -goal blood pressure less than 140/90    -continue to follow low-sodium diet     -advised to hold ACE-inhibitor for increased GI loss to reduce risk of acute kidney injury  DM:  Last A1c was 6 5  Continues to follow with primary care for diabetic control  Myasthenia Gravis:  Per last outpatient note, continues on prednisone 10 mg  If remains stable, would reduced to 5 mg daily per OP note      Nephrolithiasis: As seen on most recent CT scan and renal ultrasound  Obstructed to obtain 24 hour litho link at last appointment  Previous stone analysis from February 2019 showed 50% calcium oxalate, 50% uric acid  Denies flank pain or visible hematuria  Passed stones a couple weeks ago  Denies significant pain or visibile blood with passing stone  Denies fevers or chills  - continue to drink plenty of water  Admits to drinking 20 oz of water per day and too low sugar Gatorades  -encouraged to add lemon to water  - instructed to obtain litholink collection at his convenience which was not obtained since last appointment    -follows with Dr Ragland  BPH: denies urinary symptoms  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:  The patient was instructed to stop his vitamin-D during last visit  His repeat calcium was 9 6  Vitamin-D level was low at 28 6  PTH currently at goal   Phosphorus at goal   -will start low dose vitamin D 1,000 units daily  -recheck calcium and vitamin D level prior to next appointment  Patient will follow up in 3 months  with Dr William Rucker  HPI: Steven Villarreal is a 76 y o  male who is here for routine follow up for CKD III  His last hospitalization was in Sept 2019  The patient denies any recent illness  He denies chest pain or SOB  He denies N,V,D, or issues with urination  He has a good appetite  He denies NSAID use  ROS:   A complete review of systems was done  Pertinent positives and negatives as noted in the HPI, otherwise the review of systems is negative  Allergies: Levaquin [levofloxacin]; Quinolones; Antihistamines, chlorpheniramine-type; Benzodiazepines; Chlorpheniramine; Diphenhydramine; Erythromycin; Iodine; Iodine solution [povidone iodine]; Loratadine;  Other; Sulfa antibiotics; and Tetracyclines & related    Medications:   Current Outpatient Medications:     ACCU-CHEK FASTCLIX LANCETS MISC, Test four times daily (Patient not taking: Reported on 1/16/2020), Disp: 360 each, Rfl: 0    ACCU-CHEK GUIDE test strip, 1 each by Other route 4 (four) times a day Use as instructed bid scheduled and prn symptoms/signs blood sugar fluctuation (Patient not taking: Reported on 1/16/2020), Disp: 360 each, Rfl: 0    alfuzosin (UROXATRAL) 10 mg 24 hr tablet, Take 1 tablet (10 mg total) by mouth daily (Patient not taking: Reported on 10/29/2019), Disp: 30 tablet, Rfl: 11    atorvastatin (LIPITOR) 10 mg tablet, Take 1 tablet (10 mg total) by mouth daily (Patient not taking: Reported on 1/16/2020), Disp: 30 tablet, Rfl: 1    BD ULTRA-FINE PEN NEEDLES 29G X 12 7MM MISC, , Disp: , Rfl:     Cranberry 500 MG CAPS, Take 4 capsules by mouth daily , Disp: , Rfl:     docusate sodium (COLACE) 250 MG capsule, Take 1 capsule (250 mg total) by mouth daily (Patient not taking: Reported on 10/29/2019), Disp: 30 capsule, Rfl: 0    insulin aspart (NovoLOG) 100 units/mL injection, Inject 4 Units under the skin 3 (three) times a day before meals, Disp: , Rfl:     insulin glargine (LANTUS) 100 units/mL subcutaneous injection, Inject 12 Units under the skin daily at bedtime, Disp: 1000 Units, Rfl: 1    Insulin Pen Needle (BD ULTRA-FINE PEN NEEDLES) 29G X 12 7MM MISC, by Does not apply route 4 (four) times a day (Patient not taking: Reported on 1/16/2020), Disp: 360 each, Rfl: 3    Lactobacillus (ACIDOPHILUS) 100 MG CAPS, Take 1 capsule by mouth daily, Disp: , Rfl:     lidocaine (LIDODERM) 5 %, Apply 1 patch topically daily Remove & Discard patch within 12 hours or as directed by MD (Patient not taking: Reported on 10/29/2019), Disp: 30 patch, Rfl: 0    lisinopril (ZESTRIL) 2 5 mg tablet, Take 1 tablet (2 5 mg total) by mouth daily (Patient not taking: Reported on 10/30/2019), Disp: 90 tablet, Rfl: 0    NOVOLOG FLEXPEN 100 units/mL injection pen, inject 3 units subcutaneously twice a day BEFORE MEALS, Disp: 15 mL, Rfl: 0    Potassium 99 MG TABS, Take 1 tablet by mouth 2 (two) times a day , Disp: , Rfl:    predniSONE 10 mg tablet, Take 1 tablet (10 mg total) by mouth daily, Disp: 90 tablet, Rfl: 1    pyridoxine (VITAMIN B6) 100 mg tablet, Take 100 mg by mouth daily  , Disp: , Rfl:     Past Medical History:   Diagnosis Date    MARSHALL (acute kidney injury) ([facility] [address] )     Anxiety     Cervical spinal stenosis     CKD (chronic kidney disease)     HTN (hypertension)     Hx of chronic ulcerative colitis     Hypercalcemia     Hyperlipidemia     Kidney atrophy     Microscopic hematuria     [facility]     Nephrolithiasis     Sleep apnea      Past Surgical History:   Procedure Laterality Date    COLECTOMY      partial sigmoid    COLECTOMY LAPAROSCOPIC      partial sigmoid colectomy    COLON SURGERY      lap  partial colectomy sigmoid    COLONOSCOPY      LITHOTRIPSY      OTHER SURGICAL HISTORY      parotid bx    NC FRAGMENT KIDNEY STONE/ ESWL Left 11/2/2018    Procedure: LITHROTRIPSY EXTRACORPORAL SHOCKWAVE (ESWL); Surgeon: Lexie Ruiz MD;  Location: [facility];  Service: Urology    SALIVARY GLAND SURGERY      biopsy     Family History   Problem Relation Age of Onset    Arthritis Mother     Diabetes Mother     Hypertension Mother     Osteoporosis Mother     Depression Father     Cancer Neg Hx     Heart disease Neg Hx       reports that he has never smoked  He has never used smokeless tobacco  He reports that he drank alcohol  He reports that he does not use drugs  Physical Exam:   There were no vitals filed for this visit  There is no height or weight on file to calculate BMI      General: no acute distress   Eyes: conjunctivae pink, anicteric sclerae  ENT: mucous membranes moist  Neck: supple, no JVD  Chest: clear to auscultation bilaterally with no wheezes, rale or rhochi  CVS: regular rate and rhythm   Abdomen: soft, non-tender, non-distended  Extremities: no lower extremity edema   Skin: no rash  Neuro: awake and alert       Lab Results:  Results for orders placed or performed in visit on 01/07/20   PTH, intact   Result Value Ref Range    PTH 53 9 18 4 - 80 1 pg/mL   Vitamin D 25 hydroxy   Result Value Ref Range    Vit D, 25-Hydroxy 28 6 (L) 30 0 - 100 0 ng/mL   Microalbumin / creatinine urine ratio   Result Value Ref Range    Creatinine, Ur 131 0 mg/dL    Microalbum  ,U,Random 8 1 0 0 - 20 0 mg/L    Microalb Creat Ratio 6 0 - 30 mg/g creatinine   Phosphorus   Result Value Ref Range    Phosphorus 2 5 2 3 - 4 1 mg/dL   Magnesium   Result Value Ref Range    Magnesium 2 5 1 6 - 2 6 mg/dL   Lipid panel   Result Value Ref Range    Cholesterol 235 (H) 50 - 200 mg/dL    Triglycerides 123 <=150 mg/dL    HDL, Direct 53 >=40 mg/dL    LDL Calculated 157 (H) 0 - 100 mg/dL    Non-HDL-Chol (CHOL-HDL) 182 mg/dl   Comprehensive metabolic panel   Result Value Ref Range    Sodium 142 136 - 145 mmol/L    Potassium 4 2 3 5 - 5 3 mmol/L    Chloride 110 (H) 100 - 108 mmol/L    CO2 27 21 - 32 mmol/L    ANION GAP 5 4 - 13 mmol/L    BUN 18 5 - 25 mg/dL    Creatinine 1 30 0 60 - 1 30 mg/dL    Glucose, Fasting 85 65 - 99 mg/dL    Calcium 9 6 8 3 - 10 1 mg/dL    AST 16 5 - 45 U/L    ALT 29 12 - 78 U/L    Alkaline Phosphatase 42 (L) 46 - 116 U/L    Total Protein 7 2 6 4 - 8 2 g/dL    Albumin 3 6 3 5 - 5 0 g/dL    Total Bilirubin 0 53 0 20 - 1 00 mg/dL    eGFR 54 ml/min/1 73sq m   HEMOGLOBIN A1C W/ EAG ESTIMATION   Result Value Ref Range    Hemoglobin A1C 6 5 (H) 4 2 - 6 3 %     mg/dl             Invalid input(s): ALBUMIN      Portions of the record may have been created with voice recognition software  Occasional wrong word or "sound a like" substitutions may have occurred due to the inherent limitations of voice recognition software  Read the chart carefully and recognize, using context, where substitutions have occurred  If you have any questions, please contact the dictating provider

## 2020-03-10 NOTE — PATIENT INSTRUCTIONS
We discussed your mist recent lab work  Your kidney function is stable  Most recent number was 1 3  Please avoid motrin or ibuprofen  Please continue to drink plenty of water  We will check urine and blood tests prior to next appointment  Your blood pressure is stable  Please continue to follow low salt diet  Your diabetes is improving  Continue your prednisone as directed  Please follow with Urology for your renal stones  Please obtain litholink if you wish  Drink plenty of fluids, You may add lemon to your water  We will start Vitamin D today for low vitamin D level  We will recheck level prior to next appointment  We will see you in the office in 3-4 months for routine follow up

## 2020-03-10 NOTE — TELEPHONE ENCOUNTER
Patient called stating he saw nephrology today  He stated he has passed two stones and he wants to drop them off at the lab in [facility]  Can he get an order for them? Thank you

## 2020-03-11 NOTE — TELEPHONE ENCOUNTER
Pt stopped into the office that he is refusing to do the 48hr urine screen  He would like a urinalysis instead, pt aware there is an active order in chart from yesterday

## 2020-03-17 NOTE — TELEPHONE ENCOUNTER
Patient did drop stones at lab next door  Lab is questioning if PCP can place an order for them  Please advise

## 2020-03-18 DIAGNOSIS — N20.0 NEPHROLITHIASIS: Primary | ICD-10-CM

## 2020-03-19 ENCOUNTER — APPOINTMENT (OUTPATIENT)
Dept: LAB | Facility: CLINIC | Age: 75
End: 2020-03-19
Payer: MEDICARE

## 2020-03-19 PROCEDURE — 82360 CALCULUS ASSAY QUANT: CPT | Performed by: FAMILY MEDICINE

## 2020-03-23 NOTE — TELEPHONE ENCOUNTER
-- DO NOT REPLY / DO NOT REPLY ALL --  -- Message is from the [facility]--    COVID-19 Universal Screening Responses:    Shortness of Breath or Difficulty Completing a Sentence: NO  Fever, Cough or Other Respiratory: NO  Close Contact, Exposure or Travel to Identified Countries: NO    General Patient Message      Reason for Call: Patient believes she may have had a seizure and is requesting to speak with Dr. Quiñones or his nurse.    Caller Information       Type Contact Phone    03/23/2020 11:05 AM Phone (Incoming) Maritza Galicia (Self) [phone] (H)          Alternative phone number: [phone]    Turnaround time given to caller:   \"This message will be sent to [state Provider's name]. The clinical team will fulfill your request as soon as they review your message.\"       Discussed patient and recent urine culture results with Dr Koko Hussein  Per Dr Koko Hussein, he wants to treat urine culture results  Dr Koko Hussein reports, best for oral medication would be Suprax 400 mg daily for 5 days  Called several pharmacies to inquire about pricing of Suprax  [facility] had best option, out of pocket cost, before insurance, but after discount coupon, would be $[phone]  Per pharmacist at [facility], would have to order Suprax, would be available tomorrow for patient to   Discussed further with patient, who is agreeable to paying the cost for the Suprax, as it "will be cheaper then the PICC line "     Please send script to [facility] for Suprax

## 2020-03-24 DIAGNOSIS — N18.30 CKD (CHRONIC KIDNEY DISEASE), STAGE III (HCC): ICD-10-CM

## 2020-03-24 DIAGNOSIS — E11.9 CONTROLLED TYPE 2 DIABETES MELLITUS WITHOUT COMPLICATION, WITH LONG-TERM CURRENT USE OF INSULIN (HCC): ICD-10-CM

## 2020-03-24 DIAGNOSIS — Z79.4 CONTROLLED TYPE 2 DIABETES MELLITUS WITH HYPOGLYCEMIA, WITH LONG-TERM CURRENT USE OF INSULIN (HCC): ICD-10-CM

## 2020-03-24 DIAGNOSIS — Z79.4 TYPE 2 DIABETES MELLITUS WITH COMPLICATION, WITH LONG-TERM CURRENT USE OF INSULIN (HCC): ICD-10-CM

## 2020-03-24 DIAGNOSIS — Z79.4 CONTROLLED TYPE 2 DIABETES MELLITUS WITHOUT COMPLICATION, WITH LONG-TERM CURRENT USE OF INSULIN (HCC): ICD-10-CM

## 2020-03-24 DIAGNOSIS — E11.8 TYPE 2 DIABETES MELLITUS WITH COMPLICATION, WITH LONG-TERM CURRENT USE OF INSULIN (HCC): ICD-10-CM

## 2020-03-24 DIAGNOSIS — E11.649 CONTROLLED TYPE 2 DIABETES MELLITUS WITH HYPOGLYCEMIA, WITH LONG-TERM CURRENT USE OF INSULIN (HCC): ICD-10-CM

## 2020-03-24 RX ORDER — INSULIN ASPART 100 [IU]/ML
INJECTION, SOLUTION INTRAVENOUS; SUBCUTANEOUS
Qty: 15 ML | Refills: 1 | Status: CANCELLED | OUTPATIENT
Start: 2020-03-24

## 2020-03-24 NOTE — TELEPHONE ENCOUNTER
Patient is requesting refills of Lantus, Novolog flex pen, BD ultra fine pen needles, Accu-chek test strips and Accu-chek lancets  Pharmacy is [facility] in [facility]

## 2020-03-26 NOTE — TELEPHONE ENCOUNTER
Patient calling in regards to insulin  Patient states that his sugar is elevated due to stress eating  Is wondering if Novolog can be increased to 8 units three times a day and if Lantus can be increased to 24 units  Sugars prior to Prednisone in the morning 112-120 until Prednisone than sugar goes up 50

## 2020-03-27 DIAGNOSIS — Z79.4 CONTROLLED TYPE 2 DIABETES MELLITUS WITH HYPOGLYCEMIA, WITH LONG-TERM CURRENT USE OF INSULIN (HCC): ICD-10-CM

## 2020-03-27 DIAGNOSIS — E11.649 CONTROLLED TYPE 2 DIABETES MELLITUS WITH HYPOGLYCEMIA, WITH LONG-TERM CURRENT USE OF INSULIN (HCC): ICD-10-CM

## 2020-03-27 RX ORDER — LANCETS
EACH MISCELLANEOUS
Qty: 360 EACH | Refills: 1 | Status: SHIPPED | OUTPATIENT
Start: 2020-03-27

## 2020-03-27 RX ORDER — INSULIN ASPART 100 [IU]/ML
6 INJECTION, SOLUTION INTRAVENOUS; SUBCUTANEOUS
Qty: 15 ML | Refills: 1 | Status: SHIPPED | OUTPATIENT
Start: 2020-03-27

## 2020-03-27 RX ORDER — BLOOD SUGAR DIAGNOSTIC
1 STRIP MISCELLANEOUS 4 TIMES DAILY
Qty: 360 EACH | Refills: 1 | Status: SHIPPED | OUTPATIENT
Start: 2020-03-27 | End: 2020-08-10 | Stop reason: SDUPTHER

## 2020-03-27 RX ORDER — INSULIN GLARGINE 100 [IU]/ML
12 INJECTION, SOLUTION SUBCUTANEOUS
Qty: 1000 UNITS | Refills: 1 | Status: SHIPPED | OUTPATIENT
Start: 2020-03-27 | End: 2020-12-03

## 2020-03-27 NOTE — TELEPHONE ENCOUNTER
Patient had voicemail message at 720AM on 3/27/2020 that he was up all night worried about not getting his stuff  He said he is getting more upset that this isn't phoned in with refills on it and if you don't want to do it, he will go find a new doctor which will be hard because he doesn't want to leave his home

## 2020-03-27 NOTE — TELEPHONE ENCOUNTER
Pt called in asking where his refills where  I explained to pt that it has been a crazy week and we are trying very hard to keep up with everything  I explained the info from [facility] [address] is changing daily and he needs to bear with us  Pt understood but is upset because he called these in 3 day ago  I explained to pt that yes I understand but our office policy is 97-11 hours and today is 72 hours so they will be done by the end of office hours today  Pt seemed to understand and disconnected the call

## 2020-03-27 NOTE — TELEPHONE ENCOUNTER
Please call pharmacy to cancel the insulin VIALS refill auth- (the auth should have been put in for the PEN, so I separately escribed that)  Also, please call pt and advise that he can try going up to 6units before meals, keep log of all fingerstick sugars, and let's do a virtual visit on Monday

## 2020-03-27 NOTE — TELEPHONE ENCOUNTER
Patient left a voicemail at 130pm on 3/27/2020  His Lantus and test strips need a DANIEL form complete- they will not fill until completed, as of now- no fax from the pharmacy for the form  They also told him that Novalog and Lantus that he takes at night increased $105 to $364 for 5 pens- that means he would have to come up with two thousands dollars for detuctable  After next week he won't be able to take any kind of insulin or any meds or anything- he does not have that kind of money  He talked to [address] told him they are right and know what they are doing- he can't afford it then- he will go off all insulin and will go into a diabetic coma then he won't have to worry about getting the flu and he hungup

## 2020-03-30 NOTE — TELEPHONE ENCOUNTER
I researched patients chart- I do not see anything close to this  Do you have any idea what he would mean by this?

## 2020-03-30 NOTE — TELEPHONE ENCOUNTER
Detailed written order form completed  Forms completed and placed for providers signature  Patient states that he has not checked his sugars to write a log to review with provider due to not having testing supplies  Patient cancelled appointment with provider today to reschedule once able to track blood sugars  Patient states that he is "guessing" which insulin dose to give  Patient is wondering if provider would order a HemA1c to complete Friday  Also wondering if visit with provider should wait until after HemA1c is complete  Please advise

## 2020-03-30 NOTE — TELEPHONE ENCOUNTER
Called and spoke with Lukasz Porter that form that is to be completed has not been received by the office  Pharmacy did confirm they are having issues with their fax and will manually fax form to office

## 2020-03-30 NOTE — TELEPHONE ENCOUNTER
Patient had 2 voicemail messages regarding this DANIEL form, as of now- no form has been faxed  Patient said on Sunday he did not eat at all because he doesn't know where his lab levels are  He's gone 7 days without testing and he said next Tuesday he should be due for his A1C that it has been a full 90 days  He is scheduled today at noon for a virtual visit with Dr Lillie Munoz to discuss his levels but he was not able to test  Does he need this appt?     Per a conversation he had with Yair, he was supposed to test 4 times a day and keep a log to discuss with Dr Lillie Munoz

## 2020-03-31 ENCOUNTER — TELEPHONE (OUTPATIENT)
Dept: FAMILY MEDICINE CLINIC | Facility: CLINIC | Age: 75
End: 2020-03-31

## 2020-03-31 DIAGNOSIS — Z79.4 CONTROLLED TYPE 2 DIABETES MELLITUS WITH HYPOGLYCEMIA, WITH LONG-TERM CURRENT USE OF INSULIN (HCC): Primary | ICD-10-CM

## 2020-03-31 DIAGNOSIS — E11.649 CONTROLLED TYPE 2 DIABETES MELLITUS WITH HYPOGLYCEMIA, WITH LONG-TERM CURRENT USE OF INSULIN (HCC): Primary | ICD-10-CM

## 2020-04-01 LAB
CALCIUM OXALATE DIHYDRATE MFR STONE IR: 30 %
COLOR STONE: NORMAL
COM MFR STONE: 70 %
COMMENT-STONE3: NORMAL
COMPOSITION: NORMAL
LABORATORY COMMENT REPORT: NORMAL
PHOTO: NORMAL
SIZE STONE: NORMAL MM
SPEC SOURCE SUBJ: NORMAL
STONE ANALYSIS-IMP: NORMAL
WT STONE: 12 MG

## 2020-04-06 ENCOUNTER — APPOINTMENT (OUTPATIENT)
Dept: LAB | Age: 75
End: 2020-04-06
Payer: MEDICARE

## 2020-04-06 DIAGNOSIS — Z79.4 CONTROLLED TYPE 2 DIABETES MELLITUS WITH HYPOGLYCEMIA, WITH LONG-TERM CURRENT USE OF INSULIN (HCC): ICD-10-CM

## 2020-04-06 DIAGNOSIS — E11.649 CONTROLLED TYPE 2 DIABETES MELLITUS WITH HYPOGLYCEMIA, WITH LONG-TERM CURRENT USE OF INSULIN (HCC): ICD-10-CM

## 2020-04-06 LAB
EST. AVERAGE GLUCOSE BLD GHB EST-MCNC: 131 MG/DL
HBA1C MFR BLD: 6.2 %

## 2020-04-06 PROCEDURE — 36415 COLL VENOUS BLD VENIPUNCTURE: CPT

## 2020-04-06 PROCEDURE — 83036 HEMOGLOBIN GLYCOSYLATED A1C: CPT

## 2020-04-09 ENCOUNTER — PATIENT OUTREACH (OUTPATIENT)
Dept: FAMILY MEDICINE CLINIC | Facility: CLINIC | Age: 75
End: 2020-04-09

## 2020-04-21 ENCOUNTER — TELEMEDICINE (OUTPATIENT)
Dept: FAMILY MEDICINE CLINIC | Facility: CLINIC | Age: 75
End: 2020-04-21
Payer: MEDICARE

## 2020-04-21 DIAGNOSIS — I10 ESSENTIAL HYPERTENSION: ICD-10-CM

## 2020-04-21 DIAGNOSIS — Z79.4 CONTROLLED TYPE 2 DIABETES MELLITUS WITH HYPOGLYCEMIA, WITH LONG-TERM CURRENT USE OF INSULIN (HCC): Primary | ICD-10-CM

## 2020-04-21 DIAGNOSIS — E11.649 CONTROLLED TYPE 2 DIABETES MELLITUS WITH HYPOGLYCEMIA, WITH LONG-TERM CURRENT USE OF INSULIN (HCC): Primary | ICD-10-CM

## 2020-04-21 PROCEDURE — 99442 PR PHYS/QHP TELEPHONE EVALUATION 11-20 MIN: CPT | Performed by: FAMILY MEDICINE

## 2020-04-28 ENCOUNTER — PATIENT OUTREACH (OUTPATIENT)
Dept: FAMILY MEDICINE CLINIC | Facility: CLINIC | Age: 75
End: 2020-04-28

## 2020-05-04 ENCOUNTER — TELEPHONE (OUTPATIENT)
Dept: FAMILY MEDICINE CLINIC | Facility: CLINIC | Age: 75
End: 2020-05-04

## 2020-05-04 DIAGNOSIS — H53.8 BLURRED VISION, RIGHT EYE: Primary | ICD-10-CM

## 2020-05-04 DIAGNOSIS — H57.9 EYE PROBLEM: ICD-10-CM

## 2020-05-05 DIAGNOSIS — H57.9 EYE DISORDER: Primary | ICD-10-CM

## 2020-05-15 ENCOUNTER — TELEPHONE (OUTPATIENT)
Dept: FAMILY MEDICINE CLINIC | Facility: CLINIC | Age: 75
End: 2020-05-15

## 2020-05-26 ENCOUNTER — TELEPHONE (OUTPATIENT)
Dept: OTHER | Facility: OTHER | Age: 75
End: 2020-05-26

## 2020-05-27 ENCOUNTER — APPOINTMENT (OUTPATIENT)
Dept: LAB | Age: 75
End: 2020-05-27
Payer: MEDICARE

## 2020-05-27 ENCOUNTER — TELEPHONE (OUTPATIENT)
Dept: UROLOGY | Facility: MEDICAL CENTER | Age: 75
End: 2020-05-27

## 2020-05-27 DIAGNOSIS — N18.30 CKD (CHRONIC KIDNEY DISEASE), STAGE III (HCC): ICD-10-CM

## 2020-05-27 LAB
ANION GAP SERPL CALCULATED.3IONS-SCNC: 5 MMOL/L (ref 4–13)
BACTERIA UR QL AUTO: ABNORMAL /HPF
BILIRUB UR QL STRIP: NEGATIVE
BUN SERPL-MCNC: 23 MG/DL (ref 5–25)
CALCIUM SERPL-MCNC: 9.3 MG/DL (ref 8.3–10.1)
CAOX CRY URNS QL MICRO: ABNORMAL /HPF
CHLORIDE SERPL-SCNC: 108 MMOL/L (ref 100–108)
CLARITY UR: ABNORMAL
CO2 SERPL-SCNC: 26 MMOL/L (ref 21–32)
COLOR UR: YELLOW
CREAT SERPL-MCNC: 1.3 MG/DL (ref 0.6–1.3)
CREAT UR-MCNC: 107 MG/DL
GFR SERPL CREATININE-BSD FRML MDRD: 53 ML/MIN/1.73SQ M
GLUCOSE P FAST SERPL-MCNC: 125 MG/DL (ref 65–99)
GLUCOSE UR STRIP-MCNC: NEGATIVE MG/DL
HGB UR QL STRIP.AUTO: NEGATIVE
KETONES UR STRIP-MCNC: NEGATIVE MG/DL
LEUKOCYTE ESTERASE UR QL STRIP: ABNORMAL
NITRITE UR QL STRIP: NEGATIVE
NON-SQ EPI CELLS URNS QL MICRO: ABNORMAL /HPF
PH UR STRIP.AUTO: 6 [PH]
POTASSIUM SERPL-SCNC: 4.3 MMOL/L (ref 3.5–5.3)
PROT UR STRIP-MCNC: NEGATIVE MG/DL
PROT UR-MCNC: 11 MG/DL
PROT/CREAT UR: 0.1 MG/G{CREAT} (ref 0–0.1)
RBC #/AREA URNS AUTO: ABNORMAL /HPF
SODIUM SERPL-SCNC: 139 MMOL/L (ref 136–145)
SP GR UR STRIP.AUTO: 1.02 (ref 1–1.03)
UROBILINOGEN UR QL STRIP.AUTO: 1 E.U./DL
WBC #/AREA URNS AUTO: ABNORMAL /HPF

## 2020-05-27 PROCEDURE — 81001 URINALYSIS AUTO W/SCOPE: CPT

## 2020-05-27 PROCEDURE — 80048 BASIC METABOLIC PNL TOTAL CA: CPT

## 2020-05-27 PROCEDURE — 84156 ASSAY OF PROTEIN URINE: CPT

## 2020-05-27 PROCEDURE — 36415 COLL VENOUS BLD VENIPUNCTURE: CPT

## 2020-05-27 PROCEDURE — 82570 ASSAY OF URINE CREATININE: CPT

## 2020-05-28 ENCOUNTER — TELEPHONE (OUTPATIENT)
Dept: NEPHROLOGY | Facility: CLINIC | Age: 75
End: 2020-05-28

## 2020-05-28 ENCOUNTER — TELEPHONE (OUTPATIENT)
Dept: FAMILY MEDICINE CLINIC | Facility: CLINIC | Age: 75
End: 2020-05-28

## 2020-05-29 ENCOUNTER — TELEMEDICINE (OUTPATIENT)
Dept: UROLOGY | Facility: MEDICAL CENTER | Age: 75
End: 2020-05-29
Payer: MEDICARE

## 2020-05-29 DIAGNOSIS — N20.0 NEPHROLITHIASIS: ICD-10-CM

## 2020-05-29 DIAGNOSIS — N30.00 ACUTE CYSTITIS WITHOUT HEMATURIA: ICD-10-CM

## 2020-05-29 DIAGNOSIS — Z87.440 HISTORY OF RECURRENT UTIS: ICD-10-CM

## 2020-05-29 DIAGNOSIS — B96.5 PSEUDOMONAS URINARY TRACT INFECTION: ICD-10-CM

## 2020-05-29 DIAGNOSIS — N39.0 PSEUDOMONAS URINARY TRACT INFECTION: ICD-10-CM

## 2020-05-29 DIAGNOSIS — N40.1 BENIGN NON-NODULAR PROSTATIC HYPERPLASIA WITH LOWER URINARY TRACT SYMPTOMS: Primary | ICD-10-CM

## 2020-05-29 PROCEDURE — 99442 PR PHYS/QHP TELEPHONE EVALUATION 11-20 MIN: CPT | Performed by: UROLOGY

## 2020-06-01 ENCOUNTER — TELEPHONE (OUTPATIENT)
Dept: FAMILY MEDICINE CLINIC | Facility: CLINIC | Age: 75
End: 2020-06-01

## 2020-06-01 DIAGNOSIS — Z79.4 CONTROLLED TYPE 2 DIABETES MELLITUS WITH HYPOGLYCEMIA, WITH LONG-TERM CURRENT USE OF INSULIN (HCC): Primary | ICD-10-CM

## 2020-06-01 DIAGNOSIS — E11.649 CONTROLLED TYPE 2 DIABETES MELLITUS WITH HYPOGLYCEMIA, WITH LONG-TERM CURRENT USE OF INSULIN (HCC): Primary | ICD-10-CM

## 2020-06-01 DIAGNOSIS — Z12.5 PROSTATE CANCER SCREENING: ICD-10-CM

## 2020-06-04 ENCOUNTER — PATIENT OUTREACH (OUTPATIENT)
Dept: FAMILY MEDICINE CLINIC | Facility: CLINIC | Age: 75
End: 2020-06-04

## 2020-06-05 ENCOUNTER — OFFICE VISIT (OUTPATIENT)
Dept: FAMILY MEDICINE CLINIC | Facility: CLINIC | Age: 75
End: 2020-06-05
Payer: MEDICARE

## 2020-06-05 VITALS
DIASTOLIC BLOOD PRESSURE: 80 MMHG | SYSTOLIC BLOOD PRESSURE: 142 MMHG | HEART RATE: 87 BPM | TEMPERATURE: 97.3 F | OXYGEN SATURATION: 97 % | HEIGHT: 69 IN | BODY MASS INDEX: 26.96 KG/M2 | RESPIRATION RATE: 18 BRPM | WEIGHT: 182 LBS

## 2020-06-05 DIAGNOSIS — Z79.4 CONTROLLED TYPE 2 DIABETES MELLITUS WITH HYPOGLYCEMIA, WITH LONG-TERM CURRENT USE OF INSULIN (HCC): Primary | ICD-10-CM

## 2020-06-05 DIAGNOSIS — G70.00 MYASTHENIA GRAVIS (HCC): ICD-10-CM

## 2020-06-05 DIAGNOSIS — Z12.5 PROSTATE CANCER SCREENING: ICD-10-CM

## 2020-06-05 DIAGNOSIS — E11.649 CONTROLLED TYPE 2 DIABETES MELLITUS WITH HYPOGLYCEMIA, WITH LONG-TERM CURRENT USE OF INSULIN (HCC): Primary | ICD-10-CM

## 2020-06-05 DIAGNOSIS — G95.20 CORD COMPRESSION (HCC): ICD-10-CM

## 2020-06-05 PROCEDURE — 1125F AMNT PAIN NOTED PAIN PRSNT: CPT | Performed by: FAMILY MEDICINE

## 2020-06-05 PROCEDURE — G0439 PPPS, SUBSEQ VISIT: HCPCS | Performed by: FAMILY MEDICINE

## 2020-06-05 PROCEDURE — 2022F DILAT RTA XM EVC RTNOPTHY: CPT | Performed by: FAMILY MEDICINE

## 2020-06-05 PROCEDURE — 3079F DIAST BP 80-89 MM HG: CPT | Performed by: FAMILY MEDICINE

## 2020-06-05 PROCEDURE — 3044F HG A1C LEVEL LT 7.0%: CPT | Performed by: FAMILY MEDICINE

## 2020-06-05 PROCEDURE — 3066F NEPHROPATHY DOC TX: CPT | Performed by: FAMILY MEDICINE

## 2020-06-05 PROCEDURE — 3074F SYST BP LT 130 MM HG: CPT | Performed by: FAMILY MEDICINE

## 2020-06-05 PROCEDURE — 99214 OFFICE O/P EST MOD 30 MIN: CPT | Performed by: FAMILY MEDICINE

## 2020-06-05 PROCEDURE — 1160F RVW MEDS BY RX/DR IN RCRD: CPT | Performed by: FAMILY MEDICINE

## 2020-06-05 PROCEDURE — 1036F TOBACCO NON-USER: CPT | Performed by: FAMILY MEDICINE

## 2020-06-05 PROCEDURE — 1170F FXNL STATUS ASSESSED: CPT | Performed by: FAMILY MEDICINE

## 2020-06-05 RX ORDER — PREDNISONE 10 MG/1
10 TABLET ORAL DAILY
Qty: 90 TABLET | Refills: 1 | Status: SHIPPED | OUTPATIENT
Start: 2020-06-05 | End: 2020-11-04 | Stop reason: SDUPTHER

## 2020-06-08 ENCOUNTER — TELEPHONE (OUTPATIENT)
Dept: NEUROLOGY | Facility: CLINIC | Age: 75
End: 2020-06-08

## 2020-06-09 ENCOUNTER — TELEPHONE (OUTPATIENT)
Dept: NEUROLOGY | Facility: CLINIC | Age: 75
End: 2020-06-09

## 2020-06-10 ENCOUNTER — OFFICE VISIT (OUTPATIENT)
Dept: NEUROLOGY | Facility: CLINIC | Age: 75
End: 2020-06-10
Payer: MEDICARE

## 2020-06-10 VITALS
OXYGEN SATURATION: 97 % | RESPIRATION RATE: 14 BRPM | SYSTOLIC BLOOD PRESSURE: 126 MMHG | TEMPERATURE: 98.3 F | HEIGHT: 69 IN | DIASTOLIC BLOOD PRESSURE: 84 MMHG | BODY MASS INDEX: 26.96 KG/M2 | WEIGHT: 182 LBS | HEART RATE: 111 BPM

## 2020-06-10 DIAGNOSIS — G70.00 MYASTHENIA GRAVIS (HCC): Primary | ICD-10-CM

## 2020-06-10 DIAGNOSIS — H55.00 NYSTAGMUS: ICD-10-CM

## 2020-06-10 PROCEDURE — 3008F BODY MASS INDEX DOCD: CPT | Performed by: PSYCHIATRY & NEUROLOGY

## 2020-06-10 PROCEDURE — 1036F TOBACCO NON-USER: CPT | Performed by: PSYCHIATRY & NEUROLOGY

## 2020-06-10 PROCEDURE — 2022F DILAT RTA XM EVC RTNOPTHY: CPT | Performed by: PSYCHIATRY & NEUROLOGY

## 2020-06-10 PROCEDURE — 3074F SYST BP LT 130 MM HG: CPT | Performed by: PSYCHIATRY & NEUROLOGY

## 2020-06-10 PROCEDURE — 3079F DIAST BP 80-89 MM HG: CPT | Performed by: PSYCHIATRY & NEUROLOGY

## 2020-06-10 PROCEDURE — 1170F FXNL STATUS ASSESSED: CPT | Performed by: PSYCHIATRY & NEUROLOGY

## 2020-06-10 PROCEDURE — 1160F RVW MEDS BY RX/DR IN RCRD: CPT | Performed by: PSYCHIATRY & NEUROLOGY

## 2020-06-10 PROCEDURE — 3066F NEPHROPATHY DOC TX: CPT | Performed by: PSYCHIATRY & NEUROLOGY

## 2020-06-10 PROCEDURE — 99214 OFFICE O/P EST MOD 30 MIN: CPT | Performed by: PSYCHIATRY & NEUROLOGY

## 2020-06-10 PROCEDURE — 3044F HG A1C LEVEL LT 7.0%: CPT | Performed by: PSYCHIATRY & NEUROLOGY

## 2020-06-10 RX ORDER — PREDNISONE 1 MG/1
5 TABLET ORAL DAILY
Qty: 90 TABLET | Refills: 1 | Status: SHIPPED | OUTPATIENT
Start: 2020-06-10

## 2020-06-17 ENCOUNTER — TELEPHONE (OUTPATIENT)
Dept: NEUROLOGY | Facility: CLINIC | Age: 75
End: 2020-06-17

## 2020-06-17 NOTE — TELEPHONE ENCOUNTER
LMOM to advised of appt change due to template change on 9/04/6315 - 1:45PM from 2PM Dr Anabela Wright  Please document if this is okay or reschedule  Thank you

## 2020-07-05 ENCOUNTER — HOSPITAL ENCOUNTER (OUTPATIENT)
Dept: CT IMAGING | Facility: HOSPITAL | Age: 75
Discharge: HOME | End: 2020-07-05
Attending: UROLOGY
Payer: MEDICARE

## 2020-07-05 ENCOUNTER — APPOINTMENT (OUTPATIENT)
Dept: LAB | Facility: HOSPITAL | Age: 75
End: 2020-07-05
Payer: MEDICARE

## 2020-07-05 DIAGNOSIS — Z11.59 NEED FOR HEPATITIS C SCREENING TEST: ICD-10-CM

## 2020-07-05 DIAGNOSIS — N20.0 NEPHROLITHIASIS: ICD-10-CM

## 2020-07-05 DIAGNOSIS — E11.649 CONTROLLED TYPE 2 DIABETES MELLITUS WITH HYPOGLYCEMIA, WITH LONG-TERM CURRENT USE OF INSULIN (HCC): ICD-10-CM

## 2020-07-05 DIAGNOSIS — Z79.4 CONTROLLED TYPE 2 DIABETES MELLITUS WITH HYPOGLYCEMIA, WITH LONG-TERM CURRENT USE OF INSULIN (HCC): ICD-10-CM

## 2020-07-05 LAB
EST. AVERAGE GLUCOSE BLD GHB EST-MCNC: 128 MG/DL
HBA1C MFR BLD: 6.1 %
HCV AB SER QL: NORMAL

## 2020-07-05 PROCEDURE — 74176 CT ABD & PELVIS W/O CONTRAST: CPT

## 2020-07-05 PROCEDURE — 36415 COLL VENOUS BLD VENIPUNCTURE: CPT

## 2020-07-05 PROCEDURE — 83036 HEMOGLOBIN GLYCOSYLATED A1C: CPT

## 2020-07-05 PROCEDURE — 86803 HEPATITIS C AB TEST: CPT

## 2020-07-07 ENCOUNTER — TELEPHONE (OUTPATIENT)
Dept: FAMILY MEDICINE CLINIC | Facility: CLINIC | Age: 75
End: 2020-07-07

## 2020-07-10 ENCOUNTER — TELEPHONE (OUTPATIENT)
Dept: UROLOGY | Facility: MEDICAL CENTER | Age: 75
End: 2020-07-10

## 2020-07-10 ENCOUNTER — TELEPHONE (OUTPATIENT)
Dept: FAMILY MEDICINE CLINIC | Facility: CLINIC | Age: 75
End: 2020-07-10

## 2020-07-10 LAB
LEFT EYE DIABETIC RETINOPATHY: NORMAL
RIGHT EYE DIABETIC RETINOPATHY: NORMAL

## 2020-07-10 NOTE — RESULT ENCOUNTER NOTE
Inform pt that the  test showed stable non blocking kidney stone  Inguinal hernias were also noted  Keep usual follow up appt

## 2020-07-10 NOTE — TELEPHONE ENCOUNTER
----- Message from Merari Barahona MD sent at 7/10/2020  1:06 PM EDT -----  Inform pt that the  test showed stable non blocking kidney stone  Inguinal hernias were also noted  Keep usual follow up appt

## 2020-07-10 NOTE — TELEPHONE ENCOUNTER
Called patient - he is told of the CT scan results  He does not want to have a cysto done when he comes in because he has had problems with UTI

## 2020-07-10 NOTE — TELEPHONE ENCOUNTER
Patient called that Urology called him and told him he has a 0 7 Kidney Stone, and he is not going to pass it  He wants to know what he should do? He is very anxious and gets worried because of his Pseudomonas  Per Helena Zhang, patient needs to contact Urology

## 2020-07-10 NOTE — TELEPHONE ENCOUNTER
Patient calling to speak with clinical  Has questions regarding his stone      He can be reached at [phone]

## 2020-07-12 ENCOUNTER — NURSE TRIAGE (OUTPATIENT)
Dept: OTHER | Facility: OTHER | Age: 75
End: 2020-07-12

## 2020-07-12 NOTE — TELEPHONE ENCOUNTER
Appt made for 7/13/2020  Reason for Disposition   Urinating more frequently than usual (i e , frequency)    Answer Assessment - Initial Assessment Questions  1  SYMPTOM: "What's the main symptom you're concerned about?" (e g , frequency, incontinence)      Frequency and urgency   2  ONSET: "When did the  Increase in frequency start?"      Last night  3  PAIN: "Is there any pain?" If so, ask: "How bad is it?" (Scale: 1-10; mild, moderate, severe)      Denies  4  CAUSE: "What do you think is causing the symptoms?"      Kidney stones  5  OTHER SYMPTOMS: "Do you have any other symptoms?" (e g , fever, flank pain, blood in urine, pain with urination)      Denies  6   PREGNANCY: "Is there any chance you are pregnant?" "When was your last menstrual period?"      N/A    Protocols used: [facility]

## 2020-07-12 NOTE — TELEPHONE ENCOUNTER
Regarding: frequent urination, hx of kidneys issues  ----- Message from Yuko Barrera sent at 7/12/2020  4:16 PM EDT -----  "I am having frequent urination every half hour and they found 2 kidney stones on a recent CT scan "

## 2020-07-13 ENCOUNTER — TELEPHONE (OUTPATIENT)
Dept: UROLOGY | Facility: MEDICAL CENTER | Age: 75
End: 2020-07-13

## 2020-07-13 ENCOUNTER — OFFICE VISIT (OUTPATIENT)
Dept: FAMILY MEDICINE CLINIC | Facility: CLINIC | Age: 75
End: 2020-07-13
Payer: MEDICARE

## 2020-07-13 VITALS
BODY MASS INDEX: 26.96 KG/M2 | DIASTOLIC BLOOD PRESSURE: 84 MMHG | WEIGHT: 182 LBS | SYSTOLIC BLOOD PRESSURE: 122 MMHG | HEIGHT: 69 IN | TEMPERATURE: 97.9 F | RESPIRATION RATE: 18 BRPM | OXYGEN SATURATION: 97 % | HEART RATE: 110 BPM

## 2020-07-13 DIAGNOSIS — R35.0 URINARY FREQUENCY: Primary | ICD-10-CM

## 2020-07-13 DIAGNOSIS — N20.0 NEPHROLITHIASIS: ICD-10-CM

## 2020-07-13 DIAGNOSIS — Z86.19 HISTORY OF SEPSIS: ICD-10-CM

## 2020-07-13 DIAGNOSIS — Z87.440 HISTORY OF RECURRENT UTIS: ICD-10-CM

## 2020-07-13 PROBLEM — N39.0 PSEUDOMONAS URINARY TRACT INFECTION: Status: RESOLVED | Noted: 2020-05-29 | Resolved: 2020-07-13

## 2020-07-13 PROBLEM — B96.5 PSEUDOMONAS URINARY TRACT INFECTION: Status: RESOLVED | Noted: 2020-05-29 | Resolved: 2020-07-13

## 2020-07-13 LAB
SL AMB  POCT GLUCOSE, UA: NEGATIVE
SL AMB LEUKOCYTE ESTERASE,UA: NEGATIVE
SL AMB POCT BILIRUBIN,UA: NEGATIVE
SL AMB POCT BLOOD,UA: NEGATIVE
SL AMB POCT CLARITY,UA: CLEAR
SL AMB POCT COLOR,UA: NORMAL
SL AMB POCT KETONES,UA: NEGATIVE
SL AMB POCT NITRITE,UA: NEGATIVE
SL AMB POCT PH,UA: 5
SL AMB POCT SPECIFIC GRAVITY,UA: 1.01
SL AMB POCT URINE PROTEIN: NEGATIVE
SL AMB POCT UROBILINOGEN: NEGATIVE

## 2020-07-13 PROCEDURE — 2022F DILAT RTA XM EVC RTNOPTHY: CPT | Performed by: FAMILY MEDICINE

## 2020-07-13 PROCEDURE — 99213 OFFICE O/P EST LOW 20 MIN: CPT | Performed by: FAMILY MEDICINE

## 2020-07-13 PROCEDURE — 3044F HG A1C LEVEL LT 7.0%: CPT | Performed by: FAMILY MEDICINE

## 2020-07-13 PROCEDURE — 81002 URINALYSIS NONAUTO W/O SCOPE: CPT | Performed by: FAMILY MEDICINE

## 2020-07-13 PROCEDURE — 3075F SYST BP GE 130 - 139MM HG: CPT | Performed by: FAMILY MEDICINE

## 2020-07-13 PROCEDURE — 1160F RVW MEDS BY RX/DR IN RCRD: CPT | Performed by: FAMILY MEDICINE

## 2020-07-13 PROCEDURE — 3066F NEPHROPATHY DOC TX: CPT | Performed by: FAMILY MEDICINE

## 2020-07-13 PROCEDURE — 1036F TOBACCO NON-USER: CPT | Performed by: FAMILY MEDICINE

## 2020-07-13 PROCEDURE — 3078F DIAST BP <80 MM HG: CPT | Performed by: FAMILY MEDICINE

## 2020-07-13 NOTE — TELEPHONE ENCOUNTER
Left msg on home phone that his appt today with PCP was noted on chart and to call back if there were any further questions with regard to urinary urgency and frequency he is experiencing

## 2020-07-13 NOTE — TELEPHONE ENCOUNTER
Pt called back and Urine test was done at family office and it was ok and he has no fever  He will see Dr on Monday if you need the results of the test from today you can call there at that office at [phone]  They stated a culture was not needed

## 2020-07-13 NOTE — PROGRESS NOTES
Assessment/Plan:         Diagnoses and all orders for this visit:    Urinary frequency  Comments:  reassurance given that no abnormalities on urine dip today; pt is anxious given significant hx urosepsis, advised monitor temps and call if even low grade eleva  Orders:  -     POCT urine dip    History of recurrent UTIs  Comments:  as above, advised call if develops abnormal temp, then would start empiric abx; f/u with urol as scheduled     Nephrolithiasis    History of sepsis          Subjective:   Chief Complaint   Patient presents with    Urinary Frequency     Urination every 30 minutes yesterday    Fatigue        Patient ID: Chantale Ontiveros is a 76 y o  male  Here for acute problem  Sx onset yesterday middle of the night urinary frequency-"every hour" since then  States his urologist called him last week to let him know that he has 2 kidney stones and he has an appt Monday  Admits feels like he is retaining some urine  No constipation   No dysuria or hematuria, no fevers, no abd pain or flank pain      The following portions of the patient's history were reviewed and updated as appropriate: allergies, current medications, past family history, past medical history, past social history, past surgical history and problem list     Review of Systems   Constitutional: Positive for fatigue  Negative for activity change, chills, diaphoresis, fever and unexpected weight change  Respiratory: Negative  Cardiovascular: Negative  Gastrointestinal: Negative  Genitourinary: Negative for decreased urine volume, difficulty urinating, dysuria, flank pain and hematuria  Hematological: Negative  Objective:      /84 (BP Location: Left arm, Patient Position: Sitting)   Pulse (!) 110   Temp 97 9 °F (36 6 °C)   Resp 18   Ht 5' 9" (1 753 m)   Wt 82 6 kg (182 lb)   SpO2 97%   BMI 26 88 kg/m²          Physical Exam   Constitutional: He is oriented to person, place, and time   He appears well-developed  He is cooperative  Non-toxic appearance  He does not have a sickly appearance  He does not appear ill  No distress  HENT:   Head: Normocephalic and atraumatic  Eyes: Conjunctivae and lids are normal    Neck: Phonation normal    Cardiovascular: Regular rhythm, S1 normal and S2 normal  Tachycardia present  Exam reveals no gallop and no friction rub  No edema   Pulmonary/Chest: Effort normal and breath sounds normal    Abdominal: Soft  Normal appearance and bowel sounds are normal  He exhibits distension  He exhibits no shifting dullness, no abdominal bruit, no ascites and no mass  There is no hepatosplenomegaly  There is no tenderness  There is no rigidity and no CVA tenderness  Hernia confirmed negative in the ventral area  Neurological: He is alert and oriented to person, place, and time  Skin: He is not diaphoretic  No cyanosis  No pallor  Psychiatric: His speech is normal and behavior is normal  Judgment and thought content normal  His mood appears anxious  His affect is not angry, not labile and not inappropriate  Cognition and memory are normal  He does not exhibit a depressed mood

## 2020-07-13 NOTE — TELEPHONE ENCOUNTER
Unable to reach pt on cell or home phone  Noted pt has an appt today with Family Practice at [facility] with regard to his urinary frequency and urgency

## 2020-07-13 NOTE — TELEPHONE ENCOUNTER
Patient returning call  Patient leaving for appointment at 12:45    He can be reached at [phone]

## 2020-07-15 NOTE — TELEPHONE ENCOUNTER
Pt called that he was up worrying over this around 330am     He will call back today around 230pm to check up  MARIETTA Pinto

## 2020-07-16 ENCOUNTER — TELEPHONE (OUTPATIENT)
Dept: NEUROLOGY | Facility: CLINIC | Age: 75
End: 2020-07-16

## 2020-07-16 NOTE — TELEPHONE ENCOUNTER
LMOM to confirm 7/17/2020 1:45PM Dr Damaris Concepcion at [facility] and review covid screening questions and if patient would like to complete registration, please transfer to [phone]  Please make aware of mask, visitor and temp policy

## 2020-07-17 ENCOUNTER — TELEPHONE (OUTPATIENT)
Dept: UROLOGY | Facility: MEDICAL CENTER | Age: 75
End: 2020-07-17

## 2020-07-17 ENCOUNTER — OFFICE VISIT (OUTPATIENT)
Dept: NEUROLOGY | Facility: CLINIC | Age: 75
End: 2020-07-17
Payer: MEDICARE

## 2020-07-17 VITALS
HEART RATE: 106 BPM | TEMPERATURE: 97.8 F | WEIGHT: 183 LBS | SYSTOLIC BLOOD PRESSURE: 138 MMHG | DIASTOLIC BLOOD PRESSURE: 76 MMHG | BODY MASS INDEX: 27.11 KG/M2 | HEIGHT: 69 IN

## 2020-07-17 DIAGNOSIS — H55.00 NYSTAGMUS: Primary | ICD-10-CM

## 2020-07-17 DIAGNOSIS — G62.9 POLYNEUROPATHY: ICD-10-CM

## 2020-07-17 DIAGNOSIS — G70.00 MYASTHENIA GRAVIS (HCC): ICD-10-CM

## 2020-07-17 PROCEDURE — 3008F BODY MASS INDEX DOCD: CPT | Performed by: PSYCHIATRY & NEUROLOGY

## 2020-07-17 PROCEDURE — 3066F NEPHROPATHY DOC TX: CPT | Performed by: PSYCHIATRY & NEUROLOGY

## 2020-07-17 PROCEDURE — 1036F TOBACCO NON-USER: CPT | Performed by: PSYCHIATRY & NEUROLOGY

## 2020-07-17 PROCEDURE — 2022F DILAT RTA XM EVC RTNOPTHY: CPT | Performed by: PSYCHIATRY & NEUROLOGY

## 2020-07-17 PROCEDURE — 3075F SYST BP GE 130 - 139MM HG: CPT | Performed by: PSYCHIATRY & NEUROLOGY

## 2020-07-17 PROCEDURE — 1160F RVW MEDS BY RX/DR IN RCRD: CPT | Performed by: PSYCHIATRY & NEUROLOGY

## 2020-07-17 PROCEDURE — 3044F HG A1C LEVEL LT 7.0%: CPT | Performed by: PSYCHIATRY & NEUROLOGY

## 2020-07-17 PROCEDURE — 3078F DIAST BP <80 MM HG: CPT | Performed by: PSYCHIATRY & NEUROLOGY

## 2020-07-17 PROCEDURE — 99214 OFFICE O/P EST MOD 30 MIN: CPT | Performed by: PSYCHIATRY & NEUROLOGY

## 2020-07-17 NOTE — ASSESSMENT & PLAN NOTE
Neptali Diallo has a known history of myasthenia gravis  Today's mg ADLs score was 0  He is doing well with the current regimen  I have asked him to slowly reduce his prednisone to 10 mg day  He does have 5 mg tablets  If he does have an  Increase in symptoms then he does have the 5 mg tablets    He should notify offices accordingly

## 2020-07-17 NOTE — PROGRESS NOTES
Patient ID: Nat Stevenson is a 76 y o  male  Assessment/Plan:    Myasthenia gravis ([facility] [address] )  Arun Cote has a known history of myasthenia gravis  Today's mg ADLs score was 0  He is doing well with the current regimen  I have asked him to slowly reduce his prednisone to 10 mg day  He does have 5 mg tablets  If he does have an  Increase in symptoms then he does have the 5 mg tablets  He should notify offices accordingly     Nystagmus  He does have some mild end gaze nystagmus however this is not bothersome to the patient    Polyneuropathy  He has a mild neuropathy  There are no diagnoses linked to this encounter  Subjective: This is a 75 y/o male with History of Myasthenia Gravis diagnosed in 2009 after presents for a follow up visit  It was last evaluated here six weeks ago when he complained of pulling of the left eye  He also felt that there is a sensation in his eye  Was seen by Ophthalmology who was concerned that these symptoms may be due to myasthenia gravis  I informed that this was less likely but given his known history we did increase his prednisone 15 and asked him to slowly tapered to the current dose 50 mg every three days  He is doing much better  He has also recently seen a new optometrist and a new glasses have been placed  He was told he a mild cataract and a a stigmatism  He does have some kidney stones and he is to see a urologist next week  Recently had a hemoglobin A1c that was normal        He did have severe vertigo and weakness this resolved with the discontinuation of lisinopril                                                                     He was also utilizing shoes that were wider and his neuropathy symptoms have improved              he did undergo a MRI of the cervical spine    It did demonstrate Multilevel cervical spondylosis, as described above      Multifactorial disease results in overall mild canal stenosis and cord compression at C5-C6, overall slightly progressed since the prior examination  However there is no myelomalacia      Cervical spondylosis in the remainder of the spine is otherwise not significantly changed  He was evaluated by Neurosurgery who felt that he was not a neurosurgical candidate           He has previously had  EMGs of the lower extremities in February of 2019 which showed a mild axonal sensory neuropathy      He is now on a lower dose of insulin  The recent hemoglobin A1c was  6 2                  The following portions of the patient's history were reviewed and updated as appropriate: He  has a past medical history of MARSHALL (acute kidney injury) ([address] ), Anxiety, Cervical spinal stenosis, CKD (chronic kidney disease), HTN (hypertension), chronic ulcerative colitis, Hypercalcemia, Hyperlipidemia, Kidney atrophy, Microscopic hematuria, Myasthenia gravis ([address] ), Nephrolithiasis, and Sleep apnea  He  has a past surgical history that includes Lithotripsy; Colonoscopy; Colon surgery; Other surgical history; Salivary gland surgery; COLECTOMY LAPAROSCOPIC; Colectomy; and pr fragment kidney stone/ eswl (Left, 11/2/2018)  His family history includes Arthritis in his mother; Depression in his father; Diabetes in his mother; Hypertension in his mother; Osteoporosis in his mother  He  reports that he has never smoked  He has never used smokeless tobacco  He reports that he drank alcohol  He reports that he does not use drugs    Current Outpatient Medications   Medication Sig Dispense Refill    Accu-Chek FastClix Lancets MISC Test four times daily  [id number]:E11 649 360 each 1    ACCU-CHEK GUIDE test strip 1 each by Other route 4 (four) times a day Use as instructed bid scheduled and prn symptoms/signs blood sugar fluctuation  DX:E11 649 360 each 1    BD ULTRA-FINE PEN NEEDLES 29G X 12 7MM MISC       Cranberry 500 MG CAPS Take 4 capsules by mouth daily       Insulin Aspart FlexPen (NovoLOG FlexPen) 100 UNIT/ML SOPN Inject 6 Units under the skin 3 (three) times a day before meals 15 mL 1    insulin glargine (LANTUS) 100 units/mL subcutaneous injection Inject 12 Units under the skin daily at bedtime 1000 Units 1    Insulin Pen Needle (BD ULTRA-FINE PEN NEEDLES) 29G X 12 7MM MISC by Does not apply route 4 (four) times a day 360 each 3    Lactobacillus (ACIDOPHILUS) 100 MG CAPS Take 1 capsule by mouth daily      Potassium 99 MG TABS Take 1 tablet by mouth 2 (two) times a day       predniSONE 10 mg tablet Take 1 tablet (10 mg total) by mouth daily 90 tablet 1    predniSONE 5 mg tablet Take 1 tablet (5 mg total) by mouth daily 90 tablet 1    pyridoxine (VITAMIN B6) 100 mg tablet Take 100 mg by mouth daily        atorvastatin (LIPITOR) 10 mg tablet Take 1 tablet (10 mg total) by mouth daily (Patient not taking: Reported on 1/16/2020) 30 tablet 1    cholecalciferol (VITAMIN D3) 1,000 units tablet Take 1 tablet (1,000 Units total) by mouth daily (Patient not taking: Reported on 7/13/2020) 90 tablet 0    docusate sodium (COLACE) 250 MG capsule Take 1 capsule (250 mg total) by mouth daily (Patient not taking: Reported on 10/29/2019) 30 capsule 0    lidocaine (LIDODERM) 5 % Apply 1 patch topically daily Remove & Discard patch within 12 hours or as directed by MD (Patient not taking: Reported on 10/29/2019) 30 patch 0     No current facility-administered medications for this visit  He is allergic to levaquin [levofloxacin]; quinolones; antihistamines, chlorpheniramine-type; benzodiazepines; chlorpheniramine; diphenhydramine; erythromycin; iodine; iodine solution [povidone iodine]; loratadine; other; sulfa antibiotics; and tetracyclines & related            Objective:    Blood pressure 138/76, pulse (!) 106, temperature 97 8 °F (36 6 °C), height 5' 9" (1 753 m), weight 83 kg (183 lb)  Physical Exam   Constitutional: He appears well-developed  Eyes: Lids are normal  Nystagmus present     Neurological:   Reflex Scores:       Tricep reflexes are 1+ on the right side and 1+ on the left side  Bicep reflexes are 1+ on the right side and 1+ on the left side  Patellar reflexes are 1+ on the right side and 1+ on the left side  Achilles reflexes are 1+ on the right side and 1+ on the left side  Psychiatric: His speech is normal    Vitals reviewed  Neurological Exam  Mental Status   Oriented to person, place, time and situation  Speech is normal  Language is fluent with no aphasia  Cranial Nerves  CN II: Visual acuity is normal  Visual fields full to confrontation  CN III, IV, VI: Nystagmus present: Normal lids and orbits bilaterally  Right pupil: 4 mm  Left pupil: 4 mm  CN V: Facial sensation is normal   CN VII: Full and symmetric facial movement  CN VIII: Hearing is normal   CN IX, X: Palate elevates symmetrically  Normal gag reflex  CN XI: Shoulder shrug strength is normal   CN XII: Tongue midline without atrophy or fasciculations  Neck flexion and neck extension were five out of 5   Motor  Normal muscle bulk throughout  No fasciculations present  Sensory  Light touch is normal in upper and lower extremities  Temperature is normal in upper and lower extremities  Reflexes                                           Right                      Left  Biceps                                 1+                         1+  Triceps                                1+                         1+  Patellar                                1+                         1+  Achilles                                1+                         1+  Plantar                           Downgoing                Downgoing    Coordination  Right: Finger-to-nose normal   Left: Finger-to-nose normal     Gait  Normal casual, toe, heel and tandem gait  Able to rise from chair without using arms      Review of systems obtained from the medical assistant as below was reviewed with the patient at today's  Visit     ROS:    Review of Systems Constitutional: Negative  Negative for appetite change and fever  HENT: Negative  Negative for hearing loss, tinnitus, trouble swallowing and voice change  Eyes: Negative  Negative for photophobia and pain  Respiratory: Negative  Negative for shortness of breath  Cardiovascular: Negative  Negative for palpitations  Gastrointestinal: Negative  Negative for nausea and vomiting  Endocrine: Negative  Negative for cold intolerance  Genitourinary: Negative  Negative for dysuria, frequency and urgency  Musculoskeletal: Negative  Negative for myalgias and neck pain  Skin: Negative  Negative for rash  Neurological: Negative  Negative for dizziness, tremors, seizures, syncope, facial asymmetry, speech difficulty, weakness, light-headedness, numbness and headaches  Hematological: Negative  Does not bruise/bleed easily  Psychiatric/Behavioral: Negative  Negative for confusion, hallucinations and sleep disturbance

## 2020-07-17 NOTE — TELEPHONE ENCOUNTER
Call placed to patient and [facility] to inform him that appointment on 7/20 with Dr Danish Olsen needs to be rescheduled due to scheduling change of provider  Appointment rescheduled for Friday 7/24/2020 at 11am with Dr Danish Olsen  Office number was provided for patient to call back if he is unable to keep this appointment

## 2020-07-24 ENCOUNTER — OFFICE VISIT (OUTPATIENT)
Dept: UROLOGY | Facility: MEDICAL CENTER | Age: 75
End: 2020-07-24
Payer: MEDICARE

## 2020-07-24 VITALS
TEMPERATURE: 98.4 F | HEIGHT: 69 IN | BODY MASS INDEX: 27.4 KG/M2 | SYSTOLIC BLOOD PRESSURE: 150 MMHG | DIASTOLIC BLOOD PRESSURE: 78 MMHG | WEIGHT: 185 LBS

## 2020-07-24 DIAGNOSIS — N40.1 BENIGN NON-NODULAR PROSTATIC HYPERPLASIA WITH LOWER URINARY TRACT SYMPTOMS: ICD-10-CM

## 2020-07-24 DIAGNOSIS — N20.0 NEPHROLITHIASIS: Primary | ICD-10-CM

## 2020-07-24 LAB
SL AMB  POCT GLUCOSE, UA: ABNORMAL
SL AMB LEUKOCYTE ESTERASE,UA: ABNORMAL
SL AMB POCT BILIRUBIN,UA: ABNORMAL
SL AMB POCT BLOOD,UA: ABNORMAL
SL AMB POCT CLARITY,UA: CLEAR
SL AMB POCT COLOR,UA: YELLOW
SL AMB POCT KETONES,UA: ABNORMAL
SL AMB POCT NITRITE,UA: ABNORMAL
SL AMB POCT PH,UA: 5.5
SL AMB POCT SPECIFIC GRAVITY,UA: 1.02
SL AMB POCT URINE PROTEIN: ABNORMAL
SL AMB POCT UROBILINOGEN: 0.2

## 2020-07-24 PROCEDURE — 3044F HG A1C LEVEL LT 7.0%: CPT | Performed by: UROLOGY

## 2020-07-24 PROCEDURE — 1160F RVW MEDS BY RX/DR IN RCRD: CPT | Performed by: UROLOGY

## 2020-07-24 PROCEDURE — 99214 OFFICE O/P EST MOD 30 MIN: CPT | Performed by: UROLOGY

## 2020-07-24 PROCEDURE — 3066F NEPHROPATHY DOC TX: CPT | Performed by: UROLOGY

## 2020-07-24 PROCEDURE — 2022F DILAT RTA XM EVC RTNOPTHY: CPT | Performed by: UROLOGY

## 2020-07-24 PROCEDURE — 3077F SYST BP >= 140 MM HG: CPT | Performed by: UROLOGY

## 2020-07-24 PROCEDURE — 81003 URINALYSIS AUTO W/O SCOPE: CPT | Performed by: UROLOGY

## 2020-07-24 PROCEDURE — 3078F DIAST BP <80 MM HG: CPT | Performed by: UROLOGY

## 2020-07-24 PROCEDURE — 3008F BODY MASS INDEX DOCD: CPT | Performed by: UROLOGY

## 2020-07-24 PROCEDURE — 1036F TOBACCO NON-USER: CPT | Performed by: UROLOGY

## 2020-07-24 NOTE — PROGRESS NOTES
Assessment/Plan:    Nephrolithiasis  CT scan reveals bilateral nonobstructing renal stones  The largest stone is 7 mm and is in the lower pole  Treatment options were discussed  At this time he is asymptomatic  I have recommended we do 24 hour urine testing to see why he is forming stones  He was also interested in seeing a nephrologist for further evaluation for stone prevention  We will plan to recheck a KUB and will consider surgical treatment of his stones at that time  Benign non-nodular prostatic hyperplasia with lower urinary tract symptoms  AUA symptom score is 7 and he is satisfied with his voiding pattern  Options were discussed  At this point he is content to continue observation and watchful waiting  We will plan to recheck a PSA in September  His last PSA 1 year ago was 0 8  Diagnoses and all orders for this visit:    Nephrolithiasis  -     POCT urine dip auto non-scope  -     Ambulatory referral to Nephrology; Future  -     Stone risk profile; Future  -     XR abdomen 1 view kub; Future    Benign non-nodular prostatic hyperplasia with lower urinary tract symptoms  -     POCT urine dip auto non-scope  -     PSA Total, Diagnostic; Future          Subjective:      Patient ID: Marcos Molina is a 76 y o  male  Benign Prostatic Hypertrophy   This is a chronic problem  The current episode started more than 1 year ago  The problem is unchanged  Irritative symptoms include frequency (q 1 5- 2 hours) and nocturia (nocturia x 4)  Irritative symptoms do not include urgency  Obstructive symptoms include a slower stream  Obstructive symptoms do not include dribbling (occasional), an intermittent stream or straining  Pertinent negatives include no chills, dysuria, genital pain, hematuria or hesitancy  AUA score is 0-7  Nothing aggravates the symptoms  He had side effects from uroxatrol  Kidney stones    The patient has a long history of kidney stones    He has not passed any stones recently  He presently has no flank or back pain  He is here to review a CT scan that was recently done  The following portions of the patient's history were reviewed and updated as appropriate: allergies, current medications, past family history, past medical history, past social history, past surgical history and problem list     Review of Systems   Constitutional: Negative for chills, diaphoresis, fatigue and fever  HENT: Negative  Eyes: Negative  Respiratory: Negative  Cardiovascular: Negative  Gastrointestinal: Negative  Endocrine: Negative  Genitourinary: Positive for frequency (q 1 5- 2 hours) and nocturia (nocturia x 4)  Negative for dysuria, hematuria, hesitancy and urgency  See HPI   Musculoskeletal: Negative  Skin: Negative  Allergic/Immunologic: Negative  Neurological: Negative  Hematological: Negative  Psychiatric/Behavioral: Negative  AUA SYMPTOM SCORE      Most Recent Value   AUA SYMPTOM SCORE   How often have you had a sensation of not emptying your bladder completely after you finished urinating? 0   How often have you had to urinate again less than two hours after you finished urinating? 2   How often have you found you stopped and started again several times when you urinate? 1   How often have you found it difficult to postpone urination? 0   How often have you had a weak urinary stream?  0   How often have you had to push or strain to begin urination? 1   How many times did you most typically get up to urinate from the time you went to bed at night until the time you got up in the morning?   3   Quality of Life: If you were to spend the rest of your life with your urinary condition just the way it is now, how would you feel about that?  1   AUA SYMPTOM SCORE  7        Objective:      /78 (BP Location: Left arm, Patient Position: Sitting, Cuff Size: Adult)   Ht 5' 9" (1 753 m)   Wt 83 9 kg (185 lb)   BMI 27 32 kg/m² Physical Exam   Constitutional: He is oriented to person, place, and time  He appears well-developed and well-nourished  HENT:   Head: Normocephalic and atraumatic  Eyes: Conjunctivae are normal    Neck: Neck supple  Cardiovascular: Normal rate  Pulmonary/Chest: Effort normal    Abdominal: Soft  Bowel sounds are normal  He exhibits no distension and no mass  There is no tenderness  There is no rebound, no guarding and no CVA tenderness  No hernia  Genitourinary: Rectum normal, testes normal and penis normal  Right testis shows no mass  Left testis shows no mass  No phimosis or hypospadias  Genitourinary Comments: Prostate 1+ enlarged and palpably benign   Musculoskeletal: He exhibits no edema  Neurological: He is alert and oriented to person, place, and time  Skin: Skin is warm and dry  Psychiatric: He has a normal mood and affect  His behavior is normal  Judgment and thought content normal    Vitals reviewed

## 2020-07-24 NOTE — ASSESSMENT & PLAN NOTE
CT scan reveals bilateral nonobstructing renal stones  The largest stone is 7 mm and is in the lower pole  Treatment options were discussed  At this time he is asymptomatic  I have recommended we do 24 hour urine testing to see why he is forming stones  He was also interested in seeing a nephrologist for further evaluation for stone prevention  We will plan to recheck a KUB and will consider surgical treatment of his stones at that time

## 2020-07-24 NOTE — PATIENT INSTRUCTIONS
Kidney Stones   WHAT YOU NEED TO KNOW:   What is a kidney stone? Kidney stones form in the urinary system when the water and waste in your urine are out of balance  When this happens, certain types of waste crystals separate from the urine  The crystals build up and form kidney stones  Kidney stones can be made of uric acid, calcium, phosphate, or oxalate crystals  You may have 1 or more kidney stones  What increases my risk for kidney stones? · You do not drink enough liquids (especially water) each day  · You have urinary tract infections often  · You follow a certain type of diet  For example, people who eat a diet high in meat or salt may be at higher risk for kidney stones  People who eat foods high in oxalate may also be at higher risk  Foods that are high in oxalate include nuts, chocolate, coffee, and green leafy vegetables  · You take certain medicines such as diuretics, steroids, and antacids  · A family member has had kidney stones  · You were born with a kidney or bowel disorder, or you have other medical problems such as gout  What are the signs and symptoms of kidney stones? · Pain in the middle of your back that moves across to your side or that may spread to your groin    · Nausea and vomiting    · Urge to urinate often, burning feeling when you urinate, or pink or red urine    · Tenderness in your lower back, side, or stomach  How are kidney stones diagnosed? Your healthcare provider will ask about your health, diet, and lifestyle  He may refer you to a urologist  Oralia Huffman may need tests to find out what type of kidney stones you have  Tests can show the size of your kidney stones and where they are in your urinary system  You may have one or more of the following:  · Urine tests  may show if you have blood in your urine  They may also show high amounts of the substances that form kidney stones, such as uric acid  · Blood tests  show how well your kidneys are working   They may also be used to check the levels of calcium or uric acid in your blood  · A noncontrast helical CT scan  is a type of x-ray that uses a computer to take pictures of your kidneys  Healthcare providers use the pictures to check for kidney stones or other problems  · X-rays  of your kidneys, bladder, and ureters may be done  You may be given a dye before the pictures are taken to help healthcare providers see the pictures better  You may need to have more than one x-ray  Tell the healthcare provider if you have ever had an allergic reaction to contrast dye  · An abdominal ultrasound  uses sound waves to show pictures of your abdomen on a monitor  How are kidney stones treated? · NSAIDs , such as ibuprofen, help decrease swelling, pain, and fever  This medicine is available with or without a doctor's order  NSAIDs can cause stomach bleeding or kidney problems in certain people  If you take blood thinner medicine, always ask your healthcare provider if NSAIDs are safe for you  Always read the medicine label and follow directions  · Prescription medicine  may be given  Ask how to take this medicine safely  · Medicines  to balance your electrolytes may be needed  · A procedure or surgery  to remove the kidney stones may be needed if they do not pass on their own  Your treatment will depend on the size and location of your kidney stones  How can I manage my symptoms? · Drink plenty of liquids  Your healthcare provider may tell you to drink at least 8 to 12 (eight-ounce) cups of liquids each day  This helps flush out the kidney stones when you urinate  Water is the best liquid to drink  · Strain your urine every time you go to the bathroom  Urinate through a strainer or a piece of thin cloth to catch the stones  Take the stones to your healthcare provider so they can be sent to the lab for tests  This will help your healthcare providers plan the best treatment for you             · Eat a variety of healthy foods  Healthy foods include fruits, vegetables, whole-grain breads, low-fat dairy products, beans, and fish  You may need to limit how much sodium (salt) or protein you eat  Ask for information about the best foods for you  · Exercise regularly  Your stones may pass more easily if you stay active  Ask about the best activities for you  When should I seek immediate care? · You have vomiting that is not relieved by medicine  When should I contact my healthcare provider? · You have a fever  · You have trouble passing urine  · You see blood in your urine  · You have severe pain  · You have any questions or concerns about your condition or care  CARE AGREEMENT:   You have the right to help plan your care  Learn about your health condition and how it may be treated  Discuss treatment options with your caregivers to decide what care you want to receive  You always have the right to refuse treatment  The above information is an  only  It is not intended as medical advice for individual conditions or treatments  Talk to your doctor, nurse or pharmacist before following any medical regimen to see if it is safe and effective for you  © 2017 [employer] Information is for End User's use only and may not be sold, redistributed or otherwise used for commercial purposes  All illustrations and images included in CareNotes® are the copyrighted property of [employer]  or Emmett Gutierrez

## 2020-07-24 NOTE — ASSESSMENT & PLAN NOTE
AUA symptom score is 7 and he is satisfied with his voiding pattern  Options were discussed  At this point he is content to continue observation and watchful waiting  We will plan to recheck a PSA in September  His last PSA 1 year ago was 0 8

## 2020-07-29 ENCOUNTER — OFFICE VISIT (OUTPATIENT)
Dept: NEPHROLOGY | Facility: CLINIC | Age: 75
End: 2020-07-29
Payer: MEDICARE

## 2020-07-29 VITALS
BODY MASS INDEX: 25.18 KG/M2 | TEMPERATURE: 98.6 F | DIASTOLIC BLOOD PRESSURE: 82 MMHG | SYSTOLIC BLOOD PRESSURE: 142 MMHG | HEIGHT: 69 IN | WEIGHT: 170 LBS

## 2020-07-29 DIAGNOSIS — E11.649 CONTROLLED TYPE 2 DIABETES MELLITUS WITH HYPOGLYCEMIA, WITH LONG-TERM CURRENT USE OF INSULIN (HCC): Primary | ICD-10-CM

## 2020-07-29 DIAGNOSIS — E55.9 VITAMIN D DEFICIENCY: ICD-10-CM

## 2020-07-29 DIAGNOSIS — N18.30 CKD (CHRONIC KIDNEY DISEASE), STAGE III (HCC): ICD-10-CM

## 2020-07-29 DIAGNOSIS — N40.1 BENIGN NON-NODULAR PROSTATIC HYPERPLASIA WITH LOWER URINARY TRACT SYMPTOMS: ICD-10-CM

## 2020-07-29 DIAGNOSIS — N20.0 NEPHROLITHIASIS: ICD-10-CM

## 2020-07-29 DIAGNOSIS — Z79.4 CONTROLLED TYPE 2 DIABETES MELLITUS WITH HYPOGLYCEMIA, WITH LONG-TERM CURRENT USE OF INSULIN (HCC): Primary | ICD-10-CM

## 2020-07-29 DIAGNOSIS — I10 ESSENTIAL HYPERTENSION: ICD-10-CM

## 2020-07-29 DIAGNOSIS — G70.00 MYASTHENIA GRAVIS (HCC): ICD-10-CM

## 2020-07-29 DIAGNOSIS — G47.33 OBSTRUCTIVE SLEEP APNEA SYNDROME: ICD-10-CM

## 2020-07-29 DIAGNOSIS — E83.52 HYPERCALCEMIA: ICD-10-CM

## 2020-07-29 DIAGNOSIS — Z87.440 HISTORY OF RECURRENT UTIS: ICD-10-CM

## 2020-07-29 DIAGNOSIS — R31.29 MICROSCOPIC HEMATURIA: ICD-10-CM

## 2020-07-29 PROCEDURE — 99214 OFFICE O/P EST MOD 30 MIN: CPT | Performed by: NURSE PRACTITIONER

## 2020-07-29 PROCEDURE — 3079F DIAST BP 80-89 MM HG: CPT | Performed by: NURSE PRACTITIONER

## 2020-07-29 PROCEDURE — 3077F SYST BP >= 140 MM HG: CPT | Performed by: NURSE PRACTITIONER

## 2020-07-29 PROCEDURE — 3044F HG A1C LEVEL LT 7.0%: CPT | Performed by: NURSE PRACTITIONER

## 2020-07-29 PROCEDURE — 3008F BODY MASS INDEX DOCD: CPT | Performed by: NURSE PRACTITIONER

## 2020-07-29 PROCEDURE — 1036F TOBACCO NON-USER: CPT | Performed by: NURSE PRACTITIONER

## 2020-07-29 PROCEDURE — 2022F DILAT RTA XM EVC RTNOPTHY: CPT | Performed by: NURSE PRACTITIONER

## 2020-07-29 PROCEDURE — 3066F NEPHROPATHY DOC TX: CPT | Performed by: NURSE PRACTITIONER

## 2020-07-29 PROCEDURE — 1160F RVW MEDS BY RX/DR IN RCRD: CPT | Performed by: NURSE PRACTITIONER

## 2020-07-29 NOTE — PROGRESS NOTES
OFFICE FOLLOW UP - Nephrology   Amadeo Segovia 76 y o  male MRN: [id number]       ASSESSMENT and PLAN:  Mei Philippe was seen today for follow-up and chronic kidney disease  Diagnoses and all orders for this visit:    Controlled type 2 diabetes mellitus with hypoglycemia, with long-term current use of insulin ([facility] [address] )    Obstructive sleep apnea syndrome    Essential hypertension    Myasthenia gravis ([facility] [address] )    Nephrolithiasis  -     Uric acid; Future    CKD (chronic kidney disease), stage III ([facility])  -     Basic metabolic panel; Future    Benign non-nodular prostatic hyperplasia with lower urinary tract symptoms    Microscopic hematuria    Hypercalcemia    History of recurrent UTIs    Vitamin D deficiency  -     Vitamin D 25 hydroxy; Future      CKD III: Follows with Dr Janelle Jerez  Baseline creatinine 1 3-1 7  Chronic disease likely secondary to hypertensive nephrosclerosis, diabetic nephropathy, age-related nephron loss, +/-component of urinary retention  -previous workup including SPEP, UPEP, IgG, IgA, and IgM were all within normal limits   -most recent CT scan showed bilateral renal calculi  Following with Urology  Patient is to have 24 hour urine for stone analysis   -the patient remains stable from renal perspective  Most recent creatinine was 1 3   -last urinalysis: bland    -last UPCR: 0 10   -continue to encourage oral hydration   -continue to avoid nephrotoxins  Hypertension: Blood pressure reported okay  -previously on ACE-I    -continue to check blood pressure at home   -continue to follow low-salt diet  -currently not on anti-hypertensive's  Nephrolithiasis:  Following outpatient with Urology  Last CT scan shows bilateral nonobstructed renal stones  He is to complete a 24 hour urine for stone analysis  This was ordered by Urology  He also is to have a recheck KUB for surgical evaluation  Previous stone analysis showed 50% calcium oxalate, 50% uric acid    Last reported stone was about 1 year ago  He had lithotripsy last in 2018    -continue to drink plenty of water   -please obtain 24 hour urine for stone analysis  -encouraged add lemon to water   -continue follow-up with Urology   -requesting to see Dr Sheela Sanchez next appointment for his stones  BPH:  Denies urinary symptoms besides frequency  -continue follow-up with Urology  Myasthenia gravis: Following with Neurology  He is on prednisone 15 mg every other daily  Diabetes:  Last hemoglobin A1c was 6 1  Following with primary care physician for Dr Indira Haider control  Continue diabetic diet  Patient states that he takes his insulin when he wants to  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:  -phosphorus, Mg, Calcium level normal    -Vitamin D level: 28 6 on 01/07/2020  Started on Vitamin-D supplements, stopped taking about 3 months ago  Has not had repeat labs  Will repeat  -PTH: 53 9 on 01/07/2020  Patient will follow up in 4 months  with Dr Sheela Sanchez  HPI: Melba Ahumada is a 76 y o  male with past medical history of CKD stage 3, myasthenia gravis, nephrolithiasis, previously requiring lithotripsy, BPH, and diabetes who is here for routine follow-up for his chronic kidney disease  The patient has previously seen myself as well as Dr Devora Alcaraz  His baseline creatinine is 1 3-1 7  He is following closely with Urology for his kidney stones  He is requesting to see Dr Sheela Sanchez for his next appointment for his kidney stones  He is non-compliant with his dietary restriction although he admits to stopping drinking soda  He states he is overall doing well  He is following closely with his doctors  He recently changed his prednisone to 15 mg every other day  He is taking all of his medications as prescribed  He denies taking NSAID's  The patient is very talkative during his visits  ROS:   A complete review of systems was done  Pertinent positives and negatives as noted in the HPI, otherwise the review of systems is negative      Allergies: Levaquin [levofloxacin]; Quinolones; Antihistamines, chlorpheniramine-type; Benzodiazepines; Chlorpheniramine; Diphenhydramine; Erythromycin; Iodine; Iodine solution [povidone iodine]; Loratadine;  Other; Sulfa antibiotics; and Tetracyclines & related    Medications:   Current Outpatient Medications:     Accu-Chek FastClix Lancets MISC, Test four times daily  [id number] [id number], Disp: 360 each, Rfl: 1    ACCU-CHEK GUIDE test strip, 1 each by Other route 4 (four) times a day Use as instructed bid scheduled and prn symptoms/signs blood sugar fluctuation  DX:E11 649, Disp: 360 each, Rfl: 1    BD ULTRA-FINE PEN NEEDLES 29G X 12 7MM MISC, , Disp: , Rfl:     Insulin Aspart FlexPen (NovoLOG FlexPen) 100 UNIT/ML SOPN, Inject 6 Units under the skin 3 (three) times a day before meals, Disp: 15 mL, Rfl: 1    insulin glargine (LANTUS) 100 units/mL subcutaneous injection, Inject 12 Units under the skin daily at bedtime, Disp: 1000 Units, Rfl: 1    Insulin Pen Needle (BD ULTRA-FINE PEN NEEDLES) 29G X 12 7MM MISC, by Does not apply route 4 (four) times a day, Disp: 360 each, Rfl: 3    predniSONE 10 mg tablet, Take 1 tablet (10 mg total) by mouth daily, Disp: 90 tablet, Rfl: 1    predniSONE 5 mg tablet, Take 1 tablet (5 mg total) by mouth daily, Disp: 90 tablet, Rfl: 1    atorvastatin (LIPITOR) 10 mg tablet, Take 1 tablet (10 mg total) by mouth daily (Patient not taking: Reported on 1/16/2020), Disp: 30 tablet, Rfl: 1    cholecalciferol (VITAMIN D3) 1,000 units tablet, Take 1 tablet (1,000 Units total) by mouth daily (Patient not taking: Reported on 7/13/2020), Disp: 90 tablet, Rfl: 0    Cranberry 500 MG CAPS, Take 4 capsules by mouth daily , Disp: , Rfl:     docusate sodium (COLACE) 250 MG capsule, Take 1 capsule (250 mg total) by mouth daily (Patient not taking: Reported on 10/29/2019), Disp: 30 capsule, Rfl: 0    Lactobacillus (ACIDOPHILUS) 100 MG CAPS, Take 1 capsule by mouth daily, Disp: , Rfl:     lidocaine (LIDODERM) 5 %, Apply 1 patch topically daily Remove & Discard patch within 12 hours or as directed by MD (Patient not taking: Reported on 10/29/2019), Disp: 30 patch, Rfl: 0    Potassium 99 MG TABS, Take 1 tablet by mouth 2 (two) times a day , Disp: , Rfl:     pyridoxine (VITAMIN B6) 100 mg tablet, Take 100 mg by mouth daily  , Disp: , Rfl:     Past Medical History:   Diagnosis Date    [facility]     Anxiety     Cervical spinal stenosis     CKD (chronic kidney disease)     HTN (hypertension)     Hx of chronic ulcerative colitis     Hypercalcemia     Hyperlipidemia     Kidney atrophy     Microscopic hematuria     [facility]henia gravis [facility] [address] )     Nephrolithiasis     Sleep apnea      Past Surgical History:   Procedure Laterality Date    COLECTOMY      partial sigmoid    COLECTOMY LAPAROSCOPIC      partial sigmoid colectomy    COLON SURGERY      lap  partial colectomy sigmoid    COLONOSCOPY      LITHOTRIPSY      OTHER SURGICAL HISTORY      parotid bx    MI FRAGMENT KIDNEY STONE/ ESWL Left 11/2/2018    Procedure: LITHROTRIPSY EXTRACORPORAL SHOCKWAVE (ESWL); Surgeon: Darryl Tovar MD;  Location: [facility];  Service: Urology    SALIVARY GLAND SURGERY      biopsy     Family History   Problem Relation Age of Onset    Arthritis Mother     Diabetes Mother     Hypertension Mother     Osteoporosis Mother     Depression Father     Cancer Neg Hx     Heart disease Neg Hx       reports that he has never smoked  He has never used smokeless tobacco  He reports that he drank alcohol  He reports that he does not use drugs  Physical Exam:   Vitals:    07/29/20 1245 07/29/20 1308   BP:  142/82   Temp: 98 6 °F (37 °C)    TempSrc: Oral    Weight: 77 1 kg (170 lb)    Height: 5' 9" (1 753 m)      Body mass index is 25 1 kg/m²      General: no acute distress   Eyes: conjunctivae pink, anicteric sclerae  ENT: mucous membranes moist  Neck: supple, no JVD  Chest: clear to auscultation bilaterally with no wheezes, rale or rhochi  CVS: regular rate and rhythm   Abdomen: soft, non-tender, non-distended  Extremities: no lower extremity edema   Skin: no rash  Neuro: awake and alert       Lab Results:  Results for orders placed or performed in visit on 07/24/20   POCT urine dip auto non-scope   Result Value Ref Range     COLOR,UA yellow     CLARITY,UA clear     SPECIFIC GRAVITY,UA 1 025      PH,UA 5 5     LEUKOCYTE ESTERASE,UA trace     NITRITE,UA neg     GLUCOSE, UA trace     KETONES,UA neg     BILIRUBIN,UA neg     BLOOD,UA neg     POCT URINE PROTEIN neg     SL AMB POCT UROBILINOGEN 0 2              Invalid input(s): ALBUMIN      Portions of the record may have been created with voice recognition software  Occasional wrong word or "sound a like" substitutions may have occurred due to the inherent limitations of voice recognition software  Read the chart carefully and recognize, using context, where substitutions have occurred  If you have any questions, please contact the dictating provider

## 2020-07-29 NOTE — PATIENT INSTRUCTIONS
Your kidney function remains stable  Most recently 1 3  Continue to drink plenty of fluids  You may add lemon for stone prevention  Continue to avoid motrin or ibuprofen  Continue low salt diet  Please obtain 24 urine for stone analysis and KUB  Continues to follow with Urology  We will arrange your next follow up with Dr Nata Matias  We will also check blood work prior to your next appointment  Chronic Kidney Disease   WHAT YOU SHOULD KNOW:   Chronic kidney disease (CKD) is the gradual and permanent loss of kidney function  It is also called chronic kidney failure, or chronic renal insufficiency  Normally, the kidneys remove fluid, chemicals, and waste from your blood  These wastes are turned into urine by your kidneys  When you have CKD, your kidneys do not function properly  CKD may worsen over time and lead to kidney failure  INSTRUCTIONS:   Medicines:  · Medicines may be given to decrease blood pressure and get rid of extra fluid  You may also receive medicine to manage health conditions that may occur with CKD, such as anemia, diabetes, and heart disease  · Take your medicine as directed  Call your healthcare provider if you think your medicine is not helping or if you have side effects  Tell him if you are allergic to any medicine  Keep a list of the medicines, vitamins, and herbs you take  Include the amounts, and when and why you take them  Bring the list or the pill bottles to follow-up visits  Carry your medicine list with you in case of an emergency  Follow up with your healthcare provider as directed: You will need to return for tests to monitor your kidney function  You may also be referred to a kidney specialist  Write down your questions so you remember to ask them during your visits  Manage other health conditions: Follow your healthcare provider's directions on how to manage diabetes, high blood pressure, and heart disease  These conditions can make CKD worse   Talk to your healthcare provider before you take over-the-counter medicine  Medicines such as NSAIDs, stomach medicine, or laxatives may harm your kidneys  Weigh yourself daily: Ask your healthcare provider what your weight should be  Ask how much liquid you should drink each day  CKD may cause you to gain or lose weight rapidly  Weigh yourself every day  Write down your weight, how much liquid you drink or eat, and how much you urinate each day  Call your healthcare provider if your weight is higher or lower than it should be  Manage CKD:  · Maintain a healthy weight  Ask your healthcare provider how much you should weigh  Ask him to help you create a weight loss plan if you are overweight  · Exercise 30 to 60 minutes a day, 4 to 7 times a week, or as directed  Ask about the best exercise plan for you  Regular exercise can help you manage CKD, high blood pressure, and diabetes  · Follow your healthcare provider's advice about what to eat and drink  He may tell you to eat food low in sodium (salt), potassium, phosphorus, or protein  You may need to see a dietitian if you need help planning meals  · Limit alcohol  Ask how much alcohol is safe for you to drink  A drink of alcohol is 12 ounces of beer, 5 ounces of wine, or 1½ ounces of liquor  · Do not smoke  Smoking harms your kidneys  If you smoke, it is never too late to quit  Ask for information if you need help quitting  · Ask your healthcare provider if you need vaccines  Infections such as pneumonia, influenza, and hepatitis can be more harmful or more likely to occur when you have CKD  Vaccines reduce your risk of infection with these viruses  Contact your healthcare provider if:  · You suddenly gain or lose more weight than your healthcare provider has told you is okay  · You have itchy skin or a rash  · You urinate more or less than you normally do  · You have blood in your urine  · You have nausea and repeated vomiting       · You have fatigue or muscle weakness  · You have hiccups that will not stop  · You have questions or concerns about your condition or care  Return to the emergency department if:  · Your heart is beating faster than normal for you  · You are confused and very drowsy  · You have a seizure  · You have sudden chest pain or shortness of breath

## 2020-08-10 ENCOUNTER — TELEPHONE (OUTPATIENT)
Dept: FAMILY MEDICINE CLINIC | Facility: CLINIC | Age: 75
End: 2020-08-10

## 2020-08-10 DIAGNOSIS — E11.649 CONTROLLED TYPE 2 DIABETES MELLITUS WITH HYPOGLYCEMIA, WITH LONG-TERM CURRENT USE OF INSULIN (HCC): ICD-10-CM

## 2020-08-10 DIAGNOSIS — Z79.4 CONTROLLED TYPE 2 DIABETES MELLITUS WITH HYPOGLYCEMIA, WITH LONG-TERM CURRENT USE OF INSULIN (HCC): ICD-10-CM

## 2020-08-10 DIAGNOSIS — N18.30 CKD (CHRONIC KIDNEY DISEASE), STAGE III (HCC): ICD-10-CM

## 2020-08-11 RX ORDER — BLOOD SUGAR DIAGNOSTIC
1 STRIP MISCELLANEOUS 4 TIMES DAILY
Qty: 360 EACH | Refills: 1 | Status: SHIPPED | OUTPATIENT
Start: 2020-08-11

## 2020-09-08 ENCOUNTER — OFFICE VISIT (OUTPATIENT)
Dept: FAMILY MEDICINE CLINIC | Facility: CLINIC | Age: 75
End: 2020-09-08
Payer: MEDICARE

## 2020-09-08 VITALS
HEIGHT: 69 IN | BODY MASS INDEX: 27.4 KG/M2 | TEMPERATURE: 97.3 F | RESPIRATION RATE: 18 BRPM | WEIGHT: 185 LBS | OXYGEN SATURATION: 99 % | DIASTOLIC BLOOD PRESSURE: 80 MMHG | SYSTOLIC BLOOD PRESSURE: 138 MMHG | HEART RATE: 99 BPM

## 2020-09-08 DIAGNOSIS — Z23 NEED FOR INFLUENZA VACCINATION: ICD-10-CM

## 2020-09-08 DIAGNOSIS — G70.00 MYASTHENIA GRAVIS (HCC): ICD-10-CM

## 2020-09-08 DIAGNOSIS — Z79.52 LONG TERM (CURRENT) USE OF SYSTEMIC STEROIDS: ICD-10-CM

## 2020-09-08 DIAGNOSIS — E78.5 HYPERLIPIDEMIA, UNSPECIFIED HYPERLIPIDEMIA TYPE: ICD-10-CM

## 2020-09-08 DIAGNOSIS — I10 ESSENTIAL HYPERTENSION: ICD-10-CM

## 2020-09-08 DIAGNOSIS — Z79.4 CONTROLLED TYPE 2 DIABETES MELLITUS WITH HYPOGLYCEMIA, WITH LONG-TERM CURRENT USE OF INSULIN (HCC): Primary | ICD-10-CM

## 2020-09-08 DIAGNOSIS — E11.649 CONTROLLED TYPE 2 DIABETES MELLITUS WITH HYPOGLYCEMIA, WITH LONG-TERM CURRENT USE OF INSULIN (HCC): Primary | ICD-10-CM

## 2020-09-08 DIAGNOSIS — Z63.6 CAREGIVER STRESS: ICD-10-CM

## 2020-09-08 PROBLEM — T46.6X5A ADVERSE EFFECT OF STATIN: Status: ACTIVE | Noted: 2020-09-08

## 2020-09-08 PROBLEM — D58.2 ELEVATED HEMOGLOBIN (HCC): Status: RESOLVED | Noted: 2019-06-18 | Resolved: 2020-09-08

## 2020-09-08 PROBLEM — Z91.81 HISTORY OF FALL WITHIN PAST 90 DAYS: Status: RESOLVED | Noted: 2018-11-15 | Resolved: 2020-09-08

## 2020-09-08 PROBLEM — E83.52 HYPERCALCEMIA: Status: RESOLVED | Noted: 2018-02-14 | Resolved: 2020-09-08

## 2020-09-08 PROBLEM — R26.89 BALANCE PROBLEMS: Status: RESOLVED | Noted: 2019-06-17 | Resolved: 2020-09-08

## 2020-09-08 PROBLEM — Z91.81 HISTORY OF FALLING: Status: ACTIVE | Noted: 2019-05-20

## 2020-09-08 PROCEDURE — 99214 OFFICE O/P EST MOD 30 MIN: CPT | Performed by: FAMILY MEDICINE

## 2020-09-08 PROCEDURE — 90662 IIV NO PRSV INCREASED AG IM: CPT

## 2020-09-08 PROCEDURE — G0008 ADMIN INFLUENZA VIRUS VAC: HCPCS

## 2020-09-08 SDOH — SOCIAL STABILITY - SOCIAL INSECURITY: DEPENDENT RELATIVE NEEDING CARE AT HOME: Z63.6

## 2020-09-08 NOTE — PROGRESS NOTES
Assessment/Plan:         Diagnoses and all orders for this visit:    Controlled type 2 diabetes mellitus with hypoglycemia, with long-term current use of insulin ([facility] [address] )  Comments:  cpm    Caregiver stress  Comments:  I gave pt time to vent frustrations and listened to his complaints, encouraged the methods he is using for stress relief    Essential hypertension  Comments:  controlled, cpm    Hyperlipidemia, unspecified hyperlipidemia type    Myasthenia gravis ([facility] [address] )  Comments:  stable, managed by neuro  Orders:  -     DXA bone density spine hip and pelvis; Future    Long term (current) use of systemic steroids  -     DXA bone density spine hip and pelvis; Future    Need for influenza vaccination  -     influenza vaccine, high-dose, PF 0 7 mL (FLUZONE HIGH-DOSE)          Subjective:   Chief Complaint   Patient presents with    Follow-up     Diabetic foot exam completed by [facility] (message sent for report)    Eczema     Wondering what should be used for dry skin         Patient ID: Emely Porter is a 76 y o  male      Routine f/u appt  "Gained weight- 15 pounds since July"   No edema  "Very dry skin and its itchy"  He spends most of visit talking about his frustrations 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 who still lives alone   Regarding his DM2, states, "One time bs was 290, this morning 92" and most have been well-controlled  Has regular bm daily, but gets bloating and then will have larger bm, no meds for constip      The following portions of the patient's history were reviewed and updated as appropriate: allergies, current medications, past family history, past medical history, past social history, past surgical history and problem list     Review of Systems      Objective:      /80 (BP Location: Left arm, Patient Position: Sitting)   Pulse 99   Temp (!) 97 3 °F (36 3 °C)   Resp 18   Ht 5' 9" (1 753 m)   Wt 83 9 kg (185 lb)   SpO2 99%   BMI 27 32 kg/m²          Physical Exam  Constitutional:       General: He is not in acute distress  Appearance: He is well-developed  He is not ill-appearing, toxic-appearing or diaphoretic  HENT:      Head: Normocephalic and atraumatic  Eyes:      General: Lids are normal       Conjunctiva/sclera: Conjunctivae normal    Neck:      Trachea: Phonation normal    Cardiovascular:      Rate and Rhythm: Normal rate and regular rhythm  Comments: Unchanged exam  Pulmonary:      Effort: Pulmonary effort is normal       Breath sounds: Normal breath sounds  Abdominal:      General: There is distension  Palpations: Abdomen is soft  There is no hepatomegaly, splenomegaly or mass  Tenderness: There is no abdominal tenderness  Hernia: There is no hernia in the ventral area  Skin:     General: Skin is warm and dry  Capillary Refill: Capillary refill takes less than 2 seconds  Coloration: Skin is not pale  Comments: Lower extremities dry skin, no rash   Neurological:      Mental Status: He is alert and oriented to person, place, and time  Gait: Gait normal    Psychiatric:         Mood and Affect: Mood normal          Behavior: Behavior is cooperative

## 2020-10-08 ENCOUNTER — TELEPHONE (OUTPATIENT)
Dept: FAMILY MEDICINE CLINIC | Facility: CLINIC | Age: 75
End: 2020-10-08

## 2020-11-03 ENCOUNTER — TELEPHONE (OUTPATIENT)
Dept: NEUROLOGY | Facility: CLINIC | Age: 75
End: 2020-11-03

## 2020-11-04 ENCOUNTER — OFFICE VISIT (OUTPATIENT)
Dept: NEUROLOGY | Facility: CLINIC | Age: 75
End: 2020-11-04
Payer: MEDICARE

## 2020-11-04 VITALS
TEMPERATURE: 97.9 F | DIASTOLIC BLOOD PRESSURE: 80 MMHG | HEART RATE: 113 BPM | HEIGHT: 66 IN | BODY MASS INDEX: 29.67 KG/M2 | WEIGHT: 184.6 LBS | SYSTOLIC BLOOD PRESSURE: 130 MMHG

## 2020-11-04 DIAGNOSIS — G70.00 MYASTHENIA GRAVIS (HCC): ICD-10-CM

## 2020-11-04 PROCEDURE — 99213 OFFICE O/P EST LOW 20 MIN: CPT | Performed by: PSYCHIATRY & NEUROLOGY

## 2020-11-04 RX ORDER — PREDNISONE 10 MG/1
10 TABLET ORAL DAILY
Qty: 90 TABLET | Refills: 1 | Status: SHIPPED | OUTPATIENT
Start: 2020-11-04

## 2020-11-05 ENCOUNTER — TELEPHONE (OUTPATIENT)
Dept: UROLOGY | Facility: MEDICAL CENTER | Age: 75
End: 2020-11-05

## 2020-11-05 ENCOUNTER — OFFICE VISIT (OUTPATIENT)
Dept: NEPHROLOGY | Facility: CLINIC | Age: 75
End: 2020-11-05
Payer: MEDICARE

## 2020-11-05 VITALS
TEMPERATURE: 98 F | BODY MASS INDEX: 29.57 KG/M2 | DIASTOLIC BLOOD PRESSURE: 80 MMHG | SYSTOLIC BLOOD PRESSURE: 130 MMHG | HEIGHT: 66 IN | HEART RATE: 70 BPM | WEIGHT: 184 LBS

## 2020-11-05 DIAGNOSIS — N20.0 NEPHROLITHIASIS: Primary | ICD-10-CM

## 2020-11-05 PROCEDURE — 99214 OFFICE O/P EST MOD 30 MIN: CPT | Performed by: INTERNAL MEDICINE

## 2020-11-06 ENCOUNTER — TELEPHONE (OUTPATIENT)
Dept: UROLOGY | Facility: MEDICAL CENTER | Age: 75
End: 2020-11-06

## 2020-11-06 ENCOUNTER — APPOINTMENT (OUTPATIENT)
Dept: LAB | Facility: CLINIC | Age: 75
End: 2020-11-06
Payer: MEDICARE

## 2020-11-06 DIAGNOSIS — N20.0 NEPHROLITHIASIS: Primary | ICD-10-CM

## 2020-11-06 PROCEDURE — 82360 CALCULUS ASSAY QUANT: CPT

## 2020-11-09 ENCOUNTER — LAB (OUTPATIENT)
Dept: LAB | Facility: CLINIC | Age: 75
End: 2020-11-09
Payer: MEDICARE

## 2020-11-09 DIAGNOSIS — E55.9 VITAMIN D DEFICIENCY: ICD-10-CM

## 2020-11-09 DIAGNOSIS — N20.0 NEPHROLITHIASIS: ICD-10-CM

## 2020-11-09 DIAGNOSIS — N18.30 CKD (CHRONIC KIDNEY DISEASE), STAGE III (HCC): ICD-10-CM

## 2020-11-09 LAB
25(OH)D3 SERPL-MCNC: 24.1 NG/ML (ref 30–100)
ANION GAP SERPL CALCULATED.3IONS-SCNC: 7 MMOL/L (ref 4–13)
BUN SERPL-MCNC: 16 MG/DL (ref 5–25)
CALCIUM SERPL-MCNC: 9.8 MG/DL (ref 8.3–10.1)
CHLORIDE SERPL-SCNC: 109 MMOL/L (ref 100–108)
CO2 SERPL-SCNC: 27 MMOL/L (ref 21–32)
CREAT SERPL-MCNC: 1.29 MG/DL (ref 0.6–1.3)
GFR SERPL CREATININE-BSD FRML MDRD: 54 ML/MIN/1.73SQ M
GLUCOSE P FAST SERPL-MCNC: 72 MG/DL (ref 65–99)
POTASSIUM SERPL-SCNC: 4.3 MMOL/L (ref 3.5–5.3)
SODIUM SERPL-SCNC: 143 MMOL/L (ref 136–145)
URATE SERPL-MCNC: 7 MG/DL (ref 4.2–8)

## 2020-11-09 PROCEDURE — 84550 ASSAY OF BLOOD/URIC ACID: CPT

## 2020-11-09 PROCEDURE — 36415 COLL VENOUS BLD VENIPUNCTURE: CPT

## 2020-11-09 PROCEDURE — 82306 VITAMIN D 25 HYDROXY: CPT

## 2020-11-09 PROCEDURE — 80048 BASIC METABOLIC PNL TOTAL CA: CPT

## 2020-11-10 ENCOUNTER — TELEPHONE (OUTPATIENT)
Dept: FAMILY MEDICINE CLINIC | Facility: CLINIC | Age: 75
End: 2020-11-10

## 2020-11-10 ENCOUNTER — TELEPHONE (OUTPATIENT)
Dept: NEPHROLOGY | Facility: CLINIC | Age: 75
End: 2020-11-10

## 2020-11-13 LAB
CA CARBONATE CRY STONE QL IR: 40 %
COLOR STONE: NORMAL
COM MFR STONE: 10 %
COMMENT-STONE3: NORMAL
COMPOSITION: NORMAL
LABORATORY COMMENT REPORT: NORMAL
PHOTO: NORMAL
SIZE STONE: NORMAL MM
SPEC SOURCE SUBJ: NORMAL
STONE ANALYSIS-IMP: NORMAL
STONE ANALYSIS-IMP: NORMAL
TRI-PHOS MFR STONE: 50 %
WT STONE: 12 MG

## 2020-11-19 ENCOUNTER — TELEPHONE (OUTPATIENT)
Dept: NEUROLOGY | Facility: CLINIC | Age: 75
End: 2020-11-19

## 2020-12-03 ENCOUNTER — TELEPHONE (OUTPATIENT)
Dept: FAMILY MEDICINE CLINIC | Facility: CLINIC | Age: 75
End: 2020-12-03

## 2020-12-03 DIAGNOSIS — E11.9 CONTROLLED TYPE 2 DIABETES MELLITUS WITHOUT COMPLICATION, WITH LONG-TERM CURRENT USE OF INSULIN (HCC): ICD-10-CM

## 2020-12-03 DIAGNOSIS — Z79.4 TYPE 2 DIABETES MELLITUS WITH COMPLICATION, WITH LONG-TERM CURRENT USE OF INSULIN (HCC): ICD-10-CM

## 2020-12-03 DIAGNOSIS — E11.8 TYPE 2 DIABETES MELLITUS WITH COMPLICATION, WITH LONG-TERM CURRENT USE OF INSULIN (HCC): ICD-10-CM

## 2020-12-03 DIAGNOSIS — Z79.4 CONTROLLED TYPE 2 DIABETES MELLITUS WITHOUT COMPLICATION, WITH LONG-TERM CURRENT USE OF INSULIN (HCC): ICD-10-CM

## 2020-12-03 RX ORDER — PEN NEEDLE, DIABETIC 29 G X1/2"
NEEDLE, DISPOSABLE MISCELLANEOUS 4 TIMES DAILY
Qty: 360 EACH | Refills: 3 | Status: SHIPPED | OUTPATIENT
Start: 2020-12-03

## 2020-12-03 RX ORDER — INSULIN GLARGINE 100 [IU]/ML
12 INJECTION, SOLUTION SUBCUTANEOUS
Qty: 5 PEN | Refills: 1 | Status: SHIPPED | OUTPATIENT
Start: 2020-12-03

## 2020-12-07 ENCOUNTER — TELEPHONE (OUTPATIENT)
Dept: FAMILY MEDICINE CLINIC | Facility: CLINIC | Age: 75
End: 2020-12-07

## 2020-12-08 DIAGNOSIS — E11.22 CONTROLLED TYPE 2 DIABETES MELLITUS WITH STAGE 3 CHRONIC KIDNEY DISEASE, WITH LONG-TERM CURRENT USE OF INSULIN (HCC): ICD-10-CM

## 2020-12-08 DIAGNOSIS — N18.30 CONTROLLED TYPE 2 DIABETES MELLITUS WITH STAGE 3 CHRONIC KIDNEY DISEASE, WITH LONG-TERM CURRENT USE OF INSULIN (HCC): ICD-10-CM

## 2020-12-08 DIAGNOSIS — I10 ESSENTIAL HYPERTENSION: Primary | ICD-10-CM

## 2020-12-08 DIAGNOSIS — E78.5 HYPERLIPIDEMIA, UNSPECIFIED HYPERLIPIDEMIA TYPE: ICD-10-CM

## 2020-12-08 DIAGNOSIS — Z79.4 CONTROLLED TYPE 2 DIABETES MELLITUS WITH STAGE 3 CHRONIC KIDNEY DISEASE, WITH LONG-TERM CURRENT USE OF INSULIN (HCC): ICD-10-CM

## 2020-12-08 DIAGNOSIS — Z13.0 SCREENING FOR DEFICIENCY ANEMIA: ICD-10-CM

## 2020-12-19 DIAGNOSIS — Z20.822 EXPOSURE TO COVID-19 VIRUS: Primary | ICD-10-CM

## 2020-12-21 ENCOUNTER — TELEMEDICINE (OUTPATIENT)
Dept: FAMILY MEDICINE CLINIC | Facility: CLINIC | Age: 75
End: 2020-12-21
Payer: MEDICARE

## 2020-12-21 ENCOUNTER — TELEPHONE (OUTPATIENT)
Dept: NEUROLOGY | Facility: CLINIC | Age: 75
End: 2020-12-21

## 2020-12-21 DIAGNOSIS — Z20.822 CLOSE EXPOSURE TO COVID-19 VIRUS: Primary | ICD-10-CM

## 2020-12-21 PROCEDURE — 87637 SARSCOV2&INF A&B&RSV AMP PRB: CPT | Performed by: FAMILY MEDICINE

## 2020-12-21 PROCEDURE — 99442 PR PHYS/QHP TELEPHONE EVALUATION 11-20 MIN: CPT | Performed by: FAMILY MEDICINE

## 2020-12-22 ENCOUNTER — TELEPHONE (OUTPATIENT)
Dept: FAMILY MEDICINE CLINIC | Facility: CLINIC | Age: 75
End: 2020-12-22

## 2020-12-22 LAB
FLUAV RNA NPH QL NAA+PROBE: NOT DETECTED
FLUBV RNA NPH QL NAA+PROBE: NOT DETECTED
RSV RNA NPH QL NAA+PROBE: NOT DETECTED
SARS-COV-2 RNA NPH QL NAA+PROBE: DETECTED

## 2020-12-23 ENCOUNTER — TELEPHONE (OUTPATIENT)
Dept: FAMILY MEDICINE CLINIC | Facility: CLINIC | Age: 75
End: 2020-12-23

## 2020-12-23 DIAGNOSIS — U07.1 COVID-19 VIRUS INFECTION: Primary | ICD-10-CM

## 2020-12-23 RX ORDER — ALBUTEROL SULFATE 90 UG/1
3 AEROSOL, METERED RESPIRATORY (INHALATION) ONCE AS NEEDED
Status: CANCELLED | OUTPATIENT
Start: 2020-12-26

## 2020-12-23 RX ORDER — ACETAMINOPHEN 325 MG/1
650 TABLET ORAL ONCE AS NEEDED
Status: CANCELLED | OUTPATIENT
Start: 2020-12-26

## 2020-12-23 RX ORDER — SODIUM CHLORIDE 9 MG/ML
20 INJECTION, SOLUTION INTRAVENOUS ONCE
Status: CANCELLED | OUTPATIENT
Start: 2020-12-26

## 2020-12-26 ENCOUNTER — HOSPITAL ENCOUNTER (OUTPATIENT)
Dept: INFUSION CENTER | Facility: HOSPITAL | Age: 75
Discharge: HOME | End: 2020-12-26

## 2020-12-27 ENCOUNTER — TELEPHONE (OUTPATIENT)
Dept: FAMILY MEDICINE CLINIC | Facility: CLINIC | Age: 75
End: 2020-12-27

## 2022-12-02 NOTE — TELEPHONE ENCOUNTER
Patient confirmed appointment Problem: Risk for Injury, Hyperbaric Oxygen Therapy (HBO Department)  Goal: Remains free from injury (e.g. oxygen toxicity, fire risk)  Outcome: Outcome Met, Continue evaluating goal progress toward completion  Note: Pt arrived to HBO with VAC. Compression tolerated. Repositioned during treatment. HBO tolerated. Continue plan of care.

## 2023-11-01 NOTE — PROGRESS NOTES
10/8/2018    Naeem Party  1945  [id number]        Assessment  Left 8 mm nonobstructing lower pole renal calculus, history of asymptomatic bacteriuria with Pseudomonas colonization      Discussion  We discussed performing left-sided ESWL for his 8 mm lower pole calculus  There are 2 other smaller stones approximately 2-3 mm located in each kidney  We will focus our efforts on the left lower pole stone at this time  We discussed ESWL versus left-sided ureteroscopy  Patient is not interested in stent insertion at this time so we will proceed with left ESW L  Risk and benefits of the procedure were discussed and reviewed  Informed consent was obtained in the office today  He understands the risk of incomplete stone fracture obstruction, or the need for future ureteroscopy  History of Present Illness  68 y o  male with a history of myasthenia gravis  He also has a history of asymptomatic bacteriuria with pseudomonal urinary tract colonization  He was recently hospitalized for another presume pseudomonal infection  He was asymptomatic at that time  He was seen by infectious disease and no further antibiotics were recommended  I reviewed his CT scan which shows 2 small upper pole calculi bilaterally in the 2-3 mm range at most   The only substantial stone identified is within the lower pole of the left kidney and this measures approximately 8 mm  He denies any flank pain  He denies any hematuria  He denies any exacerbation of his myasthenia gravis  He continues to work  PSA from 2017 is 0 4  Per AUA guidelines, PSA screening can be discontinued  AUA Symptom Score      Review of Systems  Review of Systems   Constitutional: Negative  HENT: Negative  Eyes: Negative  Respiratory: Negative  Cardiovascular: Negative  Gastrointestinal: Negative  Endocrine: Negative  Genitourinary: Negative  Musculoskeletal: Negative  Skin: Negative      Allergic/Immunologic: Negative  Neurological: Negative  Hematological: Negative  Psychiatric/Behavioral: Negative            Past Medical History  Past Medical History:   Diagnosis Date    Anxiety     Cervical spinal stenosis     Hx of chronic ulcerative colitis     Hyperlipidemia     Myasthenia gravis ([address] )     Sleep apnea        Past Social History  Past Surgical History:   Procedure Laterality Date    COLECTOMY      partial sigmoid    COLECTOMY LAPAROSCOPIC      partial sigmoid colectomy    COLON SURGERY      lap  partial colectomy sigmoid    COLONOSCOPY      LITHOTRIPSY      OTHER SURGICAL HISTORY      parotid bx    SALIVARY GLAND SURGERY      biopsy       Past Family History  Family History   Problem Relation Age of Onset    Arthritis Mother     Diabetes Mother     Hypertension Mother     Osteoporosis Mother     Depression Father     Cancer Neg Hx     Heart disease Neg Hx        Past Social history  Social History     Social History    Marital status: /Civil Union     Spouse name: N/A    Number of children: N/A    Years of education: N/A     Occupational History    cleans [employer]      Social History Main Topics    Smoking status: Never Smoker    Smokeless tobacco: Never Used    Alcohol use No    Drug use: No    Sexual activity: Not on file     Other Topics Concern    Not on file     Social History Narrative    No caffeine use    Uses seat belts       Current Medications  Current Outpatient Prescriptions   Medication Sig Dispense Refill    ACCU-CHEK FASTCLIX LANCETS MISC TEST twice a day  0    ACCU-CHEK GUIDE test strip 1 each by Other route 4 (four) times a day Use as instructed bid scheduled and prn symptoms/signs blood sugar fluctuation 300 each 0    BD ULTRA-FINE PEN NEEDLES 29G X 12 7MM MISC       cholecalciferol (VITAMIN D3) 1,000 units tablet Take 2 tablets by mouth daily        clotrimazole (LOTRIMIN) 1 % cream Apply topically 2 (two) times a day 30 g 1    Cranberry 500 MG CAPS Take 1 capsule by mouth daily      famotidine (PEPCID) 20 mg tablet Take 1 tablet (20 mg total) by mouth daily 90 tablet 3    insulin aspart (NovoLOG) 100 units/mL injection Inject 4 Units under the skin 3 (three) times a day before meals      insulin glargine (LANTUS) 100 units/mL subcutaneous injection Inject 12 Units under the skin daily at bedtime      Insulin Pen Needle 29G X 12MM MISC by Does not apply route 4 (four) times a day 360 each 0    Lactobacillus (ACIDOPHILUS) 100 MG CAPS Take 1 capsule by mouth daily      lisinopril (ZESTRIL) 2 5 mg tablet       meclizine (ANTIVERT) 12 5 MG tablet Take 12 5 mg by mouth daily        predniSONE 10 mg tablet Take 1 tablet (10 mg total) by mouth daily 90 tablet 0    pyridoxine (VITAMIN B6) 100 mg tablet Take 100 mg by mouth daily         No current facility-administered medications for this visit  Allergies  Allergies   Allergen Reactions    Levaquin [Levofloxacin]      Due to MG dx    Quinolones      Due to MG dx    Antihistamines, Chlorpheniramine-Type      Difficulty urinating    Benzodiazepines     Chlorpheniramine     Diphenhydramine     Erythromycin     Iodine     Iodine Solution [Povidone Iodine]      ivp dye    Loratadine     Other      Muscle relaxers d/t myasthenia gravis-they close my airway    Sulfa Antibiotics     Tetracyclines & Related        Past Medical History, Social History, Family History, medications and allergies were reviewed  Vitals  Vitals:    10/08/18 1511   BP: 130/80   BP Location: Left arm   Patient Position: Sitting   Weight: 85 3 kg (188 lb)   Height: 5' 9" (1 753 m)       Physical Exam  Physical Exam    On examination he is in no acute distress  Lungs are clear  Cardiac is regular  Abdomen is soft nontender nondistended   examination reveals no CVA tenderness  Skin is warm  Extremities without edema    Neurologic is grossly intact and nonfocal   Gait normal   Affect normal     Results  Lab Results   Component Value Date    PSA 0 4 02/24/2017    PSA 0 6 03/18/2016    PSA 0 7 03/25/2015     Lab Results   Component Value Date    GLUCOSE 124 12/11/2015    CALCIUM 9 4 10/07/2018     10/07/2018    K 4 5 10/07/2018    CO2 28 10/07/2018     10/07/2018    BUN 16 10/07/2018    CREATININE 1 29 10/07/2018     Lab Results   Component Value Date    WBC 7 83 10/07/2018    HGB 13 7 10/07/2018    HCT 40 5 10/07/2018    MCV 93 10/07/2018     10/07/2018         Office Urine Dip  No results found for this or any previous visit (from the past 1 hour(s)) ] chest pain none

## 2023-11-20 NOTE — ED NOTES
Patient ambulated to the bathroom        Amparo Craven RN  09/15/18 2028
Patient returned from [address] [place] [address]        Nigel Ferro RN  09/15/18 [id number]
Patient transported to [address] RN  09/15/18 0385
97.1

## 2025-05-21 NOTE — TELEPHONE ENCOUNTER
Patient called stating he started the Metformin pills on Wednesday 9/5/18  He feels he is sleeping a lot, really tired all the time but could be due to weather, has diarrhea daily, numbers are between 102-189, eating whatever he wants to  He does not want to deal with chronic diarrhea  He is hoping that the symptoms will get better with time; however, he is to increase to two this Wednesday  If it is to make diarrhea worse he will not go to two  He has reviewed the information phamplet that came with the medication, has talked to the pharmacist some, and a friend that is on Metformin  Please advise with some input  Thank you  Patient called back stating he called [place] at the pharmacy who told you that you will continue with diarrhea, definitely, it's a major side effect  She recommends that he go back on the pens  He did ask if he took nothing what would happen  He was told it would cause kidney failure  Patient does not want to go on dialysis  He feels chronic diarrhea will cause more problems because he will gain no nutrients  So patient is going back on the insulin, to be safe, and feels he will be fine  Thank you 
no